# Patient Record
Sex: MALE | Race: WHITE | NOT HISPANIC OR LATINO | Employment: OTHER | URBAN - METROPOLITAN AREA
[De-identification: names, ages, dates, MRNs, and addresses within clinical notes are randomized per-mention and may not be internally consistent; named-entity substitution may affect disease eponyms.]

---

## 2019-03-03 ENCOUNTER — HOSPITAL ENCOUNTER (INPATIENT)
Facility: HOSPITAL | Age: 79
LOS: 3 days | Discharge: HOME | DRG: 389 | End: 2019-03-06
Attending: INTERNAL MEDICINE | Admitting: INTERNAL MEDICINE
Payer: MEDICARE

## 2019-03-03 DIAGNOSIS — K56.7 ILEUS: ICD-10-CM

## 2019-03-03 DIAGNOSIS — R09.02 HYPOXIA: ICD-10-CM

## 2019-03-03 DIAGNOSIS — B34.9 ACUTE VIRAL SYNDROME: ICD-10-CM

## 2019-03-03 DIAGNOSIS — R50.9 FEVER: ICD-10-CM

## 2019-03-03 DIAGNOSIS — R11.2 INTRACTABLE VOMITING WITH NAUSEA, UNSPECIFIED VOMITING TYPE: Primary | ICD-10-CM

## 2019-03-03 DIAGNOSIS — Z97.8 NASOGASTRIC TUBE PRESENT: ICD-10-CM

## 2019-03-03 PROBLEM — N17.9 AKI (ACUTE KIDNEY INJURY): Status: ACTIVE | Noted: 2019-03-03

## 2019-03-03 PROBLEM — K29.70 GASTRITIS: Status: RESOLVED | Noted: 2019-03-03 | Resolved: 2019-03-03

## 2019-03-03 PROBLEM — J44.9 COPD (CHRONIC OBSTRUCTIVE PULMONARY DISEASE): Chronic | Status: ACTIVE | Noted: 2019-03-03

## 2019-03-03 PROBLEM — K29.70 GASTRITIS: Status: ACTIVE | Noted: 2019-03-03

## 2019-03-03 PROBLEM — E78.00 HIGH CHOLESTEROL: Chronic | Status: ACTIVE | Noted: 2019-03-03

## 2019-03-03 PROBLEM — F32.A DEPRESSION: Chronic | Status: ACTIVE | Noted: 2019-03-03

## 2019-03-03 LAB
HCO3 UR-SCNC: 23.4 MMOL/L (ref 24–28)
LDH SERPL L TO P-CCNC: 0.73 MMOL/L (ref 0.5–2.2)
PCO2 BLDA: 42.1 MMHG (ref 35–45)
PH SMN: 7.35 [PH] (ref 7.35–7.45)
PO2 BLDA: 68 MMHG (ref 40–60)
POC B-TYPE NATRIURETIC PEPTIDE: 32.2 PG/ML (ref 0–100)
POC BE: -2 MMOL/L
POC D-DI: 585 NG/ML (ref 0–450)
POC SATURATED O2: 92 % (ref 95–100)
POC TCO2: 25 MMOL/L (ref 24–29)
SAMPLE: ABNORMAL

## 2019-03-03 PROCEDURE — 83880 ASSAY OF NATRIURETIC PEPTIDE: CPT | Mod: ER

## 2019-03-03 PROCEDURE — 25500020 PHARM REV CODE 255: Mod: ER

## 2019-03-03 PROCEDURE — 63600175 PHARM REV CODE 636 W HCPCS: Mod: ER | Performed by: INTERNAL MEDICINE

## 2019-03-03 PROCEDURE — 99291 CRITICAL CARE FIRST HOUR: CPT | Mod: 25,ER

## 2019-03-03 PROCEDURE — 82803 BLOOD GASES ANY COMBINATION: CPT | Mod: ER

## 2019-03-03 PROCEDURE — 96361 HYDRATE IV INFUSION ADD-ON: CPT | Mod: ER

## 2019-03-03 PROCEDURE — C9113 INJ PANTOPRAZOLE SODIUM, VIA: HCPCS | Mod: ER | Performed by: INTERNAL MEDICINE

## 2019-03-03 PROCEDURE — 85379 FIBRIN DEGRADATION QUANT: CPT | Mod: ER

## 2019-03-03 PROCEDURE — 96375 TX/PRO/DX INJ NEW DRUG ADDON: CPT | Mod: ER

## 2019-03-03 PROCEDURE — 21400001 HC TELEMETRY ROOM

## 2019-03-03 PROCEDURE — 63600175 PHARM REV CODE 636 W HCPCS: Performed by: INTERNAL MEDICINE

## 2019-03-03 PROCEDURE — 25000003 PHARM REV CODE 250: Mod: ER | Performed by: INTERNAL MEDICINE

## 2019-03-03 PROCEDURE — 96374 THER/PROPH/DIAG INJ IV PUSH: CPT | Mod: ER

## 2019-03-03 PROCEDURE — 87040 BLOOD CULTURE FOR BACTERIA: CPT

## 2019-03-03 RX ORDER — SODIUM CHLORIDE 0.9 % (FLUSH) 0.9 %
5 SYRINGE (ML) INJECTION
Status: DISCONTINUED | OUTPATIENT
Start: 2019-03-03 | End: 2019-03-06 | Stop reason: HOSPADM

## 2019-03-03 RX ORDER — PROCHLORPERAZINE EDISYLATE 5 MG/ML
10 INJECTION INTRAMUSCULAR; INTRAVENOUS EVERY 6 HOURS PRN
Status: DISCONTINUED | OUTPATIENT
Start: 2019-03-03 | End: 2019-03-06 | Stop reason: HOSPADM

## 2019-03-03 RX ORDER — KETOROLAC TROMETHAMINE 30 MG/ML
15 INJECTION, SOLUTION INTRAMUSCULAR; INTRAVENOUS EVERY 6 HOURS PRN
Status: DISCONTINUED | OUTPATIENT
Start: 2019-03-03 | End: 2019-03-06 | Stop reason: HOSPADM

## 2019-03-03 RX ORDER — PANTOPRAZOLE SODIUM 40 MG/10ML
40 INJECTION, POWDER, LYOPHILIZED, FOR SOLUTION INTRAVENOUS DAILY
Status: DISCONTINUED | OUTPATIENT
Start: 2019-03-03 | End: 2019-03-06 | Stop reason: HOSPADM

## 2019-03-03 RX ORDER — ONDANSETRON 2 MG/ML
4 INJECTION INTRAMUSCULAR; INTRAVENOUS EVERY 8 HOURS PRN
Status: DISCONTINUED | OUTPATIENT
Start: 2019-03-03 | End: 2019-03-06 | Stop reason: HOSPADM

## 2019-03-03 RX ORDER — IPRATROPIUM BROMIDE AND ALBUTEROL SULFATE 2.5; .5 MG/3ML; MG/3ML
3 SOLUTION RESPIRATORY (INHALATION) EVERY 4 HOURS PRN
Status: DISCONTINUED | OUTPATIENT
Start: 2019-03-03 | End: 2019-03-06 | Stop reason: HOSPADM

## 2019-03-03 RX ORDER — ENOXAPARIN SODIUM 100 MG/ML
40 INJECTION SUBCUTANEOUS EVERY 24 HOURS
Status: DISCONTINUED | OUTPATIENT
Start: 2019-03-03 | End: 2019-03-06 | Stop reason: HOSPADM

## 2019-03-03 RX ORDER — ONDANSETRON 2 MG/ML
8 INJECTION INTRAMUSCULAR; INTRAVENOUS
Status: COMPLETED | OUTPATIENT
Start: 2019-03-03 | End: 2019-03-03

## 2019-03-03 RX ORDER — SODIUM CHLORIDE 9 MG/ML
1000 INJECTION, SOLUTION INTRAVENOUS ONCE
Status: DISCONTINUED | OUTPATIENT
Start: 2019-03-03 | End: 2019-03-03

## 2019-03-03 RX ORDER — SODIUM CHLORIDE 9 MG/ML
INJECTION, SOLUTION INTRAVENOUS CONTINUOUS
Status: DISCONTINUED | OUTPATIENT
Start: 2019-03-03 | End: 2019-03-06 | Stop reason: HOSPADM

## 2019-03-03 RX ORDER — ACETAMINOPHEN 325 MG/1
650 TABLET ORAL EVERY 8 HOURS PRN
Status: DISCONTINUED | OUTPATIENT
Start: 2019-03-03 | End: 2019-03-06 | Stop reason: HOSPADM

## 2019-03-03 RX ORDER — SODIUM CHLORIDE 9 MG/ML
1000 INJECTION, SOLUTION INTRAVENOUS ONCE
Status: COMPLETED | OUTPATIENT
Start: 2019-03-03 | End: 2019-03-03

## 2019-03-03 RX ADMIN — SODIUM CHLORIDE: 0.9 INJECTION, SOLUTION INTRAVENOUS at 03:03

## 2019-03-03 RX ADMIN — PANTOPRAZOLE SODIUM 40 MG: 40 INJECTION, POWDER, LYOPHILIZED, FOR SOLUTION INTRAVENOUS at 03:03

## 2019-03-03 RX ADMIN — SODIUM CHLORIDE 1000 ML: 0.9 INJECTION, SOLUTION INTRAVENOUS at 02:03

## 2019-03-03 RX ADMIN — IOHEXOL: 350 INJECTION, SOLUTION INTRAVENOUS at 03:03

## 2019-03-03 RX ADMIN — ENOXAPARIN SODIUM 40 MG: 100 INJECTION SUBCUTANEOUS at 05:03

## 2019-03-03 RX ADMIN — ONDANSETRON 8 MG: 2 INJECTION INTRAMUSCULAR; INTRAVENOUS at 02:03

## 2019-03-03 NOTE — ED PROVIDER NOTES
Encounter Date: 3/3/2019       History     Chief Complaint   Patient presents with    Nausea     Patient reports nausea and vomiting that began last night, evaluated here last night and told to return to ED for worsening symptoms.     Emesis     78-year-old male with history of hypertension and diabetes mellitus type 2 presents to the emergency department after being seen earlier this morning for abdominal pain, viral syndrome with vomiting. Upon admission to the emergency department, patient stated his abdominal pain had already improved greatly.  Patient was treated with IV fluids, Zofran, Protonix and Toradol and began to improve clinically prior to his discharge. He was told that he had a slight nonobstructive ileus and to return to the emergency department if vomiting reoccurred.  After arrival home, he had a recurrence of fever and vomiting. He denies chest pain.      The history is provided by the patient. No  was used.   Emesis    This is a new problem. The current episode started yesterday. The problem occurs intermittently. The problem has been unchanged. The emesis has an appearance of stomach contents. Associated symptoms include abdominal pain, chills and a fever.     Review of patient's allergies indicates:   Allergen Reactions    Doxycycline Rash    Penicillins Rash     Past Medical History:   Diagnosis Date    Asthma     COPD (chronic obstructive pulmonary disease)     Depression     High cholesterol      No past surgical history on file.  No family history on file.  Social History     Tobacco Use    Smoking status: Never Smoker   Substance Use Topics    Alcohol use: No     Frequency: Never    Drug use: No     Review of Systems   Constitutional: Positive for chills and fever.   Respiratory: Negative for shortness of breath.    Cardiovascular: Negative for chest pain.   Gastrointestinal: Positive for abdominal pain, nausea and vomiting.   All other systems reviewed and  are negative.      Physical Exam     Initial Vitals [03/03/19 1348]   BP Pulse Resp Temp SpO2   (!) 116/58 82 17 97.9 °F (36.6 °C) (!) 91 %      MAP       --         Physical Exam    Nursing note and vitals reviewed.  Constitutional: He appears well-developed and well-nourished. No distress.   HENT:   Head: Normocephalic and atraumatic.   Right Ear: External ear normal.   Left Ear: External ear normal.   Eyes: EOM are normal.   Bilateral conjunctival injection   Neck: Normal range of motion. Neck supple.   Cardiovascular: Normal rate, regular rhythm and intact distal pulses.   Pulmonary/Chest: Breath sounds normal.   Abdominal: Soft. Bowel sounds are normal. He exhibits distension. There is no tenderness.   Musculoskeletal: Normal range of motion.   Neurological: He is alert and oriented to person, place, and time. He has normal strength.   Skin: Skin is warm.   Psychiatric: He has a normal mood and affect. His behavior is normal. Thought content normal.         ED Course   Critical Care  Date/Time: 3/3/2019 2:38 PM  Performed by: Antwon Cummings MD  Authorized by: Antwon Cummings MD   Direct patient critical care time: 20 minutes  Additional history critical care time: 10 minutes  Ordering / reviewing critical care time: 10 minutes  Documentation critical care time: 15 minutes  Consulting other physicians critical care time: 5 minutes  Total critical care time (exclusive of procedural time) : 60 minutes  Critical care was necessary to treat or prevent imminent or life-threatening deterioration of the following conditions: dehydration and metabolic crisis.  Critical care was time spent personally by me on the following activities: evaluation of patient's response to treatment, ordering and performing treatments and interventions, re-evaluation of patient's condition, examination of patient, development of treatment plan with patient or surrogate, ordering and review of radiographic studies and obtaining history  from patient or surrogate.        Labs Reviewed   ISTAT PROCEDURE - Abnormal; Notable for the following components:       Result Value    POC PO2 68 (*)     POC HCO3 23.4 (*)     POC SATURATED O2 92 (*)     All other components within normal limits   CULTURE, BLOOD   CULTURE, BLOOD   POCT D DIMER   POCT B-TYPE NATRIURETIC PEPTIDE (BNP)               Imaging Results          CTA Chest Non-Coronary (PE Study) (In process)                X-Ray Chest 1 View (Final result)  Result time 03/03/19 14:51:21   Procedure changed from X-Ray Abdomen AP 1 View (KUB)     Final result by Eric Flores MD (03/03/19 14:51:21)                 Impression:      As above.      Electronically signed by: Eric Flores MD  Date:    03/03/2019  Time:    14:51             Narrative:    EXAMINATION:  XR CHEST 1 VIEW    CLINICAL HISTORY:  Presence of other specified devices    TECHNIQUE:  Single frontal view of the chest was performed.    COMPARISON:  Chest radiograph and CT abdomen and pelvis earlier same day    FINDINGS:  Interval placement of enteric tube with tip and port projected over the left upper quadrant, likely within the gastric fundus.  Otherwise, no change.                               X-Ray Chest AP Portable (Final result)  Result time 03/03/19 14:35:52    Final result by Eric Flores MD (03/03/19 14:35:52)                 Impression:      No radiographic acute intrathoracic process seen.  Specifically, no focal consolidation.      Electronically signed by: Eric Flores MD  Date:    03/03/2019  Time:    14:35             Narrative:    EXAMINATION:  XR CHEST AP PORTABLE    CLINICAL HISTORY:  Fever, unspecified    TECHNIQUE:  Single frontal view of the chest was performed.    COMPARISON:  CT abdomen and pelvis earlier same day    FINDINGS:  Cardiomediastinal silhouette is midline and within normal limits for age allowing for AP portable technique.  Few scattered linear opacities consistent with subsegmental scarring versus  atelectasis.  The lungs are otherwise symmetrically well expanded without focal consolidation, pleural effusion or pneumothorax.  No acute osseous process seen.  PA and lateral views can be obtained.                                 Medical Decision Making:   Initial Assessment:   78-year-old male with history of hypertension and diabetes mellitus type 2 presents to the emergency department after being seen earlier this morning for abdominal pain, viral syndrome with vomiting. Upon admission to the emergency department, patient stated his abdominal pain had already improved greatly.  Patient was treated with IV fluids, Zofran, Protonix and Toradol and began to improve clinically prior to his discharge. He was told that he had a slight nonobstructive ileus and to return to the emergency department if vomiting reoccurred.  After arrival home, he had a recurrence of fever and vomiting. He denies chest pain.    ED Management:  IV fluids and Zofran were given.  Nasogastric tube was placed.  Patient's vital signs are stable and is on 2 L O2 via nasal cannula for pulse ox 91-92% or room air.  BNP was normal.  D-dimer was slightly elevated.  CTA of chest was ordered to rule out PE with low clinical suspicion.  Patient has not exhibited signs of respiratory distress.  Spoke to utilization Management at 1:35 p.m. for admission to inpatient service at [facility] secondary to intractable vomiting, ileus and acute viral syndrome.  Patient was accepted by Dr. Chaparro.                      Clinical Impression:       ICD-10-CM ICD-9-CM   1. Intractable vomiting with nausea, unspecified vomiting type R11.2 536.2   2. Ileus K56.7 560.1   3. Acute viral syndrome B34.9 079.99         Disposition:   Disposition: Transferred  Condition: Stable                        Antwon Cummings MD  03/03/19 1443       Antwon Cummings MD  03/03/19 3633

## 2019-03-03 NOTE — ED NOTES
12 [place] sump inserted to the right nare and secured with silk tape. Placement verified with chest xray. Read at bedside by Dr. Cummings

## 2019-03-03 NOTE — ED NOTES
[facility] dispatch contacted for transport to [facility]. Per dispatch, will be to ED within the hour.

## 2019-03-04 ENCOUNTER — TELEPHONE (OUTPATIENT)
Dept: SURGERY | Facility: CLINIC | Age: 79
End: 2019-03-04

## 2019-03-04 PROBLEM — E66.9 OBESITY: Status: ACTIVE | Noted: 2019-03-04

## 2019-03-04 LAB
ANION GAP SERPL CALC-SCNC: 6 MMOL/L
BUN SERPL-MCNC: 20 MG/DL
CALCIUM SERPL-MCNC: 8.2 MG/DL
CHLORIDE SERPL-SCNC: 104 MMOL/L
CO2 SERPL-SCNC: 25 MMOL/L
CREAT SERPL-MCNC: 1.2 MG/DL
EST. GFR  (AFRICAN AMERICAN): >60 ML/MIN/1.73 M^2
EST. GFR  (NON AFRICAN AMERICAN): 58 ML/MIN/1.73 M^2
GLUCOSE SERPL-MCNC: 75 MG/DL
POTASSIUM SERPL-SCNC: 4.1 MMOL/L
SODIUM SERPL-SCNC: 135 MMOL/L

## 2019-03-04 PROCEDURE — 80048 BASIC METABOLIC PNL TOTAL CA: CPT

## 2019-03-04 PROCEDURE — C9113 INJ PANTOPRAZOLE SODIUM, VIA: HCPCS | Performed by: INTERNAL MEDICINE

## 2019-03-04 PROCEDURE — 25000003 PHARM REV CODE 250: Performed by: INTERNAL MEDICINE

## 2019-03-04 PROCEDURE — 99223 1ST HOSP IP/OBS HIGH 75: CPT | Mod: GC,,, | Performed by: SURGERY

## 2019-03-04 PROCEDURE — 63600175 PHARM REV CODE 636 W HCPCS: Performed by: INTERNAL MEDICINE

## 2019-03-04 PROCEDURE — 36415 COLL VENOUS BLD VENIPUNCTURE: CPT

## 2019-03-04 PROCEDURE — 99223 PR INITIAL HOSPITAL CARE,LEVL III: ICD-10-PCS | Mod: GC,,, | Performed by: SURGERY

## 2019-03-04 PROCEDURE — 21400001 HC TELEMETRY ROOM

## 2019-03-04 RX ADMIN — KETOROLAC TROMETHAMINE 15 MG: 30 INJECTION, SOLUTION INTRAMUSCULAR; INTRAVENOUS at 08:03

## 2019-03-04 RX ADMIN — SODIUM CHLORIDE: 0.9 INJECTION, SOLUTION INTRAVENOUS at 06:03

## 2019-03-04 RX ADMIN — PANTOPRAZOLE SODIUM 40 MG: 40 INJECTION, POWDER, LYOPHILIZED, FOR SOLUTION INTRAVENOUS at 08:03

## 2019-03-04 RX ADMIN — ENOXAPARIN SODIUM 40 MG: 100 INJECTION SUBCUTANEOUS at 05:03

## 2019-03-04 RX ADMIN — SODIUM CHLORIDE: 0.9 INJECTION, SOLUTION INTRAVENOUS at 01:03

## 2019-03-04 RX ADMIN — SODIUM CHLORIDE: 0.9 INJECTION, SOLUTION INTRAVENOUS at 10:03

## 2019-03-04 NOTE — PLAN OF CARE
Problem: Fall Injury Risk  Goal: Absence of Fall and Fall-Related Injury    Intervention: Identify and Manage Contributors to Fall Injury Risk   03/03/19 1935   Manage Acute Allergic Reaction   Medication Review/Management medications reviewed   Identify and Manage Contributors to Fall Injury Risk   Self-Care Promotion independence encouraged;BADL personal objects within reach     Intervention: Promote Injury-Free Environment   03/03/19 1935   Optimize [place] and Functional Mobility   Environmental Safety Modification assistive device/personal items within reach   Optimize Balance and Safe Activity   Safety Promotion/Fall Prevention assistive device/personal item within reach;bed alarm set

## 2019-03-04 NOTE — NURSING
Patient arrived to unit via stretcher with EMS. O2 in place, NG tube in place, NS infusing at 125 ml/hr. Patient walked from stretcher to bed without difficulty. Patient is in no apparent distress. Wife at bedside. Safety maintained. Will continue to monitor.

## 2019-03-04 NOTE — PLAN OF CARE
Problem: Fall Injury Risk  Goal: Absence of Fall and Fall-Related Injury    Intervention: Identify and Manage Contributors to Fall Injury Risk   03/04/19 1510   Manage Acute Allergic Reaction   Medication Review/Management medications reviewed   Identify and Manage Contributors to Fall Injury Risk   Self-Care Promotion BADL personal objects within reach;BADL personal routines maintained

## 2019-03-04 NOTE — ASSESSMENT & PLAN NOTE
Abdominal pain and distension, nausea, and vomiting with evidence of mild ileus on CT.  Continue NG tube and supportive care with IV fluids, antiemetics, and analgesics.  NPO.

## 2019-03-04 NOTE — HOSPITAL COURSE
78 y.o. male with hyperlipidemia, COPD, and depression presents with complaint of nausea, vomiting, and abdominal pain that began last night.  Associated with abdominal distension and fever, T-max 102° F at home. CT abdomen suggestive of ileus.  Patient had an NG tube placed. Surgery was consulted to follow. No previous history of this and no abdominal surgeries. The patient quickly recovered and NG tube was pulled.  The patient tolerated soft diet. Multiple BM's and gas.  He was requesting discharge on 3/6/19.  CT also noted a pulmonary nodule that needs follow up with PCP. The patient will be discharged today. Soft diet for 2 more days. Activity as tolerated.  Follow up PCP in one week      ***Has pulmonary nodule as found on CT. Needs follow up****   Patient informed.

## 2019-03-04 NOTE — PROGRESS NOTES
"[facility] [place]  Progress Note    SUMMARY       Recommendations    1. Advance diet as able to Cardiac   2. RD to f/u with progress and reassess for nutrition support if warranted.    Goals: Initiate nutrition within 3-5 days  Nutrition Goal Status: new  Communication of RD Recs: (plan of care)    Reason for Assessment    Reason For Assessment: identified at risk by screening criteria  Diagnosis: (ileus)  Relevant Medical History: HLD, COPD  Interdisciplinary Rounds: did not attend    General Information Comments: Pt NPO with NGT. Reports last po intake on 3/2 pm. Sx/o n/v since 3/3. Pt also reports no BM since 2/28; reports hx/o impaction/obstruction in past. Reports weight/appetite stable prior to onset of sx. Did not want NFPE at this time due to abd distention/discomfort. NPO Day 2.    Nutrition Discharge Planning: Too soon to determine    Nutrition Risk Screen    Nutrition Risk Screen: other (see comments)(NPO, NG tube in place)    Nutrition/Diet History    Patient Reported Diet/Restrictions/Preferences: general  Food Preferences: n/a  Spiritual, Cultural Beliefs, Yazidism Practices, Values that Affect Care: no  Food Allergies: NKFA  Factors Affecting Nutritional Intake: NPO, altered gastrointestinal function, abdominal distension, nausea/vomiting    Anthropometrics    Temp: 98 °F (36.7 °C)  Height Method: Stated  Height: 5' 6" (167.6 cm)  Height (inches): 66 in  Weight Method: Bed Scale  Weight: 77.5 kg (170 lb 13.7 oz)  Weight (lb): 170.86 lb  Ideal Body Weight (IBW), Male: 142 lb  % Ideal Body Weight, Male (lb): 120.32 lb  BMI (Calculated): 27.6  BMI Grade: 25 - 29.9 - overweight       Lab/Procedures/Meds    Pertinent Labs Reviewed: reviewed  Pertinent Medications Reviewed: reviewed    Estimated/Assessed Needs    Weight Used For Calorie Calculations: 77.5 kg (170 lb 13.7 oz)  Energy Calorie Requirements (kcal): 1800 kcal (x 1.25)  Energy Need Method: [place]ag[place]  Protein " Requirements: 77-93g (1-1.2g/kg)  Weight Used For Protein Calculations: 77.5 kg (170 lb 13.7 oz)     Estimated Fluid Requirement Method: RDA Method  RDA Method (mL): 1800    Nutrition Prescription Ordered    Current Diet Order: NPO    Evaluation of Received Nutrient/Fluid Intake    IV Fluid (mL): 125(ml/hr NS)  Energy Calories Required: not meeting needs  Protein Required: not meeting needs  Fluid Required: (per MD)  Comments: LBM: 2/27 per pt    % Meal Intake: NPO    Nutrition Risk    Level of Risk/Frequency of Follow-up: (2 x week)     Assessment and Plan    Nutrition Problem  Inadequate energy intake    Related to (etiology):   Suspected ileus    Signs and Symptoms (as evidenced by):   NPO    Interventions  Collaboration with providers    Nutrition Diagnosis Status:   New       Monitor and Evaluation    Food and Nutrient Intake: energy intake  Food and Nutrient Adminstration: diet order, enteral and parenteral nutrition administration  Anthropometric Measurements: weight, weight change  Biochemical Data, Medical Tests and Procedures: electrolyte and renal panel  Nutrition-Focused Physical Findings: overall appearance     Malnutrition Assessment       JOSE CRUZ at this time, pt refused NFPE.         Nutrition Follow-Up    RD Follow-up?: Yes

## 2019-03-04 NOTE — TELEPHONE ENCOUNTER
Consult given to         ----- Message from Sarita Bowling sent at 3/4/2019  3:02 PM CST -----  Contact: Cherelle  [phone]           Consult  MRN:  [id number]  #:  [id number]  Doc:  Dr. Chaparro  Reason:   Ileus /SBO  Stat or Routine:  Routine  Name:  Cherelle / Nurse Amos [phone]

## 2019-03-04 NOTE — SUBJECTIVE & OBJECTIVE
Interval History: No new issues. States feels a little better. No N/V. No BM  + flatus.       Review of Systems   Constitutional: Negative for activity change.   HENT: Negative for congestion.    Respiratory: Negative for chest tightness and shortness of breath.    Cardiovascular: Negative for chest pain.   Gastrointestinal: Positive for abdominal distention and abdominal pain. Negative for nausea and vomiting.   Genitourinary: Negative for difficulty urinating.   Musculoskeletal: Negative for arthralgias.   Psychiatric/Behavioral: Negative for agitation.     Objective:     Vital Signs (Most Recent):  Temp: 98 °F (36.7 °C) (03/04/19 0717)  Pulse: 73 (03/04/19 0717)  Resp: 18 (03/04/19 0717)  BP: (!) 112/54 (03/04/19 0717)  SpO2: (!) 93 % (03/04/19 0717) Vital Signs (24h Range):  Temp:  [97.9 °F (36.6 °C)-99.7 °F (37.6 °C)] 98 °F (36.7 °C)  Pulse:  [63-89] 73  Resp:  [17-18] 18  SpO2:  [91 %-98 %] 93 %  BP: (101-132)/(54-60) 112/54     Weight: 77.5 kg (170 lb 13.7 oz)  Body mass index is 27.58 kg/m².    Intake/Output Summary (Last 24 hours) at 3/4/2019 1018  Last data filed at 3/4/2019 0745  Gross per 24 hour   Intake 1000 ml   Output 2050 ml   Net -1050 ml      Physical Exam   Constitutional: He is oriented to person, place, and time. He appears well-developed and well-nourished.   HENT:   Head: Normocephalic and atraumatic.   Pulmonary/Chest: Effort normal and breath sounds normal.   Abdominal: He exhibits distension. There is no tenderness.   Hypoactive Bs   Neurological: He is alert and oriented to person, place, and time.   Nursing note and vitals reviewed.      Significant Labs:   BMP: No results for input(s): GLU, NA, K, CL, CO2, BUN, CREATININE, CALCIUM, MG in the last 48 hours.  CBC:   Recent Labs   Lab 03/03/19  0455   WBC 4.04   HGB 16.2   HCT 49.1   *       Significant Imaging:

## 2019-03-04 NOTE — PLAN OF CARE
03/04/19 1153   Discharge Assessment   Assessment Type Discharge Planning Assessment   Confirmed/corrected address and phone number on facesheet? Yes   Assessment information obtained from? Patient   Communicated expected length of stay with patient/caregiver no   Prior to hospitilization cognitive status: Alert/Oriented   Prior to hospitalization functional status: Independent   Current cognitive status: Alert/Oriented   Current Functional Status: Independent   Facility Arrived From: Home   Lives With spouse   Able to Return to Prior Arrangements yes   Is patient able to care for self after discharge? Yes   Who are your caregiver(s) and their phone number(s)? Spouse; daughters: Rica [phone]; [phone]   Patient's perception of discharge disposition home or selfcare   Readmission Within the Last 30 Days no previous admission in last 30 days   Patient currently being followed by outpatient case management? No   Patient currently receives any other outside agency services? No   Equipment Currently Used at Home none   Do you have any problems affording any of your prescribed medications? No   Is the patient taking medications as prescribed? yes   Does the patient have transportation home? Yes   Transportation Anticipated car, drives self;family or friend will provide   Does the patient receive services at the [facility]? No   Discharge Plan A Home with family   Discharge Plan B Other  (TBD)   DME Needed Upon Discharge  other (see comments)  (TBD)   Patient/Family in Agreement with Plan yes   SW Role explained to patient; two patient identifiers recognized; SW contact information placed on Communication board. Discussed patient managing health care at home; determined who would be helping patient at home with recovery: spouse at home will help with recovery; daughters are also available to assist if needed.    Patient is independent at home with spouse; no assist usually needed; spouse and daughters can  assist if needed; no current services or DME; no needs anticipated at this time    PCP: Je Polanco MD    Extended Emergency Contact Information  Primary Emergency Contact: Rica Cullen  Mobile Phone: [phone]  Relation: Daughter  Secondary Emergency Contact: Eveline Prieto  Mobile Phone: [phone]  Relation: Daughter     Pharmacy[facility]    Payor: MEDICARE / Plan: MEDICARE PART A & B / Product Type: Government /

## 2019-03-04 NOTE — ASSESSMENT & PLAN NOTE
Creatinine 1.7, no prior baseline on file but he denies chronic kidney disease. Suspect prerenal secondary to dehydration due to gastric losses and poor p.o. intake.  Continue IV fluids, strict I/O's, monitor renal function and electrolytes, avoid nephrotoxic agents.  BMP pending.

## 2019-03-04 NOTE — ASSESSMENT & PLAN NOTE
Creatinine 1.7, no prior baseline on file but he denies chronic kidney disease. Suspect prerenal secondary to dehydration due to gastric losses and poor p.o. intake.  Continue IV fluids, strict I/O's, monitor renal function and electrolytes, avoid nephrotoxic agents.

## 2019-03-04 NOTE — SUBJECTIVE & OBJECTIVE
Past Medical History:   Diagnosis Date    Asthma     COPD (chronic obstructive pulmonary disease)     Depression     High cholesterol        No past surgical history on file.    Review of patient's allergies indicates:   Allergen Reactions    Doxycycline Rash    Penicillins Rash       Current Facility-Administered Medications on File Prior to Encounter   Medication    [COMPLETED] (pyxis) gi cocktail (mylanta 30 mL, lidocaine 2 % viscous 10 mL, dicyclomine 10 mL) 50 mL    [COMPLETED] 0.9%  NaCl infusion    [COMPLETED] ketorolac injection 15 mg    [COMPLETED] omnipaque 350 iohexol 75 mL    [COMPLETED] ondansetron injection 8 mg    [COMPLETED] pantoprazole injection 40 mg    [DISCONTINUED] ketorolac injection 15 mg     Current Outpatient Medications on File Prior to Encounter   Medication Sig    aspirin (ECOTRIN) 81 MG EC tablet Take 81 mg by mouth once daily.    FLUoxetine 20 MG tablet Take 40 mg by mouth once daily.    multivitamin capsule Take 1 capsule by mouth once daily.    ondansetron (ZOFRAN) 4 MG tablet Take 1 tablet (4 mg total) by mouth every 6 (six) hours as needed for Nausea (for nausea).    pravastatin (PRAVACHOL) 40 MG tablet Take 40 mg by mouth once daily.    [DISCONTINUED] polyethylene glycol (GLYCOLAX) 17 gram/dose powder Take 17 g by mouth once daily.     Family History     None        Tobacco Use    Smoking status: Never Smoker   Substance and Sexual Activity    Alcohol use: No     Frequency: Never    Drug use: No    Sexual activity: Not on file     Review of Systems   Constitutional: Positive for chills and fever.   Eyes: Negative for photophobia and visual disturbance.   Respiratory: Negative for cough and shortness of breath.    Cardiovascular: Negative for chest pain, palpitations and leg swelling.   Gastrointestinal: Positive for abdominal distention, abdominal pain, nausea and vomiting. Negative for diarrhea.   Genitourinary: Negative for frequency, hematuria and  urgency.   Skin: Negative for pallor, rash and wound.   Neurological: Negative for light-headedness and headaches.   Psychiatric/Behavioral: Negative for confusion and decreased concentration.     Objective:     Vital Signs (Most Recent):  Temp: 98.4 °F (36.9 °C) (03/03/19 1652)  Pulse: 63 (03/03/19 1652)  Resp: 17 (03/03/19 1652)  BP: (!) 106/58 (03/03/19 1652)  SpO2: 96 % (03/03/19 1652) Vital Signs (24h Range):  Temp:  [97.8 °F (36.6 °C)-99.7 °F (37.6 °C)] 98.4 °F (36.9 °C)  Pulse:  [63-82] 63  Resp:  [16-20] 17  SpO2:  [91 %-100 %] 96 %  BP: (101-161)/(55-64) 106/58     Weight: 72.6 kg (160 lb)  Body mass index is 25.82 kg/m².    Physical Exam   Constitutional: He is oriented to person, place, and time. He appears well-developed and well-nourished. No distress.   HENT:   Head: Normocephalic and atraumatic.   Right Ear: External ear normal.   Left Ear: External ear normal.   Nose: Nose normal.   Mouth/Throat: Oropharynx is clear and moist.   Eyes: Conjunctivae and EOM are normal. Pupils are equal, round, and reactive to light.   Neck: Normal range of motion. Neck supple.   Cardiovascular: Normal rate, regular rhythm and intact distal pulses.   Pulmonary/Chest: Effort normal and breath sounds normal. No respiratory distress. He has no wheezes. He has no rales.   Abdominal: Soft. He exhibits distension. Bowel sounds are decreased. There is tenderness (Mild, generalized).   No palpable hepatomegaly or splenomegaly   Musculoskeletal: Normal range of motion. He exhibits no edema or tenderness.   Neurological: He is alert and oriented to person, place, and time.   Skin: Skin is warm and dry.   Psychiatric: He has a normal mood and affect. Thought content normal.   Nursing note and vitals reviewed.        CRANIAL NERVES     CN III, IV, VI   Pupils are equal, round, and reactive to light.  Extraocular motions are normal.        Significant Labs: All pertinent labs within the past 24 hours have been  reviewed.    Significant Imaging: I have reviewed all pertinent imaging results/findings within the past 24 hours.

## 2019-03-04 NOTE — NURSING
End of shift bedside report given to DAYRON Pinon. Patient in no apparent distress.     12 hour chart check complete.

## 2019-03-04 NOTE — ASSESSMENT & PLAN NOTE
Abdominal pain and distension, nausea, and vomiting with evidence of mild ileus on CT.  Continue NG tube and supportive care with IV fluids, antiemetics, and analgesics.  NPO. Surgery consulted. No previous history. No abdominal surgeries. + flatus.  Continue supportive care.

## 2019-03-04 NOTE — PROGRESS NOTES
[facility] Hospital Medicine  Progress Note    Patient Name: Navin Henning Jr.  MRN: [id number]  Patient Class: IP- Inpatient   Admission Date: 3/3/2019  Length of Stay: 1 days  Attending Physician: Mac Chaparro MD  Primary Care Provider: Primary Doctor No        Subjective:     Principal Problem:Ileus    HPI:  78 y.o. male with hyperlipidemia, COPD, and depression presents with complaint of nausea, vomiting, and abdominal pain that began last night.  Associated with abdominal distension and fever, T-max 102°  F at home.  He denies cough, SOB, chest pain, dizziness, syncope, diarrhea, hematemesis, coffee-ground emesis, bloody or black stools, or dysuria.  Evaluated in ED early this morning for these symptoms, received IV fluids, antiemetics, and Tylenol with improvement and was discharged.  Upon return home his symptoms recurred and he presented to the ER again.  CT abdomen/pelvis suggestive of mild ileus.  Also evidence of acute kidney injury, creatinine 1.7 with presume normal baseline.  He was not febrile in the ED and vital signs and lab work reassuring.  IV fluids and antiemetics initiated.  NG tube placed.  Admitted to Hospital Medicine for further evaluation treatment.    Hospital Course:  78 y.o. male with hyperlipidemia, COPD, and depression presents with complaint of nausea, vomiting, and abdominal pain that began last night.  Associated with abdominal distension and fever, T-max 102° F at home. CT abdomen suggestive of ileus.  Patient had an NG tube placed. Surgery was consulted to follow. No previous history of this and no abdominal surgeries.     Interval History: No new issues. States feels a little better. No N/V. No BM  + flatus.       Review of Systems   Constitutional: Negative for activity change.   HENT: Negative for congestion.    Respiratory: Negative for chest tightness and shortness of breath.    Cardiovascular: Negative for chest pain.   Gastrointestinal:  Positive for abdominal distention and abdominal pain. Negative for nausea and vomiting.   Genitourinary: Negative for difficulty urinating.   Musculoskeletal: Negative for arthralgias.   Psychiatric/Behavioral: Negative for agitation.     Objective:     Vital Signs (Most Recent):  Temp: 98 °F (36.7 °C) (03/04/19 0717)  Pulse: 73 (03/04/19 0717)  Resp: 18 (03/04/19 0717)  BP: (!) 112/54 (03/04/19 0717)  SpO2: (!) 93 % (03/04/19 0717) Vital Signs (24h Range):  Temp:  [97.9 °F (36.6 °C)-99.7 °F (37.6 °C)] 98 °F (36.7 °C)  Pulse:  [63-89] 73  Resp:  [17-18] 18  SpO2:  [91 %-98 %] 93 %  BP: (101-132)/(54-60) 112/54     Weight: 77.5 kg (170 lb 13.7 oz)  Body mass index is 27.58 kg/m².    Intake/Output Summary (Last 24 hours) at 3/4/2019 1018  Last data filed at 3/4/2019 0745  Gross per 24 hour   Intake 1000 ml   Output 2050 ml   Net -1050 ml      Physical Exam   Constitutional: He is oriented to person, place, and time. He appears well-developed and well-nourished.   HENT:   Head: Normocephalic and atraumatic.   Pulmonary/Chest: Effort normal and breath sounds normal.   Abdominal: He exhibits distension. There is no tenderness.   Hypoactive Bs   Neurological: He is alert and oriented to person, place, and time.   Nursing note and vitals reviewed.      Significant Labs:   BMP: No results for input(s): GLU, NA, K, CL, CO2, BUN, CREATININE, CALCIUM, MG in the last 48 hours.  CBC:   Recent Labs   Lab 03/03/19  0455   WBC 4.04   HGB 16.2   HCT 49.1   *       Significant Imaging:     Assessment/Plan:      * Ileus    Abdominal pain and distension, nausea, and vomiting with evidence of mild ileus on CT.  Continue NG tube and supportive care with IV fluids, antiemetics, and analgesics.  NPO. Surgery consulted. No previous history. No abdominal surgeries. + flatus.  Continue supportive care.        CAROL (acute kidney injury)    Creatinine 1.7, no prior baseline on file but he denies chronic kidney disease. Suspect prerenal  secondary to dehydration due to gastric losses and poor p.o. intake.  Continue IV fluids, strict I/O's, monitor renal function and electrolytes, avoid nephrotoxic agents.  BMP pending.        COPD (chronic obstructive pulmonary disease)    Well controlled and not on any inhalers or nebs at home, he does not smoke.  P.r.n. nebs.     Depression    Resume fluoxetine when able to tolerate p.o.     High cholesterol    Resume statin when able to tolerate p.o.     Intractable vomiting with nausea    Supportive care as above.     Acute viral syndrome    Supportive care as above.  Antipyretics as needed for fever.       VTE Risk Mitigation (From admission, onward)        Ordered     enoxaparin injection 40 mg  Daily      03/03/19 1354     IP VTE HIGH RISK PATIENT  Once      03/03/19 1354     Place VICKIE hose  Until discontinued      03/03/19 1354              Mac Garcia MD  Department of Hospital Medicine   [facility]

## 2019-03-04 NOTE — CONSULTS
[facility]  General Surgery  Consult Note    Consults  Subjective:     Chief Complaint/Reason for Admission: fever, chills, nausea/vomiting.     History of Present Illness: Mr Henning is a 78 year old man with history of COPD admitted with nausea, intractable vomiting and fever of one day duration. At the time of assessment, he had return of bowel function with +flatus and BM, and was feeling much improved.  He has never had surgery, but had one previous admission for stool impaction, which he reports required NG tube and mechanical bowel prep.   He has had a recent normal colonoscopy, and has no family history of GI pathology.     No current facility-administered medications on file prior to encounter.      Current Outpatient Medications on File Prior to Encounter   Medication Sig    aspirin (ECOTRIN) 81 MG EC tablet Take 81 mg by mouth once daily.    FLUoxetine 20 MG tablet Take 40 mg by mouth once daily.    multivitamin capsule Take 1 capsule by mouth once daily.    ondansetron (ZOFRAN) 4 MG tablet Take 1 tablet (4 mg total) by mouth every 6 (six) hours as needed for Nausea (for nausea).    pravastatin (PRAVACHOL) 40 MG tablet Take 40 mg by mouth once daily.       Review of patient's allergies indicates:   Allergen Reactions    Doxycycline Rash    Penicillins Rash       Past Medical History:   Diagnosis Date    Asthma     COPD (chronic obstructive pulmonary disease)     Depression     High cholesterol      No past surgical history on file.  Family History     None        Tobacco Use    Smoking status: Never Smoker   Substance and Sexual Activity    Alcohol use: No     Frequency: Never    Drug use: No    Sexual activity: Not on file     Review of Systems   +fever, chills  Denies change in vision  Denies dysphagia  Denies headache, seizure  Denies CP  Denies SOB  GI per HPI  Denies dysuria, hematuria  Denies abnormal bleeding or bruising  Denies MSK pain or swelling  Denies rash or  jaundice    Objective:     Vital Signs (Most Recent):  Temp: 98.6 °F (37 °C) (03/04/19 1513)  Pulse: 69 (03/04/19 1513)  Resp: 18 (03/04/19 1513)  BP: (!) 123/58 (03/04/19 1513)  SpO2: 97 % (03/04/19 1513) Vital Signs (24h Range):  Temp:  [98 °F (36.7 °C)-99.2 °F (37.3 °C)] 98.6 °F (37 °C)  Pulse:  [64-89] 69  Resp:  [18] 18  SpO2:  [93 %-98 %] 97 %  BP: (112-132)/(54-65) 123/58     Weight: 77.5 kg (170 lb 13.7 oz)  Body mass index is 27.58 kg/m².      Intake/Output Summary (Last 24 hours) at 3/4/2019 1738  Last data filed at 3/4/2019 1033  Gross per 24 hour   Intake --   Output 2350 ml   Net -2350 ml       Physical Exam  Awake, alert, no distress  EOMI  NG in place with thin bilious output  RRR  No increased work of breathing  Abd soft, moderately distended, non distended  No MSK deformity  No rash or jaundice  CN intact, non focal  Normal affect    Significant Labs:  CBC:   Recent Labs   Lab 03/03/19  0455   WBC 4.04   RBC 5.29   HGB 16.2   HCT 49.1   *   MCV 93   MCH 30.6   MCHC 33.0     CMP:   Recent Labs   Lab 03/04/19  1120   GLU 75   CALCIUM 8.2*   *   K 4.1   CO2 25      BUN 20   CREATININE 1.2       Significant Diagnostics:  I have reviewed all pertinent imaging results/findings within the past 24 hours.  CT: Mildly distended small bowel loops, ileus favored over obstruction.  Assessment/Plan:   78M with suspected viral illness, with ileus, resolving  Clamp NG trail. If no nausea, plan to remove in the AM and PO trial.   No surgical intervention at this time.   Continue medical management.    Active Diagnoses:    Diagnosis Date Noted POA    PRINCIPAL PROBLEM:  Ileus [K56.7] 03/03/2019 Yes    Obesity [E66.9] 03/04/2019 Yes    Acute viral syndrome [B34.9] 03/03/2019 Yes    Intractable vomiting with nausea [R11.2] 03/03/2019 Yes    High cholesterol [E78.00] 03/03/2019 Yes     Chronic    Depression [F32.9] 03/03/2019 Yes     Chronic    COPD (chronic obstructive pulmonary disease)  [J44.9] 03/03/2019 Yes     Chronic    CAROL (acute kidney injury) [N17.9] 03/03/2019 Yes      Problems Resolved During this Admission:       Thank you for your consult. I will follow-up with patient. Please contact us if you have any additional questions.    Ana Conteh MD  General Surgery  [facility]

## 2019-03-04 NOTE — PLAN OF CARE
Recommendations     1. Advance diet as able to Cardiac   2. RD to f/u with progress and reassess for nutrition support if warranted.     Goals: Initiate nutrition within 3-5 days  Nutrition Goal Status: new  Communication of RD Recs: (plan of care)

## 2019-03-04 NOTE — HPI
78 y.o. male with hyperlipidemia, COPD, and depression presents with complaint of nausea, vomiting, and abdominal pain that began last night.  Associated with abdominal distension and fever, T-max 102° F at home.  He denies cough, SOB, chest pain, dizziness, syncope, diarrhea, hematemesis, coffee-ground emesis, bloody or black stools, or dysuria.  Evaluated in ED early this morning for these symptoms, received IV fluids, antiemetics, and Tylenol with improvement and was discharged.  Upon return home his symptoms recurred and he presented to the ER again.  CT abdomen/pelvis suggestive of mild ileus.  Also evidence of acute kidney injury, creatinine 1.7 with presume normal baseline.  He was not febrile in the ED and vital signs and lab work reassuring.  IV fluids and antiemetics initiated.  NG tube placed.  Admitted to Hospital Medicine for further evaluation treatment.

## 2019-03-05 LAB
ANION GAP SERPL CALC-SCNC: 4 MMOL/L
ANION GAP SERPL CALC-SCNC: 8 MMOL/L
BUN SERPL-MCNC: 13 MG/DL
BUN SERPL-MCNC: 15 MG/DL
CALCIUM SERPL-MCNC: 7.8 MG/DL
CALCIUM SERPL-MCNC: 8 MG/DL
CHLORIDE SERPL-SCNC: 108 MMOL/L
CHLORIDE SERPL-SCNC: 108 MMOL/L
CO2 SERPL-SCNC: 19 MMOL/L
CO2 SERPL-SCNC: 25 MMOL/L
CREAT SERPL-MCNC: 0.9 MG/DL
CREAT SERPL-MCNC: 0.9 MG/DL
EST. GFR  (AFRICAN AMERICAN): >60 ML/MIN/1.73 M^2
EST. GFR  (AFRICAN AMERICAN): >60 ML/MIN/1.73 M^2
EST. GFR  (NON AFRICAN AMERICAN): >60 ML/MIN/1.73 M^2
EST. GFR  (NON AFRICAN AMERICAN): >60 ML/MIN/1.73 M^2
GLUCOSE SERPL-MCNC: 56 MG/DL
GLUCOSE SERPL-MCNC: 92 MG/DL
MAGNESIUM SERPL-MCNC: 2.1 MG/DL
PHOSPHATE SERPL-MCNC: 2 MG/DL
POTASSIUM SERPL-SCNC: 4.1 MMOL/L
POTASSIUM SERPL-SCNC: 4.2 MMOL/L
SODIUM SERPL-SCNC: 135 MMOL/L
SODIUM SERPL-SCNC: 137 MMOL/L

## 2019-03-05 PROCEDURE — 80048 BASIC METABOLIC PNL TOTAL CA: CPT

## 2019-03-05 PROCEDURE — 25000003 PHARM REV CODE 250: Performed by: SURGERY

## 2019-03-05 PROCEDURE — 36415 COLL VENOUS BLD VENIPUNCTURE: CPT

## 2019-03-05 PROCEDURE — 94761 N-INVAS EAR/PLS OXIMETRY MLT: CPT

## 2019-03-05 PROCEDURE — 21400001 HC TELEMETRY ROOM

## 2019-03-05 PROCEDURE — 83735 ASSAY OF MAGNESIUM: CPT

## 2019-03-05 PROCEDURE — C9113 INJ PANTOPRAZOLE SODIUM, VIA: HCPCS | Performed by: INTERNAL MEDICINE

## 2019-03-05 PROCEDURE — 63600175 PHARM REV CODE 636 W HCPCS: Performed by: INTERNAL MEDICINE

## 2019-03-05 PROCEDURE — 80048 BASIC METABOLIC PNL TOTAL CA: CPT | Mod: 91

## 2019-03-05 PROCEDURE — 25000003 PHARM REV CODE 250: Performed by: INTERNAL MEDICINE

## 2019-03-05 PROCEDURE — 99900035 HC TECH TIME PER 15 MIN (STAT)

## 2019-03-05 PROCEDURE — 84100 ASSAY OF PHOSPHORUS: CPT

## 2019-03-05 RX ORDER — SODIUM,POTASSIUM PHOSPHATES 280-250MG
2 POWDER IN PACKET (EA) ORAL ONCE
Status: COMPLETED | OUTPATIENT
Start: 2019-03-05 | End: 2019-03-05

## 2019-03-05 RX ADMIN — SODIUM CHLORIDE, SODIUM LACTATE, POTASSIUM CHLORIDE, AND CALCIUM CHLORIDE 1000 ML: .6; .31; .03; .02 INJECTION, SOLUTION INTRAVENOUS at 08:03

## 2019-03-05 RX ADMIN — SODIUM CHLORIDE: 0.9 INJECTION, SOLUTION INTRAVENOUS at 10:03

## 2019-03-05 RX ADMIN — ENOXAPARIN SODIUM 40 MG: 100 INJECTION SUBCUTANEOUS at 05:03

## 2019-03-05 RX ADMIN — PANTOPRAZOLE SODIUM 40 MG: 40 INJECTION, POWDER, LYOPHILIZED, FOR SOLUTION INTRAVENOUS at 08:03

## 2019-03-05 RX ADMIN — POTASSIUM & SODIUM PHOSPHATES POWDER PACK 280-160-250 MG 2 PACKET: 280-160-250 PACK at 02:03

## 2019-03-05 RX ADMIN — SODIUM CHLORIDE: 0.9 INJECTION, SOLUTION INTRAVENOUS at 05:03

## 2019-03-05 RX ADMIN — SODIUM CHLORIDE: 0.9 INJECTION, SOLUTION INTRAVENOUS at 04:03

## 2019-03-05 RX ADMIN — KETOROLAC TROMETHAMINE 15 MG: 30 INJECTION, SOLUTION INTRAMUSCULAR; INTRAVENOUS at 08:03

## 2019-03-05 NOTE — PROGRESS NOTES
[facility]  General Surgery  Progress Note    Subjective:     Interval History:     Having BM  NG removed  Tolerating CLD     Post-Op Info:  * No surgery found *          Medications:  Continuous Infusions:   sodium chloride 0.9% 125 mL/hr at 03/05/19 0404     Scheduled Meds:   enoxaparin  40 mg Subcutaneous Daily    lactated ringers  1,000 mL Intravenous Once    pantoprazole  40 mg Intravenous Daily     PRN Meds:acetaminophen, albuterol-ipratropium, ketorolac, ondansetron, prochlorperazine, sodium chloride 0.9%     Objective:     Vital Signs (Most Recent):  Temp: 98.1 °F (36.7 °C) (03/05/19 0808)  Pulse: 68 (03/05/19 0808)  Resp: 17 (03/05/19 0808)  BP: (!) 148/69 (03/05/19 0808)  SpO2: (!) 94 % (03/05/19 0810) Vital Signs (24h Range):  Temp:  [97.9 °F (36.6 °C)-98.6 °F (37 °C)] 98.1 °F (36.7 °C)  Pulse:  [64-73] 68  Resp:  [17-18] 17  SpO2:  [94 %-97 %] 94 %  BP: (114-148)/(58-69) 148/69       Intake/Output Summary (Last 24 hours) at 3/5/2019 1012  Last data filed at 3/5/2019 0851  Gross per 24 hour   Intake 240 ml   Output 950 ml   Net -710 ml       Physical Exam  Gen: AOx3 in NAD  HEENT: wnl, MMM  CV; RRR  Abd: soft, mild distention, non tender  EXT: moves all     Significant Labs:  CBC: No results for input(s): WBC, RBC, HGB, HCT, PLT, MCV, MCH, MCHC in the last 48 hours.  CMP:   Recent Labs   Lab 03/05/19  0514   GLU 56*   CALCIUM 7.8*   *   K 4.2   CO2 19*      BUN 15   CREATININE 0.9       Significant Diagnostics:  None    Assessment/Plan:     Active Diagnoses:    Diagnosis Date Noted POA    PRINCIPAL PROBLEM:  Ileus [K56.7] 03/03/2019 Yes    Obesity [E66.9] 03/04/2019 Yes    Acute viral syndrome [B34.9] 03/03/2019 Yes    Intractable vomiting with nausea [R11.2] 03/03/2019 Yes    High cholesterol [E78.00] 03/03/2019 Yes     Chronic    Depression [F32.9] 03/03/2019 Yes     Chronic    COPD (chronic obstructive pulmonary disease) [J44.9] 03/03/2019 Yes     Chronic     CAROL (acute kidney injury) [N17.9] 03/03/2019 Yes      Problems Resolved During this Admission:     78M with suspected viral illness, with ileus, resolving. Tolerated clamp NG trial and NG removed this am. Currently tolerating CLD      No surgical intervention at this time.   Slowly advance diet as tolerated   Maintain electrolytes K > 3, Mag > 2 and phos > 3  Continue medical management.          Isidro Sanchez MD  General Surgery  [facility]

## 2019-03-05 NOTE — PROGRESS NOTES
[facility] Hospital Medicine  Progress Note    Patient Name: Navin Henning Jr.  MRN: [id number]  Patient Class: IP- Inpatient   Admission Date: 3/3/2019  Length of Stay: 2 days  Attending Physician: Mac Chaparro MD  Primary Care Provider: Primary Doctor No        Subjective:     Principal Problem:Ileus    HPI:  78 y.o. male with hyperlipidemia, COPD, and depression presents with complaint of nausea, vomiting, and abdominal pain that began last night.  Associated with abdominal distension and fever, T-max 102°  F at home.  He denies cough, SOB, chest pain, dizziness, syncope, diarrhea, hematemesis, coffee-ground emesis, bloody or black stools, or dysuria.  Evaluated in ED early this morning for these symptoms, received IV fluids, antiemetics, and Tylenol with improvement and was discharged.  Upon return home his symptoms recurred and he presented to the ER again.  CT abdomen/pelvis suggestive of mild ileus.  Also evidence of acute kidney injury, creatinine 1.7 with presume normal baseline.  He was not febrile in the ED and vital signs and lab work reassuring.  IV fluids and antiemetics initiated.  NG tube placed.  Admitted to Hospital Medicine for further evaluation treatment.    Hospital Course:  78 y.o. male with hyperlipidemia, COPD, and depression presents with complaint of nausea, vomiting, and abdominal pain that began last night.  Associated with abdominal distension and fever, T-max 102° F at home. CT abdomen suggestive of ileus.  Patient had an NG tube placed. Surgery was consulted to follow. No previous history of this and no abdominal surgeries.     Interval History: doing better.       Review of Systems   Constitutional: Negative for activity change.   HENT: Negative for congestion.    Respiratory: Negative for chest tightness and shortness of breath.    Cardiovascular: Negative for chest pain.   Gastrointestinal: Positive for abdominal distention and abdominal pain. Negative  for nausea and vomiting.   Genitourinary: Negative for difficulty urinating.   Musculoskeletal: Negative for arthralgias.   Psychiatric/Behavioral: Negative for agitation.     Objective:     Vital Signs (Most Recent):  Temp: 98.1 °F (36.7 °C) (03/05/19 0808)  Pulse: 68 (03/05/19 0808)  Resp: 17 (03/05/19 0808)  BP: (!) 148/69 (03/05/19 0808)  SpO2: (!) 94 % (03/05/19 0810) Vital Signs (24h Range):  Temp:  [97.9 °F (36.6 °C)-98.6 °F (37 °C)] 98.1 °F (36.7 °C)  Pulse:  [64-73] 68  Resp:  [17-18] 17  SpO2:  [94 %-97 %] 94 %  BP: (114-148)/(58-69) 148/69     Weight: 77.5 kg (170 lb 13.7 oz)  Body mass index is 27.58 kg/m².    Intake/Output Summary (Last 24 hours) at 3/5/2019 0915  Last data filed at 3/5/2019 0851  Gross per 24 hour   Intake 240 ml   Output 950 ml   Net -710 ml      Physical Exam   Constitutional: He is oriented to person, place, and time. He appears well-developed and well-nourished.   HENT:   Head: Normocephalic and atraumatic.   Pulmonary/Chest: Effort normal and breath sounds normal.   Abdominal: He exhibits distension. There is no tenderness.   Hypoactive Bs   Neurological: He is alert and oriented to person, place, and time.   Nursing note and vitals reviewed.      Significant Labs:   BMP:   Recent Labs   Lab 03/05/19  0514   GLU 56*   *   K 4.2      CO2 19*   BUN 15   CREATININE 0.9   CALCIUM 7.8*     CBC: No results for input(s): WBC, HGB, HCT, PLT in the last 48 hours.    Significant Imaging:     Assessment/Plan:      * Ileus    Abdominal pain and distension, nausea, and vomiting with evidence of mild ileus on CT.  Continue NG tube and supportive care with IV fluids, antiemetics, and analgesics.  NPO. Surgery consulted. No previous history. No abdominal surgeries. + flatus.  Continue supportive care.        CAROL (acute kidney injury)    Creatinine 1.7, no prior baseline on file but he denies chronic kidney disease. Suspect prerenal secondary to dehydration due to gastric losses and  poor p.o. intake.  Continue IV fluids, strict I/O's, monitor renal function and electrolytes, avoid nephrotoxic agents.  BMP pending.        COPD (chronic obstructive pulmonary disease)    Well controlled and not on any inhalers or nebs at home, he does not smoke.  P.r.n. nebs.     Depression    Resume fluoxetine when able to tolerate p.o.     High cholesterol    Resume statin when able to tolerate p.o.     Intractable vomiting with nausea    Supportive care as above.     Acute viral syndrome    Supportive care as above.  Antipyretics as needed for fever.       VTE Risk Mitigation (From admission, onward)        Ordered     enoxaparin injection 40 mg  Daily      03/03/19 1354     IP VTE HIGH RISK PATIENT  Once      03/03/19 1354     Place VICKIE hose  Until discontinued      03/03/19 1354          Follow surgery recs.       Mac Garcia MD  Department of Hospital Medicine   [facility]

## 2019-03-05 NOTE — SUBJECTIVE & OBJECTIVE
Interval History: doing better.       Review of Systems   Constitutional: Negative for activity change.   HENT: Negative for congestion.    Respiratory: Negative for chest tightness and shortness of breath.    Cardiovascular: Negative for chest pain.   Gastrointestinal: Positive for abdominal distention and abdominal pain. Negative for nausea and vomiting.   Genitourinary: Negative for difficulty urinating.   Musculoskeletal: Negative for arthralgias.   Psychiatric/Behavioral: Negative for agitation.     Objective:     Vital Signs (Most Recent):  Temp: 98.1 °F (36.7 °C) (03/05/19 0808)  Pulse: 68 (03/05/19 0808)  Resp: 17 (03/05/19 0808)  BP: (!) 148/69 (03/05/19 0808)  SpO2: (!) 94 % (03/05/19 0810) Vital Signs (24h Range):  Temp:  [97.9 °F (36.6 °C)-98.6 °F (37 °C)] 98.1 °F (36.7 °C)  Pulse:  [64-73] 68  Resp:  [17-18] 17  SpO2:  [94 %-97 %] 94 %  BP: (114-148)/(58-69) 148/69     Weight: 77.5 kg (170 lb 13.7 oz)  Body mass index is 27.58 kg/m².    Intake/Output Summary (Last 24 hours) at 3/5/2019 0915  Last data filed at 3/5/2019 0851  Gross per 24 hour   Intake 240 ml   Output 950 ml   Net -710 ml      Physical Exam   Constitutional: He is oriented to person, place, and time. He appears well-developed and well-nourished.   HENT:   Head: Normocephalic and atraumatic.   Pulmonary/Chest: Effort normal and breath sounds normal.   Abdominal: He exhibits distension. There is no tenderness.   Hypoactive Bs   Neurological: He is alert and oriented to person, place, and time.   Nursing note and vitals reviewed.      Significant Labs:   BMP:   Recent Labs   Lab 03/05/19  0514   GLU 56*   *   K 4.2      CO2 19*   BUN 15   CREATININE 0.9   CALCIUM 7.8*     CBC: No results for input(s): WBC, HGB, HCT, PLT in the last 48 hours.    Significant Imaging:

## 2019-03-05 NOTE — PROGRESS NOTES
Patient's IV in right AC infiltrated and was removed.  Warm towel applied to infiltration site to bring down swelling.  Attempt was made in left forearm with 20g but vein blew out.  Charge nurse MAYA Gee inserted 20g single lumen in left hand.  NS was restarted.

## 2019-03-06 VITALS
TEMPERATURE: 98 F | RESPIRATION RATE: 18 BRPM | SYSTOLIC BLOOD PRESSURE: 108 MMHG | HEART RATE: 52 BPM | DIASTOLIC BLOOD PRESSURE: 55 MMHG | HEIGHT: 66 IN | OXYGEN SATURATION: 93 % | WEIGHT: 170.88 LBS | BODY MASS INDEX: 27.46 KG/M2

## 2019-03-06 PROBLEM — B34.9 ACUTE VIRAL SYNDROME: Status: RESOLVED | Noted: 2019-03-03 | Resolved: 2019-03-06

## 2019-03-06 PROBLEM — F32.A DEPRESSION: Chronic | Status: RESOLVED | Noted: 2019-03-03 | Resolved: 2019-03-06

## 2019-03-06 PROBLEM — N17.9 AKI (ACUTE KIDNEY INJURY): Status: RESOLVED | Noted: 2019-03-03 | Resolved: 2019-03-06

## 2019-03-06 PROBLEM — K56.7 ILEUS: Status: RESOLVED | Noted: 2019-03-03 | Resolved: 2019-03-06

## 2019-03-06 PROBLEM — R11.2 INTRACTABLE VOMITING WITH NAUSEA: Status: RESOLVED | Noted: 2019-03-03 | Resolved: 2019-03-06

## 2019-03-06 LAB
ANION GAP SERPL CALC-SCNC: 5 MMOL/L
BUN SERPL-MCNC: 10 MG/DL
CALCIUM SERPL-MCNC: 7.7 MG/DL
CHLORIDE SERPL-SCNC: 108 MMOL/L
CO2 SERPL-SCNC: 24 MMOL/L
CREAT SERPL-MCNC: 0.9 MG/DL
EST. GFR  (AFRICAN AMERICAN): >60 ML/MIN/1.73 M^2
EST. GFR  (NON AFRICAN AMERICAN): >60 ML/MIN/1.73 M^2
GLUCOSE SERPL-MCNC: 68 MG/DL
POTASSIUM SERPL-SCNC: 3.9 MMOL/L
SODIUM SERPL-SCNC: 137 MMOL/L

## 2019-03-06 PROCEDURE — 25000003 PHARM REV CODE 250: Performed by: INTERNAL MEDICINE

## 2019-03-06 PROCEDURE — 63600175 PHARM REV CODE 636 W HCPCS: Performed by: INTERNAL MEDICINE

## 2019-03-06 PROCEDURE — 63600175 PHARM REV CODE 636 W HCPCS: Performed by: HOSPITALIST

## 2019-03-06 PROCEDURE — C9113 INJ PANTOPRAZOLE SODIUM, VIA: HCPCS | Performed by: INTERNAL MEDICINE

## 2019-03-06 PROCEDURE — 80048 BASIC METABOLIC PNL TOTAL CA: CPT

## 2019-03-06 PROCEDURE — 36415 COLL VENOUS BLD VENIPUNCTURE: CPT

## 2019-03-06 RX ADMIN — KETOROLAC TROMETHAMINE 15 MG: 30 INJECTION, SOLUTION INTRAMUSCULAR; INTRAVENOUS at 12:03

## 2019-03-06 RX ADMIN — PANTOPRAZOLE SODIUM 40 MG: 40 INJECTION, POWDER, LYOPHILIZED, FOR SOLUTION INTRAVENOUS at 09:03

## 2019-03-06 RX ADMIN — SODIUM CHLORIDE: 0.9 INJECTION, SOLUTION INTRAVENOUS at 12:03

## 2019-03-06 RX ADMIN — KETOROLAC TROMETHAMINE 15 MG: 30 INJECTION, SOLUTION INTRAMUSCULAR; INTRAVENOUS at 09:03

## 2019-03-06 RX ADMIN — SODIUM CHLORIDE: 0.9 INJECTION, SOLUTION INTRAVENOUS at 09:03

## 2019-03-06 NOTE — PROGRESS NOTES
[facility] Hospital Medicine  Progress Note    Patient Name: Navin Henning Jr.  MRN: [id number]  Patient Class: IP- Inpatient   Admission Date: 3/3/2019  Length of Stay: 3 days  Attending Physician: Mac Chaparro MD  Primary Care Provider: Primary Doctor No        Subjective:     Principal Problem:Ileus    HPI:  78 y.o. male with hyperlipidemia, COPD, and depression presents with complaint of nausea, vomiting, and abdominal pain that began last night.  Associated with abdominal distension and fever, T-max 102°  F at home.  He denies cough, SOB, chest pain, dizziness, syncope, diarrhea, hematemesis, coffee-ground emesis, bloody or black stools, or dysuria.  Evaluated in ED early this morning for these symptoms, received IV fluids, antiemetics, and Tylenol with improvement and was discharged.  Upon return home his symptoms recurred and he presented to the ER again.  CT abdomen/pelvis suggestive of mild ileus.  Also evidence of acute kidney injury, creatinine 1.7 with presume normal baseline.  He was not febrile in the ED and vital signs and lab work reassuring.  IV fluids and antiemetics initiated.  NG tube placed.  Admitted to Hospital Medicine for further evaluation treatment.    Hospital Course:  78 y.o. male with hyperlipidemia, COPD, and depression presents with complaint of nausea, vomiting, and abdominal pain that began last night.  Associated with abdominal distension and fever, T-max 102° F at home. CT abdomen suggestive of ileus.  Patient had an NG tube placed. Surgery was consulted to follow. No previous history of this and no abdominal surgeries. The patient quickly recovered and NG tube was pulled.  The patient tolerated soft diet. Multiple BM's and gas.  He was requesting discharge on 3/6/19.  CT also noted a pulmonary nodule that needs follow up with PCP. The patient will be discharged today. Soft diet for 2 more days. Activity as tolerated.  Follow up PCP in one week       Interval History: walking around room. Wants to go home.     Review of Systems   Constitutional: Negative for activity change.   HENT: Negative for congestion.    Respiratory: Negative for chest tightness and shortness of breath.    Cardiovascular: Negative for chest pain.   Gastrointestinal: Negative for abdominal distention, abdominal pain, nausea and vomiting.   Genitourinary: Negative for difficulty urinating.   Musculoskeletal: Negative for arthralgias.   Psychiatric/Behavioral: Negative for agitation.     Objective:     Vital Signs (Most Recent):  Temp: 97.7 °F (36.5 °C) (03/06/19 0753)  Pulse: (!) 52 (03/06/19 0753)  Resp: 18 (03/06/19 0753)  BP: (!) 108/55 (03/06/19 0753)  SpO2: (!) 93 % (03/06/19 0753) Vital Signs (24h Range):  Temp:  [97.5 °F (36.4 °C)-98.6 °F (37 °C)] 97.7 °F (36.5 °C)  Pulse:  [51-60] 52  Resp:  [17-18] 18  SpO2:  [91 %-96 %] 93 %  BP: (106-142)/(55-74) 108/55     Weight: 77.5 kg (170 lb 13.7 oz)  Body mass index is 27.58 kg/m².    Intake/Output Summary (Last 24 hours) at 3/6/2019 0944  Last data filed at 3/6/2019 0555  Gross per 24 hour   Intake 1689 ml   Output --   Net 1689 ml      Physical Exam   Constitutional: He is oriented to person, place, and time. He appears well-developed and well-nourished.   HENT:   Head: Normocephalic and atraumatic.   Pulmonary/Chest: Effort normal and breath sounds normal.   Abdominal: Bowel sounds are normal. He exhibits distension. There is no tenderness.   Hypoactive Bs   Neurological: He is alert and oriented to person, place, and time.   Nursing note and vitals reviewed.      Significant Labs:   BMP:   Recent Labs   Lab 03/05/19  0514  03/06/19  0505   GLU 56*   < > 68*   *   < > 137   K 4.2   < > 3.9      < > 108   CO2 19*   < > 24   BUN 15   < > 10   CREATININE 0.9   < > 0.9   CALCIUM 7.8*   < > 7.7*   MG 2.1  --   --     < > = values in this interval not displayed.     CBC: No results for input(s): WBC, HGB, HCT, PLT in the last  48 hours.    Significant Imaging:     Assessment/Plan:      * Ileus    Abdominal pain and distension, nausea, and vomiting with evidence of mild ileus on CT.  Continue NG tube and supportive care with IV fluids, antiemetics, and analgesics.  NPO. Surgery consulted. No previous history. No abdominal surgeries. + flatus.  Continue supportive care.     Resolved.          CAROL (acute kidney injury)    Creatinine 1.7, no prior baseline on file but he denies chronic kidney disease. Suspect prerenal secondary to dehydration due to gastric losses and poor p.o. intake.  Continue IV fluids, strict I/O's, monitor renal function and electrolytes, avoid nephrotoxic agents.  BMP pending.        COPD (chronic obstructive pulmonary disease)    Well controlled and not on any inhalers or nebs at home, he does not smoke.  P.r.n. nebs.     Depression    Resume fluoxetine when able to tolerate p.o.     High cholesterol    Resume statin when able to tolerate p.o.     Intractable vomiting with nausea    Supportive care as above.     Acute viral syndrome    Supportive care as above.  Antipyretics as needed for fever.       VTE Risk Mitigation (From admission, onward)        Ordered     enoxaparin injection 40 mg  Daily      03/03/19 1354     IP VTE HIGH RISK PATIENT  Once      03/03/19 1354     Place VICKIE hose  Until discontinued      03/03/19 1354          Will d/c to home.       Mac Garcia MD  Department of Hospital Medicine   [facility]

## 2019-03-06 NOTE — PLAN OF CARE
"   03/06/19 1102   Final Note   Assessment Type Final Discharge Note   Anticipated Discharge Disposition Home   Hospital Follow Up  Appt(s) scheduled? (NO PCP on file, wife states she will schedule appt in 1 week)   Right Care Referral Info   Post Acute Recommendation No Care     EDUCATION:  Patient provided with educational information on COPD.  Information reviewed and placed in :My Healthcare Packet" to be brought home for him to use as resource after discharge.  Information included:  signs and symptoms to look for and call the doctor if experiencing, and symptoms that may indicate a medical emergency: CALL 911.      All questions answered.  Teach back method used.    Patient stated, "To seek medical attention for SOB when resting and coughing up blood  ".    Wife states patient see's Dr. Je Lemus and will schedule with PCP in 1 week.    Nurse Leo notified patient is discharged from case management standpoint.          "

## 2019-03-06 NOTE — PROGRESS NOTES
[facility]  General Surgery  Progress Note    Subjective:     Interval History: tolerating full liquids, had normal BM, denies nausea. Does note chest pain with deep breath and leg cramping.     Post-Op Info:  * No surgery found *          Medications:  Continuous Infusions:   sodium chloride 0.9% 125 mL/hr at 03/06/19 0043     Scheduled Meds:   enoxaparin  40 mg Subcutaneous Daily    pantoprazole  40 mg Intravenous Daily     PRN Meds:acetaminophen, albuterol-ipratropium, ketorolac, ondansetron, prochlorperazine, sodium chloride 0.9%     Objective:     Vital Signs (Most Recent):  Temp: 98.1 °F (36.7 °C) (03/06/19 0321)  Pulse: (!) 58 (03/06/19 0321)  Resp: 18 (03/06/19 0321)  BP: (!) 108/57 (03/06/19 0321)  SpO2: (!) 91 % (03/06/19 0321) Vital Signs (24h Range):  Temp:  [97.5 °F (36.4 °C)-98.6 °F (37 °C)] 98.1 °F (36.7 °C)  Pulse:  [51-68] 58  Resp:  [17-18] 18  SpO2:  [91 %-96 %] 91 %  BP: (106-148)/(57-74) 108/57       Intake/Output Summary (Last 24 hours) at 3/6/2019 0716  Last data filed at 3/6/2019 0555  Gross per 24 hour   Intake 1929 ml   Output --   Net 1929 ml       Physical Exam  Gen: AOx3 in NAD  HEENT: wnl, MMM  CV; RRR  Abd: soft, mild distention, non tender  EXT: moves all       Significant Labs:  BMP:   Recent Labs   Lab 03/05/19  0514  03/06/19  0505   GLU 56*   < > 68*   *   < > 137   K 4.2   < > 3.9      < > 108   CO2 19*   < > 24   BUN 15   < > 10   CREATININE 0.9   < > 0.9   CALCIUM 7.8*   < > 7.7*   MG 2.1  --   --     < > = values in this interval not displayed.     CBC: No results for input(s): WBC, RBC, HGB, HCT, PLT, MCV, MCH, MCHC in the last 48 hours.    Significant Diagnostics:  None    Assessment/Plan:   78M with suspected viral illness, with ileus, resolving. S/p NG and interval removal, with active bowel function, tolerating diet.      Slowly advance diet as tolerated   Maintain electrolytes K > 3, Mag > 2 and phos > 3  Continue medical  management.    Will sign off, please contact with questions or concerns.     Active Diagnoses:    Diagnosis Date Noted POA    PRINCIPAL PROBLEM:  Ileus [K56.7] 03/03/2019 Yes    Obesity [E66.9] 03/04/2019 Yes    Acute viral syndrome [B34.9] 03/03/2019 Yes    Intractable vomiting with nausea [R11.2] 03/03/2019 Yes    High cholesterol [E78.00] 03/03/2019 Yes     Chronic    Depression [F32.9] 03/03/2019 Yes     Chronic    COPD (chronic obstructive pulmonary disease) [J44.9] 03/03/2019 Yes     Chronic    CAROL (acute kidney injury) [N17.9] 03/03/2019 Yes      Problems Resolved During this Admission:         Ana Conteh MD  General Surgery  [facility]

## 2019-03-06 NOTE — DISCHARGE SUMMARY
[facility] Hospital Medicine  Discharge Summary      Patient Name: Navin Henning Jr.  MRN: [id number]  Admission Date: 3/3/2019  Hospital Length of Stay: 3 days  Discharge Date and Time:  03/06/2019 9:46 AM  Attending Physician: Mac Chaparro MD   Discharging Provider: Mac Chaparro MD  Primary Care Provider: Primary Doctor No      HPI:   78 y.o. male with hyperlipidemia, COPD, and depression presents with complaint of nausea, vomiting, and abdominal pain that began last night.  Associated with abdominal distension and fever, T-max 102°  F at home.  He denies cough, SOB, chest pain, dizziness, syncope, diarrhea, hematemesis, coffee-ground emesis, bloody or black stools, or dysuria.  Evaluated in ED early this morning for these symptoms, received IV fluids, antiemetics, and Tylenol with improvement and was discharged.  Upon return home his symptoms recurred and he presented to the ER again.  CT abdomen/pelvis suggestive of mild ileus.  Also evidence of acute kidney injury, creatinine 1.7 with presume normal baseline.  He was not febrile in the ED and vital signs and lab work reassuring.  IV fluids and antiemetics initiated.  NG tube placed.  Admitted to Hospital Medicine for further evaluation treatment.    * No surgery found *      Hospital Course:   78 y.o. male with hyperlipidemia, COPD, and depression presents with complaint of nausea, vomiting, and abdominal pain that began last night.  Associated with abdominal distension and fever, T-max 102° F at home. CT abdomen suggestive of ileus.  Patient had an NG tube placed. Surgery was consulted to follow. No previous history of this and no abdominal surgeries. The patient quickly recovered and NG tube was pulled.  The patient tolerated soft diet. Multiple BM's and gas.  He was requesting discharge on 3/6/19.  CT also noted a pulmonary nodule that needs follow up with PCP. The patient will be discharged today. Soft diet for 2 more  days. Activity as tolerated.  Follow up PCP in one week      Has pulmonary nodule as found on CT. Needs follow up Patient informed.        Consults:   Consults (From admission, onward)        Status Ordering Provider     Inpatient consult to General Surgery  Once     Provider:  Best Amaro MD    Completed SHILOH REICH          No new Assessment & Plan notes have been filed under this hospital service since the last note was generated.  Service: Hospital Medicine    Final Active Diagnoses:    Diagnosis Date Noted POA    Obesity [E66.9] 03/04/2019 Yes    High cholesterol [E78.00] 03/03/2019 Yes     Chronic    COPD (chronic obstructive pulmonary disease) [J44.9] 03/03/2019 Yes     Chronic      Problems Resolved During this Admission:    Diagnosis Date Noted Date Resolved POA    PRINCIPAL PROBLEM:  Ileus [K56.7] 03/03/2019 03/06/2019 Yes    Acute viral syndrome [B34.9] 03/03/2019 03/06/2019 Yes    Intractable vomiting with nausea [R11.2] 03/03/2019 03/06/2019 Yes    Depression [F32.9] 03/03/2019 03/06/2019 Yes     Chronic    CAROL (acute kidney injury) [N17.9] 03/03/2019 03/06/2019 Yes       Discharged Condition: good    Disposition: Home or Self Care    Follow Up:  Follow-up Information     Primary Doctor No In 1 week.               Patient Instructions:   No discharge procedures on file.    Significant Diagnostic Studies:    Pending Diagnostic Studies:     None         Medications:  Reconciled Home Medications:      Medication List      CONTINUE taking these medications    aspirin 81 MG EC tablet  Commonly known as:  ECOTRIN  Take 81 mg by mouth once daily.     FLUoxetine 20 MG tablet  Take 40 mg by mouth once daily.     multivitamin capsule  Take 1 capsule by mouth once daily.     ondansetron 4 MG tablet  Commonly known as:  ZOFRAN  Take 1 tablet (4 mg total) by mouth every 6 (six) hours as needed for Nausea (for nausea).     pravastatin 40 MG tablet  Commonly known as:  PRAVACHOL  Take 40 mg by  mouth once daily.            Indwelling Lines/Drains at time of discharge:   Lines/Drains/Airways          None          Time spent on the discharge of patient:  < 30 minutes  Patient was seen and examined on the date of discharge and determined to be suitable for discharge.         Mac Garcia MD  Department of Hospital Medicine  [facility]

## 2019-03-06 NOTE — PROGRESS NOTES
Follow-up Information     [facility] In 1 week.    Why:  Outpatient services, spoke with Natalia, states patient has to schedule appt, unable to schedule with sergio.  Contact information:  [address] [facility] [id number] [phone]               WRITTEN DISCHARGE INFORMATION:     Things that YOU are responsible for to Manage Your Care At Home:  1. Getting your prescriptions filled.  2. Taking you medications as directed. DO NOT MISS ANY DOSES!  3. Going to your follow-up doctor appointments. This is important because it allows the doctor to monitor your progress and to determine if any changes need to be made to your treatment plan.                                                                Help at Home  After discharge for assistance [facility] On Call Nurse Care Line 24/7 assistance  [phone]     Thank you for choosing [facility] for your care.  Please answer any calls you may receive from [facility] we want to continue to support you as you manage your healthcare needs.  Sincerely, Your [facility] Healthcare Manager is,  Maribel Warner RN,  [phone]

## 2019-03-06 NOTE — PROGRESS NOTES
Pt chest pain reassessed pt states his chest pain has decreased now 3 out of 10.will continue to moniton

## 2019-03-06 NOTE — PROGRESS NOTES
Secure chat sent to MD pt c/o chest pain 5 out 10. States pain does not radiate and is only felt when he take a deep breathe in. Will continue to monitor

## 2019-03-06 NOTE — ASSESSMENT & PLAN NOTE
Abdominal pain and distension, nausea, and vomiting with evidence of mild ileus on CT.  Continue NG tube and supportive care with IV fluids, antiemetics, and analgesics.  NPO. Surgery consulted. No previous history. No abdominal surgeries. + flatus.  Continue supportive care.     Resolved.

## 2019-03-06 NOTE — PLAN OF CARE
Problem: Adult Inpatient Plan of Care  Goal: Absence of Hospital-Acquired Illness or Injury    Intervention: Identify and Manage Fall Risk   03/06/19 0528   Optimize Balance and Safe Activity   Safety Promotion/Fall Prevention assistive device/personal item within reach;nonskid shoes/socks when out of bed         Comments: Pt encouraged to walk with asisst

## 2019-03-06 NOTE — PROGRESS NOTES
PT ambulating steady gait/balance in hallway with spouse.  Denies any needs and no distress noted.

## 2019-03-06 NOTE — PROGRESS NOTES
AAOX3  Denies any needs and no distress noted.  VSS.  IV and tele dc'd.  DC and Rx instructions reviewed written and oral.  Pt verbalized understanding.  Transport requested.

## 2019-03-06 NOTE — SUBJECTIVE & OBJECTIVE
Interval History: walking around room. Wants to go home.     Review of Systems   Constitutional: Negative for activity change.   HENT: Negative for congestion.    Respiratory: Negative for chest tightness and shortness of breath.    Cardiovascular: Negative for chest pain.   Gastrointestinal: Negative for abdominal distention, abdominal pain, nausea and vomiting.   Genitourinary: Negative for difficulty urinating.   Musculoskeletal: Negative for arthralgias.   Psychiatric/Behavioral: Negative for agitation.     Objective:     Vital Signs (Most Recent):  Temp: 97.7 °F (36.5 °C) (03/06/19 0753)  Pulse: (!) 52 (03/06/19 0753)  Resp: 18 (03/06/19 0753)  BP: (!) 108/55 (03/06/19 0753)  SpO2: (!) 93 % (03/06/19 0753) Vital Signs (24h Range):  Temp:  [97.5 °F (36.4 °C)-98.6 °F (37 °C)] 97.7 °F (36.5 °C)  Pulse:  [51-60] 52  Resp:  [17-18] 18  SpO2:  [91 %-96 %] 93 %  BP: (106-142)/(55-74) 108/55     Weight: 77.5 kg (170 lb 13.7 oz)  Body mass index is 27.58 kg/m².    Intake/Output Summary (Last 24 hours) at 3/6/2019 0944  Last data filed at 3/6/2019 0555  Gross per 24 hour   Intake 1689 ml   Output --   Net 1689 ml      Physical Exam   Constitutional: He is oriented to person, place, and time. He appears well-developed and well-nourished.   HENT:   Head: Normocephalic and atraumatic.   Pulmonary/Chest: Effort normal and breath sounds normal.   Abdominal: Bowel sounds are normal. He exhibits distension. There is no tenderness.   Hypoactive Bs   Neurological: He is alert and oriented to person, place, and time.   Nursing note and vitals reviewed.      Significant Labs:   BMP:   Recent Labs   Lab 03/05/19  0514  03/06/19  0505   GLU 56*   < > 68*   *   < > 137   K 4.2   < > 3.9      < > 108   CO2 19*   < > 24   BUN 15   < > 10   CREATININE 0.9   < > 0.9   CALCIUM 7.8*   < > 7.7*   MG 2.1  --   --     < > = values in this interval not displayed.     CBC: No results for input(s): WBC, HGB, HCT, PLT in the last 48  hours.    Significant Imaging:

## 2019-03-06 NOTE — PLAN OF CARE
Problem: Fall Injury Risk  Goal: Absence of Fall and Fall-Related Injury  Outcome: Ongoing (interventions implemented as appropriate)  Pt has been free of falls/injury, encouraged to call staff for assistance out of bed, spouse at bedside, both verbalized an understanding and intent to comply.

## 2019-03-07 ENCOUNTER — PATIENT OUTREACH (OUTPATIENT)
Dept: ADMINISTRATIVE | Facility: CLINIC | Age: 79
End: 2019-03-07

## 2019-03-07 NOTE — PATIENT INSTRUCTIONS
Ileus  Ileus occurs when there is a problem with motility in the small or large intestine (bowel). Motility is the process of moving ingested food and waste through the digestive tract. With normal motility, muscles in the bowel walls contract to move waste along. Signals from nerves tell the muscles when to contract. With ileus, motility slows down or stops completely. As a result, waste cannot move through the bowels and out of the body. This can cause abdominal discomfort and other symptoms. Treatment is needed to restore motility and relieve symptoms.  Causes of Ileus  Ileus can be caused by the following:  Abdominal surgery  Certain infections, such as that of the peritoneum (the lining of the abdomen)  Injury to blood vessels that supply blood to the abdomen  Electrolyte imbalance, such as low levels of sodium or potassium  Certain medications, such as strong pain medications  Certain kidney or lung diseases  Certain medical conditions, such as cystic fibrosis  Symptoms of Ileus  Common symptoms of ileus include:  Abdominal swelling or bloating  Nausea and vomiting  Abdominal cramps  Loss of appetite  Inability to keep food down  Inability to pass stool or gas  Diagnosing Ileus  Your doctor will ask about your symptoms and health history. Youll also have a physical exam. If ileus is suspected, tests may be done to confirm the problem. These can include:  Imaging tests. These provide pictures of the bowels. Common tests include X-rays and a computed tomography (CT) scan.  Blood tests. These check for infection and other problems such as dehydration.  Upper gastrointestinal (GI) series. This test takes X-rays of the upper digestive tract from the mouth to the small intestine. A contrast fluid is used. The contrast fluid coats the inside of the upper digestive tract so that it will show up clearly on X-rays.  Treating Ileus  In most cases, ileus resolves by itself. The goal is to manage symptoms until motility  returns to normal. Treatment takes place in a hospital. As part of your care, the following may be done:  No food or drink is given by mouth. This allows your bowels to rest.  An intravenous (IV) line is placed in a vein in your arm or hand. The IV line is used to give fluids and nutrition. It may also be used to give medications. These may be needed to improve motility or to relieve pain. They may also be needed to treat any underlying infections or conditions you have.  A soft, thin, flexible tube (nasogastric tube) is inserted through your nose and into your stomach. The tube is used to remove extra gas and fluid in your stomach and bowels. This helps to relieve symptoms such as pain and swelling.  Youll be observed in the hospital until your symptoms improve. Your doctor will tell you when youre well enough to return home. This is usually within a few days.  In rare cases, problems may occur. Other treatments, such as surgery, may then be done. Your doctor will tell you more about other treatments, if needed.  Long-term Concerns   After treatment, most people recover completely. In some cases, you may need to see your doctor for a follow-up appointment.    When to Call the Doctor  Call your doctor right away if you have any of the following:  Fever of 100.4°F (38°C) or higher  Abdominal swelling or pain that wont go away  Inability to pass stool or gas  Nausea and vomiting  Getting full very easily with only small amounts of food or drink   © [id number] [facility], [address] [place] [address], [place] [address]. All rights reserved. This information is not intended as a substitute for professional medical care. Always follow your healthcare professional's instructions.

## 2019-03-08 LAB
BACTERIA BLD CULT: NORMAL
BACTERIA BLD CULT: NORMAL

## 2020-07-28 ENCOUNTER — OFFICE VISIT (OUTPATIENT)
Dept: URGENT CARE | Facility: CLINIC | Age: 80
End: 2020-07-28
Payer: MEDICARE

## 2020-07-28 VITALS
TEMPERATURE: 97 F | SYSTOLIC BLOOD PRESSURE: 138 MMHG | DIASTOLIC BLOOD PRESSURE: 78 MMHG | BODY MASS INDEX: 24.11 KG/M2 | RESPIRATION RATE: 17 BRPM | HEIGHT: 66 IN | OXYGEN SATURATION: 97 % | HEART RATE: 63 BPM | WEIGHT: 150 LBS

## 2020-07-28 DIAGNOSIS — R30.0 DYSURIA: Primary | ICD-10-CM

## 2020-07-28 DIAGNOSIS — R31.9 URINARY TRACT INFECTION WITH HEMATURIA, SITE UNSPECIFIED: ICD-10-CM

## 2020-07-28 DIAGNOSIS — N39.0 URINARY TRACT INFECTION WITH HEMATURIA, SITE UNSPECIFIED: ICD-10-CM

## 2020-07-28 LAB
BILIRUB UR QL STRIP: NEGATIVE
GLUCOSE UR QL STRIP: NEGATIVE
KETONES UR QL STRIP: NEGATIVE
LEUKOCYTE ESTERASE UR QL STRIP: NEGATIVE
PH, POC UA: 5.5
POC BLOOD, URINE: POSITIVE
POC NITRATES, URINE: NEGATIVE
PROT UR QL STRIP: NEGATIVE
SP GR UR STRIP: 1.01 (ref 1–1.03)
UROBILINOGEN UR STRIP-ACNC: NORMAL (ref 0.3–2.2)

## 2020-07-28 PROCEDURE — 81003 URINALYSIS AUTO W/O SCOPE: CPT | Mod: QW,S$GLB,, | Performed by: FAMILY MEDICINE

## 2020-07-28 PROCEDURE — 99213 OFFICE O/P EST LOW 20 MIN: CPT | Mod: 25,S$GLB,, | Performed by: FAMILY MEDICINE

## 2020-07-28 PROCEDURE — 99213 PR OFFICE/OUTPT VISIT, EST, LEVL III, 20-29 MIN: ICD-10-PCS | Mod: 25,S$GLB,, | Performed by: FAMILY MEDICINE

## 2020-07-28 PROCEDURE — 81003 POCT URINALYSIS, DIPSTICK, AUTOMATED, W/O SCOPE: ICD-10-PCS | Mod: QW,S$GLB,, | Performed by: FAMILY MEDICINE

## 2020-07-28 RX ORDER — MEMANTINE HYDROCHLORIDE 10 MG/1
TABLET ORAL
COMMUNITY
Start: 2020-07-06 | End: 2020-07-28 | Stop reason: SDUPTHER

## 2020-07-28 RX ORDER — ALBUTEROL SULFATE 90 UG/1
2 AEROSOL, METERED RESPIRATORY (INHALATION)
COMMUNITY
Start: 2020-01-06 | End: 2021-01-05

## 2020-07-28 RX ORDER — MEMANTINE HYDROCHLORIDE 10 MG/1
TABLET ORAL
COMMUNITY
End: 2023-06-29

## 2020-07-28 RX ORDER — CIPROFLOXACIN 500 MG/1
500 TABLET ORAL 2 TIMES DAILY
Qty: 14 TABLET | Refills: 0 | Status: SHIPPED | OUTPATIENT
Start: 2020-07-28 | End: 2020-08-04

## 2020-07-28 RX ORDER — DONEPEZIL HYDROCHLORIDE 5 MG/1
TABLET, FILM COATED ORAL
COMMUNITY
End: 2020-07-28 | Stop reason: SDUPTHER

## 2020-07-28 RX ORDER — TESTOSTERONE 75 MG/1
PELLET SUBCUTANEOUS
COMMUNITY
Start: 2018-12-17 | End: 2023-07-14

## 2020-07-28 RX ORDER — FLUOXETINE HYDROCHLORIDE 40 MG/1
40 CAPSULE ORAL
COMMUNITY
End: 2021-09-15

## 2020-07-28 RX ORDER — DONEPEZIL HYDROCHLORIDE 5 MG/1
TABLET, FILM COATED ORAL
COMMUNITY
Start: 2020-07-06 | End: 2021-09-15

## 2020-07-28 NOTE — PROGRESS NOTES
"Subjective:       Patient ID: Navin Henning Jr. is a 80 y.o. male.    Vitals:  height is 5' 6" (1.676 m) and weight is 68 kg (150 lb). His tympanic temperature is 96.6 °F (35.9 °C). His blood pressure is 138/78 and his pulse is 63. His respiration is 17 and oxygen saturation is 97%.     Chief Complaint: Dysuria    Patient presents with burning on urination and some blood in his urine . He also complains of lower right quadrant back pain . Onset of symptoms -today.     Dysuria   This is a new problem. The current episode started gradual onset. The problem occurs every urination. The problem has been unchanged. The quality of the pain is described as aching. The pain is at a severity of 4/10. The pain is moderate. There has been no fever. He is not sexually active. Associated symptoms include hematuria. Pertinent negatives include no chills, frequency, nausea, urgency, vomiting or rash. He has tried nothing for the symptoms. The treatment provided no relief. There is no history of kidney stones or recurrent UTIs.       Constitution: Negative for chills and fever.   Neck: Negative for painful lymph nodes.   Gastrointestinal: Negative for abdominal pain, nausea and vomiting.   Genitourinary: Positive for dysuria and hematuria. Negative for frequency, urgency, urine decreased, history of kidney stones, genital trauma, painful intercourse, genital sore, penile discharge, painful ejaculation, penile pain, penile swelling, scrotal swelling and testicular pain.   Musculoskeletal: Positive for back pain.   Skin: Negative for rash and lesion.   Hematologic/Lymphatic: Negative for swollen lymph nodes.       Objective:      Physical Exam   Constitutional: He is oriented to person, place, and time. He appears well-developed. He is cooperative.  Non-toxic appearance. He does not appear ill. No distress.   HENT:   Head: Normocephalic and atraumatic.   Ears:   Right Ear: Hearing, tympanic membrane, external ear and ear canal " normal.   Left Ear: Hearing, tympanic membrane, external ear and ear canal normal.   Nose: Nose normal. No mucosal edema, rhinorrhea or nasal deformity. No epistaxis. Right sinus exhibits no maxillary sinus tenderness and no frontal sinus tenderness. Left sinus exhibits no maxillary sinus tenderness and no frontal sinus tenderness.   Mouth/Throat: Uvula is midline, oropharynx is clear and moist and mucous membranes are normal. No trismus in the jaw. Normal dentition. No uvula swelling. No posterior oropharyngeal erythema.   Eyes: Conjunctivae and lids are normal. Right eye exhibits no discharge. Left eye exhibits no discharge. No scleral icterus.   Neck: Trachea normal, normal range of motion, full passive range of motion without pain and phonation normal. Neck supple.   Cardiovascular: Normal rate, regular rhythm, normal heart sounds and normal pulses.   Pulmonary/Chest: Effort normal and breath sounds normal. No respiratory distress.   Abdominal: Soft. Normal appearance and bowel sounds are normal. He exhibits no distension, no pulsatile midline mass and no mass. There is no abdominal tenderness.   Musculoskeletal: Normal range of motion.         General: No deformity.      Comments: Back : Non tender    Flexion/extension normal     Neurological: He is alert and oriented to person, place, and time. He exhibits normal muscle tone. Coordination normal.   Skin: Skin is warm, dry, intact, not diaphoretic and not pale. Psychiatric: His speech is normal and behavior is normal. Judgment and thought content normal.   Nursing note and vitals reviewed.        Assessment:       1. Dysuria    2. Urinary tract infection with hematuria, site unspecified        Plan:         Dysuria  -     POCT Urinalysis, Dipstick, Automated, W/O Scope    Urinary tract infection with hematuria, site unspecified    Other orders  -     ciprofloxacin HCl (CIPRO) 500 MG tablet; Take 1 tablet (500 mg total) by mouth 2 (two) times daily. for 7 days   Dispense: 14 tablet; Refill: 0           Patient advised to to follow-up with PCP or return to clinic if symptoms persist or worsen

## 2021-02-09 ENCOUNTER — TELEPHONE (OUTPATIENT)
Dept: INTERNAL MEDICINE | Facility: CLINIC | Age: 81
End: 2021-02-09

## 2021-03-15 ENCOUNTER — OFFICE VISIT (OUTPATIENT)
Dept: FAMILY MEDICINE | Facility: CLINIC | Age: 81
End: 2021-03-15
Payer: MEDICARE

## 2021-03-15 VITALS
HEIGHT: 66 IN | DIASTOLIC BLOOD PRESSURE: 78 MMHG | HEART RATE: 64 BPM | OXYGEN SATURATION: 95 % | BODY MASS INDEX: 27.63 KG/M2 | WEIGHT: 171.94 LBS | SYSTOLIC BLOOD PRESSURE: 130 MMHG

## 2021-03-15 DIAGNOSIS — F32.A DEPRESSION, UNSPECIFIED DEPRESSION TYPE: ICD-10-CM

## 2021-03-15 DIAGNOSIS — Z76.89 ESTABLISHING CARE WITH NEW DOCTOR, ENCOUNTER FOR: ICD-10-CM

## 2021-03-15 DIAGNOSIS — E78.00 HIGH CHOLESTEROL: Primary | ICD-10-CM

## 2021-03-15 DIAGNOSIS — J44.9 CHRONIC OBSTRUCTIVE PULMONARY DISEASE, UNSPECIFIED COPD TYPE: ICD-10-CM

## 2021-03-15 DIAGNOSIS — E66.3 OVERWEIGHT (BMI 25.0-29.9): ICD-10-CM

## 2021-03-15 PROCEDURE — 99214 OFFICE O/P EST MOD 30 MIN: CPT | Mod: PBBFAC,PO | Performed by: STUDENT IN AN ORGANIZED HEALTH CARE EDUCATION/TRAINING PROGRAM

## 2021-03-15 PROCEDURE — 99213 OFFICE O/P EST LOW 20 MIN: CPT | Mod: S$PBB,ICN,, | Performed by: STUDENT IN AN ORGANIZED HEALTH CARE EDUCATION/TRAINING PROGRAM

## 2021-03-15 PROCEDURE — 99999 PR PBB SHADOW E&M-EST. PATIENT-LVL IV: ICD-10-PCS | Mod: PBBFAC,,, | Performed by: STUDENT IN AN ORGANIZED HEALTH CARE EDUCATION/TRAINING PROGRAM

## 2021-03-15 PROCEDURE — 99999 PR PBB SHADOW E&M-EST. PATIENT-LVL IV: CPT | Mod: PBBFAC,,, | Performed by: STUDENT IN AN ORGANIZED HEALTH CARE EDUCATION/TRAINING PROGRAM

## 2021-03-15 PROCEDURE — 99213 PR OFFICE/OUTPT VISIT, EST, LEVL III, 20-29 MIN: ICD-10-PCS | Mod: S$PBB,ICN,, | Performed by: STUDENT IN AN ORGANIZED HEALTH CARE EDUCATION/TRAINING PROGRAM

## 2021-05-13 ENCOUNTER — LAB VISIT (OUTPATIENT)
Dept: LAB | Facility: HOSPITAL | Age: 81
End: 2021-05-13
Attending: STUDENT IN AN ORGANIZED HEALTH CARE EDUCATION/TRAINING PROGRAM
Payer: MEDICARE

## 2021-05-13 DIAGNOSIS — E78.00 HIGH CHOLESTEROL: ICD-10-CM

## 2021-05-13 PROCEDURE — 36415 COLL VENOUS BLD VENIPUNCTURE: CPT | Mod: PO | Performed by: STUDENT IN AN ORGANIZED HEALTH CARE EDUCATION/TRAINING PROGRAM

## 2021-05-13 PROCEDURE — 80061 LIPID PANEL: CPT | Performed by: STUDENT IN AN ORGANIZED HEALTH CARE EDUCATION/TRAINING PROGRAM

## 2021-05-14 ENCOUNTER — TELEPHONE (OUTPATIENT)
Dept: FAMILY MEDICINE | Facility: CLINIC | Age: 81
End: 2021-05-14

## 2021-05-14 LAB
CHOLEST SERPL-MCNC: 183 MG/DL (ref 120–199)
CHOLEST/HDLC SERPL: 5.2 {RATIO} (ref 2–5)
HDLC SERPL-MCNC: 35 MG/DL (ref 40–75)
HDLC SERPL: 19.1 % (ref 20–50)
LDLC SERPL CALC-MCNC: 115 MG/DL (ref 63–159)
NONHDLC SERPL-MCNC: 148 MG/DL
TRIGL SERPL-MCNC: 165 MG/DL (ref 30–150)

## 2021-06-08 ENCOUNTER — HOSPITAL ENCOUNTER (OUTPATIENT)
Dept: RADIOLOGY | Facility: HOSPITAL | Age: 81
Discharge: HOME | End: 2021-06-08
Attending: STUDENT IN AN ORGANIZED HEALTH CARE EDUCATION/TRAINING PROGRAM
Payer: MEDICARE

## 2021-06-08 ENCOUNTER — OFFICE VISIT (OUTPATIENT)
Dept: FAMILY MEDICINE | Facility: CLINIC | Age: 81
End: 2021-06-08
Payer: MEDICARE

## 2021-06-08 VITALS
TEMPERATURE: 99 F | BODY MASS INDEX: 25.86 KG/M2 | HEART RATE: 70 BPM | WEIGHT: 160.94 LBS | HEIGHT: 66 IN | SYSTOLIC BLOOD PRESSURE: 130 MMHG | OXYGEN SATURATION: 94 % | DIASTOLIC BLOOD PRESSURE: 68 MMHG

## 2021-06-08 DIAGNOSIS — R05.9 COUGH: Primary | ICD-10-CM

## 2021-06-08 DIAGNOSIS — R05.9 COUGH: ICD-10-CM

## 2021-06-08 PROCEDURE — 99214 PR OFFICE/OUTPT VISIT, EST, LEVL IV, 30-39 MIN: ICD-10-PCS | Mod: S$PBB,,, | Performed by: STUDENT IN AN ORGANIZED HEALTH CARE EDUCATION/TRAINING PROGRAM

## 2021-06-08 PROCEDURE — 71046 XR CHEST PA AND LATERAL: ICD-10-PCS | Mod: 26,,, | Performed by: RADIOLOGY

## 2021-06-08 PROCEDURE — U0003 INFECTIOUS AGENT DETECTION BY NUCLEIC ACID (DNA OR RNA); SEVERE ACUTE RESPIRATORY SYNDROME CORONAVIRUS 2 (SARS-COV-2) (CORONAVIRUS DISEASE [COVID-19]), AMPLIFIED PROBE TECHNIQUE, MAKING USE OF HIGH THROUGHPUT TECHNOLOGIES AS DESCRIBED BY CMS-2020-01-R: HCPCS | Performed by: STUDENT IN AN ORGANIZED HEALTH CARE EDUCATION/TRAINING PROGRAM

## 2021-06-08 PROCEDURE — 99999 PR PBB SHADOW E&M-EST. PATIENT-LVL IV: CPT | Mod: PBBFAC,,, | Performed by: STUDENT IN AN ORGANIZED HEALTH CARE EDUCATION/TRAINING PROGRAM

## 2021-06-08 PROCEDURE — 99999 PR PBB SHADOW E&M-EST. PATIENT-LVL IV: ICD-10-PCS | Mod: PBBFAC,,, | Performed by: STUDENT IN AN ORGANIZED HEALTH CARE EDUCATION/TRAINING PROGRAM

## 2021-06-08 PROCEDURE — 71046 X-RAY EXAM CHEST 2 VIEWS: CPT | Mod: 26,,, | Performed by: RADIOLOGY

## 2021-06-08 PROCEDURE — 99214 OFFICE O/P EST MOD 30 MIN: CPT | Mod: S$PBB,,, | Performed by: STUDENT IN AN ORGANIZED HEALTH CARE EDUCATION/TRAINING PROGRAM

## 2021-06-08 PROCEDURE — U0005 INFEC AGEN DETEC AMPLI PROBE: HCPCS | Performed by: STUDENT IN AN ORGANIZED HEALTH CARE EDUCATION/TRAINING PROGRAM

## 2021-06-08 PROCEDURE — 99214 OFFICE O/P EST MOD 30 MIN: CPT | Mod: PBBFAC,25,PO | Performed by: STUDENT IN AN ORGANIZED HEALTH CARE EDUCATION/TRAINING PROGRAM

## 2021-06-08 PROCEDURE — 71046 X-RAY EXAM CHEST 2 VIEWS: CPT | Mod: TC,FY,PO

## 2021-06-09 ENCOUNTER — TELEPHONE (OUTPATIENT)
Dept: FAMILY MEDICINE | Facility: CLINIC | Age: 81
End: 2021-06-09

## 2021-06-09 LAB — SARS-COV-2 RNA RESP QL NAA+PROBE: NOT DETECTED

## 2021-06-09 RX ORDER — PROMETHAZINE HYDROCHLORIDE AND CODEINE PHOSPHATE 6.25; 1 MG/5ML; MG/5ML
5 SOLUTION ORAL EVERY 8 HOURS PRN
Qty: 240 ML | Refills: 0 | Status: SHIPPED | OUTPATIENT
Start: 2021-06-09 | End: 2021-06-16

## 2021-06-10 ENCOUNTER — OFFICE VISIT (OUTPATIENT)
Dept: URGENT CARE | Facility: CLINIC | Age: 81
End: 2021-06-10
Payer: MEDICARE

## 2021-06-10 VITALS
DIASTOLIC BLOOD PRESSURE: 75 MMHG | RESPIRATION RATE: 18 BRPM | WEIGHT: 160 LBS | HEART RATE: 64 BPM | BODY MASS INDEX: 28.35 KG/M2 | HEIGHT: 63 IN | OXYGEN SATURATION: 96 % | TEMPERATURE: 98 F | SYSTOLIC BLOOD PRESSURE: 122 MMHG

## 2021-06-10 DIAGNOSIS — J20.9 ACUTE PURULENT BRONCHITIS: Primary | ICD-10-CM

## 2021-06-10 DIAGNOSIS — R06.2 WHEEZING: ICD-10-CM

## 2021-06-10 DIAGNOSIS — R05.9 COUGH: ICD-10-CM

## 2021-06-10 PROCEDURE — 99213 PR OFFICE/OUTPT VISIT, EST, LEVL III, 20-29 MIN: ICD-10-PCS | Mod: S$GLB,,, | Performed by: NURSE PRACTITIONER

## 2021-06-10 PROCEDURE — 99213 OFFICE O/P EST LOW 20 MIN: CPT | Mod: S$GLB,,, | Performed by: NURSE PRACTITIONER

## 2021-06-10 RX ORDER — ALBUTEROL SULFATE 90 UG/1
2 AEROSOL, METERED RESPIRATORY (INHALATION) EVERY 6 HOURS PRN
Qty: 18 G | Refills: 0 | Status: SHIPPED | OUTPATIENT
Start: 2021-06-10 | End: 2022-06-21 | Stop reason: SDUPTHER

## 2021-06-10 RX ORDER — AZITHROMYCIN 250 MG/1
TABLET, FILM COATED ORAL
Qty: 6 TABLET | Refills: 0 | Status: SHIPPED | OUTPATIENT
Start: 2021-06-10 | End: 2021-09-15

## 2021-06-14 ENCOUNTER — OFFICE VISIT (OUTPATIENT)
Dept: FAMILY MEDICINE | Facility: CLINIC | Age: 81
End: 2021-06-14
Payer: MEDICARE

## 2021-06-14 VITALS
HEART RATE: 70 BPM | OXYGEN SATURATION: 95 % | WEIGHT: 165.38 LBS | HEIGHT: 63 IN | DIASTOLIC BLOOD PRESSURE: 74 MMHG | SYSTOLIC BLOOD PRESSURE: 120 MMHG | BODY MASS INDEX: 29.3 KG/M2 | TEMPERATURE: 99 F

## 2021-06-14 DIAGNOSIS — R06.2 WHEEZING: ICD-10-CM

## 2021-06-14 DIAGNOSIS — K12.0 ORAL APHTHOUS ULCER: ICD-10-CM

## 2021-06-14 DIAGNOSIS — J44.9 CHRONIC OBSTRUCTIVE PULMONARY DISEASE, UNSPECIFIED COPD TYPE: ICD-10-CM

## 2021-06-14 DIAGNOSIS — H66.91 RIGHT OTITIS MEDIA, UNSPECIFIED OTITIS MEDIA TYPE: Primary | ICD-10-CM

## 2021-06-14 PROCEDURE — 99999 PR PBB SHADOW E&M-EST. PATIENT-LVL III: CPT | Mod: PBBFAC,,, | Performed by: STUDENT IN AN ORGANIZED HEALTH CARE EDUCATION/TRAINING PROGRAM

## 2021-06-14 PROCEDURE — 99214 PR OFFICE/OUTPT VISIT, EST, LEVL IV, 30-39 MIN: ICD-10-PCS | Mod: S$PBB,,, | Performed by: STUDENT IN AN ORGANIZED HEALTH CARE EDUCATION/TRAINING PROGRAM

## 2021-06-14 PROCEDURE — 94640 AIRWAY INHALATION TREATMENT: CPT | Mod: PBBFAC,PO

## 2021-06-14 PROCEDURE — 99214 OFFICE O/P EST MOD 30 MIN: CPT | Mod: S$PBB,,, | Performed by: STUDENT IN AN ORGANIZED HEALTH CARE EDUCATION/TRAINING PROGRAM

## 2021-06-14 PROCEDURE — 99999 PR PBB SHADOW E&M-EST. PATIENT-LVL III: ICD-10-PCS | Mod: PBBFAC,,, | Performed by: STUDENT IN AN ORGANIZED HEALTH CARE EDUCATION/TRAINING PROGRAM

## 2021-06-14 PROCEDURE — 99213 OFFICE O/P EST LOW 20 MIN: CPT | Mod: PBBFAC,PO,25 | Performed by: STUDENT IN AN ORGANIZED HEALTH CARE EDUCATION/TRAINING PROGRAM

## 2021-06-14 RX ORDER — IPRATROPIUM BROMIDE AND ALBUTEROL SULFATE 2.5; .5 MG/3ML; MG/3ML
3 SOLUTION RESPIRATORY (INHALATION)
Status: COMPLETED | OUTPATIENT
Start: 2021-06-14 | End: 2021-06-14

## 2021-06-14 RX ORDER — CEFDINIR 300 MG/1
300 CAPSULE ORAL 2 TIMES DAILY
Qty: 14 CAPSULE | Refills: 0 | Status: SHIPPED | OUTPATIENT
Start: 2021-06-14 | End: 2021-06-21

## 2021-06-14 RX ORDER — ALBUTEROL SULFATE 0.83 MG/ML
2.5 SOLUTION RESPIRATORY (INHALATION) EVERY 6 HOURS PRN
Qty: 3 ML | Refills: 3 | Status: SHIPPED | OUTPATIENT
Start: 2021-06-14 | End: 2022-06-14

## 2021-06-14 RX ADMIN — IPRATROPIUM BROMIDE AND ALBUTEROL SULFATE 3 ML: 2.5; .5 SOLUTION RESPIRATORY (INHALATION) at 04:06

## 2021-08-09 ENCOUNTER — HOSPITAL ENCOUNTER (EMERGENCY)
Facility: HOSPITAL | Age: 81
Discharge: HOME | End: 2021-08-09
Attending: EMERGENCY MEDICINE
Payer: MEDICARE

## 2021-08-09 VITALS
RESPIRATION RATE: 18 BRPM | TEMPERATURE: 100 F | OXYGEN SATURATION: 92 % | DIASTOLIC BLOOD PRESSURE: 65 MMHG | HEART RATE: 80 BPM | SYSTOLIC BLOOD PRESSURE: 129 MMHG

## 2021-08-09 DIAGNOSIS — R10.30 LOWER ABDOMINAL PAIN: ICD-10-CM

## 2021-08-09 DIAGNOSIS — R05.9 COUGH: ICD-10-CM

## 2021-08-09 DIAGNOSIS — U07.1 COVID-19 VIRUS INFECTION: Primary | ICD-10-CM

## 2021-08-09 LAB
ALBUMIN SERPL BCP-MCNC: 4 G/DL (ref 3.5–5.2)
ALLENS TEST: ABNORMAL
ALP SERPL-CCNC: 83 U/L (ref 55–135)
ALT SERPL W/O P-5'-P-CCNC: 12 U/L (ref 10–44)
ANION GAP SERPL CALC-SCNC: 9 MMOL/L (ref 8–16)
AST SERPL-CCNC: 27 U/L (ref 10–40)
BASOPHILS # BLD AUTO: 0.01 K/UL (ref 0–0.2)
BASOPHILS NFR BLD: 0.1 % (ref 0–1.9)
BILIRUB SERPL-MCNC: 0.4 MG/DL (ref 0.1–1)
BILIRUB UR QL STRIP: NEGATIVE
BUN SERPL-MCNC: 13 MG/DL (ref 8–23)
CALCIUM SERPL-MCNC: 9.3 MG/DL (ref 8.7–10.5)
CHLORIDE SERPL-SCNC: 103 MMOL/L (ref 95–110)
CLARITY UR: CLEAR
CO2 SERPL-SCNC: 28 MMOL/L (ref 23–29)
COLOR UR: YELLOW
CREAT SERPL-MCNC: 1.3 MG/DL (ref 0.5–1.4)
CTP QC/QA: YES
DELSYS: ABNORMAL
DIFFERENTIAL METHOD: ABNORMAL
EOSINOPHIL # BLD AUTO: 0 K/UL (ref 0–0.5)
EOSINOPHIL NFR BLD: 0 % (ref 0–8)
ERYTHROCYTE [DISTWIDTH] IN BLOOD BY AUTOMATED COUNT: 14.3 % (ref 11.5–14.5)
EST. GFR  (AFRICAN AMERICAN): 60 ML/MIN/1.73 M^2
EST. GFR  (NON AFRICAN AMERICAN): 52 ML/MIN/1.73 M^2
GLUCOSE SERPL-MCNC: 88 MG/DL (ref 70–110)
GLUCOSE UR QL STRIP: NEGATIVE
HCO3 UR-SCNC: 33.3 MMOL/L (ref 24–28)
HCT VFR BLD AUTO: 50.3 % (ref 40–54)
HCT VFR BLD CALC: 54 %PCV (ref 36–54)
HGB BLD-MCNC: 16.8 G/DL (ref 14–18)
HGB BLD-MCNC: 18 G/DL
HGB UR QL STRIP: ABNORMAL
IMM GRANULOCYTES # BLD AUTO: 0.05 K/UL (ref 0–0.04)
IMM GRANULOCYTES NFR BLD AUTO: 0.7 % (ref 0–0.5)
KETONES UR QL STRIP: NEGATIVE
LEUKOCYTE ESTERASE UR QL STRIP: NEGATIVE
LIPASE SERPL-CCNC: 33 U/L (ref 4–60)
LYMPHOCYTES # BLD AUTO: 0.5 K/UL (ref 1–4.8)
LYMPHOCYTES NFR BLD: 7.5 % (ref 18–48)
MCH RBC QN AUTO: 30.2 PG (ref 27–31)
MCHC RBC AUTO-ENTMCNC: 33.4 G/DL (ref 32–36)
MCV RBC AUTO: 91 FL (ref 82–98)
MODE: ABNORMAL
MONOCYTES # BLD AUTO: 3.1 K/UL (ref 0.3–1)
MONOCYTES NFR BLD: 45.2 % (ref 4–15)
NEUTROPHILS # BLD AUTO: 3.2 K/UL (ref 1.8–7.7)
NEUTROPHILS NFR BLD: 46.5 % (ref 38–73)
NITRITE UR QL STRIP: NEGATIVE
NRBC BLD-RTO: 0 /100 WBC
PCO2 BLDA: 63.4 MMHG (ref 35–45)
PH SMN: 7.33 [PH] (ref 7.35–7.45)
PH UR STRIP: 6 [PH] (ref 5–8)
PLATELET # BLD AUTO: 120 K/UL (ref 150–450)
PLATELET BLD QL SMEAR: ABNORMAL
PMV BLD AUTO: 11.3 FL (ref 9.2–12.9)
PO2 BLDA: 16 MMHG (ref 40–60)
POC BE: 7 MMOL/L
POC SATURATED O2: 19 % (ref 95–100)
POC TCO2: 35 MMOL/L (ref 24–29)
POTASSIUM BLD-SCNC: 4.3 MMOL/L (ref 3.5–5.1)
POTASSIUM SERPL-SCNC: 4.5 MMOL/L (ref 3.5–5.1)
PROT SERPL-MCNC: 7.3 G/DL (ref 6–8.4)
PROT UR QL STRIP: ABNORMAL
RBC # BLD AUTO: 5.56 M/UL (ref 4.6–6.2)
SAMPLE: ABNORMAL
SARS-COV-2 RDRP RESP QL NAA+PROBE: POSITIVE
SITE: ABNORMAL
SODIUM BLD-SCNC: 142 MMOL/L (ref 136–145)
SODIUM SERPL-SCNC: 140 MMOL/L (ref 136–145)
SP GR UR STRIP: 1.03 (ref 1–1.03)
URN SPEC COLLECT METH UR: ABNORMAL
UROBILINOGEN UR STRIP-ACNC: NEGATIVE EU/DL
WBC # BLD AUTO: 6.81 K/UL (ref 3.9–12.7)

## 2021-08-09 PROCEDURE — 25500020 PHARM REV CODE 255: Performed by: EMERGENCY MEDICINE

## 2021-08-09 PROCEDURE — 85025 COMPLETE CBC W/AUTO DIFF WBC: CPT | Performed by: STUDENT IN AN ORGANIZED HEALTH CARE EDUCATION/TRAINING PROGRAM

## 2021-08-09 PROCEDURE — 99285 EMERGENCY DEPT VISIT HI MDM: CPT | Mod: 25

## 2021-08-09 PROCEDURE — 63600175 PHARM REV CODE 636 W HCPCS: Performed by: STUDENT IN AN ORGANIZED HEALTH CARE EDUCATION/TRAINING PROGRAM

## 2021-08-09 PROCEDURE — 83690 ASSAY OF LIPASE: CPT | Performed by: STUDENT IN AN ORGANIZED HEALTH CARE EDUCATION/TRAINING PROGRAM

## 2021-08-09 PROCEDURE — 82803 BLOOD GASES ANY COMBINATION: CPT

## 2021-08-09 PROCEDURE — 96374 THER/PROPH/DIAG INJ IV PUSH: CPT | Mod: 59

## 2021-08-09 PROCEDURE — 80053 COMPREHEN METABOLIC PANEL: CPT | Performed by: STUDENT IN AN ORGANIZED HEALTH CARE EDUCATION/TRAINING PROGRAM

## 2021-08-09 PROCEDURE — 25000003 PHARM REV CODE 250: Performed by: EMERGENCY MEDICINE

## 2021-08-09 PROCEDURE — U0002 COVID-19 LAB TEST NON-CDC: HCPCS | Performed by: PHYSICIAN ASSISTANT

## 2021-08-09 PROCEDURE — 99900035 HC TECH TIME PER 15 MIN (STAT)

## 2021-08-09 PROCEDURE — 81003 URINALYSIS AUTO W/O SCOPE: CPT | Performed by: STUDENT IN AN ORGANIZED HEALTH CARE EDUCATION/TRAINING PROGRAM

## 2021-08-09 PROCEDURE — 25000242 PHARM REV CODE 250 ALT 637 W/ HCPCS: Performed by: STUDENT IN AN ORGANIZED HEALTH CARE EDUCATION/TRAINING PROGRAM

## 2021-08-09 RX ORDER — ACETAMINOPHEN 325 MG/1
650 TABLET ORAL
Status: COMPLETED | OUTPATIENT
Start: 2021-08-09 | End: 2021-08-09

## 2021-08-09 RX ORDER — ALBUTEROL SULFATE 90 UG/1
2 AEROSOL, METERED RESPIRATORY (INHALATION)
Status: COMPLETED | OUTPATIENT
Start: 2021-08-09 | End: 2021-08-09

## 2021-08-09 RX ORDER — DEXAMETHASONE SODIUM PHOSPHATE 4 MG/ML
6 INJECTION, SOLUTION INTRA-ARTICULAR; INTRALESIONAL; INTRAMUSCULAR; INTRAVENOUS; SOFT TISSUE
Status: COMPLETED | OUTPATIENT
Start: 2021-08-09 | End: 2021-08-09

## 2021-08-09 RX ADMIN — DEXAMETHASONE SODIUM PHOSPHATE 6 MG: 4 INJECTION, SOLUTION INTRAMUSCULAR; INTRAVENOUS at 03:08

## 2021-08-09 RX ADMIN — ALBUTEROL SULFATE 2 PUFF: 90 AEROSOL, METERED RESPIRATORY (INHALATION) at 03:08

## 2021-08-09 RX ADMIN — ACETAMINOPHEN 650 MG: 325 TABLET ORAL at 03:08

## 2021-08-09 RX ADMIN — IOHEXOL 75 ML: 350 INJECTION, SOLUTION INTRAVENOUS at 04:08

## 2021-08-10 ENCOUNTER — INFUSION (OUTPATIENT)
Dept: INFECTIOUS DISEASES | Facility: HOSPITAL | Age: 81
End: 2021-08-10
Attending: INTERNAL MEDICINE
Payer: MEDICARE

## 2021-08-10 VITALS
OXYGEN SATURATION: 95 % | DIASTOLIC BLOOD PRESSURE: 70 MMHG | BODY MASS INDEX: 27.31 KG/M2 | TEMPERATURE: 98 F | HEIGHT: 64 IN | HEART RATE: 60 BPM | SYSTOLIC BLOOD PRESSURE: 145 MMHG | RESPIRATION RATE: 20 BRPM | WEIGHT: 160 LBS

## 2021-08-10 DIAGNOSIS — U07.1 COVID-19 VIRUS INFECTION: Primary | ICD-10-CM

## 2021-08-10 PROCEDURE — M0243 CASIRIVI AND IMDEVI INFUSION: HCPCS | Performed by: INTERNAL MEDICINE

## 2021-08-10 PROCEDURE — 63600175 PHARM REV CODE 636 W HCPCS: Performed by: INTERNAL MEDICINE

## 2021-08-10 PROCEDURE — 25000003 PHARM REV CODE 250: Performed by: INTERNAL MEDICINE

## 2021-08-10 RX ORDER — EPINEPHRINE 0.3 MG/.3ML
0.3 INJECTION SUBCUTANEOUS
Status: DISCONTINUED | OUTPATIENT
Start: 2021-08-10 | End: 2022-01-10

## 2021-08-10 RX ORDER — SODIUM CHLORIDE 0.9 % (FLUSH) 0.9 %
10 SYRINGE (ML) INJECTION
Status: DISCONTINUED | OUTPATIENT
Start: 2021-08-10 | End: 2022-01-10

## 2021-08-10 RX ORDER — ALBUTEROL SULFATE 90 UG/1
2 AEROSOL, METERED RESPIRATORY (INHALATION)
Status: DISCONTINUED | OUTPATIENT
Start: 2021-08-10 | End: 2022-01-10

## 2021-08-10 RX ORDER — DIPHENHYDRAMINE HYDROCHLORIDE 50 MG/ML
25 INJECTION INTRAMUSCULAR; INTRAVENOUS ONCE AS NEEDED
Status: DISCONTINUED | OUTPATIENT
Start: 2021-08-10 | End: 2022-01-10

## 2021-08-10 RX ORDER — ACETAMINOPHEN 325 MG/1
650 TABLET ORAL ONCE AS NEEDED
Status: DISCONTINUED | OUTPATIENT
Start: 2021-08-10 | End: 2022-01-10

## 2021-08-10 RX ORDER — ONDANSETRON 4 MG/1
4 TABLET, ORALLY DISINTEGRATING ORAL ONCE AS NEEDED
Status: DISCONTINUED | OUTPATIENT
Start: 2021-08-10 | End: 2022-01-10

## 2021-08-10 RX ADMIN — CASIRIVIMAB AND IMDEVIMAB 600 MG: 600; 600 INJECTION, SOLUTION, CONCENTRATE INTRAVENOUS at 01:08

## 2021-08-11 ENCOUNTER — TELEPHONE (OUTPATIENT)
Dept: ADMINISTRATIVE | Facility: OTHER | Age: 81
End: 2021-08-11

## 2021-08-11 ENCOUNTER — NURSE TRIAGE (OUTPATIENT)
Dept: ADMINISTRATIVE | Facility: CLINIC | Age: 81
End: 2021-08-11

## 2021-08-12 ENCOUNTER — NURSE TRIAGE (OUTPATIENT)
Dept: ADMINISTRATIVE | Facility: CLINIC | Age: 81
End: 2021-08-12

## 2021-09-15 ENCOUNTER — LAB VISIT (OUTPATIENT)
Dept: LAB | Facility: HOSPITAL | Age: 81
End: 2021-09-15
Attending: STUDENT IN AN ORGANIZED HEALTH CARE EDUCATION/TRAINING PROGRAM
Payer: MEDICARE

## 2021-09-15 ENCOUNTER — OFFICE VISIT (OUTPATIENT)
Dept: FAMILY MEDICINE | Facility: CLINIC | Age: 81
End: 2021-09-15
Payer: MEDICARE

## 2021-09-15 VITALS
OXYGEN SATURATION: 95 % | DIASTOLIC BLOOD PRESSURE: 60 MMHG | SYSTOLIC BLOOD PRESSURE: 130 MMHG | HEIGHT: 64 IN | WEIGHT: 158.75 LBS | HEART RATE: 65 BPM | BODY MASS INDEX: 27.1 KG/M2

## 2021-09-15 DIAGNOSIS — E78.00 HIGH CHOLESTEROL: ICD-10-CM

## 2021-09-15 DIAGNOSIS — J44.9 CHRONIC OBSTRUCTIVE PULMONARY DISEASE, UNSPECIFIED COPD TYPE: ICD-10-CM

## 2021-09-15 DIAGNOSIS — R94.4 DECREASED GFR: Primary | ICD-10-CM

## 2021-09-15 DIAGNOSIS — R94.4 DECREASED GFR: ICD-10-CM

## 2021-09-15 LAB
ANION GAP SERPL CALC-SCNC: 9 MMOL/L (ref 8–16)
BUN SERPL-MCNC: 16 MG/DL (ref 8–23)
CALCIUM SERPL-MCNC: 9.4 MG/DL (ref 8.7–10.5)
CHLORIDE SERPL-SCNC: 101 MMOL/L (ref 95–110)
CO2 SERPL-SCNC: 26 MMOL/L (ref 23–29)
CREAT SERPL-MCNC: 1.1 MG/DL (ref 0.5–1.4)
EST. GFR  (AFRICAN AMERICAN): >60 ML/MIN/1.73 M^2
EST. GFR  (NON AFRICAN AMERICAN): >60 ML/MIN/1.73 M^2
GLUCOSE SERPL-MCNC: 83 MG/DL (ref 70–110)
POTASSIUM SERPL-SCNC: 4.1 MMOL/L (ref 3.5–5.1)
SODIUM SERPL-SCNC: 136 MMOL/L (ref 136–145)

## 2021-09-15 PROCEDURE — 99999 PR PBB SHADOW E&M-EST. PATIENT-LVL IV: CPT | Mod: PBBFAC,,, | Performed by: STUDENT IN AN ORGANIZED HEALTH CARE EDUCATION/TRAINING PROGRAM

## 2021-09-15 PROCEDURE — 36415 COLL VENOUS BLD VENIPUNCTURE: CPT | Mod: PO | Performed by: STUDENT IN AN ORGANIZED HEALTH CARE EDUCATION/TRAINING PROGRAM

## 2021-09-15 PROCEDURE — 80048 BASIC METABOLIC PNL TOTAL CA: CPT | Performed by: STUDENT IN AN ORGANIZED HEALTH CARE EDUCATION/TRAINING PROGRAM

## 2021-09-15 PROCEDURE — 99213 OFFICE O/P EST LOW 20 MIN: CPT | Mod: S$PBB,,, | Performed by: STUDENT IN AN ORGANIZED HEALTH CARE EDUCATION/TRAINING PROGRAM

## 2021-09-15 PROCEDURE — 99999 PR PBB SHADOW E&M-EST. PATIENT-LVL IV: ICD-10-PCS | Mod: PBBFAC,,, | Performed by: STUDENT IN AN ORGANIZED HEALTH CARE EDUCATION/TRAINING PROGRAM

## 2021-09-15 PROCEDURE — 99213 PR OFFICE/OUTPT VISIT, EST, LEVL III, 20-29 MIN: ICD-10-PCS | Mod: S$PBB,,, | Performed by: STUDENT IN AN ORGANIZED HEALTH CARE EDUCATION/TRAINING PROGRAM

## 2021-09-15 PROCEDURE — 99214 OFFICE O/P EST MOD 30 MIN: CPT | Mod: PBBFAC,PO | Performed by: STUDENT IN AN ORGANIZED HEALTH CARE EDUCATION/TRAINING PROGRAM

## 2022-01-10 ENCOUNTER — OFFICE VISIT (OUTPATIENT)
Dept: FAMILY MEDICINE | Facility: CLINIC | Age: 82
End: 2022-01-10
Payer: COMMERCIAL

## 2022-01-10 VITALS
DIASTOLIC BLOOD PRESSURE: 82 MMHG | BODY MASS INDEX: 28.04 KG/M2 | OXYGEN SATURATION: 95 % | HEIGHT: 64 IN | RESPIRATION RATE: 16 BRPM | SYSTOLIC BLOOD PRESSURE: 126 MMHG | HEART RATE: 72 BPM | WEIGHT: 164.25 LBS

## 2022-01-10 DIAGNOSIS — L57.0 ACTINIC KERATOSIS: ICD-10-CM

## 2022-01-10 DIAGNOSIS — F10.11 NONDEPENDENT ALCOHOL ABUSE, IN REMISSION: ICD-10-CM

## 2022-01-10 DIAGNOSIS — H60.391 OTHER INFECTIVE OTITIS EXTERNA OF RIGHT EAR, UNSPECIFIED CHRONICITY: ICD-10-CM

## 2022-01-10 DIAGNOSIS — Z23 IMMUNIZATION DUE: ICD-10-CM

## 2022-01-10 DIAGNOSIS — I10 ESSENTIAL HYPERTENSION: ICD-10-CM

## 2022-01-10 DIAGNOSIS — R41.3 MEMORY LOSS: ICD-10-CM

## 2022-01-10 DIAGNOSIS — H90.3 SENSORINEURAL HEARING LOSS, BILATERAL: ICD-10-CM

## 2022-01-10 DIAGNOSIS — L82.1 OTHER SEBORRHEIC KERATOSIS: ICD-10-CM

## 2022-01-10 DIAGNOSIS — Z00.01 ENCOUNTER FOR GENERAL ADULT MEDICAL EXAMINATION WITH ABNORMAL FINDINGS: Primary | ICD-10-CM

## 2022-01-10 DIAGNOSIS — F32.A DEPRESSIVE DISORDER: ICD-10-CM

## 2022-01-10 DIAGNOSIS — E66.3 OVERWEIGHT WITH BODY MASS INDEX (BMI) OF 28 TO 28.9 IN ADULT: ICD-10-CM

## 2022-01-10 DIAGNOSIS — J44.9 CHRONIC OBSTRUCTIVE PULMONARY DISEASE, UNSPECIFIED COPD TYPE: Chronic | ICD-10-CM

## 2022-01-10 DIAGNOSIS — Z87.891 PERSONAL HISTORY OF TOBACCO USE, PRESENTING HAZARDS TO HEALTH: ICD-10-CM

## 2022-01-10 DIAGNOSIS — F41.9 ANXIETY DISORDER, UNSPECIFIED TYPE: ICD-10-CM

## 2022-01-10 PROBLEM — J30.9 ALLERGIC RHINOSINUSITIS: Status: ACTIVE | Noted: 2018-11-02

## 2022-01-10 PROBLEM — G47.00 INSOMNIA: Status: ACTIVE | Noted: 2022-01-10

## 2022-01-10 PROBLEM — R73.01 IMPAIRED FASTING GLUCOSE: Status: ACTIVE | Noted: 2022-01-10

## 2022-01-10 PROBLEM — N18.30 CKD (CHRONIC KIDNEY DISEASE) STAGE 3, GFR 30-59 ML/MIN: Status: ACTIVE | Noted: 2018-11-02

## 2022-01-10 PROBLEM — E66.9 OBESITY: Status: RESOLVED | Noted: 2019-03-04 | Resolved: 2022-01-10

## 2022-01-10 PROBLEM — F31.62 BIPOLAR DISORDER, CURRENT EPISODE MIXED, MODERATE: Status: RESOLVED | Noted: 2022-01-10 | Resolved: 2022-01-10

## 2022-01-10 PROBLEM — F31.62 BIPOLAR DISORDER, CURRENT EPISODE MIXED, MODERATE: Status: ACTIVE | Noted: 2022-01-10

## 2022-01-10 PROBLEM — E29.1 TESTICULAR HYPOFUNCTION: Status: ACTIVE | Noted: 2022-01-10

## 2022-01-10 PROBLEM — Z86.39 HISTORY OF HYPERLIPIDEMIA: Status: ACTIVE | Noted: 2018-11-02

## 2022-01-10 PROBLEM — Z83.3 FAMILY HISTORY OF DIABETES MELLITUS (DM): Status: ACTIVE | Noted: 2018-11-02

## 2022-01-10 PROCEDURE — 3079F DIAST BP 80-89 MM HG: CPT | Mod: CPTII,S$GLB,, | Performed by: FAMILY MEDICINE

## 2022-01-10 PROCEDURE — 99397 PR PREVENTIVE VISIT,EST,65 & OVER: ICD-10-PCS | Mod: S$GLB,,, | Performed by: FAMILY MEDICINE

## 2022-01-10 PROCEDURE — 1159F PR MEDICATION LIST DOCUMENTED IN MEDICAL RECORD: ICD-10-PCS | Mod: CPTII,S$GLB,, | Performed by: FAMILY MEDICINE

## 2022-01-10 PROCEDURE — 99999 PR PBB SHADOW E&M-EST. PATIENT-LVL III: ICD-10-PCS | Mod: PBBFAC,,, | Performed by: FAMILY MEDICINE

## 2022-01-10 PROCEDURE — 1160F RVW MEDS BY RX/DR IN RCRD: CPT | Mod: CPTII,S$GLB,, | Performed by: FAMILY MEDICINE

## 2022-01-10 PROCEDURE — 3074F SYST BP LT 130 MM HG: CPT | Mod: CPTII,S$GLB,, | Performed by: FAMILY MEDICINE

## 2022-01-10 PROCEDURE — 1126F PR PAIN SEVERITY QUANTIFIED, NO PAIN PRESENT: ICD-10-PCS | Mod: CPTII,S$GLB,, | Performed by: FAMILY MEDICINE

## 2022-01-10 PROCEDURE — 3079F PR MOST RECENT DIASTOLIC BLOOD PRESSURE 80-89 MM HG: ICD-10-PCS | Mod: CPTII,S$GLB,, | Performed by: FAMILY MEDICINE

## 2022-01-10 PROCEDURE — 1126F AMNT PAIN NOTED NONE PRSNT: CPT | Mod: CPTII,S$GLB,, | Performed by: FAMILY MEDICINE

## 2022-01-10 PROCEDURE — 99397 PER PM REEVAL EST PAT 65+ YR: CPT | Mod: S$GLB,,, | Performed by: FAMILY MEDICINE

## 2022-01-10 PROCEDURE — 1160F PR REVIEW ALL MEDS BY PRESCRIBER/CLIN PHARMACIST DOCUMENTED: ICD-10-PCS | Mod: CPTII,S$GLB,, | Performed by: FAMILY MEDICINE

## 2022-01-10 PROCEDURE — 99999 PR PBB SHADOW E&M-EST. PATIENT-LVL III: CPT | Mod: PBBFAC,,, | Performed by: FAMILY MEDICINE

## 2022-01-10 PROCEDURE — 1101F PR PT FALLS ASSESS DOC 0-1 FALLS W/OUT INJ PAST YR: ICD-10-PCS | Mod: CPTII,S$GLB,, | Performed by: FAMILY MEDICINE

## 2022-01-10 PROCEDURE — 3288F PR FALLS RISK ASSESSMENT DOCUMENTED: ICD-10-PCS | Mod: CPTII,S$GLB,, | Performed by: FAMILY MEDICINE

## 2022-01-10 PROCEDURE — 3074F PR MOST RECENT SYSTOLIC BLOOD PRESSURE < 130 MM HG: ICD-10-PCS | Mod: CPTII,S$GLB,, | Performed by: FAMILY MEDICINE

## 2022-01-10 PROCEDURE — 1159F MED LIST DOCD IN RCRD: CPT | Mod: CPTII,S$GLB,, | Performed by: FAMILY MEDICINE

## 2022-01-10 PROCEDURE — 3288F FALL RISK ASSESSMENT DOCD: CPT | Mod: CPTII,S$GLB,, | Performed by: FAMILY MEDICINE

## 2022-01-10 PROCEDURE — 1101F PT FALLS ASSESS-DOCD LE1/YR: CPT | Mod: CPTII,S$GLB,, | Performed by: FAMILY MEDICINE

## 2022-01-10 RX ORDER — TAMSULOSIN HYDROCHLORIDE 0.4 MG/1
1 CAPSULE ORAL DAILY
COMMUNITY
Start: 2021-04-08

## 2022-01-10 RX ORDER — NEOMYCIN SULFATE, POLYMYXIN B SULFATE AND HYDROCORTISONE 10; 3.5; 1 MG/ML; MG/ML; [USP'U]/ML
3 SUSPENSION/ DROPS AURICULAR (OTIC) 4 TIMES DAILY
Qty: 10 ML | Refills: 0 | Status: SHIPPED | OUTPATIENT
Start: 2022-01-10 | End: 2022-01-20

## 2022-01-10 NOTE — PROGRESS NOTES
Subjective:       Patient ID: Navin Henning Jr. is a 81 y.o. male.    Chief Complaint: Annual Exam    Patient Active Problem List   Diagnosis    High cholesterol    Depressive disorder    COPD (chronic obstructive pulmonary disease)    Personal history of tobacco use, presenting hazards to health    Actinic keratosis    Other seborrheic keratosis    Allergic rhinosinusitis    Anxiety disorder    Sensorineural hearing loss, bilateral    CKD (chronic kidney disease) stage 3, GFR 30-59 ml/min    Testicular hypofunction    Essential hypertension    Family history of diabetes mellitus (DM)    History of hyperlipidemia    Impaired fasting glucose    Insomnia    Memory loss    Nondependent alcohol abuse, in remission    Overweight with body mass index (BMI) of 28 to 28.9 in adult      HPI  82 yo male presents today to follow up chronic medical problems. Overall stable. Follows with Dr. Alba, Neurology. Urology and Dermatology. Also gets testosterone replacement therapy in pellet form.     Reports that he feels pain in right ear canal at times. Bilateral hearing aids. No drainage, no local swelling noted.     Review of Systems   All other systems reviewed and are negative.         Results for orders placed or performed in visit on 09/15/21   Basic Metabolic Panel   Result Value Ref Range    Sodium 136 136 - 145 mmol/L    Potassium 4.1 3.5 - 5.1 mmol/L    Chloride 101 95 - 110 mmol/L    CO2 26 23 - 29 mmol/L    Glucose 83 70 - 110 mg/dL    BUN 16 8 - 23 mg/dL    Creatinine 1.1 0.5 - 1.4 mg/dL    Calcium 9.4 8.7 - 10.5 mg/dL    Anion Gap 9 8 - 16 mmol/L    eGFR if African American >60.0 >60 mL/min/1.73 m^2    eGFR if non African American >60.0 >60 mL/min/1.73 m^2       Objective:     Vitals:    01/10/22 1322   BP: 126/82   Pulse: 72   Resp: 16        Physical Exam  Vitals and nursing note reviewed.   Constitutional:       General: He is not in acute distress.     Appearance: Normal appearance. He is  not ill-appearing, toxic-appearing or diaphoretic.   HENT:      Head: Normocephalic and atraumatic.   Eyes:      General: No scleral icterus.     Conjunctiva/sclera: Conjunctivae normal.   Cardiovascular:      Rate and Rhythm: Normal rate.      Heart sounds: Normal heart sounds.   Pulmonary:      Effort: Pulmonary effort is normal. No respiratory distress.      Breath sounds: Normal breath sounds.   Abdominal:      General: Bowel sounds are normal.   Skin:     Coloration: Skin is not pale.   Neurological:      Mental Status: He is alert. Mental status is at baseline.   Psychiatric:         Attention and Perception: Attention and perception normal.         Mood and Affect: Mood and affect normal.         Speech: Speech normal.         Behavior: Behavior normal.         Cognition and Memory: Cognition and memory normal.         Judgment: Judgment normal.         Assessment:       1. Encounter for general adult medical examination with abnormal findings    2. Immunization due    3. Other infective otitis externa of right ear, unspecified chronicity    4. Overweight with body mass index (BMI) of 28 to 28.9 in adult    5. Essential hypertension    6. Memory loss    7. Chronic obstructive pulmonary disease, unspecified COPD type    8. Actinic keratosis    9. Other seborrheic keratosis    10. Sensorineural hearing loss, bilateral    11. Depressive disorder    12. Anxiety disorder, unspecified type    13. Nondependent alcohol abuse, in remission    14. Personal history of tobacco use, presenting hazards to health        Plan:         - Risk and age appropriate anticipatory guidance. HM reviewed and updated. Recommendations discussed with patient as appropriate.   - Chronic health condition is stable and controlled. Continue current medication regimen and relevant lifestyle modifications. Necessary medication refills addressed. Routine ongoing surveillance monitoring.     -Overall conditions stable. Continue follow up with  specialists. Reviewed most recent labs, no further labs needed at this time. History of COPD but no ongoing or active symptoms.    -OE - treatment, likely initially irritation and skin broken from hearing aide. Care for skin encouraged. Med prescribed as below.    - Wife and patients they'll check [facility] and return to complete if still needed.     Encounter for general adult medical examination with abnormal findings    Immunization due    Other infective otitis externa of right ear, unspecified chronicity  -     neomycin-polymyxin-hydrocortisone (CORTISPORIN) 3.5-10,000-1 mg/mL-unit/mL-% otic suspension; Place 3 drops into the right ear 4 (four) times daily. for 10 days  Dispense: 10 mL; Refill: 0    Overweight with body mass index (BMI) of 28 to 28.9 in adult    Essential hypertension    Memory loss    Chronic obstructive pulmonary disease, unspecified COPD type    Actinic keratosis    Other seborrheic keratosis    Sensorineural hearing loss, bilateral    Depressive disorder    Anxiety disorder, unspecified type    Nondependent alcohol abuse, in remission    Personal history of tobacco use, presenting hazards to health      Patient's questions answered. Plan reviewed with patient at the end of visit. Relevant precautions to chief complaint and reasons to seek medical care or contact the office sooner reviewed with patient.     Follow up in about 6 months (around 7/10/2022) for f/u chronic medical problems.

## 2022-01-22 ENCOUNTER — OFFICE VISIT (OUTPATIENT)
Dept: FAMILY MEDICINE | Facility: CLINIC | Age: 82
End: 2022-01-22
Payer: MEDICARE

## 2022-01-22 VITALS
HEART RATE: 61 BPM | HEIGHT: 64 IN | OXYGEN SATURATION: 96 % | BODY MASS INDEX: 27.63 KG/M2 | DIASTOLIC BLOOD PRESSURE: 62 MMHG | WEIGHT: 161.81 LBS | SYSTOLIC BLOOD PRESSURE: 108 MMHG

## 2022-01-22 DIAGNOSIS — N18.30 STAGE 3 CHRONIC KIDNEY DISEASE, UNSPECIFIED WHETHER STAGE 3A OR 3B CKD: ICD-10-CM

## 2022-01-22 DIAGNOSIS — F41.9 ANXIETY DISORDER, UNSPECIFIED TYPE: ICD-10-CM

## 2022-01-22 DIAGNOSIS — I10 ESSENTIAL HYPERTENSION: ICD-10-CM

## 2022-01-22 DIAGNOSIS — M54.50 ACUTE RIGHT-SIDED LOW BACK PAIN WITHOUT SCIATICA: Primary | ICD-10-CM

## 2022-01-22 DIAGNOSIS — J42 CHRONIC BRONCHITIS, UNSPECIFIED CHRONIC BRONCHITIS TYPE: ICD-10-CM

## 2022-01-22 PROCEDURE — 99499 RISK ADDL DX/OHS AUDIT: ICD-10-PCS | Mod: S$GLB,,, | Performed by: INTERNAL MEDICINE

## 2022-01-22 PROCEDURE — 1125F PR PAIN SEVERITY QUANTIFIED, PAIN PRESENT: ICD-10-PCS | Mod: HCNC,CPTII,S$GLB, | Performed by: INTERNAL MEDICINE

## 2022-01-22 PROCEDURE — 3074F SYST BP LT 130 MM HG: CPT | Mod: HCNC,CPTII,S$GLB, | Performed by: INTERNAL MEDICINE

## 2022-01-22 PROCEDURE — 1101F PR PT FALLS ASSESS DOC 0-1 FALLS W/OUT INJ PAST YR: ICD-10-PCS | Mod: HCNC,CPTII,S$GLB, | Performed by: INTERNAL MEDICINE

## 2022-01-22 PROCEDURE — 1125F AMNT PAIN NOTED PAIN PRSNT: CPT | Mod: HCNC,CPTII,S$GLB, | Performed by: INTERNAL MEDICINE

## 2022-01-22 PROCEDURE — 3288F PR FALLS RISK ASSESSMENT DOCUMENTED: ICD-10-PCS | Mod: HCNC,CPTII,S$GLB, | Performed by: INTERNAL MEDICINE

## 2022-01-22 PROCEDURE — 99499 UNLISTED E&M SERVICE: CPT | Mod: S$GLB,,, | Performed by: INTERNAL MEDICINE

## 2022-01-22 PROCEDURE — 99999 PR PBB SHADOW E&M-EST. PATIENT-LVL IV: ICD-10-PCS | Mod: PBBFAC,HCNC,, | Performed by: INTERNAL MEDICINE

## 2022-01-22 PROCEDURE — 99214 PR OFFICE/OUTPT VISIT, EST, LEVL IV, 30-39 MIN: ICD-10-PCS | Mod: HCNC,S$GLB,, | Performed by: INTERNAL MEDICINE

## 2022-01-22 PROCEDURE — 1159F PR MEDICATION LIST DOCUMENTED IN MEDICAL RECORD: ICD-10-PCS | Mod: HCNC,CPTII,S$GLB, | Performed by: INTERNAL MEDICINE

## 2022-01-22 PROCEDURE — 1101F PT FALLS ASSESS-DOCD LE1/YR: CPT | Mod: HCNC,CPTII,S$GLB, | Performed by: INTERNAL MEDICINE

## 2022-01-22 PROCEDURE — 3288F FALL RISK ASSESSMENT DOCD: CPT | Mod: HCNC,CPTII,S$GLB, | Performed by: INTERNAL MEDICINE

## 2022-01-22 PROCEDURE — 1159F MED LIST DOCD IN RCRD: CPT | Mod: HCNC,CPTII,S$GLB, | Performed by: INTERNAL MEDICINE

## 2022-01-22 PROCEDURE — 3078F PR MOST RECENT DIASTOLIC BLOOD PRESSURE < 80 MM HG: ICD-10-PCS | Mod: HCNC,CPTII,S$GLB, | Performed by: INTERNAL MEDICINE

## 2022-01-22 PROCEDURE — 1160F RVW MEDS BY RX/DR IN RCRD: CPT | Mod: HCNC,CPTII,S$GLB, | Performed by: INTERNAL MEDICINE

## 2022-01-22 PROCEDURE — 1160F PR REVIEW ALL MEDS BY PRESCRIBER/CLIN PHARMACIST DOCUMENTED: ICD-10-PCS | Mod: HCNC,CPTII,S$GLB, | Performed by: INTERNAL MEDICINE

## 2022-01-22 PROCEDURE — 3074F PR MOST RECENT SYSTOLIC BLOOD PRESSURE < 130 MM HG: ICD-10-PCS | Mod: HCNC,CPTII,S$GLB, | Performed by: INTERNAL MEDICINE

## 2022-01-22 PROCEDURE — 99214 OFFICE O/P EST MOD 30 MIN: CPT | Mod: HCNC,S$GLB,, | Performed by: INTERNAL MEDICINE

## 2022-01-22 PROCEDURE — 3078F DIAST BP <80 MM HG: CPT | Mod: HCNC,CPTII,S$GLB, | Performed by: INTERNAL MEDICINE

## 2022-01-22 PROCEDURE — 99999 PR PBB SHADOW E&M-EST. PATIENT-LVL IV: CPT | Mod: PBBFAC,HCNC,, | Performed by: INTERNAL MEDICINE

## 2022-01-22 RX ORDER — METHOCARBAMOL 500 MG/1
500 TABLET, FILM COATED ORAL 2 TIMES DAILY
Qty: 20 TABLET | Refills: 0 | Status: SHIPPED | OUTPATIENT
Start: 2022-01-22 | End: 2022-02-01

## 2022-01-22 RX ORDER — MELOXICAM 7.5 MG/1
7.5 TABLET ORAL DAILY
Qty: 30 TABLET | Refills: 0 | Status: SHIPPED | OUTPATIENT
Start: 2022-01-22 | End: 2022-07-11

## 2022-01-22 NOTE — PROGRESS NOTES
Subjective:       Patient ID: Navin Henning Jr. is a 81 y.o. male.    Chief Complaint: Back Pain (1 month )      HPI  Navin Henning Jr. is a 81 y.o. male with chronic conditions of COPD, hyperlipidemia, hypertension, impaired glucose tolerance, mood disorder who presents today for evaluation of back pain.    Reports has been having a right-sided needs back pain for a month.  Pain is reproduced with deep inspiration and sometimes hurts to breathe.  Denies cough, fever, chills, leg swelling, or shortness of breath.  Reports pain appeared after he had a fall and fell on his side and the right side of his back he had a wall.  Has been using Tylenol and patches and the medications dull the pain but it does not go away his he the pain is more muscle skeletal than respiratory.  Decided to further evaluate as he has been persistent but does not limit him.    Health Maintenance:  Health Maintenance   Topic Date Due    Hepatitis C Screening  Never done    TETANUS VACCINE  07/26/2021    Lipid Panel  05/13/2026       Review of Systems   Constitutional: Negative for activity change, chills, fever and unexpected weight change.   HENT: Negative for nasal congestion and sore throat.    Respiratory: Negative for cough and shortness of breath.    Cardiovascular: Negative for chest pain and palpitations.   Gastrointestinal: Negative.    Genitourinary: Negative for difficulty urinating.   Musculoskeletal: Positive for back pain. Negative for joint swelling.   Integumentary:  Negative for rash.   Neurological: Negative.       Past Medical History:   Diagnosis Date    Asthma     COPD (chronic obstructive pulmonary disease)     Depression     High cholesterol        No past surgical history on file.    No family history on file.    Social History     Socioeconomic History    Marital status:    Tobacco Use    Smoking status: Never Smoker    Smokeless tobacco: Never Used   Substance and Sexual Activity     Alcohol use: No    Drug use: No       Current Outpatient Medications   Medication Sig Dispense Refill    albuterol (PROVENTIL) 2.5 mg /3 mL (0.083 %) nebulizer solution Take 3 mLs (2.5 mg total) by nebulization every 6 (six) hours as needed for Wheezing. Rescue 3 mL 3    albuterol (VENTOLIN HFA) 90 mcg/actuation inhaler Inhale 2 puffs into the lungs every 6 (six) hours as needed for Wheezing. Rescue 18 g 0    memantine (NAMENDA) 10 MG Tab memantine 10 mg tablet   Take 1 tablet twice a day by oral route as directed for 30 days.      multivitamin capsule Take 1 capsule by mouth once daily.      tamsulosin (FLOMAX) 0.4 mg Cap Take 1 capsule by mouth once daily.      testosterone (TESTOPEL) 75 mg Pllt       meloxicam (MOBIC) 7.5 MG tablet Take 1 tablet (7.5 mg total) by mouth once daily. 30 tablet 0    methocarbamoL (ROBAXIN) 500 MG Tab Take 1 tablet (500 mg total) by mouth 2 (two) times daily. for 10 days 20 tablet 0     No current facility-administered medications for this visit.       Review of patient's allergies indicates:   Allergen Reactions    Doxycycline Rash and Hives    Penicillins Rash    Sulfamethoxazole-trimethoprim Rash         Objective:       Last 3 sets of Vitals    Vitals - 1 value per visit 1/10/2022 1/22/2022 1/22/2022   SYSTOLIC 126 - 108   DIASTOLIC 82 - 62   Pulse 72 - 61   Temp - - -   Resp 16 - -   SPO2 95 - 96   Weight (lb) 164.24 - 161.82   Weight (kg) 74.5 - 73.4   Height 64 - 64   BMI (Calculated) 28.2 - 27.8   VISIT REPORT - - -   Pain Score  - 8 -   Physical Exam  Constitutional:       General: He is not in acute distress.     Appearance: Normal appearance.   HENT:      Head: Normocephalic.   Eyes:      General: No scleral icterus.     Extraocular Movements: Extraocular movements intact.      Conjunctiva/sclera: Conjunctivae normal.   Neck:      Vascular: No carotid bruit.   Cardiovascular:      Rate and Rhythm: Normal rate and regular rhythm.      Pulses: Normal pulses.       Heart sounds: Normal heart sounds.   Pulmonary:      Effort: Pulmonary effort is normal.      Breath sounds: Normal breath sounds.   Abdominal:      General: Bowel sounds are normal. There is no distension.      Palpations: Abdomen is soft.   Musculoskeletal:         General: Tenderness (Has reproducible pain in paraspinal area of his midback. ) present. No swelling. Normal range of motion.   Lymphadenopathy:      Cervical: No cervical adenopathy.   Skin:     General: Skin is warm and dry.   Neurological:      General: No focal deficit present.      Mental Status: He is alert and oriented to person, place, and time.      Motor: No weakness.   Psychiatric:         Mood and Affect: Mood normal.         Behavior: Behavior normal.           CBC:  Recent Labs   Lab 03/03/19  0455 03/03/19  0455 08/09/21  1431 08/09/21  1455   WBC 4.04   < > 6.81  --    RBC 5.29   < > 5.56  --    Hemoglobin 16.2   < > 16.8  --    POC Hematocrit  --   --   --  54   Hematocrit 49.1   < > 50.3  --    Platelets 146 L   < > 120 L  --    MCV 93   < > 91  --    MCH 30.6   < > 30.2  --    MCHC 33.0  --  33.4  --     < > = values in this interval not displayed.     CMP:  Recent Labs   Lab 08/09/21  1431 08/09/21  1431 09/15/21  1135   Glucose 88   < > 83   Calcium 9.3   < > 9.4   Albumin 4.0  --   --    Total Protein 7.3  --   --    Sodium 140   < > 136   Potassium 4.5   < > 4.1   CO2 28   < > 26   Chloride 103   < > 101   BUN 13   < > 16   Creatinine 1.3   < > 1.1   Alkaline Phosphatase 83  --   --    ALT 12  --   --    AST 27  --   --    Total Bilirubin 0.4  --   --     < > = values in this interval not displayed.     URINALYSIS:  Recent Labs   Lab 03/03/19  0516 07/28/20  1316 08/09/21  1449   Color, UA Estrella  --  Yellow   Clarity, UA Clear  --   --    Specific Gravity, UA  --   --  1.030   Spec Grav UA 1.025  --   --    pH, UA  --    < > 6.0   Protein, UA  --   --  Trace A   Nitrite, UA Negative NG  --  Negative   Leukocytes, UA Negative NG   --  Negative   Urobilinogen, UA 1.0  --  Negative    < > = values in this interval not displayed.      LIPIDS:  Recent Labs   Lab 05/13/21  1059   HDL 35 L   Cholesterol 183   Triglycerides 165 H   LDL Cholesterol 115.0   HDL/Cholesterol Ratio 19.1 L   Non-HDL Cholesterol 148   Total Cholesterol/HDL Ratio 5.2 H     TSH:        A1C:        Imaging:  CT Abdomen Pelvis With Contrast  Narrative: EXAMINATION:  CT ABDOMEN PELVIS WITH CONTRAST    CLINICAL HISTORY:  Abdominal pain, acute, nonlocalized;Severe lower abdominal tendernes, constipation, history of SBO;    TECHNIQUE:  Low dose axial images, sagittal and coronal reformations were obtained from the lung bases to the pubic symphysis following the IV administration of 100 mL of Omnipaque 350 .  Oral contrast was not given.    COMPARISON:  CT abdomen and pelvis 03/03/2019    FINDINGS:  Imaged lung bases show mild dependent atelectasis and scattered areas of platelike scarring versus atelectasis most prominent within the left lower lobe.  There is approximate 5 mm soft tissue density pleural based nodule along the right hemidiaphragm.    Base of the heart is normal in size without significant pericardial fluid.    Portal vein is patent.  Liver, gallbladder, pancreas, spleen, stomach, duodenum and bilateral adrenal glands are within normal limits.  No biliary ductal dilatation.    Bilateral kidneys are normal in size, shape and location with symmetric normal enhancement.  No hydronephrosis or significant perinephric stranding.  Right renal subcentimeter hypoattenuating cortical focus which is too small to characterize.  Ureters are nondilated.  Urinary bladder is well distended noting circumferential wall thickening and subtle perivesicular stranding.  Prostate is normal in size.  Punctate right pelvic phlebolith noted.    No ascites, free air or lymphadenopathy.  Mild scattered calcific atherosclerosis of the aorta and its branch vessels.  No aortic aneurysm or  dissection.    Tiny fat containing umbilical hernia.  Appendix and terminal ileum are within normal limits.  Scattered liquid stool throughout the colon suggesting a nonspecific diarrheal illness.  Terminal ileum is within normal limits.  Multiple scattered colonic diverticula without evidence of acute diverticulitis.  No evidence of bowel obstruction.  No focal bowel wall thickening or adjacent inflammation.  No pneumatosis or portal venous gas.    Osseous structures appear stable without acute or destructive process seen.  Impression: 1. Scattered colonic liquid stool suggesting nonspecific diarrheal illness.  Otherwise, no evidence of bowel obstruction or focal bowel wall thickening.  2. Diverticulosis coli without acute diverticulitis.  3. Right lung base 5 mm soft tissue density pleural based nodule.  For a solid nodule <6 mm, Fleischner Society 2017 guidelines recommend no routine follow up for a low risk patient, or follow-up with non-contrast chest CT at 12 months in a high risk patient.  4. Grossly stable few additional findings as above.    Electronically signed by: Eric Flores MD  Date:    08/09/2021  Time:    16:54  X-Ray Chest AP Portable  Narrative: EXAMINATION:  XR CHEST AP PORTABLE    CLINICAL HISTORY:  Cough    TECHNIQUE:  Single frontal view of the chest was performed.    COMPARISON:  06/08/2021    FINDINGS:  The cardiomediastinal silhouette is not enlarged noting calcification of the aorta..  There is no pleural effusion.  The trachea is midline.  The lungs are symmetrically expanded bilaterally with mild bilateral basilar subsegmental atelectasis..  No large focal consolidation seen.  There is no pneumothorax.  The osseous structures are remarkable for degenerative change..  Impression: 1. Mild bilateral basilar subsegmental atelectasis, no large focal consolidation.    Electronically signed by: Winston Martínez MD  Date:    08/09/2021  Time:    14:12      Assessment:       1. Acute right-sided  low back pain without sciatica    2. Chronic bronchitis, unspecified chronic bronchitis type    3. Anxiety disorder, unspecified type    4. Essential hypertension    5. Stage 3 chronic kidney disease, unspecified whether stage 3a or 3b CKD          Plan:       Navin was seen today for back pain.    Diagnoses and all orders for this visit:    Acute right-sided low back pain without sciatica  -     possible muscle strain or spasm.  With history off fall will order x-ray.  -  X-Ray Ribs 2 View Right; Future  -     meloxicam (MOBIC) 7.5 MG tablet; Take 1 tablet (7.5 mg total) by mouth once daily.  -     methocarbamoL (ROBAXIN) 500 MG Tab; Take 1 tablet (500 mg total) by mouth 2 (two) times daily. for 10 days  - continue patches and Tylenol as needed.  - okay to use heating pad.    Chronic bronchitis, unspecified chronic bronchitis type   - respiratory stable    Anxiety disorder, unspecified type   - stable mood and affect.    Essential hypertension   - controlled.  Same treatment.    Stage 3 chronic kidney disease, unspecified whether stage 3a or 3b CKD   - labs reviewed and last metabolic panel was stable.      Health Maintenance Due   Topic Date Due    Hepatitis C Screening  Never done    Pneumococcal Vaccines (Age 65+) (2 of 2 - PPSV23) 04/14/2020    Shingles Vaccine (3 of 3) 04/27/2020    TETANUS VACCINE  07/26/2021        Merle Vásquez MD  [facility]  Disclaimer:  This note has been generated using voice-recognition software. There may be grammatical or spelling errors that have been missed during proof-reading

## 2022-01-24 ENCOUNTER — HOSPITAL ENCOUNTER (OUTPATIENT)
Dept: RADIOLOGY | Facility: HOSPITAL | Age: 82
Discharge: HOME | End: 2022-01-24
Attending: INTERNAL MEDICINE
Payer: MEDICARE

## 2022-01-24 ENCOUNTER — PATIENT MESSAGE (OUTPATIENT)
Dept: FAMILY MEDICINE | Facility: CLINIC | Age: 82
End: 2022-01-24
Payer: MEDICARE

## 2022-01-24 DIAGNOSIS — M54.50 ACUTE RIGHT-SIDED LOW BACK PAIN WITHOUT SCIATICA: ICD-10-CM

## 2022-01-24 PROCEDURE — 71100 X-RAY EXAM RIBS UNI 2 VIEWS: CPT | Mod: TC,HCNC,FY,PO,RT

## 2022-01-24 PROCEDURE — 71100 XR RIBS 2 VIEW RIGHT: ICD-10-PCS | Mod: 26,HCNC,RT, | Performed by: RADIOLOGY

## 2022-01-24 PROCEDURE — 71100 X-RAY EXAM RIBS UNI 2 VIEWS: CPT | Mod: 26,HCNC,RT, | Performed by: RADIOLOGY

## 2022-01-25 NOTE — PROGRESS NOTES
Patient, Navin Henning Jr. (MRN #[id number]), presented with a recent Platelet count less than 150 K/uL consistent with the definition of thrombocytopenia (ICD10 - D69.6).    Platelets   Date Value Ref Range Status   08/09/2021 120 (L) 150 - 450 K/uL Final     The patient's thrombocytopenia was monitored, evaluated, addressed and/or treated. This addendum to the medical record is made on 01/24/2022.

## 2022-03-23 ENCOUNTER — TELEPHONE (OUTPATIENT)
Dept: INTERNAL MEDICINE | Facility: CLINIC | Age: 82
End: 2022-03-23
Payer: MEDICARE

## 2022-03-23 ENCOUNTER — OFFICE VISIT (OUTPATIENT)
Dept: INTERNAL MEDICINE | Facility: CLINIC | Age: 82
End: 2022-03-23
Payer: MEDICARE

## 2022-03-23 VITALS
BODY MASS INDEX: 25.66 KG/M2 | OXYGEN SATURATION: 95 % | HEIGHT: 66 IN | SYSTOLIC BLOOD PRESSURE: 130 MMHG | HEART RATE: 73 BPM | WEIGHT: 159.63 LBS | DIASTOLIC BLOOD PRESSURE: 72 MMHG

## 2022-03-23 DIAGNOSIS — J44.1 ACUTE EXACERBATION OF CHRONIC OBSTRUCTIVE PULMONARY DISEASE (COPD): ICD-10-CM

## 2022-03-23 DIAGNOSIS — J06.9 VIRAL URI: Primary | ICD-10-CM

## 2022-03-23 LAB
CTP QC/QA: YES
CTP QC/QA: YES
FLUAV AG NPH QL: NEGATIVE
FLUBV AG NPH QL: NEGATIVE
SARS-COV-2 RDRP RESP QL NAA+PROBE: NEGATIVE

## 2022-03-23 PROCEDURE — 99214 OFFICE O/P EST MOD 30 MIN: CPT | Mod: S$GLB,,, | Performed by: INTERNAL MEDICINE

## 2022-03-23 PROCEDURE — 1125F AMNT PAIN NOTED PAIN PRSNT: CPT | Mod: CPTII,S$GLB,, | Performed by: INTERNAL MEDICINE

## 2022-03-23 PROCEDURE — 1101F PT FALLS ASSESS-DOCD LE1/YR: CPT | Mod: CPTII,S$GLB,, | Performed by: INTERNAL MEDICINE

## 2022-03-23 PROCEDURE — 87804 POCT INFLUENZA A/B: ICD-10-PCS | Mod: QW,S$GLB,, | Performed by: INTERNAL MEDICINE

## 2022-03-23 PROCEDURE — 3078F DIAST BP <80 MM HG: CPT | Mod: CPTII,S$GLB,, | Performed by: INTERNAL MEDICINE

## 2022-03-23 PROCEDURE — 1101F PR PT FALLS ASSESS DOC 0-1 FALLS W/OUT INJ PAST YR: ICD-10-PCS | Mod: CPTII,S$GLB,, | Performed by: INTERNAL MEDICINE

## 2022-03-23 PROCEDURE — U0002 COVID-19 LAB TEST NON-CDC: HCPCS | Mod: QW,S$GLB,, | Performed by: INTERNAL MEDICINE

## 2022-03-23 PROCEDURE — 1159F MED LIST DOCD IN RCRD: CPT | Mod: CPTII,S$GLB,, | Performed by: INTERNAL MEDICINE

## 2022-03-23 PROCEDURE — 3078F PR MOST RECENT DIASTOLIC BLOOD PRESSURE < 80 MM HG: ICD-10-PCS | Mod: CPTII,S$GLB,, | Performed by: INTERNAL MEDICINE

## 2022-03-23 PROCEDURE — 99214 PR OFFICE/OUTPT VISIT, EST, LEVL IV, 30-39 MIN: ICD-10-PCS | Mod: S$GLB,,, | Performed by: INTERNAL MEDICINE

## 2022-03-23 PROCEDURE — 3288F FALL RISK ASSESSMENT DOCD: CPT | Mod: CPTII,S$GLB,, | Performed by: INTERNAL MEDICINE

## 2022-03-23 PROCEDURE — 99999 PR PBB SHADOW E&M-EST. PATIENT-LVL III: ICD-10-PCS | Mod: PBBFAC,,, | Performed by: INTERNAL MEDICINE

## 2022-03-23 PROCEDURE — 87804 INFLUENZA ASSAY W/OPTIC: CPT | Mod: QW,S$GLB,, | Performed by: INTERNAL MEDICINE

## 2022-03-23 PROCEDURE — 1159F PR MEDICATION LIST DOCUMENTED IN MEDICAL RECORD: ICD-10-PCS | Mod: CPTII,S$GLB,, | Performed by: INTERNAL MEDICINE

## 2022-03-23 PROCEDURE — 3075F PR MOST RECENT SYSTOLIC BLOOD PRESS GE 130-139MM HG: ICD-10-PCS | Mod: CPTII,S$GLB,, | Performed by: INTERNAL MEDICINE

## 2022-03-23 PROCEDURE — 99999 PR PBB SHADOW E&M-EST. PATIENT-LVL III: CPT | Mod: PBBFAC,,, | Performed by: INTERNAL MEDICINE

## 2022-03-23 PROCEDURE — 1125F PR PAIN SEVERITY QUANTIFIED, PAIN PRESENT: ICD-10-PCS | Mod: CPTII,S$GLB,, | Performed by: INTERNAL MEDICINE

## 2022-03-23 PROCEDURE — 3288F PR FALLS RISK ASSESSMENT DOCUMENTED: ICD-10-PCS | Mod: CPTII,S$GLB,, | Performed by: INTERNAL MEDICINE

## 2022-03-23 PROCEDURE — U0002: ICD-10-PCS | Mod: QW,S$GLB,, | Performed by: INTERNAL MEDICINE

## 2022-03-23 PROCEDURE — 3075F SYST BP GE 130 - 139MM HG: CPT | Mod: CPTII,S$GLB,, | Performed by: INTERNAL MEDICINE

## 2022-03-23 RX ORDER — PREDNISONE 20 MG/1
60 TABLET ORAL DAILY
Qty: 15 TABLET | Refills: 0 | Status: SHIPPED | OUTPATIENT
Start: 2022-03-23 | End: 2022-03-29 | Stop reason: SDUPTHER

## 2022-03-23 RX ORDER — LEVOFLOXACIN 750 MG/1
750 TABLET ORAL DAILY
Qty: 5 TABLET | Refills: 0 | Status: SHIPPED | OUTPATIENT
Start: 2022-03-23 | End: 2022-03-28

## 2022-03-23 NOTE — TELEPHONE ENCOUNTER
----- Message from Sp Cates III, MD sent at 3/23/2022  4:20 PM CDT -----  Hi  Can you please call the patient. All testing was negative and the patient can f/u with the PCP for persistent symptoms.

## 2022-03-23 NOTE — PROGRESS NOTES
Subjective:       Patient ID: Navin Henning Jr. is a 81 y.o. male.    Chief Complaint: Cough, Nasal Congestion, and Sore Throat    URI         Patient reports that symptoms started   Yesterday evening   prior to presentation .     Symptoms are are worsening       Symptoms include nasal congestion, sore throat, cough    Patient denies fever, chills, night sweats, chest pain, hemoptysis, dyspnea, wheezing or change taste/smell    They have tried albuterol inhaler   for the symptoms     Associated symptoms include green ,       none known sick contact. But son flew in from out of town 3 days ago     Recent travel;none    Recent visits    Immunization History   Administered Date(s) Administered    COVID-19, MRNA, LN-S, PF (Pfizer) (Purple Cap) 01/06/2021, 01/27/2021, 10/19/2021    Dtap, Unspecified Formulation 07/26/2011    Influenza 01/31/2012, 01/01/2013, 09/01/2013, 10/03/2013, 10/07/2014, 04/11/2016    Influenza (FLUAD) - Quadrivalent - Adjuvanted - PF *Preferred* (65+) 11/30/2021    Influenza - Quadrivalent 11/10/2016, 11/08/2017    Influenza - Quadrivalent - High Dose - PF (65 years and older) 10/05/2020    Influenza - Trivalent (ADULT) 11/07/2018    Pneumococcal 02/01/2009    Pneumococcal Conjugate - 13 Valent 04/14/2015    Pneumococcal Polysaccharide - 23 Valent 03/03/2003    Tdap 07/26/2011    Zoster 05/03/2003, 01/01/2013, 03/01/2013    Zoster Recombinant 03/02/2020       Tobacco Use: Medium Risk    Smoking Tobacco Use: Former Smoker    Smokeless Tobacco Use: Never Used           Patient Active Problem List   Diagnosis    High cholesterol    Depressive disorder    COPD (chronic obstructive pulmonary disease)    Personal history of tobacco use, presenting hazards to health    Actinic keratosis    Other seborrheic keratosis    Allergic rhinosinusitis    Anxiety disorder    Sensorineural hearing loss, bilateral    CKD (chronic kidney disease) stage 3, GFR 30-59 ml/min     "Testicular hypofunction    Essential hypertension    Family history of diabetes mellitus (DM)    History of hyperlipidemia    Impaired fasting glucose    Insomnia    Memory loss    Nondependent alcohol abuse, in remission    Overweight with body mass index (BMI) of 28 to 28.9 in adult           HPI  Review of Systems      Objective:     /72 (BP Location: Right arm, Patient Position: Sitting, BP Method: Medium (Manual))   Pulse 73   Ht 5' 6" (1.676 m)   Wt 72.4 kg (159 lb 9.8 oz)   SpO2 (!) 93%   BMI 25.76 kg/m²       Wt Readings from Last 1 Encounters:   03/23/22 1122 72.4 kg (159 lb 9.8 oz)       BMI Readings from Last 1 Encounters:   03/23/22 25.76 kg/m²            Physical Exam    Assessment:       1. Viral URI    2. Acute exacerbation of chronic obstructive pulmonary disease (COPD)        Plan:         Navin was seen today for cough, nasal congestion and sore throat.    Diagnoses and all orders for this visit:    Viral URI  -     POCT COVID-19 Rapid Screening  -     POCT Influenza A/B    Acute exacerbation of chronic obstructive pulmonary disease (COPD)  Chronic  Acute Exacerbation  Patient is at goal today   I have reviewed lifestyle modification to achieve/maintain goals  We will adjust the current medication regimen to   Patient will follow up in 2 weeks if no improvement    reviewed signs and symptoms that should prompt return to provider or evaluation in the ED  -     predniSONE (DELTASONE) 20 MG tablet; Take 3 tablets (60 mg total) by mouth once daily. for 5 days  -     levoFLOXacin (LEVAQUIN) 750 MG tablet; Take 1 tablet (750 mg total) by mouth once daily. for 5 days            Future Appointments   Date Time Provider Department Center   7/11/2022  8:40 AM Chandrika Barrett MD [facility]         Medication List with Changes/Refills   New Medications    LEVOFLOXACIN (LEVAQUIN) 750 MG TABLET    Take 1 tablet (750 mg total) by mouth once daily. for 5 days    PREDNISONE (DELTASONE) 20 " MG TABLET    Take 3 tablets (60 mg total) by mouth once daily. for 5 days   Current Medications    ALBUTEROL (PROVENTIL) 2.5 MG /3 ML (0.083 %) NEBULIZER SOLUTION    Take 3 mLs (2.5 mg total) by nebulization every 6 (six) hours as needed for Wheezing. Rescue    ALBUTEROL (VENTOLIN HFA) 90 MCG/ACTUATION INHALER    Inhale 2 puffs into the lungs every 6 (six) hours as needed for Wheezing. Rescue    MELOXICAM (MOBIC) 7.5 MG TABLET    Take 1 tablet (7.5 mg total) by mouth once daily.    MEMANTINE (NAMENDA) 10 MG TAB    memantine 10 mg tablet   Take 1 tablet twice a day by oral route as directed for 30 days.    MULTIVITAMIN CAPSULE    Take 1 capsule by mouth once daily.    TAMSULOSIN (FLOMAX) 0.4 MG CAP    Take 1 capsule by mouth once daily.    TESTOSTERONE (TESTOPEL) 75 MG PLLT             Disclaimer:  This note has been generated using voice-recognition software. There may be grammatical or spelling errors that have been missed during proof-reading

## 2022-03-24 ENCOUNTER — TELEPHONE (OUTPATIENT)
Dept: INTERNAL MEDICINE | Facility: CLINIC | Age: 82
End: 2022-03-24
Payer: MEDICARE

## 2022-03-24 NOTE — TELEPHONE ENCOUNTER
----- Message from Seema Benz sent at 3/23/2022  5:14 PM CDT -----  Type:  Patient Returning Call    Who Called:pt wife  Who Left Message for Patient:office  Does the patient know what this is regarding?:lab results  Would the patient rather a call back or a response via [facility]? call  Best Call Back Number:[phone]  Additional Information:

## 2022-03-29 ENCOUNTER — PATIENT MESSAGE (OUTPATIENT)
Dept: INTERNAL MEDICINE | Facility: CLINIC | Age: 82
End: 2022-03-29
Payer: MEDICARE

## 2022-03-29 DIAGNOSIS — J44.1 ACUTE EXACERBATION OF CHRONIC OBSTRUCTIVE PULMONARY DISEASE (COPD): ICD-10-CM

## 2022-03-29 RX ORDER — PREDNISONE 20 MG/1
60 TABLET ORAL DAILY
Qty: 15 TABLET | Refills: 0 | Status: SHIPPED | OUTPATIENT
Start: 2022-03-29 | End: 2022-04-03

## 2022-06-21 ENCOUNTER — HOSPITAL ENCOUNTER (EMERGENCY)
Facility: HOSPITAL | Age: 82
Discharge: HOME | End: 2022-06-21
Attending: EMERGENCY MEDICINE
Payer: MEDICARE

## 2022-06-21 VITALS
TEMPERATURE: 99 F | OXYGEN SATURATION: 95 % | HEART RATE: 70 BPM | RESPIRATION RATE: 20 BRPM | SYSTOLIC BLOOD PRESSURE: 154 MMHG | DIASTOLIC BLOOD PRESSURE: 71 MMHG

## 2022-06-21 DIAGNOSIS — U07.1 COVID-19: Primary | ICD-10-CM

## 2022-06-21 DIAGNOSIS — R06.2 WHEEZING: ICD-10-CM

## 2022-06-21 PROCEDURE — 99284 EMERGENCY DEPT VISIT MOD MDM: CPT

## 2022-06-21 RX ORDER — PROMETHAZINE HYDROCHLORIDE AND DEXTROMETHORPHAN HYDROBROMIDE 6.25; 15 MG/5ML; MG/5ML
5 SYRUP ORAL EVERY 8 HOURS PRN
Qty: 118 ML | Refills: 0 | Status: SHIPPED | OUTPATIENT
Start: 2022-06-21 | End: 2022-07-01

## 2022-06-21 RX ORDER — ALBUTEROL SULFATE 90 UG/1
2 AEROSOL, METERED RESPIRATORY (INHALATION) EVERY 4 HOURS PRN
Qty: 18 G | Refills: 0 | Status: SHIPPED | OUTPATIENT
Start: 2022-06-21 | End: 2023-01-25 | Stop reason: SDUPTHER

## 2022-06-21 NOTE — DISCHARGE INSTRUCTIONS
Thank you for coming in to see us at [facility]! It was nice to meet you, and I hope you feel better soon. Please feel free to return to the ER at any time should your symptoms get worse, or if you have different emergent concerns.    Our goal in the emergency department is to always give you outstanding care and exceptional service. You may receive a survey by mail or e-mail in the next week regarding your experience in our ED. We would greatly appreciate your completing and returning the survey. Your feedback provides us with a way to recognize our staff who give very good care and it helps us learn how to improve when your experience was below our aspiration of excellence.       Sincerely,    Al Bennett MD  Medical Director  Emergency Department  [facility]

## 2022-06-23 ENCOUNTER — PATIENT MESSAGE (OUTPATIENT)
Dept: FAMILY MEDICINE | Facility: CLINIC | Age: 82
End: 2022-06-23
Payer: MEDICARE

## 2022-06-23 DIAGNOSIS — U07.1 COVID-19: Primary | ICD-10-CM

## 2022-06-29 NOTE — ED PROVIDER NOTES
NAME:  Navin Henning Jr.  MRN:    [id number]  ADMIT DATE: 2022        EMERGENCY DEPARTMENT ENCOUNTER    CHIEF COMPLAINT    Chief Complaint   Patient presents with    COVID-19 Concerns     Nasal congestion, body aches, sore throat x 3 days, pt tested + for covid yesterday          HPI    Navin Henning Jr. is a 82 y.o. male who  has a past medical history of Asthma, COPD (chronic obstructive pulmonary disease), Depression, and High cholesterol.    Patient presents with symptoms concerning for COVID-19, tested positive yesterday.  Onset: 3 days ago  Symptoms include: SOB, cough, body aches, sore throat, nasal congestion  Alleviating factors: nothing  Pt denies fever and chills.  Exposure: wife with similar sx  Vaccinated: yes      ALLERGIES    Review of patient's allergies indicates:   Allergen Reactions    Doxycycline Rash and Hives    Penicillins Rash    Sulfamethoxazole-trimethoprim Rash         PAST MEDICAL HISTORY  Past Medical History:   Diagnosis Date    Asthma     COPD (chronic obstructive pulmonary disease)     Depression     High cholesterol          SURGICAL HISTORY    No past surgical history on file.      SOCIAL HISTORY    Social History     Socioeconomic History    Marital status:    Tobacco Use    Smoking status: Former Smoker     Packs/day: 2.00     Years: 19.00     Pack years: 38.00     Quit date:      Years since quittin.5    Smokeless tobacco: Never Used   Substance and Sexual Activity    Alcohol use: No    Drug use: No         FAMILY HISTORY    No family history on file.      REVIEW OF SYSTEMS   Review of Systems   Constitutional: Positive for activity change, appetite change and fatigue. Negative for fever.   HENT: Positive for congestion and sore throat.    Respiratory: Positive for cough and shortness of breath.    Gastrointestinal: Negative for abdominal pain, nausea and vomiting.   Musculoskeletal: Positive for myalgias.   Neurological: Positive for  headaches.   Psychiatric/Behavioral: Positive for sleep disturbance.   All other systems reviewed and are negative.          PHYSICAL EXAM      VITAL SIGNS:   Initial Vitals [06/21/22 1422]   BP Pulse Resp Temp SpO2   (!) 154/71 70 20 99.4 °F (37.4 °C) 95 %      MAP       --              Physical Exam    Nursing Notes and Triage Vitals reviewed by me.    Gen: AxOx4, NAD, appears stated age  Eye: no scleral icterus, no periorbital edema or ecchymosis  Head: NCAT, no lesions  Neck: no obvious masses  CVS: warm and well perfused  PULM:  Unable to auscultate lungs secondary to PPE and poor quality of contact precautions stethoscope, normal work of breathing and effort, speaking in full sentences without distress.   Ext: no rash, no deformities  Neuro: SANDERS, gait intact          LABS  Pertinent labs reviewed. (See chart for details)   Labs Reviewed - No data to display      RADIOLOGY    Imaging Results    None           PROCEDURES    Procedures      EKG               ED COURSE & MEDICAL DECISION MAKING:  Pt was seen and examined.    Pt presents with COVID-19 positive test, feeling ill.    Pt was not hypoxic, in no respiratory distress.  Ambulatory pulse ox was 95%  Pt does not meet criteria for admission at this time.  Will provide Rx for paxlovid.    Pt discharged in stable condition.   Provided with strict return precautions and follow up instructions.  Advised on hand hygeine, mask wearing, and social distancing.    Discussed CDC recommendations for quarantine.       Medications - No data to display              IMPRESSION:    ICD-10-CM ICD-9-CM   1. COVID-19  U07.1 079.89   2. Wheezing  R06.2 786.07         DISPOSITION:   ED Disposition Condition    Discharge Stable              PRESCRIPTIONS:   ED Prescriptions     Medication Sig Dispense Start Date End Date Auth. Provider    albuterol (VENTOLIN HFA) 90 mcg/actuation inhaler Inhale 2 puffs into the lungs every 4 (four) hours as needed for Wheezing or Shortness of  Breath. Rescue 18 g 2022  Al Bennett MD    nirmatrelvir-ritonavir 150 mg x 2- 100 mg copackaged tablets (EUA) () Take 3 tablets by mouth 2 (two) times daily for 5 days. Each dose contains 2 nirmatrelvir (pink tablets) and 1 ritonavir (white tablet). Take all 3 tablets together 30 tablet 2022 Al Bennett MD    promethazine-dextromethorphan (PROMETHAZINE-DM) 6.25-15 mg/5 mL Syrp Take 5 mLs by mouth every 8 (eight) hours as needed (cough). 118 mL 2022 MD Al Beck MD  22 [id number]

## 2022-07-11 ENCOUNTER — OFFICE VISIT (OUTPATIENT)
Dept: FAMILY MEDICINE | Facility: CLINIC | Age: 82
End: 2022-07-11
Payer: MEDICARE

## 2022-07-11 VITALS
HEART RATE: 72 BPM | DIASTOLIC BLOOD PRESSURE: 64 MMHG | HEIGHT: 66 IN | OXYGEN SATURATION: 95 % | SYSTOLIC BLOOD PRESSURE: 130 MMHG | WEIGHT: 158.75 LBS | BODY MASS INDEX: 25.51 KG/M2

## 2022-07-11 DIAGNOSIS — F51.01 PRIMARY INSOMNIA: ICD-10-CM

## 2022-07-11 DIAGNOSIS — N40.0 BENIGN PROSTATIC HYPERPLASIA WITHOUT LOWER URINARY TRACT SYMPTOMS: ICD-10-CM

## 2022-07-11 DIAGNOSIS — Z86.39 HISTORY OF HYPERLIPIDEMIA: ICD-10-CM

## 2022-07-11 DIAGNOSIS — I10 ESSENTIAL HYPERTENSION: Primary | ICD-10-CM

## 2022-07-11 DIAGNOSIS — H90.3 SENSORINEURAL HEARING LOSS, BILATERAL: ICD-10-CM

## 2022-07-11 DIAGNOSIS — F33.1 MAJOR DEPRESSIVE DISORDER, RECURRENT, MODERATE: ICD-10-CM

## 2022-07-11 DIAGNOSIS — R41.3 MEMORY LOSS: ICD-10-CM

## 2022-07-11 DIAGNOSIS — J42 CHRONIC BRONCHITIS, UNSPECIFIED CHRONIC BRONCHITIS TYPE: Chronic | ICD-10-CM

## 2022-07-11 DIAGNOSIS — N18.31 STAGE 3A CHRONIC KIDNEY DISEASE: ICD-10-CM

## 2022-07-11 DIAGNOSIS — R73.01 IMPAIRED FASTING GLUCOSE: ICD-10-CM

## 2022-07-11 DIAGNOSIS — E66.3 OVERWEIGHT WITH BODY MASS INDEX (BMI) OF 25 TO 25.9 IN ADULT: ICD-10-CM

## 2022-07-11 DIAGNOSIS — R05.8 POST-VIRAL COUGH SYNDROME: ICD-10-CM

## 2022-07-11 DIAGNOSIS — Z87.891 PERSONAL HISTORY OF TOBACCO USE, PRESENTING HAZARDS TO HEALTH: ICD-10-CM

## 2022-07-11 DIAGNOSIS — Z23 IMMUNIZATION DUE: ICD-10-CM

## 2022-07-11 DIAGNOSIS — H60.541 ECZEMATOID OTITIS EXTERNA OF RIGHT EAR, UNSPECIFIED CHRONICITY: ICD-10-CM

## 2022-07-11 PROBLEM — L82.1 OTHER SEBORRHEIC KERATOSIS: Status: RESOLVED | Noted: 2022-01-10 | Resolved: 2022-07-11

## 2022-07-11 PROBLEM — E78.00 HIGH CHOLESTEROL: Chronic | Status: RESOLVED | Noted: 2019-03-03 | Resolved: 2022-07-11

## 2022-07-11 PROBLEM — F32.A DEPRESSIVE DISORDER: Status: RESOLVED | Noted: 2019-03-03 | Resolved: 2022-07-11

## 2022-07-11 PROCEDURE — 1159F PR MEDICATION LIST DOCUMENTED IN MEDICAL RECORD: ICD-10-PCS | Mod: CPTII,S$GLB,, | Performed by: FAMILY MEDICINE

## 2022-07-11 PROCEDURE — 99214 OFFICE O/P EST MOD 30 MIN: CPT | Mod: S$GLB,,, | Performed by: FAMILY MEDICINE

## 2022-07-11 PROCEDURE — 1126F AMNT PAIN NOTED NONE PRSNT: CPT | Mod: CPTII,S$GLB,, | Performed by: FAMILY MEDICINE

## 2022-07-11 PROCEDURE — 99999 PR PBB SHADOW E&M-EST. PATIENT-LVL III: ICD-10-PCS | Mod: PBBFAC,,, | Performed by: FAMILY MEDICINE

## 2022-07-11 PROCEDURE — 1159F MED LIST DOCD IN RCRD: CPT | Mod: CPTII,S$GLB,, | Performed by: FAMILY MEDICINE

## 2022-07-11 PROCEDURE — 90732 PPSV23 VACC 2 YRS+ SUBQ/IM: CPT | Mod: S$GLB,,, | Performed by: FAMILY MEDICINE

## 2022-07-11 PROCEDURE — 1126F PR PAIN SEVERITY QUANTIFIED, NO PAIN PRESENT: ICD-10-PCS | Mod: CPTII,S$GLB,, | Performed by: FAMILY MEDICINE

## 2022-07-11 PROCEDURE — 99214 PR OFFICE/OUTPT VISIT, EST, LEVL IV, 30-39 MIN: ICD-10-PCS | Mod: S$GLB,,, | Performed by: FAMILY MEDICINE

## 2022-07-11 PROCEDURE — 1101F PT FALLS ASSESS-DOCD LE1/YR: CPT | Mod: CPTII,S$GLB,, | Performed by: FAMILY MEDICINE

## 2022-07-11 PROCEDURE — G0009 PNEUMOCOCCAL POLYSACCHARIDE VACCINE 23-VALENT =>2YO SQ IM: ICD-10-PCS | Mod: S$GLB,,, | Performed by: FAMILY MEDICINE

## 2022-07-11 PROCEDURE — 3078F DIAST BP <80 MM HG: CPT | Mod: CPTII,S$GLB,, | Performed by: FAMILY MEDICINE

## 2022-07-11 PROCEDURE — 3075F SYST BP GE 130 - 139MM HG: CPT | Mod: CPTII,S$GLB,, | Performed by: FAMILY MEDICINE

## 2022-07-11 PROCEDURE — 99999 PR PBB SHADOW E&M-EST. PATIENT-LVL III: CPT | Mod: PBBFAC,,, | Performed by: FAMILY MEDICINE

## 2022-07-11 PROCEDURE — 3075F PR MOST RECENT SYSTOLIC BLOOD PRESS GE 130-139MM HG: ICD-10-PCS | Mod: CPTII,S$GLB,, | Performed by: FAMILY MEDICINE

## 2022-07-11 PROCEDURE — 1160F PR REVIEW ALL MEDS BY PRESCRIBER/CLIN PHARMACIST DOCUMENTED: ICD-10-PCS | Mod: CPTII,S$GLB,, | Performed by: FAMILY MEDICINE

## 2022-07-11 PROCEDURE — 3078F PR MOST RECENT DIASTOLIC BLOOD PRESSURE < 80 MM HG: ICD-10-PCS | Mod: CPTII,S$GLB,, | Performed by: FAMILY MEDICINE

## 2022-07-11 PROCEDURE — 3288F FALL RISK ASSESSMENT DOCD: CPT | Mod: CPTII,S$GLB,, | Performed by: FAMILY MEDICINE

## 2022-07-11 PROCEDURE — G0009 ADMIN PNEUMOCOCCAL VACCINE: HCPCS | Mod: S$GLB,,, | Performed by: FAMILY MEDICINE

## 2022-07-11 PROCEDURE — 1101F PR PT FALLS ASSESS DOC 0-1 FALLS W/OUT INJ PAST YR: ICD-10-PCS | Mod: CPTII,S$GLB,, | Performed by: FAMILY MEDICINE

## 2022-07-11 PROCEDURE — 1160F RVW MEDS BY RX/DR IN RCRD: CPT | Mod: CPTII,S$GLB,, | Performed by: FAMILY MEDICINE

## 2022-07-11 PROCEDURE — 90732 PNEUMOCOCCAL POLYSACCHARIDE VACCINE 23-VALENT =>2YO SQ IM: ICD-10-PCS | Mod: S$GLB,,, | Performed by: FAMILY MEDICINE

## 2022-07-11 PROCEDURE — 3288F PR FALLS RISK ASSESSMENT DOCUMENTED: ICD-10-PCS | Mod: CPTII,S$GLB,, | Performed by: FAMILY MEDICINE

## 2022-07-11 RX ORDER — FLUTICASONE PROPIONATE 50 MCG
1 SPRAY, SUSPENSION (ML) NASAL DAILY
Qty: 15.8 ML | Refills: 0 | Status: SHIPPED | OUTPATIENT
Start: 2022-07-11 | End: 2023-01-25 | Stop reason: SDUPTHER

## 2022-07-11 RX ORDER — DONEPEZIL HYDROCHLORIDE 5 MG/1
5 TABLET, FILM COATED ORAL DAILY
COMMUNITY
Start: 2022-04-21 | End: 2023-08-17 | Stop reason: ALTCHOICE

## 2022-07-11 RX ORDER — PROMETHAZINE HYDROCHLORIDE AND DEXTROMETHORPHAN HYDROBROMIDE 6.25; 15 MG/5ML; MG/5ML
5 SYRUP ORAL EVERY 4 HOURS PRN
Qty: 240 ML | Refills: 0 | Status: SHIPPED | OUTPATIENT
Start: 2022-07-11 | End: 2022-07-21

## 2022-07-11 RX ORDER — CALCIUM CARBONATE 300MG(750)
1 TABLET,CHEWABLE ORAL NIGHTLY
COMMUNITY

## 2022-07-11 RX ORDER — BENZONATATE 200 MG/1
200 CAPSULE ORAL 3 TIMES DAILY PRN
Qty: 30 CAPSULE | Refills: 0 | Status: SHIPPED | OUTPATIENT
Start: 2022-07-11 | End: 2022-07-21

## 2022-07-11 RX ORDER — FLUOXETINE HYDROCHLORIDE 40 MG/1
40 CAPSULE ORAL
COMMUNITY
Start: 2022-04-08

## 2022-07-11 RX ORDER — NEOMYCIN SULFATE, POLYMYXIN B SULFATE AND HYDROCORTISONE 10; 3.5; 1 MG/ML; MG/ML; [USP'U]/ML
3 SUSPENSION/ DROPS AURICULAR (OTIC) 4 TIMES DAILY
Qty: 10 ML | Refills: 0 | Status: SHIPPED | OUTPATIENT
Start: 2022-07-11 | End: 2022-07-18

## 2022-07-11 NOTE — PROGRESS NOTES
Subjective:       Patient ID: Navin Henning Jr. is a 82 y.o. male.    Chief Complaint: Follow-up    Patient Active Problem List   Diagnosis    COPD (chronic obstructive pulmonary disease)    Personal history of tobacco use, presenting hazards to health    Actinic keratosis    Seborrheic keratoses    Allergic rhinosinusitis    Anxiety disorder    Sensorineural hearing loss, bilateral    Stage 3a chronic kidney disease    Testicular hypofunction    Essential hypertension    Family history of diabetes mellitus (DM)    History of hyperlipidemia    Impaired fasting glucose    Insomnia    Memory loss    Nondependent alcohol abuse, in remission    Overweight with body mass index (BMI) of 25 to 25.9 in adult    Major depressive disorder, recurrent, moderate    Benign prostatic hyperplasia without lower urinary tract symptoms      HPI  83 yo male presents for follow up of chronic medical problem.     Recent COVID recovery at end of June. All improved with exception of persistent cough.   Right ear pain with no drainage or fever/chills for past week.     Overall stable with meds and doing well. Some post COVID fatigue so has not been walking as much as prior.     Review of Systems   All other systems reviewed and are negative.       Objective:     Vitals:    07/11/22 0842   BP: 130/64   Pulse: 72        Physical Exam  Vitals and nursing note reviewed.   Constitutional:       General: He is not in acute distress.     Appearance: Normal appearance. He is well-developed. He is not ill-appearing, toxic-appearing or diaphoretic.   HENT:      Head: Normocephalic and atraumatic.      Nose:      Comments: TM: appear normal BL - no erythema or bulging. Right ear canal with some mild erythema and scaly area.  Nasal congestion noted   Cobblestoning and pharyngeal erythema without exudate noted  No adenopathy  Full ROM of neck    Eyes:      General: No scleral icterus.     Conjunctiva/sclera: Conjunctivae normal.       Pupils: Pupils are equal, round, and reactive to light.   Cardiovascular:      Rate and Rhythm: Normal rate and regular rhythm.      Heart sounds: Normal heart sounds.   Pulmonary:      Effort: Pulmonary effort is normal. No respiratory distress.      Breath sounds: Normal breath sounds.   Abdominal:      General: Bowel sounds are normal.      Palpations: Abdomen is soft.      Tenderness: There is no abdominal tenderness.   Musculoskeletal:      Cervical back: Normal range of motion and neck supple.   Skin:     General: Skin is warm.      Coloration: Skin is not pale.      Findings: No rash.   Neurological:      Mental Status: He is alert and oriented to person, place, and time. Mental status is at baseline.   Psychiatric:         Attention and Perception: Attention and perception normal.         Mood and Affect: Mood and affect normal.         Speech: Speech normal.         Behavior: Behavior normal.         Thought Content: Thought content normal.         Cognition and Memory: Cognition and memory normal.         Judgment: Judgment normal.         Assessment:       1. Essential hypertension    2. Stage 3a chronic kidney disease    3. Major depressive disorder, recurrent, moderate    4. Memory loss    5. Chronic bronchitis, unspecified chronic bronchitis type    6. History of hyperlipidemia    7. Overweight with body mass index (BMI) of 25 to 25.9 in adult    8. Impaired fasting glucose    9. Primary insomnia    10. Personal history of tobacco use, presenting hazards to health    11. Sensorineural hearing loss, bilateral    12. Benign prostatic hyperplasia without lower urinary tract symptoms    13. Immunization due    14. Eczematoid otitis externa of right ear, unspecified chronicity    15. Post-viral cough syndrome        Plan:       Overall chronic medical conditions are stable. Continue current meds.   No refills needed.   Update labs and vaccines as below. [facility] and T[facility] with [facility].   Pneumovax  (23) today. Prior was before 68 yo.   Exam with CTAB - post viral cough expectations discussed.   Ear with some local signs of irritation. Also could be pressure in middle ear with fluid/residual congestion. Flonase.    Essential hypertension  -     CBC Auto Differential; Future; Expected date: 07/11/2022  -     Comprehensive Metabolic Panel; Future; Expected date: 07/11/2022  -     Lipid Panel; Future; Expected date: 07/11/2022  -     BASIC METABOLIC PANEL; Future; Expected date: 07/11/2022    Stage 3a chronic kidney disease  -     Comprehensive Metabolic Panel; Future; Expected date: 07/11/2022  -     BASIC METABOLIC PANEL; Future; Expected date: 07/11/2022    Major depressive disorder, recurrent, moderate    Memory loss    Chronic bronchitis, unspecified chronic bronchitis type    History of hyperlipidemia  -     Lipid Panel; Future; Expected date: 07/11/2022    Overweight with body mass index (BMI) of 25 to 25.9 in adult    Impaired fasting glucose  -     Hemoglobin A1C; Future; Expected date: 07/11/2022  -     Lipid Panel; Future; Expected date: 07/11/2022    Primary insomnia    Personal history of tobacco use, presenting hazards to health    Sensorineural hearing loss, bilateral    Benign prostatic hyperplasia without lower urinary tract symptoms    Immunization due  -     (In Office Administered) Pneumococcal Polysaccharide Vaccine (23 Valent) (SQ/IM)    Eczematoid otitis externa of right ear, unspecified chronicity  -     neomycin-polymyxin-hydrocortisone (CORTISPORIN) 3.5-10,000-1 mg/mL-unit/mL-% otic suspension; Place 3 drops into the right ear 4 (four) times daily. for 7 days  Dispense: 10 mL; Refill: 0    Post-viral cough syndrome  -     promethazine-dextromethorphan (PROMETHAZINE-DM) 6.25-15 mg/5 mL Syrp; Take 5 mLs by mouth every 4 (four) hours as needed (cough).  Dispense: 240 mL; Refill: 0  -     benzonatate (TESSALON) 200 MG capsule; Take 1 capsule (200 mg total) by mouth 3 (three) times daily as  needed for Cough.  Dispense: 30 capsule; Refill: 0  -     fluticasone propionate (FLONASE) 50 mcg/actuation nasal spray; 1 spray (50 mcg total) by Each Nostril route once daily.  Dispense: 15.8 mL; Refill: 0      Patient's questions answered. Plan reviewed with patient at the end of visit. Relevant precautions to chief complaint and reasons to seek medical care or contact the office sooner reviewed with patient.     Follow up in about 6 months (around 1/11/2023) for Annual Exam (BMP).

## 2022-07-12 ENCOUNTER — PATIENT MESSAGE (OUTPATIENT)
Dept: FAMILY MEDICINE | Facility: CLINIC | Age: 82
End: 2022-07-12
Payer: MEDICARE

## 2022-07-12 ENCOUNTER — LAB VISIT (OUTPATIENT)
Dept: LAB | Facility: HOSPITAL | Age: 82
End: 2022-07-12
Attending: FAMILY MEDICINE
Payer: MEDICARE

## 2022-07-12 DIAGNOSIS — N18.31 STAGE 3A CHRONIC KIDNEY DISEASE: ICD-10-CM

## 2022-07-12 DIAGNOSIS — R73.01 IMPAIRED FASTING GLUCOSE: ICD-10-CM

## 2022-07-12 DIAGNOSIS — I10 ESSENTIAL HYPERTENSION: ICD-10-CM

## 2022-07-12 DIAGNOSIS — Z86.39 HISTORY OF HYPERLIPIDEMIA: ICD-10-CM

## 2022-07-12 LAB
ALBUMIN SERPL BCP-MCNC: 3.8 G/DL (ref 3.5–5.2)
ALP SERPL-CCNC: 81 U/L (ref 55–135)
ALT SERPL W/O P-5'-P-CCNC: 13 U/L (ref 10–44)
ANION GAP SERPL CALC-SCNC: 8 MMOL/L (ref 8–16)
AST SERPL-CCNC: 18 U/L (ref 10–40)
BASOPHILS # BLD AUTO: 0.01 K/UL (ref 0–0.2)
BASOPHILS NFR BLD: 0.1 % (ref 0–1.9)
BILIRUB SERPL-MCNC: 0.7 MG/DL (ref 0.1–1)
BUN SERPL-MCNC: 14 MG/DL (ref 8–23)
CALCIUM SERPL-MCNC: 9.3 MG/DL (ref 8.7–10.5)
CHLORIDE SERPL-SCNC: 104 MMOL/L (ref 95–110)
CHOLEST SERPL-MCNC: 199 MG/DL (ref 120–199)
CHOLEST/HDLC SERPL: 5 {RATIO} (ref 2–5)
CO2 SERPL-SCNC: 26 MMOL/L (ref 23–29)
CREAT SERPL-MCNC: 1 MG/DL (ref 0.5–1.4)
DIFFERENTIAL METHOD: ABNORMAL
EOSINOPHIL # BLD AUTO: 0 K/UL (ref 0–0.5)
EOSINOPHIL NFR BLD: 0.1 % (ref 0–8)
ERYTHROCYTE [DISTWIDTH] IN BLOOD BY AUTOMATED COUNT: 15.2 % (ref 11.5–14.5)
EST. GFR  (AFRICAN AMERICAN): >60 ML/MIN/1.73 M^2
EST. GFR  (NON AFRICAN AMERICAN): >60 ML/MIN/1.73 M^2
ESTIMATED AVG GLUCOSE: 108 MG/DL (ref 68–131)
GLUCOSE SERPL-MCNC: 76 MG/DL (ref 70–110)
HBA1C MFR BLD: 5.4 % (ref 4–5.6)
HCT VFR BLD AUTO: 47.2 % (ref 40–54)
HDLC SERPL-MCNC: 40 MG/DL (ref 40–75)
HDLC SERPL: 20.1 % (ref 20–50)
HGB BLD-MCNC: 15 G/DL (ref 14–18)
IMM GRANULOCYTES # BLD AUTO: 0.06 K/UL (ref 0–0.04)
IMM GRANULOCYTES NFR BLD AUTO: 0.9 % (ref 0–0.5)
LDLC SERPL CALC-MCNC: 128.2 MG/DL (ref 63–159)
LYMPHOCYTES # BLD AUTO: 1.4 K/UL (ref 1–4.8)
LYMPHOCYTES NFR BLD: 20.7 % (ref 18–48)
MCH RBC QN AUTO: 29.9 PG (ref 27–31)
MCHC RBC AUTO-ENTMCNC: 31.8 G/DL (ref 32–36)
MCV RBC AUTO: 94 FL (ref 82–98)
MONOCYTES # BLD AUTO: 1.7 K/UL (ref 0.3–1)
MONOCYTES NFR BLD: 24.2 % (ref 4–15)
NEUTROPHILS # BLD AUTO: 3.7 K/UL (ref 1.8–7.7)
NEUTROPHILS NFR BLD: 54 % (ref 38–73)
NONHDLC SERPL-MCNC: 159 MG/DL
NRBC BLD-RTO: 0 /100 WBC
PLATELET # BLD AUTO: 167 K/UL (ref 150–450)
PMV BLD AUTO: 11.2 FL (ref 9.2–12.9)
POTASSIUM SERPL-SCNC: 4.2 MMOL/L (ref 3.5–5.1)
PROT SERPL-MCNC: 6.3 G/DL (ref 6–8.4)
RBC # BLD AUTO: 5.01 M/UL (ref 4.6–6.2)
SODIUM SERPL-SCNC: 138 MMOL/L (ref 136–145)
TRIGL SERPL-MCNC: 154 MG/DL (ref 30–150)
WBC # BLD AUTO: 6.86 K/UL (ref 3.9–12.7)

## 2022-07-12 PROCEDURE — 80053 COMPREHEN METABOLIC PANEL: CPT | Performed by: FAMILY MEDICINE

## 2022-07-12 PROCEDURE — 85025 COMPLETE CBC W/AUTO DIFF WBC: CPT | Performed by: FAMILY MEDICINE

## 2022-07-12 PROCEDURE — 36415 COLL VENOUS BLD VENIPUNCTURE: CPT | Mod: PO | Performed by: FAMILY MEDICINE

## 2022-07-12 PROCEDURE — 83036 HEMOGLOBIN GLYCOSYLATED A1C: CPT | Performed by: FAMILY MEDICINE

## 2022-07-12 PROCEDURE — 80061 LIPID PANEL: CPT | Performed by: FAMILY MEDICINE

## 2022-07-13 PROBLEM — E78.1 HYPERTRIGLYCERIDEMIA: Status: ACTIVE | Noted: 2018-11-02

## 2022-07-19 ENCOUNTER — PATIENT MESSAGE (OUTPATIENT)
Dept: RESEARCH | Facility: CLINIC | Age: 82
End: 2022-07-19
Payer: MEDICARE

## 2022-10-12 ENCOUNTER — OFFICE VISIT (OUTPATIENT)
Dept: URGENT CARE | Facility: CLINIC | Age: 82
End: 2022-10-12
Payer: MEDICARE

## 2022-10-12 VITALS
BODY MASS INDEX: 25.39 KG/M2 | DIASTOLIC BLOOD PRESSURE: 83 MMHG | RESPIRATION RATE: 19 BRPM | HEIGHT: 66 IN | SYSTOLIC BLOOD PRESSURE: 158 MMHG | OXYGEN SATURATION: 95 % | WEIGHT: 158 LBS | HEART RATE: 70 BPM | TEMPERATURE: 98 F

## 2022-10-12 DIAGNOSIS — R09.81 COUGH WITH CONGESTION OF PARANASAL SINUS: ICD-10-CM

## 2022-10-12 DIAGNOSIS — R05.8 COUGH WITH CONGESTION OF PARANASAL SINUS: ICD-10-CM

## 2022-10-12 DIAGNOSIS — H93.8X3 EAR CONGESTION, BILATERAL: ICD-10-CM

## 2022-10-12 DIAGNOSIS — R06.2 WHEEZING ON AUSCULTATION: ICD-10-CM

## 2022-10-12 DIAGNOSIS — B96.89 ACUTE BACTERIAL BRONCHITIS: Primary | ICD-10-CM

## 2022-10-12 DIAGNOSIS — J20.8 ACUTE BACTERIAL BRONCHITIS: Primary | ICD-10-CM

## 2022-10-12 DIAGNOSIS — Z11.52 ENCOUNTER FOR SCREENING FOR COVID-19: ICD-10-CM

## 2022-10-12 DIAGNOSIS — Z11.59 SCREENING FOR VIRAL DISEASE: ICD-10-CM

## 2022-10-12 LAB
CTP QC/QA: YES
CTP QC/QA: YES
POC MOLECULAR INFLUENZA A AGN: NEGATIVE
POC MOLECULAR INFLUENZA B AGN: NEGATIVE
SARS-COV-2 RDRP RESP QL NAA+PROBE: NEGATIVE

## 2022-10-12 PROCEDURE — 99214 PR OFFICE/OUTPT VISIT, EST, LEVL IV, 30-39 MIN: ICD-10-PCS | Mod: S$GLB,CS,, | Performed by: PHYSICIAN ASSISTANT

## 2022-10-12 PROCEDURE — 87502 POCT INFLUENZA A/B MOLECULAR: ICD-10-PCS | Mod: QW,S$GLB,, | Performed by: PHYSICIAN ASSISTANT

## 2022-10-12 PROCEDURE — 1125F PR PAIN SEVERITY QUANTIFIED, PAIN PRESENT: ICD-10-PCS | Mod: CPTII,S$GLB,, | Performed by: PHYSICIAN ASSISTANT

## 2022-10-12 PROCEDURE — 1125F AMNT PAIN NOTED PAIN PRSNT: CPT | Mod: CPTII,S$GLB,, | Performed by: PHYSICIAN ASSISTANT

## 2022-10-12 PROCEDURE — 3077F PR MOST RECENT SYSTOLIC BLOOD PRESSURE >= 140 MM HG: ICD-10-PCS | Mod: CPTII,S$GLB,, | Performed by: PHYSICIAN ASSISTANT

## 2022-10-12 PROCEDURE — 3079F PR MOST RECENT DIASTOLIC BLOOD PRESSURE 80-89 MM HG: ICD-10-PCS | Mod: CPTII,S$GLB,, | Performed by: PHYSICIAN ASSISTANT

## 2022-10-12 PROCEDURE — 87635: ICD-10-PCS | Mod: QW,S$GLB,, | Performed by: PHYSICIAN ASSISTANT

## 2022-10-12 PROCEDURE — 1159F PR MEDICATION LIST DOCUMENTED IN MEDICAL RECORD: ICD-10-PCS | Mod: CPTII,S$GLB,, | Performed by: PHYSICIAN ASSISTANT

## 2022-10-12 PROCEDURE — 87635 SARS-COV-2 COVID-19 AMP PRB: CPT | Mod: QW,S$GLB,, | Performed by: PHYSICIAN ASSISTANT

## 2022-10-12 PROCEDURE — 3079F DIAST BP 80-89 MM HG: CPT | Mod: CPTII,S$GLB,, | Performed by: PHYSICIAN ASSISTANT

## 2022-10-12 PROCEDURE — 99214 OFFICE O/P EST MOD 30 MIN: CPT | Mod: S$GLB,CS,, | Performed by: PHYSICIAN ASSISTANT

## 2022-10-12 PROCEDURE — 1159F MED LIST DOCD IN RCRD: CPT | Mod: CPTII,S$GLB,, | Performed by: PHYSICIAN ASSISTANT

## 2022-10-12 PROCEDURE — 87502 INFLUENZA DNA AMP PROBE: CPT | Mod: QW,S$GLB,, | Performed by: PHYSICIAN ASSISTANT

## 2022-10-12 PROCEDURE — 3077F SYST BP >= 140 MM HG: CPT | Mod: CPTII,S$GLB,, | Performed by: PHYSICIAN ASSISTANT

## 2022-10-12 RX ORDER — CEFDINIR 300 MG/1
300 CAPSULE ORAL 2 TIMES DAILY
Qty: 20 CAPSULE | Refills: 0 | Status: SHIPPED | OUTPATIENT
Start: 2022-10-12 | End: 2022-10-22

## 2022-10-12 RX ORDER — GABAPENTIN 300 MG
300 CAPSULE ORAL NIGHTLY PRN
COMMUNITY
Start: 2022-07-13 | End: 2023-01-25

## 2022-10-12 RX ORDER — BENZONATATE 200 MG/1
200 CAPSULE ORAL 3 TIMES DAILY PRN
Qty: 30 CAPSULE | Refills: 0 | Status: SHIPPED | OUTPATIENT
Start: 2022-10-12 | End: 2022-10-22

## 2022-10-12 NOTE — PROGRESS NOTES
"Subjective:       Patient ID: Navin Henning Jr. is a 82 y.o. male.    Vitals:  height is 5' 6" (1.676 m) and weight is 71.7 kg (158 lb). His oral temperature is 97.9 °F (36.6 °C). His blood pressure is 158/83 (abnormal) and his pulse is 70. His respiration is 19 and oxygen saturation is 95%.     Chief Complaint: Cough    82 yr old male  With history of mild COPD, sciatica, depression, and hyperlipidemia who presents to urgent care clinic with wife for evaluation.  Complaining of significant productive cough, runny nose, earache, wheezing Due to excessive coughing , Nasal/sinus congestion, and body aches. His symptoms started Saturday. Taking tylenol and zyrtec. No other associated symptoms. Taking ciproo 500mg twice a day from urology since patient had cystoscope done yesterday.     Cough  This is a new problem. The current episode started in the past 7 days. The problem has been gradually worsening. The problem occurs constantly. The cough is Productive of sputum. Associated symptoms include ear congestion, ear pain, nasal congestion, postnasal drip, rhinorrhea, a sore throat and wheezing. Pertinent negatives include no chest pain, chills, eye redness, fever, headaches, heartburn, hemoptysis, myalgias, rash, shortness of breath, sweats or weight loss. Nothing aggravates the symptoms. Treatments tried: tylnol 1000mg. The treatment provided mild relief.      Constitution: Negative for activity change, chills, sweating, fatigue, fever and generalized weakness.   HENT:  Positive for ear pain, congestion, postnasal drip, sinus pressure and sore throat. Negative for hearing loss, facial swelling, sinus pain, trouble swallowing and voice change.    Neck: Negative for neck pain, neck stiffness and painful lymph nodes.   Cardiovascular:  Negative for chest pain, leg swelling, palpitations, sob on exertion and passing out.   Eyes:  Negative for eye discharge, eye pain, eye redness, photophobia, vision loss, double " vision, blurred vision and eyelid swelling.   Respiratory:  Positive for cough, sputum production, COPD and wheezing. Negative for chest tightness, bloody sputum, shortness of breath and asthma.    Gastrointestinal:  Negative for abdominal pain, nausea, vomiting, diarrhea, bright red blood in stool, dark colored stools, rectal bleeding, heartburn and bowel incontinence.   Genitourinary:  Negative for dysuria, frequency, urgency, urine decreased, flank pain, bladder incontinence, hematuria and history of kidney stones.   Musculoskeletal:  Negative for trauma, joint pain, joint swelling, abnormal ROM of joint, muscle cramps and muscle ache.   Skin:  Negative for color change, pale, rash and wound.   Allergic/Immunologic: Negative for seasonal allergies, asthma and immunocompromised state.   Neurological:  Negative for dizziness, history of vertigo, light-headedness, passing out, facial drooping, speech difficulty, coordination disturbances, loss of balance, headaches, disorientation, altered mental status, loss of consciousness, numbness, tingling and seizures.   Hematologic/Lymphatic: Negative for swollen lymph nodes, easy bruising/bleeding and trouble clotting. Does not bruise/bleed easily.   Psychiatric/Behavioral:  Negative for altered mental status and disorientation.        Past Medical History:   Diagnosis Date    Asthma     COPD (chronic obstructive pulmonary disease)     Depression     High cholesterol        Objective:      Physical Exam   Constitutional: He is oriented to person, place, and time. He appears well-developed. He is cooperative.  Non-toxic appearance. He does not appear ill. No distress.   HENT:   Head: Normocephalic and atraumatic.   Ears:   Right Ear: Hearing, external ear and ear canal normal. No drainage, swelling or tenderness.   Left Ear: Hearing, external ear and ear canal normal. No drainage, swelling or tenderness.      Comments: Mild  middle ear effusion bilaterally with no bulging or  erythema.  Nose: Nose normal. No rhinorrhea or purulent discharge. Right sinus exhibits no maxillary sinus tenderness and no frontal sinus tenderness. Left sinus exhibits no maxillary sinus tenderness and no frontal sinus tenderness.   Mouth/Throat: Uvula is midline, oropharynx is clear and moist and mucous membranes are normal. No oral lesions. No trismus in the jaw. No uvula swelling. No oropharyngeal exudate, posterior oropharyngeal edema or posterior oropharyngeal erythema. No tonsillar exudate.   Eyes: Conjunctivae, EOM and lids are normal. Pupils are equal, round, and reactive to light. No visual field deficit is present. Right eye exhibits no discharge. Left eye exhibits no discharge. Right conjunctiva is not injected. Right conjunctiva has no hemorrhage. Left conjunctiva is not injected. Left conjunctiva has no hemorrhage. Extraocular movement intact vision grossly intact gaze aligned appropriately   Neck: Neck supple. No neck rigidity present.   Cardiovascular: Normal rate, regular rhythm, normal heart sounds and normal pulses.   No murmur heard.  Pulmonary/Chest: Effort normal. No accessory muscle usage or stridor. No respiratory distress. He has wheezes. He exhibits no tenderness.         Comments: Wheezing on ausculation in upper lung lobes    Abdominal: Normal appearance. He exhibits no distension and no mass. Soft. There is no abdominal tenderness. There is no rebound and no guarding.   Musculoskeletal: Normal range of motion.         General: Normal range of motion.      Right lower leg: No edema.      Left lower leg: No edema.      Comments: Moves all extremities with normal tone, strength, and ROM.  Gait normal.   Lymphadenopathy:     He has no cervical adenopathy.   Neurological: no focal deficit. He is alert, oriented to person, place, and time and at baseline. He has normal motor skills and normal sensation. He displays no weakness, facial symmetry, normal reflexes and no dysarthria. No cranial  nerve deficit or sensory deficit. He exhibits normal muscle tone. He has a normal Finger-Nose-Finger Test. Coordination: Heel to shin test normal. He shows no pronator drift. He displays no seizure activity. Gait and coordination normal. Coordination normal. GCS eye subscore is 4. GCS verbal subscore is 5. GCS motor subscore is 6.   Skin: Skin is warm, dry, not diaphoretic and no rash. Capillary refill takes less than 2 seconds.   Psychiatric: His speech is normal and behavior is normal. Mood and thought content normal.   Nursing note and vitals reviewed.        Results for orders placed or performed in visit on 10/12/22   POCT Influenza A/B MOLECULAR   Result Value Ref Range    POC Molecular Influenza A Ag Negative Negative, Not Reported    POC Molecular Influenza B Ag Negative Negative, Not Reported     Acceptable Yes    POCT COVID-19 Rapid Screening   Result Value Ref Range    POC Rapid COVID Negative Negative     Acceptable Yes        Assessment:       1. Acute bacterial bronchitis    2. Cough with congestion of paranasal sinus    3. Ear congestion, bilateral    4. Wheezing on auscultation    5. Encounter for screening for COVID-19    6. Screening for viral disease          Nontoxic appearing. Vitals are stable. Rapid covid and flu negative.   All diagnostic testing personally reviewed and interpreted.   Patient has symptoms at this time which is consistent with above diagnosis.        Patient was recommended OTC treatments for their symptoms.   Patient was also prescribed medications for their symptoms.   Offered albuterol inhaler but patient already has 1 from previous visit few months ago.  Discussed that he needs to add Mucinex DM, Flonase twice a day, cetirizine in addition to his prescription antibiotics.  We had an in-depth conversation regarding antibiotic choice as he is allergic to penicillin, doxycycline, and Bactrim.  He is currently on 3 days of Cipro 500 mg twice a day  per his urology due to cystoscope yesterday.  Unable to take any other antibiotics due to allergy.  Unable to take macrolide flashes Z-Richard in addition to his current cipro abx due to increased risk of QTC syndrome.   Will prescribe 10 day of Omnicef/cefdinir for symptoms as he has tolerated this in the past.     Patient was counseled, explained with the test results meaning, expected course, and answered all of questions. They can also receive results via my chart.  Printed and verbal treatment guidelines/recommendations were given.   Recommend follow-up PCP in the next 2-3 days if new or worsening symptoms.    Patient understands that they received an Urgent Care treatment only and that they may be released before all your medical problems are known or treated. Strict ED versus clinic precautions given.  Patient verbalized understanding and agreed with plan of care.    Note dictated with voice recognition software, please excuse any grammatical errors.    Plan:         Acute bacterial bronchitis  -     cefdinir (OMNICEF) 300 MG capsule; Take 1 capsule (300 mg total) by mouth 2 (two) times daily. for 10 days  Dispense: 20 capsule; Refill: 0    Cough with congestion of paranasal sinus  -     POCT Influenza A/B MOLECULAR  -     POCT COVID-19 Rapid Screening  -     benzonatate (TESSALON) 200 MG capsule; Take 1 capsule (200 mg total) by mouth 3 (three) times daily as needed for Cough.  Dispense: 30 capsule; Refill: 0    Ear congestion, bilateral    Wheezing on auscultation    Encounter for screening for COVID-19    Screening for viral disease            Additional MDM:     Heart Failure Score:   COPD = Yes    Patient Instructions     PLEASE READ YOUR DISCHARGE INSTRUCTIONS ENTIRELY AS IT CONTAINS IMPORTANT INFORMATION.    Patient had covid testing done today.  Discussed corona virus precautions and reviewed CDC FAC; printed a copy for patient.  I discussed to continue to monitor their symptoms. Discussed that if their  symptoms persist or worsen to seek re-evaluation. Clinic vs. ER precautions were given.  Patient verbalized understanding and agreed with the entire plan of care.    If Negative and no direct exposure: symptom free without fever reducing meds in 24 hours - can go back to work in 24 hours with surgical mask for 10-14 days.      - Reviewed radiographs and all diagnostic testing with patient/family.    - Rest.  Drink plenty of fluids.    - Tylenol OR anti-inflammatory (NSAIDs, ibuprofen, aleve, motrin) as directed as needed for fever/pain.  For Tylenol, do not exceed 3000 mg/ day. If no contraindication or allergies.  -OK to supplement with OTC DayQuil, NyQuil or TheraFlu every 6 hours as needed for cough and congestion.  Use caution of total amount of Tylenol/acetaminophen per day.  - continue albuterol inhaler as needed for shortness of breath/wheezing  - take Tessalon as needed for cough suppression.     - If you were prescribed antibiotics, please take them to completion. Please supplement with OTC probiotics and yogurt.  Contact clinic if develop profuse diarrhea and weakness.      -Below are suggestions for symptomatic relief:              -Salt water gargles to soothe throat pain.              -Chloroseptic spray also helps to numb throat pain.              -Nasal saline spray reduces inflammation and dryness.              -Warm face compresses to help with facial sinus pain/pressure.              -Vicks vapor rub at night.            **may also supplement with OTC nasal spray to help with inflammation and congestion. Wean to off when you nose becomes to dry or bleed. Also use nasal saline twice a day to help with dryness.               -Flonase OTC or Nasacort OTC  once or twice a day for nasal/sinus congestion. DON'T USE IF YOU HAVE GLAUCOMA. CHECK WITH YOUR PHARMACIST/PHYSICIAN.              -Simple foods like chicken noodle soup.              -Mucinex DM (ANY COUGH EXPECTORANT-- guaifenesin) for cough or  chest congestion with mucus during the day time. Delsym or robitussin (ANY COUGH SUPPRESSANT- dextromethorphan) helps with coughing at night. Mucinex-DM if you have chest congestion or sputum (caution if history of high blood pressure or palpitations).              -Zyrtec/Claritin/xyzal during the day time  & Benadryl at night (only if severe runny nose) may help with allergies and runny nose. Add decongestant if you have nasal/sinus congestion/sinus pressure/ear fullness sensation. (see below)              -may take OTC meclizine as needed for dizziness or nausea.     Caution with use of Decongestant meds:  -If you DO NOT have Hypertension or any history of palpitations, it is ok to take over the counter Sudafed or Mucinex D or Allegra-D or Claritin-D or Zyrtec-D.  -If you do take one of the above, it is ok to combine that with plain over the counter Mucinex or Allegra or Claritin or Zyrtec. If, for example, you are taking Zyrtec -D, you can combine that with Mucinex, but not Mucinex-D.  If you are taking Mucinex-D, you can combine that with plain Allegra or Claritin or Zyrtec.     -Do not combine pseudophed or phenylephrine with any other brand allergy-D for DECONGESTANT.   -Or vice versa, you can you take plain allergy medications (allegra/claritin/zyrtec with NO Decongestant) and ADD OTC pseudophed or phenelyphrine 2-3 times a day (or every 4-6 hours needed). Avoid taking decongestant late at night or with caffeine as it can keep you up or cause jittery feeling.     -If you DO have Hypertension , anxiety, or palpitations, it is safe to take Coricidin HBP for relief of sinus symptoms.      For your GI symptoms:  -Use gatorade/pedialyte or rehydration packets to help stay hydrated. Vitamin water and plain water do not contain rehydrating electrolytes.  -Increase clear liquids (water, gatorade, pedialyte, broths, jello, etc) Hold off on solids for 12-18 hours. Then advance to BRAT diet (banana, rice, applesauce,  tea, toast/crackers), then advance further as tolerated. Avoid spicy or fatty foods.   -May take Emitrol OTC as needed for nausea.   -Use Peptobismol or Immodium to help alleviate your diarrhea symptoms.   -Take mylanta or simethicone for bloating or gas pain.   -Take pepcid or omeprazole if you have heartburn or reflux sensation.  -Avoid imodium unless you have more than 6 loose stools in 24 hours. Take 1 dose and monitor to see if you can repeat AS IT WILL CAUSE CONSTIPATION.  -Wash hands frequently while sick. Avoid ibuprofen or other NSAIDS until you are well.   -Please go to the ER if you experience worsening abdominal pain, blood in your vomit or stool, high fever, dizziness, fainting, swelling of your abdomen, inability to pass gas or stool, or inability to urinate.         -You must understand that you've received an Urgent Care treatment only and that you may be released before all your medical problems are known or treated. You, the patient, will arrange for follow up care as instructed. Please arrange follow up with your primary medical clinic within 2-5 days if your signs and symptoms have not resolved or worsen.     - Follow up with your PCP or specialty clinic as directed.  You can call [phone] or [phone] to schedule an appointment with the appropriate provider.  Schedule [facility]ENTER is open Mon-Friday 8-5pm (excluded holidays).    - If your condition worsens or fails to improve we recommend that you receive another evaluation at the emergency room immediately or contact your primary medical clinic to discuss your concerns.            Prevention steps for patients with confirmed or suspected COVID-19  Stay home and stay away from family members and friends. The CDC says, you can leave home after these three things have happened: 1) You have had no fever for at least 24 hours (that is one full day of no fever without the use of medicine that reduces fevers) 2) AND other symptoms have improved  (for example, when your cough or shortness of breath have improved) 3) AND at least 10 days have passed since your symptoms first appeared OR after 7-10 days passed from first positive test.  Separate yourself from other people and animals in your home.  Call ahead before visiting your doctor.  Wear a facemask.  Cover your coughs and sneezes.  Wash your hands often with soap and water; hand  can be used, too.  Avoid sharing personal household items.  Wipe down surfaces used daily.  Monitor your symptoms. Seek prompt medical attention if your illness is worsening (e.g., difficulty breathing).   Before seeking care, call your healthcare provider.  If you have a medical emergency and need to call 911, notify the dispatch personnel that you have, or are being evaluated for COVID-19. If possible, put on a facemask before emergency medical services arrive.        Recommended precautions for household members, intimate partners, and caregivers in a home setting of a patient with symptomatic laboratory-confirmed COVID-19 or a patient under investigation.  Household members, intimate partners, and caregivers in the home setting awaiting tests results have close contact with a person with symptomatic, laboratory-confirmed COVID-19 or a person under investigation. Close contacts should monitor their health; they should call their provider right away if they develop symptoms suggestive of COVID-19 (e.g., fever, cough, shortness of breath).    Close contacts should also follow these recommendations:  Make sure that you understand and can help the patient follow their provider's instructions for medication(s) and care. You should help the patient with basic needs in the home and provide support for getting groceries, prescriptions, and other personal needs.  Monitor the patient's symptoms. If the patient is getting sicker, call his or her healthcare provider and tell them that the patient has laboratory-confirmed  COVID-19. If the patient has a medical emergency and you need to call 911, notify the dispatch personnel that the patient has, or is being evaluated for COVID-19.  Household members should stay in another room or be  from the patient. Household members should use a separate bedroom and bathroom, if available.  Prohibit visitors.  Household members should care for any pets in the home.  Make sure that shared spaces in the home have good air flow, such as by an air conditioner or an opened window, weather permitting.  Perform hand hygiene frequently. Wash your hands often with soap and water for at least 20 seconds or use an alcohol-based hand  (that contains > 60% alcohol) covering all surfaces of your hands and rubbing them together until they feel dry. Soap and water should be used preferentially.  Avoid touching your eyes, nose, and mouth.  The patient should wear a facemask. If the patient is not able to wear a facemask (for example, because it causes trouble breathing), caregivers should wear a mask when they are in the same room as the patient.  Wear a disposable facemask and gloves when you touch or have contact with the patient's blood, stool, or body fluids, such as saliva, sputum, nasal mucus, vomit, urine.  Throw out disposable facemasks and gloves after using them. Do not reuse.  When removing personal protective equipment, first remove and dispose of gloves. Then, immediately clean your hands with soap and water or alcohol-based hand . Next, remove and dispose of facemask, and immediately clean your hands again with soap and water or alcohol-based hand .  You should not share dishes, drinking glasses, cups, eating utensils, towels, bedding, or other items with the patient. After the patient uses these items, you should wash them thoroughly (see below Wash laundry thoroughly).  Clean all high-touch surfaces, such as counters, tabletops, doorknobs, bathroom fixtures,  toilets, phones, keyboards, tablets, and bedside tables, every day. Also, clean any surfaces that may have blood, stool, or body fluids on them.  Use a household cleaning spray or wipe, according to the label instructions. Labels contain instructions for safe and effective use of the cleaning product including precautions you should take when applying the product, such as wearing gloves and making sure you have good ventilation during use of the product.  Wash laundry thoroughly.  Immediately remove and wash clothes or bedding that have blood, stool, or body fluids on them.  Wear disposable gloves while handling soiled items and keep soiled items away from your body. Clean your hands (with soap and water or an alcohol-based hand ) immediately after removing your gloves.  Read and follow directions on labels of laundry or clothing items and detergent. In general, using a normal laundry detergent according to washing machine instructions and dry thoroughly using the warmest temperatures recommended on the clothing label.  Place all used disposable gloves, facemasks, and other contaminated items in a lined container before disposing of them with other household waste. Clean your hands (with soap and water or an alcohol-based hand ) immediately after handling these items. Soap and water should be used preferentially if hands are visibly dirty.  Discuss any additional questions with your state or local health department or healthcare provider. Check available hours when contacting your local health department.    For more information see CDC link below.      https://www.cdc.gov/coronavirus/2019-ncov/hcp/guidance-prevent-spread.html#precautions        Sources:  CDC, [place] [facility]          Instructions for Home Care of Patients and Caretakers with Coronavirus Disease 2019  Limit visitors to the home.  Older persons and those that have chronic medical conditions such as  diabetes, lung and heart disease are at increased risk for illness.   If possible, patients should use a separate bedroom while recovering. Caregivers and household members should avoid prolonged contact with the patient which means to stay 6 feet away and avoid contact with cough droplets.  When close contact is necessary, wash your hands before and immediately after contact.   Perform hand hygiene frequently. Wash your hands often with soap and water for at least 20 seconds or use an alcohol-based hand , covering all surfaces of your hands and rubbing them together until they feel dry.   Avoid touching your eyes, nose, and mouth with unwashed hands.  Avoid sharing household items with the patient. You should not share dishes, drinking glasses, cups, eating utensils, towels, bedding, or other items. After the patient uses these items, you should wash them thoroughly.  Wash laundry thoroughly.   Immediately remove and wash clothes or bedding that have blood, stool, or body fluids on them.  Clean all high-touch surfaces, such as counters, tabletops, doorknobs, bathroom fixtures, toilets, phones, keyboards, tablets, and bedside tables, every day.   Use a household cleaning spray or wipe, according to the label instructions. Labels contain instructions for safe and effective use of the cleaning product including precautions you should take when applying the product, such as wearing gloves and making sure you have good ventilation during use of the product.    For more information see CDC link below.      https://www.cdc.gov/coronavirus/2019-ncov/hcp/guidance-prevent-spread.html#precautions               If your symptoms worsen or if you have any other concerns, please contact [facility] at [phone].

## 2022-10-12 NOTE — PATIENT INSTRUCTIONS
PLEASE READ YOUR DISCHARGE INSTRUCTIONS ENTIRELY AS IT CONTAINS IMPORTANT INFORMATION.    Patient had covid testing done today.  Discussed corona virus precautions and reviewed [facility] FAC; printed a copy for patient.  I discussed to continue to monitor their symptoms. Discussed that if their symptoms persist or worsen to seek re-evaluation. Clinic vs. ER precautions were given.  Patient verbalized understanding and agreed with the entire plan of care.    If Negative and no direct exposure: symptom free without fever reducing meds in 24 hours - can go back to work in 24 hours with surgical mask for 10-14 days.      - Reviewed radiographs and all diagnostic testing with patient/family.    - Rest.  Drink plenty of fluids.    - Tylenol OR anti-inflammatory (NSAIDs, ibuprofen, aleve, motrin) as directed as needed for fever/pain.  For Tylenol, do not exceed 3000 mg/ day. If no contraindication or allergies.  -OK to supplement with OTC DayQuil, NyQuil or TheraFlu every 6 hours as needed for cough and congestion.  Use caution of total amount of Tylenol/acetaminophen per day.  - continue albuterol inhaler as needed for shortness of breath/wheezing  - take Tessalon as needed for cough suppression.     - If you were prescribed antibiotics, please take them to completion. Please supplement with OTC probiotics and yogurt.  Contact clinic if develop profuse diarrhea and weakness.      -Below are suggestions for symptomatic relief:              -Salt water gargles to soothe throat pain.              -Chloroseptic spray also helps to numb throat pain.              -Nasal saline spray reduces inflammation and dryness.              -Warm face compresses to help with facial sinus pain/pressure.              -Vicks vapor rub at night.            **may also supplement with OTC nasal spray to help with inflammation and congestion. Wean to off when you nose becomes to dry or bleed. Also use nasal saline twice a day to help with dryness.                -Flonase OTC or Nasacort OTC  once or twice a day for nasal/sinus congestion. DON'T USE IF YOU HAVE GLAUCOMA. CHECK WITH YOUR PHARMACIST/PHYSICIAN.              -Simple foods like chicken noodle soup.              -Mucinex DM (ANY COUGH EXPECTORANT-- guaifenesin) for cough or chest congestion with mucus during the day time. Delsym or robitussin (ANY COUGH SUPPRESSANT- dextromethorphan) helps with coughing at night. Mucinex-DM if you have chest congestion or sputum (caution if history of high blood pressure or palpitations).              -Zyrtec/Claritin/xyzal during the day time  & Benadryl at night (only if severe runny nose) may help with allergies and runny nose. Add decongestant if you have nasal/sinus congestion/sinus pressure/ear fullness sensation. (see below)              -may take OTC meclizine as needed for dizziness or nausea.     Caution with use of Decongestant meds:  -If you DO NOT have Hypertension or any history of palpitations, it is ok to take over the counter Sudafed or Mucinex D or Allegra-D or Claritin-D or Zyrtec-D.  -If you do take one of the above, it is ok to combine that with plain over the counter Mucinex or Allegra or Claritin or Zyrtec. If, for example, you are taking Zyrtec -D, you can combine that with Mucinex, but not Mucinex-D.  If you are taking Mucinex-D, you can combine that with plain Allegra or Claritin or Zyrtec.     -Do not combine pseudophed or phenylephrine with any other brand allergy-D for DECONGESTANT.   -Or vice versa, you can you take plain allergy medications (allegra/claritin/zyrtec with NO Decongestant) and ADD OTC pseudophed or phenelyphrine 2-3 times a day (or every 4-6 hours needed). Avoid taking decongestant late at night or with caffeine as it can keep you up or cause jittery feeling.     -If you DO have Hypertension , anxiety, or palpitations, it is safe to take Coricidin HBP for relief of sinus symptoms.      For your GI symptoms:  -Use  gatorade/pedialyte or rehydration packets to help stay hydrated. Vitamin water and plain water do not contain rehydrating electrolytes.  -Increase clear liquids (water, gatorade, pedialyte, broths, jello, etc) Hold off on solids for 12-18 hours. Then advance to BRAT diet (banana, rice, applesauce, tea, toast/crackers), then advance further as tolerated. Avoid spicy or fatty foods.   -May take Emitrol OTC as needed for nausea.   -Use Peptobismol or Immodium to help alleviate your diarrhea symptoms.   -Take mylanta or simethicone for bloating or gas pain.   -Take pepcid or omeprazole if you have heartburn or reflux sensation.  -Avoid imodium unless you have more than 6 loose stools in 24 hours. Take 1 dose and monitor to see if you can repeat AS IT WILL CAUSE CONSTIPATION.  -Wash hands frequently while sick. Avoid ibuprofen or other NSAIDS until you are well.   -Please go to the ER if you experience worsening abdominal pain, blood in your vomit or stool, high fever, dizziness, fainting, swelling of your abdomen, inability to pass gas or stool, or inability to urinate.         -You must understand that you've received an Urgent Care treatment only and that you may be released before all your medical problems are known or treated. You, the patient, will arrange for follow up care as instructed. Please arrange follow up with your primary medical clinic within 2-5 days if your signs and symptoms have not resolved or worsen.     - Follow up with your PCP or specialty clinic as directed.  You can call [phone] or [phone] to schedule an appointment with the appropriate provider.  Schedule [facility] is open Mon-Friday 8-5pm (excluded holidays).    - If your condition worsens or fails to improve we recommend that you receive another evaluation at the emergency room immediately or contact your primary medical clinic to discuss your concerns.            Prevention steps for patients with confirmed or suspected  COVID-19  Stay home and stay away from family members and friends. The CDC says, you can leave home after these three things have happened: 1) You have had no fever for at least 24 hours (that is one full day of no fever without the use of medicine that reduces fevers) 2) AND other symptoms have improved (for example, when your cough or shortness of breath have improved) 3) AND at least 10 days have passed since your symptoms first appeared OR after 7-10 days passed from first positive test.  Separate yourself from other people and animals in your home.  Call ahead before visiting your doctor.  Wear a facemask.  Cover your coughs and sneezes.  Wash your hands often with soap and water; hand  can be used, too.  Avoid sharing personal household items.  Wipe down surfaces used daily.  Monitor your symptoms. Seek prompt medical attention if your illness is worsening (e.g., difficulty breathing).   Before seeking care, call your healthcare provider.  If you have a medical emergency and need to call 911, notify the dispatch personnel that you have, or are being evaluated for COVID-19. If possible, put on a facemask before emergency medical services arrive.        Recommended precautions for household members, intimate partners, and caregivers in a home setting of a patient with symptomatic laboratory-confirmed COVID-19 or a patient under investigation.  Household members, intimate partners, and caregivers in the home setting awaiting tests results have close contact with a person with symptomatic, laboratory-confirmed COVID-19 or a person under investigation. Close contacts should monitor their health; they should call their provider right away if they develop symptoms suggestive of COVID-19 (e.g., fever, cough, shortness of breath).    Close contacts should also follow these recommendations:  Make sure that you understand and can help the patient follow their provider's instructions for medication(s) and care.  You should help the patient with basic needs in the home and provide support for getting groceries, prescriptions, and other personal needs.  Monitor the patient's symptoms. If the patient is getting sicker, call his or her healthcare provider and tell them that the patient has laboratory-confirmed COVID-19. If the patient has a medical emergency and you need to call 911, notify the dispatch personnel that the patient has, or is being evaluated for COVID-19.  Household members should stay in another room or be  from the patient. Household members should use a separate bedroom and bathroom, if available.  Prohibit visitors.  Household members should care for any pets in the home.  Make sure that shared spaces in the home have good air flow, such as by an air conditioner or an opened window, weather permitting.  Perform hand hygiene frequently. Wash your hands often with soap and water for at least 20 seconds or use an alcohol-based hand  (that contains > 60% alcohol) covering all surfaces of your hands and rubbing them together until they feel dry. Soap and water should be used preferentially.  Avoid touching your eyes, nose, and mouth.  The patient should wear a facemask. If the patient is not able to wear a facemask (for example, because it causes trouble breathing), caregivers should wear a mask when they are in the same room as the patient.  Wear a disposable facemask and gloves when you touch or have contact with the patient's blood, stool, or body fluids, such as saliva, sputum, nasal mucus, vomit, urine.  Throw out disposable facemasks and gloves after using them. Do not reuse.  When removing personal protective equipment, first remove and dispose of gloves. Then, immediately clean your hands with soap and water or alcohol-based hand . Next, remove and dispose of facemask, and immediately clean your hands again with soap and water or alcohol-based hand .  You should not  share dishes, drinking glasses, cups, eating utensils, towels, bedding, or other items with the patient. After the patient uses these items, you should wash them thoroughly (see below Wash laundry thoroughly).  Clean all high-touch surfaces, such as counters, tabletops, doorknobs, bathroom fixtures, toilets, phones, keyboards, tablets, and bedside tables, every day. Also, clean any surfaces that may have blood, stool, or body fluids on them.  Use a household cleaning spray or wipe, according to the label instructions. Labels contain instructions for safe and effective use of the cleaning product including precautions you should take when applying the product, such as wearing gloves and making sure you have good ventilation during use of the product.  Wash laundry thoroughly.  Immediately remove and wash clothes or bedding that have blood, stool, or body fluids on them.  Wear disposable gloves while handling soiled items and keep soiled items away from your body. Clean your hands (with soap and water or an alcohol-based hand ) immediately after removing your gloves.  Read and follow directions on labels of laundry or clothing items and detergent. In general, using a normal laundry detergent according to washing machine instructions and dry thoroughly using the warmest temperatures recommended on the clothing label.  Place all used disposable gloves, facemasks, and other contaminated items in a lined container before disposing of them with other household waste. Clean your hands (with soap and water or an alcohol-based hand ) immediately after handling these items. Soap and water should be used preferentially if hands are visibly dirty.  Discuss any additional questions with your state or local health department or healthcare provider. Check available hours when contacting your local health department.    For more information see CDC link below.       https://www.cdc.gov/coronavirus/2019-ncov/hcp/guidance-prevent-spread.html#precautions        Sources:  [facility], [place] Department of Health and \[facility] [place] [facility]\""          Instructions for Home Care of Patients and Caretakers with Coronavirus Disease 2019  Limit visitors to the home.  Older persons and those that have chronic medical conditions such as diabetes, lung and heart disease are at increased risk for illness.   If possible, patients should use a separate bedroom while recovering. Caregivers and household members should avoid prolonged contact with the patient which means to stay 6 feet away and avoid contact with cough droplets.  When close contact is necessary, wash your hands before and immediately after contact.   Perform hand hygiene frequently. Wash your hands often with soap and water for at least 20 seconds or use an alcohol-based hand , covering all surfaces of your hands and rubbing them together until they feel dry.   Avoid touching your eyes, nose, and mouth with unwashed hands.  Avoid sharing household items with the patient. You should not share dishes, drinking glasses, cups, eating utensils, towels, bedding, or other items. After the patient uses these items, you should wash them thoroughly.  Wash laundry thoroughly.   Immediately remove and wash clothes or bedding that have blood, stool, or body fluids on them.  Clean all high-touch surfaces, such as counters, tabletops, doorknobs, bathroom fixtures, toilets, phones, keyboards, tablets, and bedside tables, every day.   Use a household cleaning spray or wipe, according to the label instructions. Labels contain instructions for safe and effective use of the cleaning product including precautions you should take when applying the product, such as wearing gloves and making sure you have good ventilation during use of the product.    For more information see CDC link below.       https://www.cdc.gov/coronavirus/2019-ncov/hcp/guidance-prevent-spread.html#precautions               If your symptoms worsen or if you have any other concerns, please contact [facility] at [phone].

## 2022-11-11 ENCOUNTER — OFFICE VISIT (OUTPATIENT)
Dept: INTERNAL MEDICINE | Facility: CLINIC | Age: 82
End: 2022-11-11
Payer: MEDICARE

## 2022-11-11 VITALS
HEART RATE: 68 BPM | RESPIRATION RATE: 18 BRPM | WEIGHT: 166.25 LBS | SYSTOLIC BLOOD PRESSURE: 130 MMHG | BODY MASS INDEX: 26.72 KG/M2 | HEIGHT: 66 IN | OXYGEN SATURATION: 95 % | TEMPERATURE: 98 F | DIASTOLIC BLOOD PRESSURE: 74 MMHG

## 2022-11-11 DIAGNOSIS — J44.1 COPD EXACERBATION: Primary | ICD-10-CM

## 2022-11-11 DIAGNOSIS — R19.7 DIARRHEA, UNSPECIFIED TYPE: ICD-10-CM

## 2022-11-11 PROCEDURE — 3075F PR MOST RECENT SYSTOLIC BLOOD PRESS GE 130-139MM HG: ICD-10-PCS | Mod: CPTII,S$GLB,, | Performed by: INTERNAL MEDICINE

## 2022-11-11 PROCEDURE — 1159F PR MEDICATION LIST DOCUMENTED IN MEDICAL RECORD: ICD-10-PCS | Mod: CPTII,S$GLB,, | Performed by: INTERNAL MEDICINE

## 2022-11-11 PROCEDURE — 99214 OFFICE O/P EST MOD 30 MIN: CPT | Mod: S$GLB,,, | Performed by: INTERNAL MEDICINE

## 2022-11-11 PROCEDURE — 1101F PR PT FALLS ASSESS DOC 0-1 FALLS W/OUT INJ PAST YR: ICD-10-PCS | Mod: CPTII,S$GLB,, | Performed by: INTERNAL MEDICINE

## 2022-11-11 PROCEDURE — 99999 PR PBB SHADOW E&M-EST. PATIENT-LVL IV: ICD-10-PCS | Mod: PBBFAC,,, | Performed by: INTERNAL MEDICINE

## 2022-11-11 PROCEDURE — 1160F PR REVIEW ALL MEDS BY PRESCRIBER/CLIN PHARMACIST DOCUMENTED: ICD-10-PCS | Mod: CPTII,S$GLB,, | Performed by: INTERNAL MEDICINE

## 2022-11-11 PROCEDURE — 1159F MED LIST DOCD IN RCRD: CPT | Mod: CPTII,S$GLB,, | Performed by: INTERNAL MEDICINE

## 2022-11-11 PROCEDURE — 1101F PT FALLS ASSESS-DOCD LE1/YR: CPT | Mod: CPTII,S$GLB,, | Performed by: INTERNAL MEDICINE

## 2022-11-11 PROCEDURE — 3288F FALL RISK ASSESSMENT DOCD: CPT | Mod: CPTII,S$GLB,, | Performed by: INTERNAL MEDICINE

## 2022-11-11 PROCEDURE — 1160F RVW MEDS BY RX/DR IN RCRD: CPT | Mod: CPTII,S$GLB,, | Performed by: INTERNAL MEDICINE

## 2022-11-11 PROCEDURE — 1125F AMNT PAIN NOTED PAIN PRSNT: CPT | Mod: CPTII,S$GLB,, | Performed by: INTERNAL MEDICINE

## 2022-11-11 PROCEDURE — 3075F SYST BP GE 130 - 139MM HG: CPT | Mod: CPTII,S$GLB,, | Performed by: INTERNAL MEDICINE

## 2022-11-11 PROCEDURE — 3078F DIAST BP <80 MM HG: CPT | Mod: CPTII,S$GLB,, | Performed by: INTERNAL MEDICINE

## 2022-11-11 PROCEDURE — 99999 PR PBB SHADOW E&M-EST. PATIENT-LVL IV: CPT | Mod: PBBFAC,,, | Performed by: INTERNAL MEDICINE

## 2022-11-11 PROCEDURE — 99214 PR OFFICE/OUTPT VISIT, EST, LEVL IV, 30-39 MIN: ICD-10-PCS | Mod: S$GLB,,, | Performed by: INTERNAL MEDICINE

## 2022-11-11 PROCEDURE — 3288F PR FALLS RISK ASSESSMENT DOCUMENTED: ICD-10-PCS | Mod: CPTII,S$GLB,, | Performed by: INTERNAL MEDICINE

## 2022-11-11 PROCEDURE — 3078F PR MOST RECENT DIASTOLIC BLOOD PRESSURE < 80 MM HG: ICD-10-PCS | Mod: CPTII,S$GLB,, | Performed by: INTERNAL MEDICINE

## 2022-11-11 PROCEDURE — 1125F PR PAIN SEVERITY QUANTIFIED, PAIN PRESENT: ICD-10-PCS | Mod: CPTII,S$GLB,, | Performed by: INTERNAL MEDICINE

## 2022-11-11 RX ORDER — PREDNISONE 50 MG/1
50 TABLET ORAL DAILY
Qty: 5 TABLET | Refills: 0 | Status: SHIPPED | OUTPATIENT
Start: 2022-11-11 | End: 2022-11-16

## 2022-11-11 RX ORDER — AZITHROMYCIN 250 MG/1
TABLET, FILM COATED ORAL
Qty: 6 TABLET | Refills: 0 | Status: SHIPPED | OUTPATIENT
Start: 2022-11-11 | End: 2023-01-25

## 2022-11-11 NOTE — PROGRESS NOTES
Subjective:       Patient ID: Navin Henning Jr. is a 82 y.o. male.    Chief Complaint: Cough (2 weeks ) and Fatigue (2 weeks )    HPI    82-year-old male here for evaluation of a cough and fatigue.  He has been coughing the last two weeks.  He has had trouble breathing and has been taking breathing treatments.  He has COPD.  He went to  and was diagnosed with bronchitis. He has had concerning symptoms - persistent cough with thick, green mucus, fatigue/sleeping all the time.  His stomach has been hard and having diarrhea.  He has had muscle cramps.  He was treated with cefdinir.      His diarrhea started about a week ago.  He is having 3 BMs a day.  It is loose and watery.  It is brown in color.  It smells worse than usual.    Review of Systems      Objective:      Physical Exam  Vitals reviewed.   Constitutional:       Appearance: He is well-developed.   HENT:      Head: Normocephalic and atraumatic.      Mouth/Throat:      Pharynx: No oropharyngeal exudate.   Eyes:      General: No scleral icterus.        Right eye: No discharge.         Left eye: No discharge.      Pupils: Pupils are equal, round, and reactive to light.   Neck:      Thyroid: No thyromegaly.      Trachea: No tracheal deviation.   Cardiovascular:      Rate and Rhythm: Normal rate and regular rhythm.      Heart sounds: Normal heart sounds. No murmur heard.    No friction rub. No gallop.   Pulmonary:      Effort: Pulmonary effort is normal. No respiratory distress.      Breath sounds: Normal breath sounds. No wheezing or rales.   Chest:      Chest wall: No tenderness.   Abdominal:      General: Bowel sounds are normal. There is no distension.      Palpations: Abdomen is soft. There is no mass.      Tenderness: There is no abdominal tenderness. There is no guarding or rebound.   Musculoskeletal:         General: No tenderness. Normal range of motion.      Cervical back: Normal range of motion and neck supple.   Skin:     General: Skin is warm  and dry.      Coloration: Skin is not pale.      Findings: No erythema or rash.   Neurological:      Mental Status: He is alert and oriented to person, place, and time.   Psychiatric:         Behavior: Behavior normal.       Assessment:       1. COPD exacerbation  - predniSONE (DELTASONE) 50 MG Tab; Take 1 tablet (50 mg total) by mouth once daily. for 5 days  Dispense: 5 tablet; Refill: 0    2. Diarrhea, unspecified type  - Clostridium difficile EIA; Future  - WBC, Stool; Future  - CULTURE, STOOL; Future    Plan:       1. Z-Richard prescribed.  Prednisone 50 mg daily x5 days prescribed.  2. Check stool for C diff, white blood cell, culture.  Recommend probiotics.  If positive for C diff, would treat with Flagyl or vancomycin.

## 2022-11-12 ENCOUNTER — LAB VISIT (OUTPATIENT)
Dept: LAB | Facility: HOSPITAL | Age: 82
End: 2022-11-12
Attending: INTERNAL MEDICINE
Payer: MEDICARE

## 2022-11-12 DIAGNOSIS — R19.7 DIARRHEA, UNSPECIFIED TYPE: ICD-10-CM

## 2022-11-12 LAB
C DIFF GDH STL QL: NEGATIVE
C DIFF TOX A+B STL QL IA: NEGATIVE
WBC #/AREA STL HPF: NORMAL /[HPF]

## 2022-11-12 PROCEDURE — 87427 SHIGA-LIKE TOXIN AG IA: CPT | Mod: 59 | Performed by: INTERNAL MEDICINE

## 2022-11-12 PROCEDURE — 87449 NOS EACH ORGANISM AG IA: CPT | Performed by: INTERNAL MEDICINE

## 2022-11-12 PROCEDURE — 89055 LEUKOCYTE ASSESSMENT FECAL: CPT | Performed by: INTERNAL MEDICINE

## 2022-11-12 PROCEDURE — 87045 FECES CULTURE AEROBIC BACT: CPT | Performed by: INTERNAL MEDICINE

## 2022-11-12 PROCEDURE — 87046 STOOL CULTR AEROBIC BACT EA: CPT | Performed by: INTERNAL MEDICINE

## 2022-11-14 ENCOUNTER — PES CALL (OUTPATIENT)
Dept: ADMINISTRATIVE | Facility: OTHER | Age: 82
End: 2022-11-14
Payer: MEDICARE

## 2022-11-14 LAB
E COLI SXT1 STL QL IA: NEGATIVE
E COLI SXT2 STL QL IA: NEGATIVE

## 2022-11-16 LAB — BACTERIA STL CULT: NORMAL

## 2023-01-17 ENCOUNTER — OFFICE VISIT (OUTPATIENT)
Dept: URGENT CARE | Facility: CLINIC | Age: 83
End: 2023-01-17
Payer: MEDICARE

## 2023-01-17 VITALS
HEART RATE: 91 BPM | HEIGHT: 66 IN | BODY MASS INDEX: 26.52 KG/M2 | SYSTOLIC BLOOD PRESSURE: 146 MMHG | OXYGEN SATURATION: 96 % | RESPIRATION RATE: 18 BRPM | WEIGHT: 165 LBS | DIASTOLIC BLOOD PRESSURE: 78 MMHG | TEMPERATURE: 98 F

## 2023-01-17 DIAGNOSIS — J06.9 URI, ACUTE: ICD-10-CM

## 2023-01-17 DIAGNOSIS — R05.9 COUGH, UNSPECIFIED TYPE: Primary | ICD-10-CM

## 2023-01-17 DIAGNOSIS — J40 BRONCHITIS: ICD-10-CM

## 2023-01-17 LAB
CTP QC/QA: YES
SARS-COV-2 AG RESP QL IA.RAPID: NEGATIVE

## 2023-01-17 PROCEDURE — 1159F PR MEDICATION LIST DOCUMENTED IN MEDICAL RECORD: ICD-10-PCS | Mod: CPTII,S$GLB,, | Performed by: FAMILY MEDICINE

## 2023-01-17 PROCEDURE — 3077F SYST BP >= 140 MM HG: CPT | Mod: CPTII,S$GLB,, | Performed by: FAMILY MEDICINE

## 2023-01-17 PROCEDURE — 99213 PR OFFICE/OUTPT VISIT, EST, LEVL III, 20-29 MIN: ICD-10-PCS | Mod: S$GLB,,, | Performed by: FAMILY MEDICINE

## 2023-01-17 PROCEDURE — 87811 SARS CORONAVIRUS 2 ANTIGEN POCT, MANUAL READ: ICD-10-PCS | Mod: QW,S$GLB,, | Performed by: FAMILY MEDICINE

## 2023-01-17 PROCEDURE — 1159F MED LIST DOCD IN RCRD: CPT | Mod: CPTII,S$GLB,, | Performed by: FAMILY MEDICINE

## 2023-01-17 PROCEDURE — 99213 OFFICE O/P EST LOW 20 MIN: CPT | Mod: S$GLB,,, | Performed by: FAMILY MEDICINE

## 2023-01-17 PROCEDURE — 3077F PR MOST RECENT SYSTOLIC BLOOD PRESSURE >= 140 MM HG: ICD-10-PCS | Mod: CPTII,S$GLB,, | Performed by: FAMILY MEDICINE

## 2023-01-17 PROCEDURE — 87811 SARS-COV-2 COVID19 W/OPTIC: CPT | Mod: QW,S$GLB,, | Performed by: FAMILY MEDICINE

## 2023-01-17 PROCEDURE — 3078F PR MOST RECENT DIASTOLIC BLOOD PRESSURE < 80 MM HG: ICD-10-PCS | Mod: CPTII,S$GLB,, | Performed by: FAMILY MEDICINE

## 2023-01-17 PROCEDURE — 3078F DIAST BP <80 MM HG: CPT | Mod: CPTII,S$GLB,, | Performed by: FAMILY MEDICINE

## 2023-01-17 RX ORDER — LORATADINE 10 MG/1
10 TABLET ORAL DAILY
Qty: 30 TABLET | Refills: 2 | Status: SHIPPED | OUTPATIENT
Start: 2023-01-17 | End: 2023-05-11 | Stop reason: ALTCHOICE

## 2023-01-17 RX ORDER — GUAIFENESIN 600 MG/1
600 TABLET, EXTENDED RELEASE ORAL 2 TIMES DAILY
Qty: 14 TABLET | Refills: 2 | Status: SHIPPED | OUTPATIENT
Start: 2023-01-17 | End: 2023-01-25

## 2023-01-17 RX ORDER — AZITHROMYCIN 250 MG/1
TABLET, FILM COATED ORAL
Qty: 6 TABLET | Refills: 0 | Status: SHIPPED | OUTPATIENT
Start: 2023-01-17 | End: 2023-01-25

## 2023-01-17 RX ORDER — PROMETHAZINE HYDROCHLORIDE AND DEXTROMETHORPHAN HYDROBROMIDE 6.25; 15 MG/5ML; MG/5ML
SYRUP ORAL
Qty: 180 ML | Refills: 0 | Status: SHIPPED | OUTPATIENT
Start: 2023-01-17 | End: 2023-01-25 | Stop reason: SDUPTHER

## 2023-01-17 NOTE — PROGRESS NOTES
"Subjective:       Patient ID: Navin Henning Jr. is a 82 y.o. male.    Vitals:  height is 5' 6" (1.676 m) and weight is 74.8 kg (165 lb). His oral temperature is 98.3 °F (36.8 °C). His blood pressure is 146/78 (abnormal) and his pulse is 91. His respiration is 18 and oxygen saturation is 96%.     Chief Complaint: Sinus Problem    This started yesterday. He is having the symptoms below and is coughing up green mucus. He has taken an inhaler, some cough medication and zyrtec. The inhaler is the only one that helped. He went to [employer] recent and just got back and that is when this started. Now coughing colored phlegm, no fever or chills      Sinus Problem  This is a new problem. The current episode started yesterday. The problem has been gradually worsening since onset. There has been no fever. The fever has been present for Less than 1 day. His pain is at a severity of 0/10. He is experiencing no pain. Associated symptoms include congestion, coughing, headaches, sinus pressure and a sore throat. The treatment provided moderate relief.     HENT:  Positive for congestion, sinus pressure and sore throat.    Respiratory:  Positive for cough.    Neurological:  Positive for headaches.     Objective:      Physical Exam   Constitutional: He is oriented to person, place, and time. He appears well-developed. He is cooperative.  Non-toxic appearance. He does not appear ill. No distress.   HENT:   Head: Normocephalic and atraumatic.   Ears:   Right Ear: Hearing, tympanic membrane, external ear and ear canal normal.   Left Ear: Hearing, tympanic membrane, external ear and ear canal normal.   Nose: Nose normal. No mucosal edema, rhinorrhea or nasal deformity. No epistaxis. Right sinus exhibits no maxillary sinus tenderness and no frontal sinus tenderness. Left sinus exhibits no maxillary sinus tenderness and no frontal sinus tenderness.   Mouth/Throat: Uvula is midline, oropharynx is clear and moist and mucous membranes are " normal. Mucous membranes are moist. No trismus in the jaw. Normal dentition. No uvula swelling. No oropharyngeal exudate. Oropharynx is clear.   Eyes: Conjunctivae and lids are normal. Pupils are equal, round, and reactive to light. Right eye exhibits no discharge. Left eye exhibits no discharge. No scleral icterus. Extraocular movement intact   Neck: Trachea normal and phonation normal. Neck supple.   Cardiovascular: Normal rate, regular rhythm, normal heart sounds and normal pulses.   Pulmonary/Chest: Effort normal and breath sounds normal. No respiratory distress.   Abdominal: Normal appearance and bowel sounds are normal. He exhibits no distension and no mass. Soft. There is no abdominal tenderness.   Musculoskeletal: Normal range of motion.         General: No deformity. Normal range of motion.   Neurological: He is alert and oriented to person, place, and time. He exhibits normal muscle tone. Coordination normal.   Skin: Skin is warm, dry, intact, not diaphoretic and not pale.   Psychiatric: His speech is normal and behavior is normal. Judgment and thought content normal.   Nursing note and vitals reviewed.      Assessment:       1. Cough, unspecified type    2. URI, acute    3. Bronchitis          Plan:         Cough, unspecified type  -     SARS Coronavirus 2 Antigen, POCT Manual Read    URI, acute    Bronchitis    Other orders  -     azithromycin (ZITHROMAX Z-JESSICA) 250 MG tablet; Take 2 tablets (500 mg) on  Day 1,  followed by 1 tablet (250 mg) once daily on Days 2 through 5.  Dispense: 6 tablet; Refill: 0  -     loratadine (CLARITIN) 10 mg tablet; Take 1 tablet (10 mg total) by mouth once daily.  Dispense: 30 tablet; Refill: 2  -     promethazine-dextromethorphan (PROMETHAZINE-DM) 6.25-15 mg/5 mL Syrp; Take one tsp po q 6 hrs prn cough  Dispense: 180 mL; Refill: 0  -     guaiFENesin (MUCINEX) 600 mg 12 hr tablet; Take 1 tablet (600 mg total) by mouth 2 (two) times daily. for 7 days  Dispense: 14 tablet;  Refill: 2          Results for orders placed or performed in visit on 01/17/23   SARS Coronavirus 2 Antigen, POCT Manual Read   Result Value Ref Range    SARS Coronavirus 2 Antigen Negative Negative     Acceptable Yes

## 2023-01-18 ENCOUNTER — PATIENT MESSAGE (OUTPATIENT)
Dept: FAMILY MEDICINE | Facility: CLINIC | Age: 83
End: 2023-01-18
Payer: MEDICARE

## 2023-01-18 DIAGNOSIS — J11.1 INFLUENZAL ACUTE UPPER RESPIRATORY INFECTION: Primary | ICD-10-CM

## 2023-01-19 ENCOUNTER — PATIENT MESSAGE (OUTPATIENT)
Dept: FAMILY MEDICINE | Facility: CLINIC | Age: 83
End: 2023-01-19
Payer: MEDICARE

## 2023-01-19 RX ORDER — OSELTAMIVIR PHOSPHATE 75 MG/1
75 CAPSULE ORAL 2 TIMES DAILY
Qty: 10 CAPSULE | Refills: 0 | Status: SHIPPED | OUTPATIENT
Start: 2023-01-19 | End: 2023-01-25

## 2023-01-23 ENCOUNTER — LAB VISIT (OUTPATIENT)
Dept: LAB | Facility: HOSPITAL | Age: 83
End: 2023-01-23
Attending: FAMILY MEDICINE
Payer: MEDICARE

## 2023-01-23 DIAGNOSIS — N18.31 STAGE 3A CHRONIC KIDNEY DISEASE: ICD-10-CM

## 2023-01-23 DIAGNOSIS — I10 ESSENTIAL HYPERTENSION: ICD-10-CM

## 2023-01-23 LAB
ANION GAP SERPL CALC-SCNC: 9 MMOL/L (ref 8–16)
BUN SERPL-MCNC: 13 MG/DL (ref 8–23)
CALCIUM SERPL-MCNC: 9.8 MG/DL (ref 8.7–10.5)
CHLORIDE SERPL-SCNC: 104 MMOL/L (ref 95–110)
CO2 SERPL-SCNC: 26 MMOL/L (ref 23–29)
CREAT SERPL-MCNC: 1.2 MG/DL (ref 0.5–1.4)
EST. GFR  (NO RACE VARIABLE): >60 ML/MIN/1.73 M^2
GLUCOSE SERPL-MCNC: 57 MG/DL (ref 70–110)
POTASSIUM SERPL-SCNC: 4.5 MMOL/L (ref 3.5–5.1)
SODIUM SERPL-SCNC: 139 MMOL/L (ref 136–145)

## 2023-01-23 PROCEDURE — 80048 BASIC METABOLIC PNL TOTAL CA: CPT | Mod: HCNC | Performed by: FAMILY MEDICINE

## 2023-01-23 PROCEDURE — 36415 COLL VENOUS BLD VENIPUNCTURE: CPT | Mod: HCNC,PO | Performed by: FAMILY MEDICINE

## 2023-01-25 ENCOUNTER — LAB VISIT (OUTPATIENT)
Dept: LAB | Facility: HOSPITAL | Age: 83
End: 2023-01-25
Attending: FAMILY MEDICINE
Payer: MEDICARE

## 2023-01-25 ENCOUNTER — OFFICE VISIT (OUTPATIENT)
Dept: FAMILY MEDICINE | Facility: CLINIC | Age: 83
End: 2023-01-25
Payer: MEDICARE

## 2023-01-25 VITALS
OXYGEN SATURATION: 95 % | HEIGHT: 66 IN | BODY MASS INDEX: 25.97 KG/M2 | HEART RATE: 80 BPM | DIASTOLIC BLOOD PRESSURE: 70 MMHG | WEIGHT: 161.63 LBS | TEMPERATURE: 98 F | SYSTOLIC BLOOD PRESSURE: 122 MMHG

## 2023-01-25 DIAGNOSIS — G30.9 ALZHEIMER'S DISEASE, UNSPECIFIED (CODE): ICD-10-CM

## 2023-01-25 DIAGNOSIS — F33.1 MAJOR DEPRESSIVE DISORDER, RECURRENT, MODERATE: ICD-10-CM

## 2023-01-25 DIAGNOSIS — F10.21 ALCOHOL DEPENDENCE, IN REMISSION: ICD-10-CM

## 2023-01-25 DIAGNOSIS — R05.8 POST-VIRAL COUGH SYNDROME: ICD-10-CM

## 2023-01-25 DIAGNOSIS — R79.89 ABNORMAL CBC: ICD-10-CM

## 2023-01-25 DIAGNOSIS — N18.31 STAGE 3A CHRONIC KIDNEY DISEASE: ICD-10-CM

## 2023-01-25 DIAGNOSIS — J44.1 COPD EXACERBATION: ICD-10-CM

## 2023-01-25 DIAGNOSIS — E16.2 HYPOGLYCEMIA: ICD-10-CM

## 2023-01-25 DIAGNOSIS — R53.83 FATIGUE, UNSPECIFIED TYPE: ICD-10-CM

## 2023-01-25 DIAGNOSIS — D64.9 ANEMIA, UNSPECIFIED TYPE: ICD-10-CM

## 2023-01-25 DIAGNOSIS — D69.6 THROMBOCYTOPENIA, UNSPECIFIED: Primary | ICD-10-CM

## 2023-01-25 DIAGNOSIS — F10.20 UNCOMPLICATED ALCOHOL DEPENDENCE: ICD-10-CM

## 2023-01-25 DIAGNOSIS — D69.6 THROMBOCYTOPENIA, UNSPECIFIED: ICD-10-CM

## 2023-01-25 DIAGNOSIS — F10.21 HISTORY OF ALCOHOL DEPENDENCE: ICD-10-CM

## 2023-01-25 DIAGNOSIS — I70.0 AORTIC ATHEROSCLEROSIS: ICD-10-CM

## 2023-01-25 LAB
ALBUMIN SERPL BCP-MCNC: 4.1 G/DL (ref 3.5–5.2)
ALP SERPL-CCNC: 81 U/L (ref 55–135)
ALT SERPL W/O P-5'-P-CCNC: 6 U/L (ref 10–44)
ANION GAP SERPL CALC-SCNC: 12 MMOL/L (ref 8–16)
AST SERPL-CCNC: 17 U/L (ref 10–40)
BASOPHILS # BLD AUTO: 0.02 K/UL (ref 0–0.2)
BASOPHILS NFR BLD: 0.4 % (ref 0–1.9)
BILIRUB SERPL-MCNC: 0.7 MG/DL (ref 0.1–1)
BUN SERPL-MCNC: 18 MG/DL (ref 8–23)
CALCIUM SERPL-MCNC: 10.1 MG/DL (ref 8.7–10.5)
CHLORIDE SERPL-SCNC: 104 MMOL/L (ref 95–110)
CO2 SERPL-SCNC: 25 MMOL/L (ref 23–29)
CREAT SERPL-MCNC: 1.4 MG/DL (ref 0.5–1.4)
DIFFERENTIAL METHOD: ABNORMAL
EOSINOPHIL # BLD AUTO: 0 K/UL (ref 0–0.5)
EOSINOPHIL NFR BLD: 0.2 % (ref 0–8)
ERYTHROCYTE [DISTWIDTH] IN BLOOD BY AUTOMATED COUNT: 15.7 % (ref 11.5–14.5)
EST. GFR  (NO RACE VARIABLE): 50.8 ML/MIN/1.73 M^2
ESTIMATED AVG GLUCOSE: 111 MG/DL (ref 68–131)
GLUCOSE SERPL-MCNC: 70 MG/DL (ref 70–110)
HBA1C MFR BLD: 5.5 % (ref 4–5.6)
HCT VFR BLD AUTO: 53 % (ref 40–54)
HGB BLD-MCNC: 17.2 G/DL (ref 14–18)
IMM GRANULOCYTES # BLD AUTO: 0.02 K/UL (ref 0–0.04)
IMM GRANULOCYTES NFR BLD AUTO: 0.4 % (ref 0–0.5)
IRON SERPL-MCNC: 149 UG/DL (ref 45–160)
LYMPHOCYTES # BLD AUTO: 1.1 K/UL (ref 1–4.8)
LYMPHOCYTES NFR BLD: 20.7 % (ref 18–48)
MCH RBC QN AUTO: 28.6 PG (ref 27–31)
MCHC RBC AUTO-ENTMCNC: 32.5 G/DL (ref 32–36)
MCV RBC AUTO: 88 FL (ref 82–98)
MONOCYTES # BLD AUTO: 1.2 K/UL (ref 0.3–1)
MONOCYTES NFR BLD: 21.5 % (ref 4–15)
NEUTROPHILS # BLD AUTO: 3.1 K/UL (ref 1.8–7.7)
NEUTROPHILS NFR BLD: 56.8 % (ref 38–73)
NRBC BLD-RTO: 0 /100 WBC
PLATELET # BLD AUTO: 187 K/UL (ref 150–450)
PMV BLD AUTO: 11.3 FL (ref 9.2–12.9)
POTASSIUM SERPL-SCNC: 4.6 MMOL/L (ref 3.5–5.1)
PROT SERPL-MCNC: 7.4 G/DL (ref 6–8.4)
RBC # BLD AUTO: 6.01 M/UL (ref 4.6–6.2)
SATURATED IRON: 34 % (ref 20–50)
SODIUM SERPL-SCNC: 141 MMOL/L (ref 136–145)
TOTAL IRON BINDING CAPACITY: 438 UG/DL (ref 250–450)
TRANSFERRIN SERPL-MCNC: 296 MG/DL (ref 200–375)
WBC # BLD AUTO: 5.4 K/UL (ref 3.9–12.7)

## 2023-01-25 PROCEDURE — 82607 VITAMIN B-12: CPT | Mod: HCNC | Performed by: FAMILY MEDICINE

## 2023-01-25 PROCEDURE — 3078F DIAST BP <80 MM HG: CPT | Mod: HCNC,CPTII,S$GLB, | Performed by: FAMILY MEDICINE

## 2023-01-25 PROCEDURE — 84443 ASSAY THYROID STIM HORMONE: CPT | Mod: HCNC | Performed by: FAMILY MEDICINE

## 2023-01-25 PROCEDURE — 1159F MED LIST DOCD IN RCRD: CPT | Mod: HCNC,CPTII,S$GLB, | Performed by: FAMILY MEDICINE

## 2023-01-25 PROCEDURE — 82746 ASSAY OF FOLIC ACID SERUM: CPT | Mod: HCNC | Performed by: FAMILY MEDICINE

## 2023-01-25 PROCEDURE — 1160F RVW MEDS BY RX/DR IN RCRD: CPT | Mod: HCNC,CPTII,S$GLB, | Performed by: FAMILY MEDICINE

## 2023-01-25 PROCEDURE — 3074F PR MOST RECENT SYSTOLIC BLOOD PRESSURE < 130 MM HG: ICD-10-PCS | Mod: HCNC,CPTII,S$GLB, | Performed by: FAMILY MEDICINE

## 2023-01-25 PROCEDURE — 3078F PR MOST RECENT DIASTOLIC BLOOD PRESSURE < 80 MM HG: ICD-10-PCS | Mod: HCNC,CPTII,S$GLB, | Performed by: FAMILY MEDICINE

## 2023-01-25 PROCEDURE — 84466 ASSAY OF TRANSFERRIN: CPT | Mod: HCNC | Performed by: FAMILY MEDICINE

## 2023-01-25 PROCEDURE — 99999 PR PBB SHADOW E&M-EST. PATIENT-LVL IV: CPT | Mod: PBBFAC,HCNC,, | Performed by: FAMILY MEDICINE

## 2023-01-25 PROCEDURE — 3288F PR FALLS RISK ASSESSMENT DOCUMENTED: ICD-10-PCS | Mod: HCNC,CPTII,S$GLB, | Performed by: FAMILY MEDICINE

## 2023-01-25 PROCEDURE — 84681 ASSAY OF C-PEPTIDE: CPT | Mod: HCNC | Performed by: FAMILY MEDICINE

## 2023-01-25 PROCEDURE — 3074F SYST BP LT 130 MM HG: CPT | Mod: HCNC,CPTII,S$GLB, | Performed by: FAMILY MEDICINE

## 2023-01-25 PROCEDURE — 1101F PT FALLS ASSESS-DOCD LE1/YR: CPT | Mod: HCNC,CPTII,S$GLB, | Performed by: FAMILY MEDICINE

## 2023-01-25 PROCEDURE — 99214 PR OFFICE/OUTPT VISIT, EST, LEVL IV, 30-39 MIN: ICD-10-PCS | Mod: HCNC,S$GLB,, | Performed by: FAMILY MEDICINE

## 2023-01-25 PROCEDURE — 99214 OFFICE O/P EST MOD 30 MIN: CPT | Mod: HCNC,S$GLB,, | Performed by: FAMILY MEDICINE

## 2023-01-25 PROCEDURE — 1159F PR MEDICATION LIST DOCUMENTED IN MEDICAL RECORD: ICD-10-PCS | Mod: HCNC,CPTII,S$GLB, | Performed by: FAMILY MEDICINE

## 2023-01-25 PROCEDURE — 84206 ASSAY OF PROINSULIN: CPT | Mod: HCNC | Performed by: FAMILY MEDICINE

## 2023-01-25 PROCEDURE — 1126F PR PAIN SEVERITY QUANTIFIED, NO PAIN PRESENT: ICD-10-PCS | Mod: HCNC,CPTII,S$GLB, | Performed by: FAMILY MEDICINE

## 2023-01-25 PROCEDURE — 1160F PR REVIEW ALL MEDS BY PRESCRIBER/CLIN PHARMACIST DOCUMENTED: ICD-10-PCS | Mod: HCNC,CPTII,S$GLB, | Performed by: FAMILY MEDICINE

## 2023-01-25 PROCEDURE — 83036 HEMOGLOBIN GLYCOSYLATED A1C: CPT | Mod: HCNC | Performed by: FAMILY MEDICINE

## 2023-01-25 PROCEDURE — 82728 ASSAY OF FERRITIN: CPT | Mod: HCNC | Performed by: FAMILY MEDICINE

## 2023-01-25 PROCEDURE — 85025 COMPLETE CBC W/AUTO DIFF WBC: CPT | Mod: HCNC | Performed by: FAMILY MEDICINE

## 2023-01-25 PROCEDURE — 99499 RISK ADDL DX/OHS AUDIT: ICD-10-PCS | Mod: S$GLB,,, | Performed by: FAMILY MEDICINE

## 2023-01-25 PROCEDURE — 1126F AMNT PAIN NOTED NONE PRSNT: CPT | Mod: HCNC,CPTII,S$GLB, | Performed by: FAMILY MEDICINE

## 2023-01-25 PROCEDURE — 36415 COLL VENOUS BLD VENIPUNCTURE: CPT | Mod: HCNC,PO | Performed by: FAMILY MEDICINE

## 2023-01-25 PROCEDURE — 99499 UNLISTED E&M SERVICE: CPT | Mod: S$GLB,,, | Performed by: FAMILY MEDICINE

## 2023-01-25 PROCEDURE — 80053 COMPREHEN METABOLIC PANEL: CPT | Mod: HCNC | Performed by: FAMILY MEDICINE

## 2023-01-25 PROCEDURE — 83525 ASSAY OF INSULIN: CPT | Mod: HCNC | Performed by: FAMILY MEDICINE

## 2023-01-25 PROCEDURE — 3288F FALL RISK ASSESSMENT DOCD: CPT | Mod: HCNC,CPTII,S$GLB, | Performed by: FAMILY MEDICINE

## 2023-01-25 PROCEDURE — 99999 PR PBB SHADOW E&M-EST. PATIENT-LVL IV: ICD-10-PCS | Mod: PBBFAC,HCNC,, | Performed by: FAMILY MEDICINE

## 2023-01-25 PROCEDURE — 1101F PR PT FALLS ASSESS DOC 0-1 FALLS W/OUT INJ PAST YR: ICD-10-PCS | Mod: HCNC,CPTII,S$GLB, | Performed by: FAMILY MEDICINE

## 2023-01-25 RX ORDER — PROMETHAZINE HYDROCHLORIDE AND DEXTROMETHORPHAN HYDROBROMIDE 6.25; 15 MG/5ML; MG/5ML
SYRUP ORAL
Qty: 180 ML | Refills: 0 | Status: SHIPPED | OUTPATIENT
Start: 2023-01-25 | End: 2023-03-02

## 2023-01-25 RX ORDER — BENZONATATE 100 MG/1
100 CAPSULE ORAL 3 TIMES DAILY PRN
Qty: 30 CAPSULE | Refills: 0 | Status: SHIPPED | OUTPATIENT
Start: 2023-01-25 | End: 2023-02-04

## 2023-01-25 RX ORDER — ALBUTEROL SULFATE 90 UG/1
2 AEROSOL, METERED RESPIRATORY (INHALATION) EVERY 4 HOURS PRN
Qty: 18 G | Refills: 0 | Status: SHIPPED | OUTPATIENT
Start: 2023-01-25

## 2023-01-25 RX ORDER — FLUTICASONE PROPIONATE 50 MCG
1 SPRAY, SUSPENSION (ML) NASAL DAILY
Qty: 15.8 ML | Refills: 0 | Status: SHIPPED | OUTPATIENT
Start: 2023-01-25 | End: 2023-05-11 | Stop reason: ALTCHOICE

## 2023-01-25 NOTE — PROGRESS NOTES
"Subjective:       Patient ID: Navin Henning Jr. is a 82 y.o. male.    Chief Complaint: Cough, Fatigue, and Diarrhea        Navin Henning Jr. is a 82 y.o. male who presents today for an annual exam. Normally followed by Dr. Barrett.     He has the flu during his normally scheduled f/u.   Has a history of COPD.   He had the flu 9 days ago, he thinks. Wife was positive. He has similar symptoms. He is currently taking mucinex, albuterol 3x/day and cough syrup and claritin. He doesn't think any of this is helping. He feels fatigued from this. No fevers. He completed XODIS kwan. He does report cough is improving, but slowly. Doesn't feel like his previous COPD exacerbation.     Memory problem: seeing outside neurology. Starting a trial.   Insomnia: on fluoxetine, from neurology.     Reports episodes of hypoglycemia. This occurs when he gets sick. Wife, who is a nurse, reports checking sugars and they are in the 50's and 60's. He eats something, and then this improves.  Most recent episode was 1/23, had labs that day, had eaten, yet glucose was still low.     Has been feeling fatigued. Run down. "In the bed." Not sleeping well at night.     PMHx: reviewed in EMR and updated  Meds: reviewed in EMR and updated  Shx: reviewed in EMR and updated  FMHx: no family history of colon cancer, breast cancer, ovarian cancer    Review of Systems   Constitutional:  Positive for fatigue. Negative for chills and fever.   Respiratory:  Positive for cough. Negative for shortness of breath.    Cardiovascular:  Negative for chest pain.   Gastrointestinal:  Negative for diarrhea, nausea and vomiting.   Neurological:  Negative for dizziness, light-headedness and headaches.       Health Maintenance Due   Topic Date Due    TETANUS VACCINE  07/26/2021    COVID-19 Vaccine (4 - Booster for Pfizer series) 12/14/2021     Immunization History   Administered Date(s) Administered    COVID-19, MRNA, LN-S PF (Pfizer) (Purple Cap) 01/06/2021, " "01/27/2021, 10/19/2021    Dtap, Unspecified Formulation 07/26/2011    Influenza 01/31/2012, 01/01/2013, 09/01/2013, 10/03/2013, 10/07/2014, 04/11/2016    Influenza (FLUAD) - Quadrivalent - Adjuvanted - PF *Preferred* (65+) 11/30/2021    Influenza - High Dose - PF (65 years and older) 10/05/2020    Influenza - Quadrivalent 11/10/2016, 11/08/2017    Influenza - Quadrivalent - High Dose - PF (65 years and older) 10/05/2020    Influenza - Trivalent (ADULT) 11/07/2018    Pneumococcal 02/01/2009    Pneumococcal Conjugate - 13 Valent 04/14/2015    Pneumococcal Polysaccharide - 23 Valent 03/03/2003, 07/11/2022    Tdap 07/26/2011    Zoster 05/03/2003, 01/01/2013, 03/01/2013    Zoster Recombinant 03/02/2020, 04/25/2022         Objective:     Vitals:    01/25/23 1536   BP: 122/70   BP Location: Right arm   Patient Position: Sitting   BP Method: Large (Manual)   Pulse: 80   Temp: 98.1 °F (36.7 °C)   TempSrc: Axillary   SpO2: 95%   Weight: 73.3 kg (161 lb 9.6 oz)   Height: 5' 6" (1.676 m)        Physical Exam  Constitutional:       General: He is not in acute distress.     Appearance: He is not ill-appearing, toxic-appearing or diaphoretic.   HENT:      Nose: Congestion present. No rhinorrhea.      Mouth/Throat:      Pharynx: No oropharyngeal exudate or posterior oropharyngeal erythema.   Cardiovascular:      Rate and Rhythm: Normal rate and regular rhythm.   Pulmonary:      Effort: Pulmonary effort is normal. No respiratory distress.      Breath sounds: Normal breath sounds. No wheezing or rales.   Abdominal:      Palpations: Abdomen is soft.      Tenderness: There is no abdominal tenderness.   Neurological:      General: No focal deficit present.      Mental Status: He is alert.   Psychiatric:         Mood and Affect: Mood normal.         Behavior: Behavior normal.         Thought Content: Thought content normal.         Judgment: Judgment normal.       Assessment:       1. Thrombocytopenia, unspecified    2. Alcohol " dependence, in remission    3. Aortic atherosclerosis    4. Alzheimer's disease, unspecified (CODE)    5. Major depressive disorder, recurrent, moderate    6. COPD exacerbation    7. Stage 3a chronic kidney disease    8. Fatigue, unspecified type    9. Hypoglycemia    10. Abnormal CBC    11. Anemia, unspecified type    12. History of alcohol dependence    13. Post-viral cough syndrome        Plan:       Whipple's triad??? hypoglycemia, symptoms, and improves with food. Refer to endocrine. Will order labs.     Cough: likely post viral. Supportive care. Slowly improving. Consider CXR if still persisting.     F/u with PCP    Labs today.     Thrombocytopenia, unspecified  -     CBC Auto Differential; Future; Expected date: 01/25/2023  -     Iron and TIBC; Future; Expected date: 01/25/2023  -     Ferritin; Future; Expected date: 01/25/2023  -     Vitamin B12; Future; Expected date: 01/25/2023  -     FOLATE; Future; Expected date: 01/25/2023    Alcohol dependence, in remission    Aortic atherosclerosis    Alzheimer's disease, unspecified (CODE)    Major depressive disorder, recurrent, moderate  -     TSH; Future; Expected date: 01/25/2023    COPD exacerbation    Stage 3a chronic kidney disease  -     Comprehensive Metabolic Panel; Future; Expected date: 01/25/2023    Fatigue, unspecified type  -     CBC Auto Differential; Future; Expected date: 01/25/2023  -     Iron and TIBC; Future; Expected date: 01/25/2023  -     Ferritin; Future; Expected date: 01/25/2023  -     Comprehensive Metabolic Panel; Future; Expected date: 01/25/2023  -     TSH; Future; Expected date: 01/25/2023    Hypoglycemia  -     Insulin, random; Future; Expected date: 01/25/2023  -     C-PEPTIDE; Future; Expected date: 01/25/2023  -     BETA - HYDROXYBUTYRATE, SERUM; Future; Expected date: 01/25/2023  -     PROINSULIN; Future; Expected date: 01/25/2023  -     Ambulatory referral/consult to Endocrinology; Future; Expected date: 02/01/2023  -     Vitamin  B12; Future; Expected date: 01/25/2023  -     FOLATE; Future; Expected date: 01/25/2023    Abnormal CBC  -     Hemoglobin A1C; Future; Expected date: 01/25/2023  -     Vitamin B12; Future; Expected date: 01/25/2023  -     FOLATE; Future; Expected date: 01/25/2023    Anemia, unspecified type  -     Iron and TIBC; Future; Expected date: 01/25/2023  -     Ferritin; Future; Expected date: 01/25/2023  -     Vitamin B12; Future; Expected date: 01/25/2023  -     FOLATE; Future; Expected date: 01/25/2023    History of alcohol dependence  -     Vitamin B12; Future; Expected date: 01/25/2023  -     FOLATE; Future; Expected date: 01/25/2023    Post-viral cough syndrome  -     promethazine-dextromethorphan (PROMETHAZINE-DM) 6.25-15 mg/5 mL Syrp; Take one tsp po q 6 hrs prn cough  Dispense: 180 mL; Refill: 0  -     fluticasone propionate (FLONASE) 50 mcg/actuation nasal spray; 1 spray (50 mcg total) by Each Nostril route once daily.  Dispense: 15.8 mL; Refill: 0  -     benzonatate (TESSALON) 100 MG capsule; Take 1 capsule (100 mg total) by mouth 3 (three) times daily as needed.  Dispense: 30 capsule; Refill: 0  -     albuterol (VENTOLIN HFA) 90 mcg/actuation inhaler; Inhale 2 puffs into the lungs every 4 (four) hours as needed for Wheezing or Shortness of Breath. Rescue  Dispense: 18 g; Refill: 0

## 2023-01-26 LAB
C PEPTIDE SERPL-MCNC: 4.5 NG/ML (ref 0.78–5.19)
FERRITIN SERPL-MCNC: 86 NG/ML (ref 20–300)
FOLATE SERPL-MCNC: 39.8 NG/ML (ref 4–24)
INSULIN COLLECTION INTERVAL: 7
INSULIN SERPL-ACNC: 8.3 UU/ML
TSH SERPL DL<=0.005 MIU/L-ACNC: 0.66 UIU/ML (ref 0.4–4)
VIT B12 SERPL-MCNC: 1913 PG/ML (ref 210–950)

## 2023-01-30 NOTE — PROGRESS NOTES
"Subjective:      Patient ID: Navin Henning Jr. is a 82 y.o. male.    Chief Complaint:  No chief complaint on file.    History of Present Illness  Navin Henning Jr. With history of hypertension, high triglycerides, COPD, impaired fasting glucose, overweight, testicular hypofunction, insomnia, memory loss, alcohol dependence in remission, and aortic atherosclerosis who presents today for initial evaluation and management of hypoglycemia. This is his first visit with me.     The patient location is: at home  The chief complaint leading to consultation is: hypoglycemia  Visit type: audiovisual    Face to Face time with patient: 20  45 minutes of total time spent on the encounter, which includes face to face time and non-face to face time preparing to see the patient (eg, review of tests), Obtaining and/or reviewing separately obtained history, Documenting clinical information in the electronic or other health record, Independently interpreting results (not separately reported) and communicating results to the patient/family/caregiver, or Care coordination (not separately reported).    Each patient to whom he or she provides medical services by telemedicine is:  (1) informed of the relationship between the physician and patient and the respective role of any other health care provider with respect to management of the patient; and (2) notified that he or she may decline to receive medical services by telemedicine and may withdraw from such care at any time.    He is accompanied by his wife.    With regards to hypoglycemia:    Symptoms first started "a few years" Wife states he gets shaky and sweaty and weak. Wife checks blood sugar and states low around 60's. States he eats cheese stick and some juice and he feels better in about 15-20 minutes  Usually occurs in the morning. Sometimes after a meal.     Lab was after breakfast that was mostly sweets.    Did not need assistance     Context of events:  Fasting " or postprandial   Meals that precipitate symptoms - sweets tend to bring on these symptoms.     Has not seen nutrition    No history of bariatric surgery  Max weight: 161 and has been this weight for several years     Wife states around 10 years ago his A1c was elevated. He was put on Metformin one tab daily and A1c reduced and he was taken off of medication. Has history of impaired glucose tolerance.    Denies symptoms of hyperglycemia such as polyuria, polydipsia, or blurred vision    Gets up to go to the bathroom     No new medications     Lab Results   Component Value Date    HGBA1C 5.5 01/25/2023      Latest Reference Range & Units 01/25/23 16:52   Hemoglobin A1C External 4.0 - 5.6 % 5.5   Estimated Avg Glucose 68 - 131 mg/dL 111   C-Peptide 0.78 - 5.19 ng/mL 4.50   Insulin <25.0 uU/mL 8.3   Insulin Collection Interval  7.0   Proinsulin 3.6 - 22 pmol/L 24 (H)   (H): Data is abnormally high  Review of Systems  As above  Lab Review:   Lab Results   Component Value Date    HGBA1C 5.5 01/25/2023    HGBA1C 5.4 07/12/2022      Lab Results   Component Value Date    CHOL 199 07/12/2022    HDL 40 07/12/2022    LDLCALC 128.2 07/12/2022    TRIG 154 (H) 07/12/2022    CHOLHDL 20.1 07/12/2022     Lab Results   Component Value Date     01/25/2023    K 4.6 01/25/2023     01/25/2023    CO2 25 01/25/2023    GLU 70 01/25/2023    BUN 18 01/25/2023    CREATININE 1.4 01/25/2023    CALCIUM 10.1 01/25/2023    PROT 7.4 01/25/2023    ALBUMIN 4.1 01/25/2023    BILITOT 0.7 01/25/2023    ALKPHOS 81 01/25/2023    AST 17 01/25/2023    ALT 6 (L) 01/25/2023    ANIONGAP 12 01/25/2023    ESTGFRAFRICA >60.0 07/12/2022    EGFRNONAA >60.0 07/12/2022    TSH 0.662 01/25/2023     No results found for: [id number]  Assessment and Plan     1. Hypoglycemia  Ambulatory referral/consult to Endocrinology    GLUCOSE MONITORING CONTINUOUS MIN 72 HOURS    Basic Metabolic Panel    Beta-Hydroxybutyrate, Serum    PROINSULIN    Insulin, random      2.  Impaired fasting glucose          Hypoglycemia  Glucose of 50's sporadically on labs since 2019.   Symptoms of hypoglycemia, fasting and postprandial, that resolve with eating  Will check CGMS   Will order BMP, betahydroxybutyate, proinuslin and insulin with instruction to go to lab if he experiences symptoms  Consider observed fast or mixed meal test in future  Encouraged to have protein with carbohydrates     Impaired fasting glucose  Has history of elevated A1c and was on metformin in the past.  A1c at goal      Follow up in about 6 months (around 8/3/2023).

## 2023-01-31 LAB — PROINSULIN SERPL-SCNC: 24 PMOL/L (ref 3.6–22)

## 2023-02-03 ENCOUNTER — OFFICE VISIT (OUTPATIENT)
Dept: ENDOCRINOLOGY | Facility: CLINIC | Age: 83
End: 2023-02-03
Payer: MEDICARE

## 2023-02-03 ENCOUNTER — PATIENT MESSAGE (OUTPATIENT)
Dept: ENDOCRINOLOGY | Facility: CLINIC | Age: 83
End: 2023-02-03

## 2023-02-03 DIAGNOSIS — E16.2 HYPOGLYCEMIA: ICD-10-CM

## 2023-02-03 DIAGNOSIS — R73.01 IMPAIRED FASTING GLUCOSE: ICD-10-CM

## 2023-02-03 PROCEDURE — 1159F PR MEDICATION LIST DOCUMENTED IN MEDICAL RECORD: ICD-10-PCS | Mod: HCNC,CPTII,95, | Performed by: NURSE PRACTITIONER

## 2023-02-03 PROCEDURE — 99204 PR OFFICE/OUTPT VISIT, NEW, LEVL IV, 45-59 MIN: ICD-10-PCS | Mod: HCNC,95,, | Performed by: NURSE PRACTITIONER

## 2023-02-03 PROCEDURE — 1160F PR REVIEW ALL MEDS BY PRESCRIBER/CLIN PHARMACIST DOCUMENTED: ICD-10-PCS | Mod: HCNC,CPTII,95, | Performed by: NURSE PRACTITIONER

## 2023-02-03 PROCEDURE — 1159F MED LIST DOCD IN RCRD: CPT | Mod: HCNC,CPTII,95, | Performed by: NURSE PRACTITIONER

## 2023-02-03 PROCEDURE — 99204 OFFICE O/P NEW MOD 45 MIN: CPT | Mod: HCNC,95,, | Performed by: NURSE PRACTITIONER

## 2023-02-03 PROCEDURE — 1160F RVW MEDS BY RX/DR IN RCRD: CPT | Mod: HCNC,CPTII,95, | Performed by: NURSE PRACTITIONER

## 2023-02-03 NOTE — ASSESSMENT & PLAN NOTE
Glucose of 50's sporadically on labs since 2019.   Symptoms of hypoglycemia, fasting and postprandial, that resolve with eating  Will check CGMS   Will order BMP, betahydroxybutyate, proinuslin and insulin with instruction to go to lab if he experiences symptoms  Consider observed fast or mixed meal test in future  Encouraged to have protein with carbohydrates

## 2023-02-03 NOTE — PATIENT INSTRUCTIONS
Hypoglycemia  Glucose of 50's sporadically on labs since 2019.   Symptoms of hypoglycemia, fasting and postprandial, that resolve with eating  Will check CGMS -continuous glucose monitor  Will order BMP, betahydroxybutyate, proinuslin and insulin with instruction to go to lab if he experiences symptoms  Consider observed fast or mixed meal test in future  Encouraged to have protein with carbohydrates     UNDERSTANDING CONTINUOUS GLUCOSE MONITORING     What is continuous glucose monitoring?   Continuous glucose monitoring system (CGMS) is a method used to record your blood sugar levels over a period of time (usually 5 days). Your home blood glucose monitoring is very helpful, but only measures blood glucose levels at various points in time and can miss dangerous high and low patterns, especially during the night while you sleep. Continuous glucose monitoring provides a continuous 24-hour measurement of your blood glucose levels.     Who benefits from continuous glucose monitoring?   ? People who experience dangerous high and low blood glucose readings.   ? People who suffer from hypoglycemia unawareness and cannot feel when their blood glucose is dropping.   ? People who desire better blood glucose control.   ? People with elevated A1C levels.     How does continuous glucose monitoring work?   A glucose sensor is inserted on the top of your skin on the back of your arm. We do not leave a needle under your skin. The blood glucose sensor measures your blood sugar level every 15 minutes, 24 hours a day. At the end of the 5 days, the CGM Sensor is then downloaded and reviewed so your healthcare provider can see your blood sugar trends and patterns.     What are your responsibilities to ensure successful testing?   It is recommended that you monitor your blood readings as directed by your healthcare provider.  It is vital that you document the correct times of your medications, meals, snacks, blood sugar readings, and any  exercise.     How do you prepare for the test?   There is nothing you need to do to prepare for the test. You do not need to fast (restrict food intake) the night before the test.     Revised: 01/2017    © 2015 [facility] ([email or web address]) is a non-profit, academic, multi-specialty, healthcare delivery system dedicated to patient care, research and education.

## 2023-02-06 ENCOUNTER — TELEPHONE (OUTPATIENT)
Dept: ENDOCRINOLOGY | Facility: CLINIC | Age: 83
End: 2023-02-06
Payer: MEDICARE

## 2023-02-07 DIAGNOSIS — Z00.00 ENCOUNTER FOR MEDICARE ANNUAL WELLNESS EXAM: ICD-10-CM

## 2023-02-09 DIAGNOSIS — Z00.00 ENCOUNTER FOR MEDICARE ANNUAL WELLNESS EXAM: ICD-10-CM

## 2023-02-10 ENCOUNTER — PATIENT MESSAGE (OUTPATIENT)
Dept: FAMILY MEDICINE | Facility: CLINIC | Age: 83
End: 2023-02-10
Payer: MEDICARE

## 2023-02-10 DIAGNOSIS — R05.9 COUGH, UNSPECIFIED TYPE: Primary | ICD-10-CM

## 2023-02-13 ENCOUNTER — HOSPITAL ENCOUNTER (OUTPATIENT)
Dept: RADIOLOGY | Facility: HOSPITAL | Age: 83
Discharge: HOME | End: 2023-02-13
Attending: FAMILY MEDICINE
Payer: MEDICARE

## 2023-02-13 ENCOUNTER — PATIENT MESSAGE (OUTPATIENT)
Dept: FAMILY MEDICINE | Facility: CLINIC | Age: 83
End: 2023-02-13
Payer: MEDICARE

## 2023-02-13 DIAGNOSIS — R05.9 COUGH, UNSPECIFIED TYPE: ICD-10-CM

## 2023-02-13 PROCEDURE — 71046 XR CHEST PA AND LATERAL: ICD-10-PCS | Mod: 26,HCNC,, | Performed by: RADIOLOGY

## 2023-02-13 PROCEDURE — 71046 X-RAY EXAM CHEST 2 VIEWS: CPT | Mod: TC,HCNC,FY,PO

## 2023-02-13 PROCEDURE — 71046 X-RAY EXAM CHEST 2 VIEWS: CPT | Mod: 26,HCNC,, | Performed by: RADIOLOGY

## 2023-02-14 ENCOUNTER — CLINICAL SUPPORT (OUTPATIENT)
Dept: ENDOCRINOLOGY | Facility: CLINIC | Age: 83
End: 2023-02-14
Payer: MEDICARE

## 2023-02-14 DIAGNOSIS — E16.2 HYPOGLYCEMIA: ICD-10-CM

## 2023-02-14 NOTE — PROGRESS NOTES
"PLACEMENT OF DEXCOM G6 PRO SENSOR  CONTINOUS GLUCOSE MONITORING SYSTEM (CGMS)     Patient is here in clinic today for placement of continuous glucose monitoring sensor.                Each patient verified that they were here for CGMS procedure ordered by their provider and that they have a working glucose meter and supplies at home.   Patient will be provided with a Dexcom G6 Pro sensor, transmitter, and a copy of the Continuous Glucose Monitoring Patient Log to fill out during the study.              A detailed  explanation of Continuous Glucose Monitoring was  provided. Patient informed that this is a blind procedure and that they will not actually see the blood sugar tracing in real time.    Instructed patient to check blood sugar using home glucometer and to record the following on provided patient log sheets:Blood sugar taken at home, Meals and snacks, Activity, and Diabetes medications taken and dosage               Patient was brought to a private location.  Site selected and prepared and allowed to dry. Glucose Transmitter Serial Number [id number]  was inserted to patient's abdomen.               The following forms  were given and reviewed in detail with patient and all questions answered.   Continuous Glucose Monitoring Patient Log   Dexcom G6 PRO Patient Handout "Blinded CGM Patient Handout"                 Instructions: Time: 15 min   Insertion of sensor:  Time: 5 minutes      "

## 2023-02-20 ENCOUNTER — CLINICAL SUPPORT (OUTPATIENT)
Dept: ENDOCRINOLOGY | Facility: CLINIC | Age: 83
End: 2023-02-20
Payer: MEDICARE

## 2023-02-20 DIAGNOSIS — E16.2 HYPOGLYCEMIA: Primary | ICD-10-CM

## 2023-02-20 PROCEDURE — 95250 CONT GLUC MNTR PHYS/QHP EQP: CPT | Mod: HCNC,S$GLB,, | Performed by: NURSE PRACTITIONER

## 2023-02-20 PROCEDURE — 95250 PR GLUCOSE MONITORING,72 HRS,SUB-Q SENSOR: ICD-10-PCS | Mod: HCNC,S$GLB,, | Performed by: NURSE PRACTITIONER

## 2023-02-20 PROCEDURE — 95251 CONT GLUC MNTR ANALYSIS I&R: CPT | Mod: HCNC,S$GLB,, | Performed by: NURSE PRACTITIONER

## 2023-02-20 PROCEDURE — 95251 PR GLUCOSE MONITOR, 72 HOUR, PHYS INTERP: ICD-10-PCS | Mod: HCNC,S$GLB,, | Performed by: NURSE PRACTITIONER

## 2023-02-20 NOTE — PROGRESS NOTES
Return of the Dexcom G6 Pro Sensor and Patient Log.     Patient returned to clinic today to return Glucose Sensor and signed patient log form used in CGMS procedure.     The CGMS Sensor will be scanned and downloaded. All reports will be imported into the patient's electronic medical record.     Endocrine Provider will complete data interpretation and make recommendations; will forward recommendations to the ordering provider for follow up with patient.       normal... Well appearing, well nourished, awake, alert, oriented to person, place, time/situation and in no apparent distress.

## 2023-02-28 ENCOUNTER — PATIENT MESSAGE (OUTPATIENT)
Dept: FAMILY MEDICINE | Facility: CLINIC | Age: 83
End: 2023-02-28
Payer: MEDICARE

## 2023-02-28 ENCOUNTER — PATIENT MESSAGE (OUTPATIENT)
Dept: ENDOCRINOLOGY | Facility: CLINIC | Age: 83
End: 2023-02-28
Payer: MEDICARE

## 2023-03-02 ENCOUNTER — OFFICE VISIT (OUTPATIENT)
Dept: FAMILY MEDICINE | Facility: CLINIC | Age: 83
End: 2023-03-02
Payer: MEDICARE

## 2023-03-02 ENCOUNTER — LAB VISIT (OUTPATIENT)
Dept: LAB | Facility: HOSPITAL | Age: 83
End: 2023-03-02
Attending: FAMILY MEDICINE
Payer: MEDICARE

## 2023-03-02 VITALS
HEART RATE: 81 BPM | DIASTOLIC BLOOD PRESSURE: 61 MMHG | BODY MASS INDEX: 26.26 KG/M2 | SYSTOLIC BLOOD PRESSURE: 119 MMHG | WEIGHT: 163.38 LBS | HEIGHT: 66 IN | OXYGEN SATURATION: 95 %

## 2023-03-02 DIAGNOSIS — R04.2 COUGH WITH HEMOPTYSIS: ICD-10-CM

## 2023-03-02 DIAGNOSIS — K21.9 GASTROESOPHAGEAL REFLUX DISEASE WITHOUT ESOPHAGITIS: ICD-10-CM

## 2023-03-02 DIAGNOSIS — R61 UNEXPLAINED NIGHT SWEATS: ICD-10-CM

## 2023-03-02 DIAGNOSIS — R04.2 COUGH WITH HEMOPTYSIS: Primary | ICD-10-CM

## 2023-03-02 DIAGNOSIS — R05.3 CHRONIC COUGH: ICD-10-CM

## 2023-03-02 DIAGNOSIS — J45.30 MILD PERSISTENT ASTHMA WITHOUT COMPLICATION: ICD-10-CM

## 2023-03-02 DIAGNOSIS — J30.9 ALLERGIC RHINITIS, UNSPECIFIED SEASONALITY, UNSPECIFIED TRIGGER: ICD-10-CM

## 2023-03-02 DIAGNOSIS — R06.01 ORTHOPNEA: ICD-10-CM

## 2023-03-02 LAB — BNP SERPL-MCNC: 34 PG/ML (ref 0–99)

## 2023-03-02 PROCEDURE — 99999 PR PBB SHADOW E&M-EST. PATIENT-LVL IV: ICD-10-PCS | Mod: PBBFAC,HCNC,, | Performed by: FAMILY MEDICINE

## 2023-03-02 PROCEDURE — 1159F PR MEDICATION LIST DOCUMENTED IN MEDICAL RECORD: ICD-10-PCS | Mod: HCNC,CPTII,S$GLB, | Performed by: FAMILY MEDICINE

## 2023-03-02 PROCEDURE — 1126F AMNT PAIN NOTED NONE PRSNT: CPT | Mod: HCNC,CPTII,S$GLB, | Performed by: FAMILY MEDICINE

## 2023-03-02 PROCEDURE — 99999 PR PBB SHADOW E&M-EST. PATIENT-LVL IV: CPT | Mod: PBBFAC,HCNC,, | Performed by: FAMILY MEDICINE

## 2023-03-02 PROCEDURE — 1160F RVW MEDS BY RX/DR IN RCRD: CPT | Mod: HCNC,CPTII,S$GLB, | Performed by: FAMILY MEDICINE

## 2023-03-02 PROCEDURE — 86480 TB TEST CELL IMMUN MEASURE: CPT | Mod: HCNC | Performed by: FAMILY MEDICINE

## 2023-03-02 PROCEDURE — 3288F FALL RISK ASSESSMENT DOCD: CPT | Mod: HCNC,CPTII,S$GLB, | Performed by: FAMILY MEDICINE

## 2023-03-02 PROCEDURE — 1101F PR PT FALLS ASSESS DOC 0-1 FALLS W/OUT INJ PAST YR: ICD-10-PCS | Mod: HCNC,CPTII,S$GLB, | Performed by: FAMILY MEDICINE

## 2023-03-02 PROCEDURE — 36415 COLL VENOUS BLD VENIPUNCTURE: CPT | Mod: HCNC,PO | Performed by: FAMILY MEDICINE

## 2023-03-02 PROCEDURE — 1126F PR PAIN SEVERITY QUANTIFIED, NO PAIN PRESENT: ICD-10-PCS | Mod: HCNC,CPTII,S$GLB, | Performed by: FAMILY MEDICINE

## 2023-03-02 PROCEDURE — 3074F SYST BP LT 130 MM HG: CPT | Mod: HCNC,CPTII,S$GLB, | Performed by: FAMILY MEDICINE

## 2023-03-02 PROCEDURE — 99213 PR OFFICE/OUTPT VISIT, EST, LEVL III, 20-29 MIN: ICD-10-PCS | Mod: HCNC,S$GLB,, | Performed by: FAMILY MEDICINE

## 2023-03-02 PROCEDURE — 83880 ASSAY OF NATRIURETIC PEPTIDE: CPT | Mod: HCNC | Performed by: FAMILY MEDICINE

## 2023-03-02 PROCEDURE — 1159F MED LIST DOCD IN RCRD: CPT | Mod: HCNC,CPTII,S$GLB, | Performed by: FAMILY MEDICINE

## 2023-03-02 PROCEDURE — 3078F PR MOST RECENT DIASTOLIC BLOOD PRESSURE < 80 MM HG: ICD-10-PCS | Mod: HCNC,CPTII,S$GLB, | Performed by: FAMILY MEDICINE

## 2023-03-02 PROCEDURE — 1160F PR REVIEW ALL MEDS BY PRESCRIBER/CLIN PHARMACIST DOCUMENTED: ICD-10-PCS | Mod: HCNC,CPTII,S$GLB, | Performed by: FAMILY MEDICINE

## 2023-03-02 PROCEDURE — 3074F PR MOST RECENT SYSTOLIC BLOOD PRESSURE < 130 MM HG: ICD-10-PCS | Mod: HCNC,CPTII,S$GLB, | Performed by: FAMILY MEDICINE

## 2023-03-02 PROCEDURE — 99213 OFFICE O/P EST LOW 20 MIN: CPT | Mod: HCNC,S$GLB,, | Performed by: FAMILY MEDICINE

## 2023-03-02 PROCEDURE — 3288F PR FALLS RISK ASSESSMENT DOCUMENTED: ICD-10-PCS | Mod: HCNC,CPTII,S$GLB, | Performed by: FAMILY MEDICINE

## 2023-03-02 PROCEDURE — 3078F DIAST BP <80 MM HG: CPT | Mod: HCNC,CPTII,S$GLB, | Performed by: FAMILY MEDICINE

## 2023-03-02 PROCEDURE — 1101F PT FALLS ASSESS-DOCD LE1/YR: CPT | Mod: HCNC,CPTII,S$GLB, | Performed by: FAMILY MEDICINE

## 2023-03-02 RX ORDER — FAMOTIDINE 20 MG/1
20 TABLET, FILM COATED ORAL NIGHTLY PRN
Qty: 90 TABLET | Refills: 3 | Status: SHIPPED | OUTPATIENT
Start: 2023-03-02 | End: 2024-03-28

## 2023-03-02 RX ORDER — BUDESONIDE AND FORMOTEROL FUMARATE DIHYDRATE 160; 4.5 UG/1; UG/1
2 AEROSOL RESPIRATORY (INHALATION) EVERY 12 HOURS
Qty: 10.2 G | Refills: 5 | Status: SHIPPED | OUTPATIENT
Start: 2023-03-02 | End: 2024-02-22 | Stop reason: SDUPTHER

## 2023-03-02 NOTE — PROGRESS NOTES
Subjective:       Patient ID: Navin Henning Jr. is a 82 y.o. male.    Chief Complaint: Cough    Patient Active Problem List   Diagnosis    COPD (chronic obstructive pulmonary disease)    Personal history of tobacco use, presenting hazards to health    Actinic keratosis    Seborrheic keratoses    Allergic rhinosinusitis    Anxiety disorder    Sensorineural hearing loss, bilateral    Stage 3a chronic kidney disease    Testicular hypofunction    Essential hypertension    Family history of diabetes mellitus (DM)    Hypertriglyceridemia    Impaired fasting glucose    Insomnia    Memory loss    Nondependent alcohol abuse, in remission    Overweight with body mass index (BMI) of 25 to 25.9 in adult    Major depressive disorder, recurrent, moderate    Benign prostatic hyperplasia without lower urinary tract symptoms    Thrombocytopenia, unspecified    Alcohol dependence, in remission    Aortic atherosclerosis    Alzheimer's disease, unspecified (CODE)    Hypoglycemia      Cough  81 yo male with cough at night ongoing, recently in past 2-3 days coughing up fresh blood. Laying down and coughing. Some clear sputum. +wheezing intermittently. 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 Reports +sweats. Weight stable. Working up with Endo for hypoglycemic episodes at this time.     Review of Systems   Respiratory:  Positive for cough.    All other systems reviewed and are negative.       Results for orders placed or performed in visit on 01/25/23   CBC Auto Differential   Result Value Ref Range    WBC 5.40 3.90 - 12.70 K/uL    RBC 6.01 4.60 - 6.20 M/uL    Hemoglobin 17.2 14.0 - 18.0 g/dL    Hematocrit 53.0 40.0 - 54.0 %    MCV 88 82 - 98 fL    MCH 28.6 27.0 - 31.0 pg    MCHC 32.5 32.0 - 36.0 g/dL    RDW 15.7 (H) 11.5 - 14.5 %    Platelets 187 150 - 450 K/uL    MPV 11.3 9.2 - 12.9 fL    Immature Granulocytes 0.4 0.0 - 0.5 %    Gran # (ANC) 3.1 1.8 - 7.7 K/uL    Immature Grans (Abs) 0.02 0.00  - 0.04 K/uL    Lymph # 1.1 1.0 - 4.8 K/uL    Mono # 1.2 (H) 0.3 - 1.0 K/uL    Eos # 0.0 0.0 - 0.5 K/uL    Baso # 0.02 0.00 - 0.20 K/uL    nRBC 0 0 /100 WBC    Gran % 56.8 38.0 - 73.0 %    Lymph % 20.7 18.0 - 48.0 %    Mono % 21.5 (H) 4.0 - 15.0 %    Eosinophil % 0.2 0.0 - 8.0 %    Basophil % 0.4 0.0 - 1.9 %    Differential Method Automated    Iron and TIBC   Result Value Ref Range    Iron 149 45 - 160 ug/dL    Transferrin 296 200 - 375 mg/dL    TIBC 438 250 - 450 ug/dL    Saturated Iron 34 20 - 50 %   Ferritin   Result Value Ref Range    Ferritin 86 20.0 - 300.0 ng/mL   Comprehensive Metabolic Panel   Result Value Ref Range    Sodium 141 136 - 145 mmol/L    Potassium 4.6 3.5 - 5.1 mmol/L    Chloride 104 95 - 110 mmol/L    CO2 25 23 - 29 mmol/L    Glucose 70 70 - 110 mg/dL    BUN 18 8 - 23 mg/dL    Creatinine 1.4 0.5 - 1.4 mg/dL    Calcium 10.1 8.7 - 10.5 mg/dL    Total Protein 7.4 6.0 - 8.4 g/dL    Albumin 4.1 3.5 - 5.2 g/dL    Total Bilirubin 0.7 0.1 - 1.0 mg/dL    Alkaline Phosphatase 81 55 - 135 U/L    AST 17 10 - 40 U/L    ALT 6 (L) 10 - 44 U/L    Anion Gap 12 8 - 16 mmol/L    eGFR 50.8 (A) >60 mL/min/1.73 m^2   Insulin, random   Result Value Ref Range    Insulin 8.3 <25.0 uU/mL    Insulin Collection Interval 7.0    C-PEPTIDE   Result Value Ref Range    C-Peptide 4.50 0.78 - 5.19 ng/mL   PROINSULIN   Result Value Ref Range    Proinsulin 24 (H) 3.6 - 22 pmol/L   TSH   Result Value Ref Range    TSH 0.662 0.400 - 4.000 uIU/mL   Hemoglobin A1C   Result Value Ref Range    Hemoglobin A1C 5.5 4.0 - 5.6 %    Estimated Avg Glucose 111 68 - 131 mg/dL   Vitamin B12   Result Value Ref Range    Vitamin B-12 1913 (H) 210 - 950 pg/mL   FOLATE   Result Value Ref Range    Folate 39.8 (H) 4.0 - 24.0 ng/mL     Objective:     Vitals:    03/02/23 0922   BP: 119/61   Pulse: 81        Physical Exam  Vitals and nursing note reviewed.   Constitutional:       General: He is not in acute distress.     Appearance: Normal appearance. He is  well-developed. He is not ill-appearing, toxic-appearing or diaphoretic.   HENT:      Head: Normocephalic and atraumatic.      Nose:      Comments: TM: appear normal BL  Nasal congestion absent  Posterior pharynx irritation  No adenopathy  Full ROM of neck    Eyes:      General: No scleral icterus.     Conjunctiva/sclera: Conjunctivae normal.      Pupils: Pupils are equal, round, and reactive to light.   Cardiovascular:      Rate and Rhythm: Normal rate and regular rhythm.   Pulmonary:      Effort: Pulmonary effort is normal. No respiratory distress.      Breath sounds: Wheezing present.   Abdominal:      Palpations: Abdomen is soft.      Tenderness: There is no abdominal tenderness.   Musculoskeletal:      Cervical back: Normal range of motion and neck supple.   Skin:     General: Skin is warm.      Coloration: Skin is not pale.      Findings: No rash.   Neurological:      Mental Status: He is alert and oriented to person, place, and time. Mental status is at baseline.   Psychiatric:         Attention and Perception: Attention and perception normal.         Mood and Affect: Mood and affect normal.         Speech: Speech normal.         Behavior: Behavior normal.         Thought Content: Thought content normal.         Cognition and Memory: Cognition and memory normal.         Judgment: Judgment normal.         Assessment:       1. Cough with hemoptysis    2. Unexplained night sweats    3. Chronic cough    4. Mild persistent asthma without complication    5. Orthopnea    6. Gastroesophageal reflux disease without esophagitis    7. Allergic rhinitis, unspecified seasonality, unspecified trigger          Plan:         Cough with hemoptysis  -     CT Chest Without Contrast; Future; Expected date: 03/02/2023  -     BNP; Future; Expected date: 03/02/2023  -     QUANTIFERON GOLD TB; Future; Expected date: 03/02/2023    Unexplained night sweats  -     CT Chest Without Contrast; Future; Expected date: 03/02/2023  -      QUANTIFERON GOLD TB; Future; Expected date: 03/02/2023    Chronic cough  -     CT Chest Without Contrast; Future; Expected date: 03/02/2023  -     Echo; Future  -     BNP; Future; Expected date: 03/02/2023    Mild persistent asthma without complication  -     budesonide-formoterol 160-4.5 mcg (SYMBICORT) 160-4.5 mcg/actuation HFAA; Inhale 2 puffs into the lungs every 12 (twelve) hours. Controller  Dispense: 10.2 g; Refill: 5    Orthopnea  -     Echo; Future    Gastroesophageal reflux disease without esophagitis  -     famotidine (PEPCID) 20 MG tablet; Take 1 tablet (20 mg total) by mouth nightly as needed for Heartburn.  Dispense: 90 tablet; Refill: 3    Allergic rhinitis, unspecified seasonality, unspecified trigger  Continue Claritin.     Patient's questions answered. Plan reviewed with patient at the end of visit. Relevant precautions to chief complaint and reasons to seek medical care or contact the office sooner reviewed with patient.     Follow up if symptoms worsen or fail to improve.

## 2023-03-03 LAB
GAMMA INTERFERON BACKGROUND BLD IA-ACNC: 0 IU/ML
M TB IFN-G CD4+ BCKGRND COR BLD-ACNC: -0 IU/ML
MITOGEN IGNF BCKGRD COR BLD-ACNC: 10 IU/ML
TB GOLD PLUS: NEGATIVE
TB2 - NIL: 0.02 IU/ML

## 2023-03-06 ENCOUNTER — HOSPITAL ENCOUNTER (OUTPATIENT)
Dept: RADIOLOGY | Facility: HOSPITAL | Age: 83
Discharge: HOME | End: 2023-03-06
Attending: FAMILY MEDICINE
Payer: MEDICARE

## 2023-03-06 DIAGNOSIS — R61 UNEXPLAINED NIGHT SWEATS: ICD-10-CM

## 2023-03-06 DIAGNOSIS — R05.3 CHRONIC COUGH: ICD-10-CM

## 2023-03-06 DIAGNOSIS — R04.2 COUGH WITH HEMOPTYSIS: ICD-10-CM

## 2023-03-06 PROCEDURE — 71250 CT CHEST WITHOUT CONTRAST: ICD-10-PCS | Mod: 26,HCNC,, | Performed by: STUDENT IN AN ORGANIZED HEALTH CARE EDUCATION/TRAINING PROGRAM

## 2023-03-06 PROCEDURE — 71250 CT THORAX DX C-: CPT | Mod: TC,HCNC

## 2023-03-06 PROCEDURE — 71250 CT THORAX DX C-: CPT | Mod: 26,HCNC,, | Performed by: STUDENT IN AN ORGANIZED HEALTH CARE EDUCATION/TRAINING PROGRAM

## 2023-03-14 ENCOUNTER — HOSPITAL ENCOUNTER (OUTPATIENT)
Dept: CARDIOLOGY | Facility: HOSPITAL | Age: 83
Discharge: HOME | End: 2023-03-14
Attending: FAMILY MEDICINE
Payer: MEDICARE

## 2023-03-14 VITALS — HEIGHT: 66 IN | WEIGHT: 163 LBS | BODY MASS INDEX: 26.2 KG/M2

## 2023-03-14 DIAGNOSIS — R05.3 CHRONIC COUGH: ICD-10-CM

## 2023-03-14 DIAGNOSIS — R06.01 ORTHOPNEA: ICD-10-CM

## 2023-03-14 LAB
AORTIC ROOT ANNULUS: 2.8 CM
AORTIC VALVE CUSP SEPERATION: 1.76 CM
AV INDEX (PROSTH): 0.91
AV MEAN GRADIENT: 3 MMHG
AV PEAK GRADIENT: 5 MMHG
AV VALVE AREA: 2.6 CM2
AV VELOCITY RATIO: 0.91
BSA FOR ECHO PROCEDURE: 1.86 M2
CV ECHO LV RWT: 0.63 CM
DOP CALC AO PEAK VEL: 1.17 M/S
DOP CALC AO VTI: 23.2 CM
DOP CALC LVOT AREA: 2.9 CM2
DOP CALC LVOT DIAMETER: 1.91 CM
DOP CALC LVOT PEAK VEL: 1.07 M/S
DOP CALC LVOT STROKE VOLUME: 60.43 CM3
DOP CALC MV VTI: 33.7 CM
DOP CALCLVOT PEAK VEL VTI: 21.1 CM
E WAVE DECELERATION TIME: 292.33 MSEC
E/A RATIO: 0.65
E/E' RATIO: 9.5 M/S
ECHO LV POSTERIOR WALL: 1.22 CM (ref 0.6–1.1)
EJECTION FRACTION: 55 %
FRACTIONAL SHORTENING: 26 % (ref 28–44)
INTERVENTRICULAR SEPTUM: 1.44 CM (ref 0.6–1.1)
IVRT: 79.92 MSEC
LA MAJOR: 5.33 CM
LA MINOR: 4.8 CM
LA WIDTH: 3.5 CM
LEFT ATRIUM SIZE: 3.66 CM
LEFT ATRIUM VOLUME INDEX MOD: 19.3 ML/M2
LEFT ATRIUM VOLUME INDEX: 30.1 ML/M2
LEFT ATRIUM VOLUME MOD: 35.32 CM3
LEFT ATRIUM VOLUME: 55 CM3
LEFT INTERNAL DIMENSION IN SYSTOLE: 2.87 CM (ref 2.1–4)
LEFT VENTRICLE DIASTOLIC VOLUME INDEX: 35.2 ML/M2
LEFT VENTRICLE DIASTOLIC VOLUME: 64.42 ML
LEFT VENTRICLE MASS INDEX: 100 G/M2
LEFT VENTRICLE SYSTOLIC VOLUME INDEX: 17.2 ML/M2
LEFT VENTRICLE SYSTOLIC VOLUME: 31.44 ML
LEFT VENTRICULAR INTERNAL DIMENSION IN DIASTOLE: 3.86 CM (ref 3.5–6)
LEFT VENTRICULAR MASS: 183.35 G
LV LATERAL E/E' RATIO: 9.5 M/S
LV SEPTAL E/E' RATIO: 9.5 M/S
LVOT MG: 2.34 MMHG
LVOT MV: 0.7 CM/S
MV MEAN GRADIENT: 1 MMHG
MV PEAK A VEL: 0.88 M/S
MV PEAK E VEL: 0.57 M/S
MV PEAK GRADIENT: 5 MMHG
MV STENOSIS PRESSURE HALF TIME: 84.78 MS
MV VALVE AREA BY CONTINUITY EQUATION: 1.79 CM2
MV VALVE AREA P 1/2 METHOD: 2.59 CM2
PISA TR MAX VEL: 1.19 M/S
PULM VEIN S/D RATIO: 1.72
PV MV: 0.75 M/S
PV PEAK D VEL: 0.36 M/S
PV PEAK S VEL: 0.62 M/S
PV PEAK VELOCITY: 1.2 CM/S
RA MAJOR: 4.7 CM
RA PRESSURE: 3 MMHG
RIGHT VENTRICULAR END-DIASTOLIC DIMENSION: 2.53 CM
TDI LATERAL: 0.06 M/S
TDI SEPTAL: 0.06 M/S
TDI: 0.06 M/S
TR MAX PG: 6 MMHG
TV REST PULMONARY ARTERY PRESSURE: 9 MMHG

## 2023-03-14 PROCEDURE — 93306 ECHO (CUPID ONLY): ICD-10-PCS | Mod: 26,HCNC,, | Performed by: INTERNAL MEDICINE

## 2023-03-14 PROCEDURE — 93306 TTE W/DOPPLER COMPLETE: CPT | Mod: 26,HCNC,, | Performed by: INTERNAL MEDICINE

## 2023-03-14 PROCEDURE — 93306 TTE W/DOPPLER COMPLETE: CPT | Mod: HCNC

## 2023-03-19 ENCOUNTER — PATIENT MESSAGE (OUTPATIENT)
Dept: FAMILY MEDICINE | Facility: CLINIC | Age: 83
End: 2023-03-19

## 2023-03-21 ENCOUNTER — TELEPHONE (OUTPATIENT)
Dept: FAMILY MEDICINE | Facility: CLINIC | Age: 83
End: 2023-03-21
Payer: MEDICARE

## 2023-03-28 ENCOUNTER — LAB VISIT (OUTPATIENT)
Dept: LAB | Facility: HOSPITAL | Age: 83
End: 2023-03-28
Attending: FAMILY MEDICINE
Payer: MEDICARE

## 2023-03-28 ENCOUNTER — OFFICE VISIT (OUTPATIENT)
Dept: FAMILY MEDICINE | Facility: CLINIC | Age: 83
End: 2023-03-28
Payer: MEDICARE

## 2023-03-28 VITALS
SYSTOLIC BLOOD PRESSURE: 128 MMHG | HEIGHT: 66 IN | HEART RATE: 91 BPM | BODY MASS INDEX: 26.4 KG/M2 | WEIGHT: 164.25 LBS | OXYGEN SATURATION: 95 % | DIASTOLIC BLOOD PRESSURE: 64 MMHG

## 2023-03-28 DIAGNOSIS — R61 CHRONIC NIGHT SWEATS: Primary | ICD-10-CM

## 2023-03-28 DIAGNOSIS — E66.3 OVERWEIGHT WITH BODY MASS INDEX (BMI) OF 26 TO 26.9 IN ADULT: ICD-10-CM

## 2023-03-28 DIAGNOSIS — R05.3 CHRONIC COUGH: ICD-10-CM

## 2023-03-28 DIAGNOSIS — R61 CHRONIC NIGHT SWEATS: ICD-10-CM

## 2023-03-28 PROBLEM — D69.6 THROMBOCYTOPENIA, UNSPECIFIED: Status: RESOLVED | Noted: 2023-01-25 | Resolved: 2023-03-28

## 2023-03-28 LAB
ALBUMIN SERPL BCP-MCNC: 3.9 G/DL (ref 3.5–5.2)
ALP SERPL-CCNC: 90 U/L (ref 55–135)
ALT SERPL W/O P-5'-P-CCNC: 15 U/L (ref 10–44)
ANION GAP SERPL CALC-SCNC: 8 MMOL/L (ref 8–16)
AST SERPL-CCNC: 19 U/L (ref 10–40)
BASOPHILS # BLD AUTO: 0.02 K/UL (ref 0–0.2)
BASOPHILS NFR BLD: 0.3 % (ref 0–1.9)
BILIRUB SERPL-MCNC: 0.5 MG/DL (ref 0.1–1)
BUN SERPL-MCNC: 17 MG/DL (ref 8–23)
CALCIUM SERPL-MCNC: 9.6 MG/DL (ref 8.7–10.5)
CHLORIDE SERPL-SCNC: 101 MMOL/L (ref 95–110)
CO2 SERPL-SCNC: 34 MMOL/L (ref 23–29)
CREAT SERPL-MCNC: 1.1 MG/DL (ref 0.5–1.4)
CRP SERPL-MCNC: 3.5 MG/L (ref 0–8.2)
DIFFERENTIAL METHOD: ABNORMAL
EOSINOPHIL # BLD AUTO: 0 K/UL (ref 0–0.5)
EOSINOPHIL NFR BLD: 0 % (ref 0–8)
ERYTHROCYTE [DISTWIDTH] IN BLOOD BY AUTOMATED COUNT: 18.6 % (ref 11.5–14.5)
ERYTHROCYTE [SEDIMENTATION RATE] IN BLOOD BY PHOTOMETRIC METHOD: 16 MM/HR (ref 0–23)
EST. GFR  (NO RACE VARIABLE): >60 ML/MIN/1.73 M^2
GLUCOSE SERPL-MCNC: 67 MG/DL (ref 70–110)
HCT VFR BLD AUTO: 53 % (ref 40–54)
HGB BLD-MCNC: 16.8 G/DL (ref 14–18)
HIV 1+2 AB+HIV1 P24 AG SERPL QL IA: NORMAL
IMM GRANULOCYTES # BLD AUTO: 0.07 K/UL (ref 0–0.04)
IMM GRANULOCYTES NFR BLD AUTO: 0.9 % (ref 0–0.5)
LYMPHOCYTES # BLD AUTO: 1.2 K/UL (ref 1–4.8)
LYMPHOCYTES NFR BLD: 15 % (ref 18–48)
MCH RBC QN AUTO: 28.8 PG (ref 27–31)
MCHC RBC AUTO-ENTMCNC: 31.7 G/DL (ref 32–36)
MCV RBC AUTO: 91 FL (ref 82–98)
MONOCYTES # BLD AUTO: 1.7 K/UL (ref 0.3–1)
MONOCYTES NFR BLD: 22.3 % (ref 4–15)
NEUTROPHILS # BLD AUTO: 4.7 K/UL (ref 1.8–7.7)
NEUTROPHILS NFR BLD: 61.5 % (ref 38–73)
NRBC BLD-RTO: 0 /100 WBC
PLATELET # BLD AUTO: 150 K/UL (ref 150–450)
PMV BLD AUTO: 11.2 FL (ref 9.2–12.9)
POTASSIUM SERPL-SCNC: 3.8 MMOL/L (ref 3.5–5.1)
PROT SERPL-MCNC: 7 G/DL (ref 6–8.4)
RBC # BLD AUTO: 5.83 M/UL (ref 4.6–6.2)
SODIUM SERPL-SCNC: 143 MMOL/L (ref 136–145)
WBC # BLD AUTO: 7.67 K/UL (ref 3.9–12.7)

## 2023-03-28 PROCEDURE — 87040 BLOOD CULTURE FOR BACTERIA: CPT | Mod: HCNC | Performed by: FAMILY MEDICINE

## 2023-03-28 PROCEDURE — 3074F SYST BP LT 130 MM HG: CPT | Mod: HCNC,CPTII,S$GLB, | Performed by: FAMILY MEDICINE

## 2023-03-28 PROCEDURE — 85652 RBC SED RATE AUTOMATED: CPT | Mod: HCNC | Performed by: FAMILY MEDICINE

## 2023-03-28 PROCEDURE — 1101F PR PT FALLS ASSESS DOC 0-1 FALLS W/OUT INJ PAST YR: ICD-10-PCS | Mod: HCNC,CPTII,S$GLB, | Performed by: FAMILY MEDICINE

## 2023-03-28 PROCEDURE — 3078F PR MOST RECENT DIASTOLIC BLOOD PRESSURE < 80 MM HG: ICD-10-PCS | Mod: HCNC,CPTII,S$GLB, | Performed by: FAMILY MEDICINE

## 2023-03-28 PROCEDURE — 1159F MED LIST DOCD IN RCRD: CPT | Mod: HCNC,CPTII,S$GLB, | Performed by: FAMILY MEDICINE

## 2023-03-28 PROCEDURE — 99213 OFFICE O/P EST LOW 20 MIN: CPT | Mod: HCNC,S$GLB,, | Performed by: FAMILY MEDICINE

## 2023-03-28 PROCEDURE — 99999 PR PBB SHADOW E&M-EST. PATIENT-LVL IV: ICD-10-PCS | Mod: PBBFAC,HCNC,, | Performed by: FAMILY MEDICINE

## 2023-03-28 PROCEDURE — 3078F DIAST BP <80 MM HG: CPT | Mod: HCNC,CPTII,S$GLB, | Performed by: FAMILY MEDICINE

## 2023-03-28 PROCEDURE — 1160F PR REVIEW ALL MEDS BY PRESCRIBER/CLIN PHARMACIST DOCUMENTED: ICD-10-PCS | Mod: HCNC,CPTII,S$GLB, | Performed by: FAMILY MEDICINE

## 2023-03-28 PROCEDURE — 1160F RVW MEDS BY RX/DR IN RCRD: CPT | Mod: HCNC,CPTII,S$GLB, | Performed by: FAMILY MEDICINE

## 2023-03-28 PROCEDURE — 1101F PT FALLS ASSESS-DOCD LE1/YR: CPT | Mod: HCNC,CPTII,S$GLB, | Performed by: FAMILY MEDICINE

## 2023-03-28 PROCEDURE — 3288F PR FALLS RISK ASSESSMENT DOCUMENTED: ICD-10-PCS | Mod: HCNC,CPTII,S$GLB, | Performed by: FAMILY MEDICINE

## 2023-03-28 PROCEDURE — 1126F AMNT PAIN NOTED NONE PRSNT: CPT | Mod: HCNC,CPTII,S$GLB, | Performed by: FAMILY MEDICINE

## 2023-03-28 PROCEDURE — 86140 C-REACTIVE PROTEIN: CPT | Mod: HCNC | Performed by: FAMILY MEDICINE

## 2023-03-28 PROCEDURE — 80053 COMPREHEN METABOLIC PANEL: CPT | Mod: HCNC | Performed by: FAMILY MEDICINE

## 2023-03-28 PROCEDURE — 36415 COLL VENOUS BLD VENIPUNCTURE: CPT | Mod: HCNC,PO | Performed by: FAMILY MEDICINE

## 2023-03-28 PROCEDURE — 1159F PR MEDICATION LIST DOCUMENTED IN MEDICAL RECORD: ICD-10-PCS | Mod: HCNC,CPTII,S$GLB, | Performed by: FAMILY MEDICINE

## 2023-03-28 PROCEDURE — 99999 PR PBB SHADOW E&M-EST. PATIENT-LVL IV: CPT | Mod: PBBFAC,HCNC,, | Performed by: FAMILY MEDICINE

## 2023-03-28 PROCEDURE — 3288F FALL RISK ASSESSMENT DOCD: CPT | Mod: HCNC,CPTII,S$GLB, | Performed by: FAMILY MEDICINE

## 2023-03-28 PROCEDURE — 87389 HIV-1 AG W/HIV-1&-2 AB AG IA: CPT | Mod: HCNC | Performed by: FAMILY MEDICINE

## 2023-03-28 PROCEDURE — 1126F PR PAIN SEVERITY QUANTIFIED, NO PAIN PRESENT: ICD-10-PCS | Mod: HCNC,CPTII,S$GLB, | Performed by: FAMILY MEDICINE

## 2023-03-28 PROCEDURE — 85025 COMPLETE CBC W/AUTO DIFF WBC: CPT | Mod: HCNC | Performed by: FAMILY MEDICINE

## 2023-03-28 PROCEDURE — 3074F PR MOST RECENT SYSTOLIC BLOOD PRESSURE < 130 MM HG: ICD-10-PCS | Mod: HCNC,CPTII,S$GLB, | Performed by: FAMILY MEDICINE

## 2023-03-28 PROCEDURE — 99213 PR OFFICE/OUTPT VISIT, EST, LEVL III, 20-29 MIN: ICD-10-PCS | Mod: HCNC,S$GLB,, | Performed by: FAMILY MEDICINE

## 2023-03-28 RX ORDER — MIRTAZAPINE 15 MG/1
TABLET, FILM COATED ORAL
COMMUNITY

## 2023-03-28 NOTE — PROGRESS NOTES
Subjective:       Patient ID: Navin eHnning Jr. is a 82 y.o. male.    Chief Complaint: Diarrhea    Patient Active Problem List   Diagnosis    COPD (chronic obstructive pulmonary disease)    Personal history of tobacco use, presenting hazards to health    Actinic keratosis    Seborrheic keratoses    Allergic rhinosinusitis    Anxiety disorder    Sensorineural hearing loss, bilateral    Stage 3a chronic kidney disease    Testicular hypofunction    Essential hypertension    Family history of diabetes mellitus (DM)    Hypertriglyceridemia    Impaired fasting glucose    Insomnia    Memory loss    Nondependent alcohol abuse, in remission    Overweight with body mass index (BMI) of 26 to 26.9 in adult    Major depressive disorder, recurrent, moderate    Benign prostatic hyperplasia without lower urinary tract symptoms    Alcohol dependence, in remission    Aortic atherosclerosis    Alzheimer's disease, unspecified (CODE)    Hypoglycemia      81 yo male with:    - 6 months of drenching night sweats. Ongoing concern. Unremarkable work up thusfar. No weight loss. 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     Symbicort - Using once per day - reports improvement with cough.  Albuterol - No needing albuterol.   Improvement on wheezy and winded feeling.     PSA check in past year normal. Treated for BPH    Review of Systems   Gastrointestinal:  Positive for diarrhea.   All other systems reviewed and are negative.       Objective:     Vitals:    03/28/23 1021   BP: 128/64   Pulse: 91        Physical Exam  Vitals and nursing note reviewed.   Constitutional:       General: He is not in acute distress.     Appearance: Normal appearance. He is not ill-appearing, toxic-appearing or diaphoretic.   HENT:      Head: Normocephalic and atraumatic.   Eyes:      General: No scleral icterus.     Conjunctiva/sclera: Conjunctivae normal.   Cardiovascular:      Rate and Rhythm: Normal rate.      Heart  sounds: Normal heart sounds.   Pulmonary:      Effort: Pulmonary effort is normal. No respiratory distress.      Breath sounds: Normal breath sounds.   Abdominal:      General: Bowel sounds are normal.   Skin:     Coloration: Skin is not pale.   Neurological:      Mental Status: He is alert. Mental status is at baseline.   Psychiatric:         Attention and Perception: Attention and perception normal.         Mood and Affect: Mood and affect normal.         Speech: Speech normal.         Behavior: Behavior normal.         Cognition and Memory: Cognition and memory normal.         Judgment: Judgment normal.         Assessment:       1. Chronic night sweats    2. Chronic cough    3. Overweight with body mass index (BMI) of 26 to 26.9 in adult        Plan:         Chronic night sweats  -     C-Reactive Protein; Future; Expected date: 03/28/2023  -     Sedimentation rate; Future; Expected date: 03/28/2023  -     Urinalysis, Reflex to Urine Culture Urine, Clean Catch; Future; Expected date: 03/28/2023  -     HIV 1/2 Ag/Ab (4th Gen); Future; Expected date: 03/28/2023  -     CBC Auto Differential; Future; Expected date: 03/28/2023  -     Comprehensive Metabolic Panel; Future; Expected date: 03/28/2023  -     CULTURE, BLOOD; Future; Expected date: 03/28/2023  - Continue work up of night sweats.     Chronic cough  - Improving, continue with combo inhaler.     Overweight with body mass index (BMI) of 26 to 26.9 in adult  - Continue to monitor and trend weight.     Patient's questions answered. Plan reviewed with patient at the end of visit. Relevant precautions to chief complaint and reasons to seek medical care or contact the office sooner reviewed with patient.     Follow up in about 4 weeks (around 4/25/2023) for night sweats.

## 2023-04-02 LAB — BACTERIA BLD CULT: NORMAL

## 2023-04-03 DIAGNOSIS — R61 CHRONIC NIGHT SWEATS: ICD-10-CM

## 2023-04-03 DIAGNOSIS — R61 UNEXPLAINED NIGHT SWEATS: Primary | ICD-10-CM

## 2023-04-04 ENCOUNTER — PATIENT MESSAGE (OUTPATIENT)
Dept: FAMILY MEDICINE | Facility: CLINIC | Age: 83
End: 2023-04-04
Payer: MEDICARE

## 2023-04-05 ENCOUNTER — PATIENT MESSAGE (OUTPATIENT)
Dept: FAMILY MEDICINE | Facility: CLINIC | Age: 83
End: 2023-04-05
Payer: MEDICARE

## 2023-04-05 DIAGNOSIS — I44.7 LEFT BUNDLE BRANCH BLOCK: Primary | ICD-10-CM

## 2023-04-20 ENCOUNTER — PATIENT MESSAGE (OUTPATIENT)
Dept: FAMILY MEDICINE | Facility: CLINIC | Age: 83
End: 2023-04-20
Payer: MEDICARE

## 2023-04-21 ENCOUNTER — PATIENT MESSAGE (OUTPATIENT)
Dept: FAMILY MEDICINE | Facility: CLINIC | Age: 83
End: 2023-04-21
Payer: MEDICARE

## 2023-04-27 ENCOUNTER — OFFICE VISIT (OUTPATIENT)
Dept: CARDIOLOGY | Facility: CLINIC | Age: 83
End: 2023-04-27
Payer: MEDICARE

## 2023-04-27 VITALS
BODY MASS INDEX: 26.12 KG/M2 | DIASTOLIC BLOOD PRESSURE: 63 MMHG | OXYGEN SATURATION: 94 % | HEART RATE: 64 BPM | WEIGHT: 162.5 LBS | SYSTOLIC BLOOD PRESSURE: 120 MMHG | HEIGHT: 66 IN

## 2023-04-27 DIAGNOSIS — I44.7 LEFT BUNDLE BRANCH BLOCK: ICD-10-CM

## 2023-04-27 DIAGNOSIS — I70.0 AORTIC ATHEROSCLEROSIS: ICD-10-CM

## 2023-04-27 DIAGNOSIS — I10 ESSENTIAL HYPERTENSION: Primary | ICD-10-CM

## 2023-04-27 DIAGNOSIS — N18.31 STAGE 3A CHRONIC KIDNEY DISEASE: ICD-10-CM

## 2023-04-27 DIAGNOSIS — E78.1 HYPERTRIGLYCERIDEMIA: ICD-10-CM

## 2023-04-27 DIAGNOSIS — I25.118 ATHEROSCLEROSIS OF NATIVE CORONARY ARTERY OF NATIVE HEART WITH OTHER FORM OF ANGINA PECTORIS: ICD-10-CM

## 2023-04-27 DIAGNOSIS — R94.31 ABNORMAL ELECTROCARDIOGRAM (ECG) (EKG): ICD-10-CM

## 2023-04-27 PROCEDURE — 1159F PR MEDICATION LIST DOCUMENTED IN MEDICAL RECORD: ICD-10-PCS | Mod: HCNC,CPTII,S$GLB, | Performed by: INTERNAL MEDICINE

## 2023-04-27 PROCEDURE — 3078F DIAST BP <80 MM HG: CPT | Mod: HCNC,CPTII,S$GLB, | Performed by: INTERNAL MEDICINE

## 2023-04-27 PROCEDURE — 99999 PR PBB SHADOW E&M-EST. PATIENT-LVL IV: CPT | Mod: PBBFAC,HCNC,, | Performed by: INTERNAL MEDICINE

## 2023-04-27 PROCEDURE — 99204 PR OFFICE/OUTPT VISIT, NEW, LEVL IV, 45-59 MIN: ICD-10-PCS | Mod: HCNC,S$GLB,, | Performed by: INTERNAL MEDICINE

## 2023-04-27 PROCEDURE — 3074F SYST BP LT 130 MM HG: CPT | Mod: HCNC,CPTII,S$GLB, | Performed by: INTERNAL MEDICINE

## 2023-04-27 PROCEDURE — 99999 PR PBB SHADOW E&M-EST. PATIENT-LVL IV: ICD-10-PCS | Mod: PBBFAC,HCNC,, | Performed by: INTERNAL MEDICINE

## 2023-04-27 PROCEDURE — 1126F AMNT PAIN NOTED NONE PRSNT: CPT | Mod: HCNC,CPTII,S$GLB, | Performed by: INTERNAL MEDICINE

## 2023-04-27 PROCEDURE — 1159F MED LIST DOCD IN RCRD: CPT | Mod: HCNC,CPTII,S$GLB, | Performed by: INTERNAL MEDICINE

## 2023-04-27 PROCEDURE — 1160F PR REVIEW ALL MEDS BY PRESCRIBER/CLIN PHARMACIST DOCUMENTED: ICD-10-PCS | Mod: HCNC,CPTII,S$GLB, | Performed by: INTERNAL MEDICINE

## 2023-04-27 PROCEDURE — 1160F RVW MEDS BY RX/DR IN RCRD: CPT | Mod: HCNC,CPTII,S$GLB, | Performed by: INTERNAL MEDICINE

## 2023-04-27 PROCEDURE — 3078F PR MOST RECENT DIASTOLIC BLOOD PRESSURE < 80 MM HG: ICD-10-PCS | Mod: HCNC,CPTII,S$GLB, | Performed by: INTERNAL MEDICINE

## 2023-04-27 PROCEDURE — 3288F FALL RISK ASSESSMENT DOCD: CPT | Mod: HCNC,CPTII,S$GLB, | Performed by: INTERNAL MEDICINE

## 2023-04-27 PROCEDURE — 3288F PR FALLS RISK ASSESSMENT DOCUMENTED: ICD-10-PCS | Mod: HCNC,CPTII,S$GLB, | Performed by: INTERNAL MEDICINE

## 2023-04-27 PROCEDURE — 3074F PR MOST RECENT SYSTOLIC BLOOD PRESSURE < 130 MM HG: ICD-10-PCS | Mod: HCNC,CPTII,S$GLB, | Performed by: INTERNAL MEDICINE

## 2023-04-27 PROCEDURE — 1126F PR PAIN SEVERITY QUANTIFIED, NO PAIN PRESENT: ICD-10-PCS | Mod: HCNC,CPTII,S$GLB, | Performed by: INTERNAL MEDICINE

## 2023-04-27 PROCEDURE — 1101F PT FALLS ASSESS-DOCD LE1/YR: CPT | Mod: HCNC,CPTII,S$GLB, | Performed by: INTERNAL MEDICINE

## 2023-04-27 PROCEDURE — 1101F PR PT FALLS ASSESS DOC 0-1 FALLS W/OUT INJ PAST YR: ICD-10-PCS | Mod: HCNC,CPTII,S$GLB, | Performed by: INTERNAL MEDICINE

## 2023-04-27 PROCEDURE — 99204 OFFICE O/P NEW MOD 45 MIN: CPT | Mod: HCNC,S$GLB,, | Performed by: INTERNAL MEDICINE

## 2023-04-27 RX ORDER — PRAVASTATIN SODIUM 20 MG/1
20 TABLET ORAL NIGHTLY
Qty: 90 TABLET | Refills: 3 | Status: SHIPPED | OUTPATIENT
Start: 2023-04-27 | End: 2024-02-19

## 2023-04-27 NOTE — PROGRESS NOTES
Subjective:   @Patient ID:  Navin Henning Jr. is a 82 y.o. male who presents for evaluation of LBBB      HPI:   Here for initial evaluation   Referred by Dr. Barrett for LBBB  He is accompanied by his wife  He is a pleasant gentleman.  Does have chronic left bundle-branch block for many years.  For the last 2 -3 years he has been having significant night sweats  No chest pain.  Stable dyspnea on exertion  Recent CT of the chest showed severe coronary atherosclerosis in the left main as well as LAD circumflex and RCA  He had a stress test 15 years ago that was okay  He does have vertigo the last few days.  He had history of vertigo pain many years ago and he was seen by ENT    Stopped smoking 1976. He smoked for 20 yrs    Prior cardiovascular  Hx  --------------------------------         - ECHO   3/14/2023   The left ventricle is normal in size with concentric remodeling and normal systolic function.  The estimated ejection fraction is 55%.  There is abnormal septal wall motion consistent with left bundle branch block.  Normal left ventricular diastolic function.  The estimated PA systolic pressure is 9 mmHg.  Normal right ventricular size with normal right ventricular systolic function.  Normal central venous pressure (3 mmHg).        - EKG 3/2019   SR with LBBB        Patient Active Problem List    Diagnosis Date Noted    Hypoglycemia 02/03/2023    Alcohol dependence, in remission 01/25/2023    Aortic atherosclerosis 01/25/2023    Alzheimer's disease, unspecified (CODE) 01/25/2023    Major depressive disorder, recurrent, moderate 07/11/2022    Benign prostatic hyperplasia without lower urinary tract symptoms 07/11/2022    Personal history of tobacco use, presenting hazards to health 01/10/2022    Actinic keratosis 01/10/2022    Seborrheic keratoses 01/10/2022    Anxiety disorder 01/10/2022    Sensorineural hearing loss, bilateral 01/10/2022    Testicular hypofunction 01/10/2022    Essential hypertension  01/10/2022    Impaired fasting glucose 01/10/2022    Insomnia 01/10/2022    Nondependent alcohol abuse, in remission 01/10/2022    Overweight with body mass index (BMI) of 26 to 26.9 in adult 01/10/2022    Memory loss 2019    COPD (chronic obstructive pulmonary disease) 2019    Allergic rhinosinusitis 2018    Stage 3a chronic kidney disease 2018    Family history of diabetes mellitus (DM) 2018    Hypertriglyceridemia 2018           Right Arm BP - Sittin/63  Left Arm BP - Sittin/61        LAST HbA1c  Lab Results   Component Value Date    HGBA1C 5.5 2023       Lipid panel  Lab Results   Component Value Date    CHOL 199 2022    CHOL 183 2021     Lab Results   Component Value Date    HDL 40 2022    HDL 35 (L) 2021     Lab Results   Component Value Date    LDLCALC 128.2 2022    LDLCALC 115.0 2021     Lab Results   Component Value Date    TRIG 154 (H) 2022    TRIG 165 (H) 2021     Lab Results   Component Value Date    CHOLHDL 20.1 2022    CHOLHDL 19.1 (L) 2021            Review of Systems   Constitutional: Negative for chills and fever.   HENT:  Positive for hearing loss.         As in HPI   Eyes:  Negative for blurred vision.   Cardiovascular:         As in HPI   Respiratory:          As in HPI   Hematologic/Lymphatic: Negative for bleeding problem.   Skin:  Negative for itching.   Musculoskeletal:  Negative for falls.   Gastrointestinal:  Negative for abdominal pain and hematochezia.   Genitourinary:  Negative for hematuria.   Neurological:  Negative for dizziness and loss of balance.   Psychiatric/Behavioral:  Negative for altered mental status and depression.      Objective:   Physical Exam  Constitutional:       Appearance: He is well-developed.   HENT:      Head: Normocephalic and atraumatic.   Eyes:      Conjunctiva/sclera: Conjunctivae normal.   Neck:      Vascular: No carotid bruit or JVD.    Cardiovascular:      Rate and Rhythm: Normal rate and regular rhythm.      Pulses:           Carotid pulses are 2+ on the right side and 2+ on the left side.       Radial pulses are 2+ on the right side and 2+ on the left side.      Heart sounds: Normal heart sounds. No murmur heard.    No friction rub. No gallop.   Pulmonary:      Effort: Pulmonary effort is normal. No respiratory distress.      Breath sounds: No stridor. No wheezing.      Comments: Prolonged expiratory phase  Abdominal:      General: Abdomen is flat.      Palpations: Abdomen is soft.   Musculoskeletal:      Cervical back: Neck supple.      Right lower leg: No edema.      Left lower leg: No edema.   Skin:     General: Skin is warm and dry.   Neurological:      Mental Status: He is alert and oriented to person, place, and time.   Psychiatric:         Behavior: Behavior normal.       Assessment:     1. Essential hypertension    2. Hypertriglyceridemia    3. Aortic atherosclerosis    4. Stage 3a chronic kidney disease    5. Left bundle branch block    6. Abnormal electrocardiogram (ECG) (EKG)    7. Atherosclerosis of native coronary artery of native heart with other form of angina pectoris        Plan:   There is evidence of significant coronary atherosclerosis by CT chest.  Left bundle-branch block EKG of undetermined significance    Given risk factors, evidence of severe coronary atherosclerosis, and abnormal EKG we will go ahead and check Lexiscan stress test for risk stratification further evaluation    We will start low-dose pravastatin along with the aspirin  Blood pressure is well controlled    We will discuss the stress test results over the phone  Six-month follow-up.  Sooner if major abnormalities in the stress test      Pertinent cardiac images and EKG reviewed independently.    Continue with current medical plan and lifestyle changes.  Return sooner for concerns or questions. If symptoms persist go to the ED  I have reviewed all  pertinent data including patient's medical history in detail and updated the computerized patient record.     Orders Placed This Encounter   Procedures    NM Myocardial Perfusion Spect Multi Pharmacologic     Standing Status:   Future     Standing Expiration Date:   4/27/2024     Order Specific Question:   May the Radiologist modify the order per protocol to meet the clinical needs of the patient?     Answer:   Yes     Order Specific Question:   Stress Medication to use:     Answer:   Regadenoson     Order Specific Question:   Diabetes?     Answer:   No    Nuclear Stress Test     Standing Status:   Future     Standing Expiration Date:   4/27/2024     Order Specific Question:   Which stress agent will be used?     Answer:   Pharm     Order Specific Question:   Which medicaton for the stress procedure?     Answer:   Regadenoson     Order Specific Question:   Release to patient     Answer:   Immediate       Follow up as scheduled.     He expressed verbal understanding and agreed with the plan    Patient's Medications   New Prescriptions    PRAVASTATIN (PRAVACHOL) 20 MG TABLET    Take 1 tablet (20 mg total) by mouth every evening.   Previous Medications    ALBUTEROL (VENTOLIN HFA) 90 MCG/ACTUATION INHALER    Inhale 2 puffs into the lungs every 4 (four) hours as needed for Wheezing or Shortness of Breath. Rescue    BUDESONIDE-FORMOTEROL 160-4.5 MCG (SYMBICORT) 160-4.5 MCG/ACTUATION HFAA    Inhale 2 puffs into the lungs every 12 (twelve) hours. Controller    DONEPEZIL (ARICEPT) 5 MG TABLET    Take 5 mg by mouth once daily.    FAMOTIDINE (PEPCID) 20 MG TABLET    Take 1 tablet (20 mg total) by mouth nightly as needed for Heartburn.    FLUOXETINE 40 MG CAPSULE    Take 40 mg by mouth.    FLUTICASONE PROPIONATE (FLONASE) 50 MCG/ACTUATION NASAL SPRAY    1 spray (50 mcg total) by Each Nostril route once daily.    LORATADINE (CLARITIN) 10 MG TABLET    Take 1 tablet (10 mg total) by mouth once daily.    MELATONIN 10 MG TBDL     Take 1 tablet by mouth every evening.    MEMANTINE (NAMENDA) 10 MG TAB    memantine 10 mg tablet   Take 1 tablet twice a day by oral route as directed for 30 days.    MIRTAZAPINE (REMERON) 15 MG TABLET    mirtazapine 15 mg tablet   Take 1 tablet every day by oral route in the evening for 30 days.    MULTIVITAMIN CAPSULE    Take 1 capsule by mouth once daily.    TAMSULOSIN (FLOMAX) 0.4 MG CAP    Take 1 capsule by mouth once daily.    TESTOSTERONE (TESTOPEL) 75 MG PLLT       Modified Medications    No medications on file   Discontinued Medications    No medications on file

## 2023-05-01 ENCOUNTER — HOSPITAL ENCOUNTER (OUTPATIENT)
Dept: RADIOLOGY | Facility: HOSPITAL | Age: 83
Discharge: HOME | End: 2023-05-01
Attending: FAMILY MEDICINE
Payer: MEDICARE

## 2023-05-01 DIAGNOSIS — R61 CHRONIC NIGHT SWEATS: ICD-10-CM

## 2023-05-01 DIAGNOSIS — R61 UNEXPLAINED NIGHT SWEATS: ICD-10-CM

## 2023-05-01 PROCEDURE — 74177 CT ABDOMEN PELVIS WITH CONTRAST: ICD-10-PCS | Mod: 26,HCNC,, | Performed by: RADIOLOGY

## 2023-05-01 PROCEDURE — 25500020 PHARM REV CODE 255: Mod: HCNC | Performed by: FAMILY MEDICINE

## 2023-05-01 PROCEDURE — 74177 CT ABD & PELVIS W/CONTRAST: CPT | Mod: TC,HCNC

## 2023-05-01 PROCEDURE — 74177 CT ABD & PELVIS W/CONTRAST: CPT | Mod: 26,HCNC,, | Performed by: RADIOLOGY

## 2023-05-01 RX ADMIN — IOHEXOL 75 ML: 350 INJECTION, SOLUTION INTRAVENOUS at 12:05

## 2023-05-01 RX ADMIN — IOHEXOL 30 ML: 350 INJECTION, SOLUTION INTRAVENOUS at 11:05

## 2023-05-11 ENCOUNTER — OFFICE VISIT (OUTPATIENT)
Dept: PULMONOLOGY | Facility: CLINIC | Age: 83
End: 2023-05-11
Payer: MEDICARE

## 2023-05-11 ENCOUNTER — PATIENT MESSAGE (OUTPATIENT)
Dept: PULMONOLOGY | Facility: CLINIC | Age: 83
End: 2023-05-11

## 2023-05-11 ENCOUNTER — LAB VISIT (OUTPATIENT)
Dept: LAB | Facility: HOSPITAL | Age: 83
End: 2023-05-11
Payer: MEDICARE

## 2023-05-11 VITALS
WEIGHT: 163.38 LBS | HEIGHT: 66 IN | SYSTOLIC BLOOD PRESSURE: 120 MMHG | HEART RATE: 75 BPM | DIASTOLIC BLOOD PRESSURE: 82 MMHG | BODY MASS INDEX: 26.26 KG/M2 | OXYGEN SATURATION: 95 %

## 2023-05-11 DIAGNOSIS — R19.7 DIARRHEA, UNSPECIFIED TYPE: ICD-10-CM

## 2023-05-11 DIAGNOSIS — I70.0 AORTIC ATHEROSCLEROSIS: ICD-10-CM

## 2023-05-11 DIAGNOSIS — E16.2 HYPOGLYCEMIA: ICD-10-CM

## 2023-05-11 DIAGNOSIS — R61 CHRONIC NIGHT SWEATS: ICD-10-CM

## 2023-05-11 DIAGNOSIS — J42 CHRONIC BRONCHITIS, UNSPECIFIED CHRONIC BRONCHITIS TYPE: Chronic | ICD-10-CM

## 2023-05-11 DIAGNOSIS — D3A.8 NEUROENDOCRINE TUMOR: Primary | ICD-10-CM

## 2023-05-11 DIAGNOSIS — E66.3 OVERWEIGHT WITH BODY MASS INDEX (BMI) OF 26 TO 26.9 IN ADULT: ICD-10-CM

## 2023-05-11 DIAGNOSIS — R23.2 FLUSHING: ICD-10-CM

## 2023-05-11 DIAGNOSIS — C80.1 OCCULT MALIGNANCY: ICD-10-CM

## 2023-05-11 DIAGNOSIS — Z87.891 PERSONAL HISTORY OF TOBACCO USE, PRESENTING HAZARDS TO HEALTH: ICD-10-CM

## 2023-05-11 DIAGNOSIS — J30.9 ALLERGIC RHINITIS, UNSPECIFIED SEASONALITY, UNSPECIFIED TRIGGER: ICD-10-CM

## 2023-05-11 DIAGNOSIS — N18.31 STAGE 3A CHRONIC KIDNEY DISEASE: ICD-10-CM

## 2023-05-11 DIAGNOSIS — R05.3 CHRONIC COUGH: Primary | ICD-10-CM

## 2023-05-11 LAB
ANION GAP SERPL CALC-SCNC: 9 MMOL/L (ref 8–16)
B-OH-BUTYR BLD STRIP-SCNC: 0.2 MMOL/L (ref 0–0.5)
BUN SERPL-MCNC: 12 MG/DL (ref 8–23)
CALCIUM SERPL-MCNC: 10.3 MG/DL (ref 8.7–10.5)
CHLORIDE SERPL-SCNC: 104 MMOL/L (ref 95–110)
CO2 SERPL-SCNC: 31 MMOL/L (ref 23–29)
CREAT SERPL-MCNC: 1.1 MG/DL (ref 0.5–1.4)
EST. GFR  (NO RACE VARIABLE): >60 ML/MIN/1.73 M^2
GLUCOSE SERPL-MCNC: 65 MG/DL (ref 70–110)
INSULIN COLLECTION INTERVAL: NORMAL
INSULIN SERPL-ACNC: 8 UU/ML
POTASSIUM SERPL-SCNC: 5 MMOL/L (ref 3.5–5.1)
SODIUM SERPL-SCNC: 144 MMOL/L (ref 136–145)

## 2023-05-11 PROCEDURE — 1101F PR PT FALLS ASSESS DOC 0-1 FALLS W/OUT INJ PAST YR: ICD-10-PCS | Mod: HCNC,CPTII,S$GLB, | Performed by: STUDENT IN AN ORGANIZED HEALTH CARE EDUCATION/TRAINING PROGRAM

## 2023-05-11 PROCEDURE — 3074F SYST BP LT 130 MM HG: CPT | Mod: HCNC,CPTII,S$GLB, | Performed by: STUDENT IN AN ORGANIZED HEALTH CARE EDUCATION/TRAINING PROGRAM

## 2023-05-11 PROCEDURE — 82941 ASSAY OF GASTRIN: CPT | Mod: HCNC | Performed by: STUDENT IN AN ORGANIZED HEALTH CARE EDUCATION/TRAINING PROGRAM

## 2023-05-11 PROCEDURE — 1159F MED LIST DOCD IN RCRD: CPT | Mod: HCNC,CPTII,S$GLB, | Performed by: STUDENT IN AN ORGANIZED HEALTH CARE EDUCATION/TRAINING PROGRAM

## 2023-05-11 PROCEDURE — 82010 KETONE BODYS QUAN: CPT | Mod: HCNC | Performed by: NURSE PRACTITIONER

## 2023-05-11 PROCEDURE — 99999 PR PBB SHADOW E&M-EST. PATIENT-LVL IV: ICD-10-PCS | Mod: PBBFAC,HCNC,, | Performed by: STUDENT IN AN ORGANIZED HEALTH CARE EDUCATION/TRAINING PROGRAM

## 2023-05-11 PROCEDURE — 1101F PT FALLS ASSESS-DOCD LE1/YR: CPT | Mod: HCNC,CPTII,S$GLB, | Performed by: STUDENT IN AN ORGANIZED HEALTH CARE EDUCATION/TRAINING PROGRAM

## 2023-05-11 PROCEDURE — 99205 PR OFFICE/OUTPT VISIT, NEW, LEVL V, 60-74 MIN: ICD-10-PCS | Mod: HCNC,S$GLB,, | Performed by: STUDENT IN AN ORGANIZED HEALTH CARE EDUCATION/TRAINING PROGRAM

## 2023-05-11 PROCEDURE — 83525 ASSAY OF INSULIN: CPT | Mod: HCNC | Performed by: NURSE PRACTITIONER

## 2023-05-11 PROCEDURE — 99205 OFFICE O/P NEW HI 60 MIN: CPT | Mod: HCNC,S$GLB,, | Performed by: STUDENT IN AN ORGANIZED HEALTH CARE EDUCATION/TRAINING PROGRAM

## 2023-05-11 PROCEDURE — 3074F PR MOST RECENT SYSTOLIC BLOOD PRESSURE < 130 MM HG: ICD-10-PCS | Mod: HCNC,CPTII,S$GLB, | Performed by: STUDENT IN AN ORGANIZED HEALTH CARE EDUCATION/TRAINING PROGRAM

## 2023-05-11 PROCEDURE — 3288F FALL RISK ASSESSMENT DOCD: CPT | Mod: HCNC,CPTII,S$GLB, | Performed by: STUDENT IN AN ORGANIZED HEALTH CARE EDUCATION/TRAINING PROGRAM

## 2023-05-11 PROCEDURE — 3079F PR MOST RECENT DIASTOLIC BLOOD PRESSURE 80-89 MM HG: ICD-10-PCS | Mod: HCNC,CPTII,S$GLB, | Performed by: STUDENT IN AN ORGANIZED HEALTH CARE EDUCATION/TRAINING PROGRAM

## 2023-05-11 PROCEDURE — 36415 COLL VENOUS BLD VENIPUNCTURE: CPT | Mod: HCNC | Performed by: NURSE PRACTITIONER

## 2023-05-11 PROCEDURE — 84586 ASSAY OF VIP: CPT | Mod: HCNC | Performed by: STUDENT IN AN ORGANIZED HEALTH CARE EDUCATION/TRAINING PROGRAM

## 2023-05-11 PROCEDURE — 3288F PR FALLS RISK ASSESSMENT DOCUMENTED: ICD-10-PCS | Mod: HCNC,CPTII,S$GLB, | Performed by: STUDENT IN AN ORGANIZED HEALTH CARE EDUCATION/TRAINING PROGRAM

## 2023-05-11 PROCEDURE — 1126F AMNT PAIN NOTED NONE PRSNT: CPT | Mod: HCNC,CPTII,S$GLB, | Performed by: STUDENT IN AN ORGANIZED HEALTH CARE EDUCATION/TRAINING PROGRAM

## 2023-05-11 PROCEDURE — 1159F PR MEDICATION LIST DOCUMENTED IN MEDICAL RECORD: ICD-10-PCS | Mod: HCNC,CPTII,S$GLB, | Performed by: STUDENT IN AN ORGANIZED HEALTH CARE EDUCATION/TRAINING PROGRAM

## 2023-05-11 PROCEDURE — 80048 BASIC METABOLIC PNL TOTAL CA: CPT | Mod: HCNC | Performed by: NURSE PRACTITIONER

## 2023-05-11 PROCEDURE — 3079F DIAST BP 80-89 MM HG: CPT | Mod: HCNC,CPTII,S$GLB, | Performed by: STUDENT IN AN ORGANIZED HEALTH CARE EDUCATION/TRAINING PROGRAM

## 2023-05-11 PROCEDURE — 1126F PR PAIN SEVERITY QUANTIFIED, NO PAIN PRESENT: ICD-10-PCS | Mod: HCNC,CPTII,S$GLB, | Performed by: STUDENT IN AN ORGANIZED HEALTH CARE EDUCATION/TRAINING PROGRAM

## 2023-05-11 PROCEDURE — 83497 ASSAY OF 5-HIAA: CPT | Mod: HCNC | Performed by: STUDENT IN AN ORGANIZED HEALTH CARE EDUCATION/TRAINING PROGRAM

## 2023-05-11 PROCEDURE — 84206 ASSAY OF PROINSULIN: CPT | Mod: HCNC | Performed by: NURSE PRACTITIONER

## 2023-05-11 PROCEDURE — 99999 PR PBB SHADOW E&M-EST. PATIENT-LVL IV: CPT | Mod: PBBFAC,HCNC,, | Performed by: STUDENT IN AN ORGANIZED HEALTH CARE EDUCATION/TRAINING PROGRAM

## 2023-05-11 RX ORDER — AMMONIUM LACTATE 12 G/100G
LOTION TOPICAL
COMMUNITY

## 2023-05-11 RX ORDER — AZELASTINE 1 MG/ML
2 SPRAY, METERED NASAL 2 TIMES DAILY
Qty: 60 ML | Refills: 3 | Status: SHIPPED | OUTPATIENT
Start: 2023-05-11 | End: 2023-10-10 | Stop reason: SDUPTHER

## 2023-05-11 RX ORDER — HYDROCODONE BITARTRATE AND ACETAMINOPHEN 5; 325 MG/1; MG/1
TABLET ORAL
COMMUNITY
End: 2023-08-17

## 2023-05-11 RX ORDER — FLUTICASONE PROPIONATE 50 MCG
1 SPRAY, SUSPENSION (ML) NASAL 2 TIMES DAILY
Qty: 16 G | Refills: 5 | Status: SHIPPED | OUTPATIENT
Start: 2023-05-11

## 2023-05-11 RX ORDER — KETOCONAZOLE 20 MG/ML
SHAMPOO, SUSPENSION TOPICAL
COMMUNITY
Start: 2023-02-27

## 2023-05-11 RX ORDER — TRAMADOL HYDROCHLORIDE 50 MG/1
50 TABLET ORAL DAILY PRN
COMMUNITY
Start: 2023-05-10 | End: 2024-02-22

## 2023-05-11 RX ORDER — HYDROCORTISONE 25 MG/ML
LOTION TOPICAL
COMMUNITY
Start: 2023-02-27 | End: 2024-03-28

## 2023-05-11 RX ORDER — LEVOCETIRIZINE DIHYDROCHLORIDE 5 MG/1
5 TABLET, FILM COATED ORAL NIGHTLY
Qty: 90 TABLET | Refills: 3 | Status: SHIPPED | OUTPATIENT
Start: 2023-05-11 | End: 2024-03-06 | Stop reason: SDUPTHER

## 2023-05-11 NOTE — PROGRESS NOTES
"Subjective:     Reason for visit: chronic cough    Patient ID:  Navin Henning Jr. is a 83 y.o. male with Alzheimer's, anxiety/depression, HTN, CKD 3, hyperlipidemia, allergic rhinosinusitis    Patient is accompanied by his wife who provides additional history.    Interval History:  2-3 years of night sweats.  Six months of drenching night sweats.  Also with facial flushing and chronic diarrhea.  Currently having issues with hypoglycemia as well.  Metallic taste in mouth with cough intermittently throughout the day.  Mostly nonproductive.  Cough worse at night and better in the morning.  Weight has been stable.      Additional Pulmonary History:  Childhood Illnesses:  No  Occupational/Environmental:  Worked in a chemical plant  Tobacco/Smokin year history.  Quit     Objective:     Vitals:    23 1420   BP: 120/82   BP Location: Right arm   Patient Position: Sitting   BP Method: Medium (Manual)   Pulse: 75   SpO2: 95%   Weight: 74.1 kg (163 lb 5.8 oz)   Height: 5' 6" (1.676 m)         Physical Exam  Vitals and nursing note reviewed.   Constitutional:       General: He is not in acute distress.     Appearance: He is overweight. He is not ill-appearing, toxic-appearing or diaphoretic.   HENT:      Head: Normocephalic and atraumatic.      Nose: No rhinorrhea.      Mouth/Throat:      Mouth: Mucous membranes are moist.   Eyes:      General: No scleral icterus.     Extraocular Movements: Extraocular movements intact.   Cardiovascular:      Rate and Rhythm: Normal rate and regular rhythm.   Pulmonary:      Effort: No tachypnea, accessory muscle usage, respiratory distress or retractions.   Abdominal:      General: There is no distension.   Skin:     General: Skin is warm and dry.      Coloration: Skin is not jaundiced.      Findings: No rash.   Neurological:      General: No focal deficit present.      Mental Status: Mental status is at baseline.        Personal Diagnostic Review and " Interpretation  03/06/2023 CT chest without contrast:  Aortic and coronary artery atherosclerosis; paraseptal emphysema in the bilateral upper lobes with small scarring; mildly ectatic airways in the RML; right-sided calcified granuloma; bibasilar reticulations     08/09/2021 CT abdomen and pelvis:  Bibasilar atelectasis      Pertinent Studies Reviewed & Interpreted:     Pulmonary Function Tests:   None    6 Minute Walk Tests:   None    Echocardiograms:   03/14/2023:  EF 55% with elevated MITCH 30 and concentric LVH; abnormal septal motion; PASP 9      Assessment & Plan:       Problem List Items Addressed This Visit          ENT    Allergic rhinosinusitis    Overview     Significant sinus symptoms with almost constant postnasal gtt.   Suspect this is the primary  behind his chronic cough.  Starting aggressive sinus regimen for 8 week trial           Relevant Medications    levocetirizine (XYZAL) 5 MG tablet    fluticasone propionate (FLONASE) 50 mcg/actuation nasal spray    azelastine (ASTELIN) 137 mcg (0.1 %) nasal spray       Pulmonary    COPD (chronic obstructive pulmonary disease) (Chronic)    Overview     Substantial smoking history.  Emphysematous changes seen on recent CT chest.  PFTs prior to next visit.  Currently on LABA/ICS           Chronic cough - Primary    Overview     Occurs throughout the day but mostly at night.  Some occupational exposure with remote smoking history.  Significant sinus disease.  Suspect sinus disease is driving this in are going to start an aggressive trial.  See the section for further recs.           Relevant Medications    levocetirizine (XYZAL) 5 MG tablet    fluticasone propionate (FLONASE) 50 mcg/actuation nasal spray    azelastine (ASTELIN) 137 mcg (0.1 %) nasal spray    Other Relevant Orders    Spirometry with/without bronchodilator    Lung Volumes    DLCO-Carbon Monoxide Diffusing Capacity       Cardiac/Vascular    Aortic atherosclerosis    Overview     Noted on  recent 2023 CT chest.  Not currently on ASA or statin              Renal/    Stage 3a chronic kidney disease    Overview     Complicates every aspect of patient care.              Endocrine    Overweight with body mass index (BMI) of 26 to 26.9 in adult    Overview     Patient would benefit from weight           Hypoglycemia    Overview     Intermittent hypoglycemia of unknown cause.  Elevated C-peptide.  Following with endocrinology.  See the section on night sweats.           Relevant Orders    VASOACTIVE INTES. POLYPEP 8150    GASTRIN    5-HIAA Plasma (Neuroendocrine)       GI    Diarrhea    Overview     Daily diarrhea.  See the section on night sweats.           Relevant Orders    VASOACTIVE INTES. POLYPEP 8150    GASTRIN    5-HIAA Plasma (Neuroendocrine)       Other    Personal history of tobacco use, presenting hazards to health    Overview     Quit 1976.  Twenty pack year history before that.  Worked in a chemical plant.  Congratulated on quitting encouraged ongoing cessation.           Flushing    Overview     Noticeable facial flushing.  See the section on night sweats           Relevant Orders    VASOACTIVE INTES. POLYPEP 8150    GASTRIN    5-HIAA Plasma (Neuroendocrine)    Chronic night sweats    Overview     2-3 year history.  Drenching night sweats for past 6 months.  Also associated with diarrhea, flushing and hypoglycemia.  Concerned that this may represent a neuroendocrine tumor with carcinoid syndrome.  Had ordered preliminary blood work.  Message to PCP regarding obtaining an octreotide scan and following up on result.               Portions of the record may have been created with voice-recognition software. Occasional wrong-word or sound-a-like substitutions may have occurred due to the inherent limitations of voice-recognition software. Read the chart carefully and recognize, using context, where substitutions have occurred.

## 2023-05-11 NOTE — PROGRESS NOTES
Night sweats persistent over 1 year with unremarkable work up.   Hypoglycemia episodes with initial suspicion for insulinoma, unrevealing work up with Endo.   Continued flushing and now  with chronic diarrhea. Intermittent cough.   Stable weight.     Suspected Neuroendocrine tumor  Occult malignancy  -     Ambulatory referral/consult to Hematology / Oncology; Future; Expected date: 05/18/2023

## 2023-05-12 ENCOUNTER — TELEPHONE (OUTPATIENT)
Dept: HEMATOLOGY/ONCOLOGY | Facility: CLINIC | Age: 83
End: 2023-05-12
Payer: MEDICARE

## 2023-05-15 LAB — GASTRIN SERPL-MCNC: 43 PG/ML

## 2023-05-16 LAB — PROINSULIN SERPL-SCNC: 19 PMOL/L (ref 3.6–22)

## 2023-05-16 NOTE — PROGRESS NOTES
"DIAGNOSIS CLINIC NOTE    Name: Navin Henning Jr.  MRN:  [id number]  :  1940 Age 83 y.o.  Date of Service: 2023    Reason for visit:  Navin Henning Jr. is a 83 y.o. male here regarding carcinoid syndrome     ONCOLOGICAL HISTORY/HISTORY OF PRESENT ILLNESS:  Navin Henning Jr. has medical problems including COPD and asthma    Navin Henning Jr. presents to diagnosis for evaluation carcinoid syndrome.    Regarding concerning symptoms he reports the following have occurred for the last 1.5-2 yrs:  -Drenching Night sweats that require him to change his clothes and shower  -"erratic" blood surgars  -Facial flushing for 1.5-2 years.   -Diarrhea (intermittent but persistent)     Weight is the same   No palpitations    Wheezing increased, on Symbicort (BID) and using albuterol on a daily basis, followed by pulmonary.     Dizziness x 5-6 weeks with imbalance.  Goes away without intervention.     Smoked  cigarrets x 20 years, quit , former ETOH (quit drinking ).  Chemical plant operater x 25 years, retired . Lives in [place] with wife 2 cats. Has 3 adult daughters, all heathy.     Past Medical History:   Diagnosis Date    Asthma     COPD (chronic obstructive pulmonary disease)     Depression     High cholesterol        No past surgical history on file.    Allergies as of 2023 - Reviewed 2023   Allergen Reaction Noted    Mirtazapine Other (See Comments) 2023    Doxycycline Rash and Hives 2018    Penicillins Rash 2019    Sulfamethoxazole-trimethoprim Rash 2018       No family history on file.    Social History     Tobacco Use    Smoking status: Former     Packs/day: 2.00     Years: 19.00     Pack years: 38.00     Types: Cigarettes     Quit date:      Years since quittin.3    Smokeless tobacco: Never   Substance Use Topics    Alcohol use: No    Drug use: No       PHYSICAL EXAMINATION:  There were no vitals taken for this visit.  Wt Readings from " Last 3 Encounters:   05/11/23 74.1 kg (163 lb 5.8 oz)   04/27/23 73.7 kg (162 lb 7.7 oz)   03/28/23 74.5 kg (164 lb 3.9 oz)     ECOG PERFORMANCE STATUS: 1  General:  albert-appearing, nontoxic  Eyes:  Equal and round pupils, EOMI, no scleral icterus  Mouth:  No lesions, moist  Cardiovascular:  Warm, well-perfused, no peripheral edema  Lungs:  Unlabored on room air, no wheezing  Neurologic:  Awake, alert and oriented, participating in the exam  Psych:  Appropriate mood and affect  Skin:  albert pallor, No rashes  Heme:  No petechiae, no purpura    LABORATORY:  CBC  Lab Results   Component Value Date    WBC 7.67 03/28/2023    HGB 16.8 03/28/2023    HCT 53.0 03/28/2023    MCV 91 03/28/2023     03/28/2023         BMP  Lab Results   Component Value Date     05/11/2023    K 5.0 05/11/2023     05/11/2023    CO2 31 (H) 05/11/2023    BUN 12 05/11/2023    CREATININE 1.1 05/11/2023    CALCIUM 10.3 05/11/2023    ANIONGAP 9 05/11/2023    ESTGFRAFRICA >60.0 07/12/2022    EGFRNONAA >60.0 07/12/2022         PERTINENT PATHOLOGY:      PERTINENT RADIOLOGY:  CT A/P 5/1/23  Impression:  1.  Proximal segmental small bowel wall thickening no new when compared to 08/09/2021.  This is nonspecific and can be secondary to a variety of causes, including inflammatory bowel disease, portal hypertension, ischemic enteritis, infectious enteritis among other things   2.  Dilation of the right renal pelvis with an abrupt caliber change at the ureteropelvic junction.  No clear cause for this is seen on imaging.  Direct visualization and/or a CT urogram could be considered for further evaluation.  3.  Mild bilateral ureterectasis, also new when compared to 08/09/2021.  This may be secondary to obstruction, possibly from the enlarged prostate.    ASSESSMENT AND PLAN:    Navin Henning Jr. is a 83 y.o. male with...    #Night Sweats, Facial Flushing, Diarrhea   -concern for neuroendocrine cancer/carcinoid, referral from  pulmonologist   -referring physician ordered some biochemical labs remain pending, will add copper PET and additional biochemical testing to work up   -follow up with [facility] with Dr. Brenna Murrieta (GI) and Dr. Carlos Erickson (thoracic) if tissue diagnosis confirmed    #erythrocytosis  - likely related to testosterone supplementations but erythrocytosis could also be a contributing factor to his above symptoms   -checking MPN panel given other symptoms    #Dizziness  -reported onset 5-6 weeks ago, could be related to erythrocytosis, checking MRI brain given age and concern for malignancy   -if malignancy ruled out, needs to consider discontinuation of testosterone therapy vs. Routine phlebotomy     # aortic atherosclerosis-noted on recent imaging, not currently on aspirin or statin, followed by cardiology, monitoring ongoing    # Alzheimer's-mild cognitive decline, on Aricept, monitored by primary care, accompanied bywife today    #Anxiety disorder-on mirtazapine, mood stable, monitored by primary care      Please note, presumed malignancy symptom management prior to tissue diagnosis remains the responsibility of the referring physician, primary care, or emergency department.  Symptom management will transfer to the treating oncologist after tissue diagnosis and initial visit with the treating oncologist.         BMT Chart Routing      Follow up with physician No follow up needed.   Follow up with [facility]    Provider visit type    Infusion scheduling note    Injection scheduling note    Labs   Scheduling:  Preferred lab:  Lab interval:  Collect all labs today   Imaging MRI, ECHO and PET scan   PET scan complete ASAP   Pharmacy appointment    Other referrals              Margarita Pandya M.D.  Hematology/Oncology

## 2023-05-17 ENCOUNTER — TELEPHONE (OUTPATIENT)
Dept: INFUSION THERAPY | Facility: HOSPITAL | Age: 83
End: 2023-05-17
Payer: MEDICARE

## 2023-05-17 ENCOUNTER — LAB VISIT (OUTPATIENT)
Dept: LAB | Facility: HOSPITAL | Age: 83
End: 2023-05-17
Attending: INTERNAL MEDICINE
Payer: MEDICARE

## 2023-05-17 ENCOUNTER — OFFICE VISIT (OUTPATIENT)
Dept: HEMATOLOGY/ONCOLOGY | Facility: CLINIC | Age: 83
End: 2023-05-17
Payer: MEDICARE

## 2023-05-17 VITALS
TEMPERATURE: 98 F | SYSTOLIC BLOOD PRESSURE: 154 MMHG | RESPIRATION RATE: 16 BRPM | HEIGHT: 66 IN | BODY MASS INDEX: 26.19 KG/M2 | OXYGEN SATURATION: 95 % | DIASTOLIC BLOOD PRESSURE: 69 MMHG | WEIGHT: 162.94 LBS | HEART RATE: 63 BPM

## 2023-05-17 DIAGNOSIS — D3A.8 NEUROENDOCRINE TUMOR: ICD-10-CM

## 2023-05-17 DIAGNOSIS — R42 DIZZINESS: ICD-10-CM

## 2023-05-17 DIAGNOSIS — R23.2 FLUSHING: ICD-10-CM

## 2023-05-17 DIAGNOSIS — R19.7 DIARRHEA, UNSPECIFIED TYPE: ICD-10-CM

## 2023-05-17 DIAGNOSIS — D45 ERYTHROCYTOSIS DUE TO POLYCYTHEMIA VERA: ICD-10-CM

## 2023-05-17 DIAGNOSIS — C80.1 OCCULT MALIGNANCY: ICD-10-CM

## 2023-05-17 DIAGNOSIS — I70.0 AORTIC ATHEROSCLEROSIS: ICD-10-CM

## 2023-05-17 DIAGNOSIS — Z08 ENCOUNTER FOR FOLLOW-UP SURVEILLANCE OF NEUROENDOCRINE CARCINOMA: ICD-10-CM

## 2023-05-17 DIAGNOSIS — Z85.89 ENCOUNTER FOR FOLLOW-UP SURVEILLANCE OF NEUROENDOCRINE CARCINOMA: ICD-10-CM

## 2023-05-17 DIAGNOSIS — D75.1 SECONDARY ERYTHROCYTOSIS: ICD-10-CM

## 2023-05-17 DIAGNOSIS — R61 CHRONIC NIGHT SWEATS: ICD-10-CM

## 2023-05-17 DIAGNOSIS — F41.9 ANXIETY DISORDER, UNSPECIFIED TYPE: Primary | ICD-10-CM

## 2023-05-17 LAB
ALBUMIN SERPL BCP-MCNC: 4 G/DL (ref 3.5–5.2)
ALP SERPL-CCNC: 84 U/L (ref 55–135)
ALT SERPL W/O P-5'-P-CCNC: 8 U/L (ref 10–44)
ANION GAP SERPL CALC-SCNC: 7 MMOL/L (ref 8–16)
AST SERPL-CCNC: 19 U/L (ref 10–40)
BASOPHILS # BLD AUTO: 0.03 K/UL (ref 0–0.2)
BASOPHILS NFR BLD: 0.5 % (ref 0–1.9)
BILIRUB SERPL-MCNC: 0.5 MG/DL (ref 0.1–1)
BUN SERPL-MCNC: 17 MG/DL (ref 8–23)
CALCIUM SERPL-MCNC: 9.8 MG/DL (ref 8.7–10.5)
CHLORIDE SERPL-SCNC: 104 MMOL/L (ref 95–110)
CO2 SERPL-SCNC: 30 MMOL/L (ref 23–29)
CREAT SERPL-MCNC: 1.2 MG/DL (ref 0.5–1.4)
DIFFERENTIAL METHOD: ABNORMAL
EOSINOPHIL # BLD AUTO: 0 K/UL (ref 0–0.5)
EOSINOPHIL NFR BLD: 0.2 % (ref 0–8)
ERYTHROCYTE [DISTWIDTH] IN BLOOD BY AUTOMATED COUNT: 15.7 % (ref 11.5–14.5)
EST. GFR  (NO RACE VARIABLE): >60 ML/MIN/1.73 M^2
GLUCOSE SERPL-MCNC: 89 MG/DL (ref 70–110)
HCT VFR BLD AUTO: 54.7 % (ref 40–54)
HGB BLD-MCNC: 17.1 G/DL (ref 14–18)
IMM GRANULOCYTES # BLD AUTO: 0.05 K/UL (ref 0–0.04)
IMM GRANULOCYTES NFR BLD AUTO: 0.8 % (ref 0–0.5)
LDH SERPL L TO P-CCNC: 183 U/L (ref 110–260)
LYMPHOCYTES # BLD AUTO: 0.9 K/UL (ref 1–4.8)
LYMPHOCYTES NFR BLD: 14.4 % (ref 18–48)
MCH RBC QN AUTO: 28.7 PG (ref 27–31)
MCHC RBC AUTO-ENTMCNC: 31.3 G/DL (ref 32–36)
MCV RBC AUTO: 92 FL (ref 82–98)
MONOCYTES # BLD AUTO: 1.5 K/UL (ref 0.3–1)
MONOCYTES NFR BLD: 24.9 % (ref 4–15)
NEUTROPHILS # BLD AUTO: 3.6 K/UL (ref 1.8–7.7)
NEUTROPHILS NFR BLD: 59.2 % (ref 38–73)
NRBC BLD-RTO: 0 /100 WBC
PLATELET # BLD AUTO: 145 K/UL (ref 150–450)
PMV BLD AUTO: 10.4 FL (ref 9.2–12.9)
POTASSIUM SERPL-SCNC: 4.6 MMOL/L (ref 3.5–5.1)
PROT SERPL-MCNC: 6.9 G/DL (ref 6–8.4)
RBC # BLD AUTO: 5.95 M/UL (ref 4.6–6.2)
SODIUM SERPL-SCNC: 141 MMOL/L (ref 136–145)
TSH SERPL DL<=0.005 MIU/L-ACNC: 2.45 UIU/ML (ref 0.4–4)
VASOACTIVE INTESTINAL POLYPEPTIDE PLASMA: 51 PG/ML
WBC # BLD AUTO: 5.99 K/UL (ref 3.9–12.7)

## 2023-05-17 PROCEDURE — 80053 COMPREHEN METABOLIC PANEL: CPT | Performed by: INTERNAL MEDICINE

## 2023-05-17 PROCEDURE — 99999 PR PBB SHADOW E&M-EST. PATIENT-LVL III: ICD-10-PCS | Mod: PBBFAC,HCNC,, | Performed by: INTERNAL MEDICINE

## 2023-05-17 PROCEDURE — 83615 LACTATE (LD) (LDH) ENZYME: CPT | Performed by: INTERNAL MEDICINE

## 2023-05-17 PROCEDURE — 1101F PT FALLS ASSESS-DOCD LE1/YR: CPT | Mod: CPTII,,, | Performed by: INTERNAL MEDICINE

## 2023-05-17 PROCEDURE — 36415 COLL VENOUS BLD VENIPUNCTURE: CPT | Performed by: INTERNAL MEDICINE

## 2023-05-17 PROCEDURE — 1126F AMNT PAIN NOTED NONE PRSNT: CPT | Mod: CPTII,,, | Performed by: INTERNAL MEDICINE

## 2023-05-17 PROCEDURE — 3078F DIAST BP <80 MM HG: CPT | Mod: CPTII,,, | Performed by: INTERNAL MEDICINE

## 2023-05-17 PROCEDURE — 1126F PR PAIN SEVERITY QUANTIFIED, NO PAIN PRESENT: ICD-10-PCS | Mod: CPTII,,, | Performed by: INTERNAL MEDICINE

## 2023-05-17 PROCEDURE — 83519 RIA NONANTIBODY: CPT | Performed by: INTERNAL MEDICINE

## 2023-05-17 PROCEDURE — 84260 ASSAY OF SEROTONIN: CPT | Performed by: INTERNAL MEDICINE

## 2023-05-17 PROCEDURE — 3077F PR MOST RECENT SYSTOLIC BLOOD PRESSURE >= 140 MM HG: ICD-10-PCS | Mod: CPTII,,, | Performed by: INTERNAL MEDICINE

## 2023-05-17 PROCEDURE — 99499 RISK ADDL DX/OHS AUDIT: ICD-10-PCS | Mod: S$GLB,,, | Performed by: INTERNAL MEDICINE

## 2023-05-17 PROCEDURE — 99205 OFFICE O/P NEW HI 60 MIN: CPT | Mod: ,,, | Performed by: INTERNAL MEDICINE

## 2023-05-17 PROCEDURE — 1101F PR PT FALLS ASSESS DOC 0-1 FALLS W/OUT INJ PAST YR: ICD-10-PCS | Mod: CPTII,,, | Performed by: INTERNAL MEDICINE

## 2023-05-17 PROCEDURE — 99205 PR OFFICE/OUTPT VISIT, NEW, LEVL V, 60-74 MIN: ICD-10-PCS | Mod: ,,, | Performed by: INTERNAL MEDICINE

## 2023-05-17 PROCEDURE — 3288F FALL RISK ASSESSMENT DOCD: CPT | Mod: CPTII,,, | Performed by: INTERNAL MEDICINE

## 2023-05-17 PROCEDURE — 3077F SYST BP >= 140 MM HG: CPT | Mod: CPTII,,, | Performed by: INTERNAL MEDICINE

## 2023-05-17 PROCEDURE — 99999 PR PBB SHADOW E&M-EST. PATIENT-LVL III: CPT | Mod: PBBFAC,HCNC,, | Performed by: INTERNAL MEDICINE

## 2023-05-17 PROCEDURE — 86316 IMMUNOASSAY TUMOR OTHER: CPT | Performed by: INTERNAL MEDICINE

## 2023-05-17 PROCEDURE — 3078F PR MOST RECENT DIASTOLIC BLOOD PRESSURE < 80 MM HG: ICD-10-PCS | Mod: CPTII,,, | Performed by: INTERNAL MEDICINE

## 2023-05-17 PROCEDURE — 99499 UNLISTED E&M SERVICE: CPT | Mod: S$GLB,,, | Performed by: INTERNAL MEDICINE

## 2023-05-17 PROCEDURE — 85025 COMPLETE CBC W/AUTO DIFF WBC: CPT | Performed by: INTERNAL MEDICINE

## 2023-05-17 PROCEDURE — 84443 ASSAY THYROID STIM HORMONE: CPT | Performed by: INTERNAL MEDICINE

## 2023-05-17 PROCEDURE — 3288F PR FALLS RISK ASSESSMENT DOCUMENTED: ICD-10-PCS | Mod: CPTII,,, | Performed by: INTERNAL MEDICINE

## 2023-05-17 NOTE — TELEPHONE ENCOUNTER
----- Message from Stefanie Luis RN sent at 5/17/2023 12:39 PM CDT -----    ----- Message -----  From: Margarita Pandya MD  Sent: 5/17/2023  11:36 AM CDT  To: 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  Hopefully we can get the PET done timely. Please add him to your tracking list.      Thanks in advance,     Margarita Pandya M.D.   Oncology and Benign Hematology

## 2023-05-18 ENCOUNTER — HOSPITAL ENCOUNTER (OUTPATIENT)
Dept: RADIOLOGY | Facility: HOSPITAL | Age: 83
Discharge: HOME | End: 2023-05-18
Attending: INTERNAL MEDICINE
Payer: MEDICARE

## 2023-05-18 ENCOUNTER — HOSPITAL ENCOUNTER (OUTPATIENT)
Dept: CARDIOLOGY | Facility: HOSPITAL | Age: 83
Discharge: HOME | End: 2023-05-18
Attending: INTERNAL MEDICINE
Payer: MEDICARE

## 2023-05-18 DIAGNOSIS — I10 ESSENTIAL HYPERTENSION: ICD-10-CM

## 2023-05-18 DIAGNOSIS — M48.02 SPINAL STENOSIS IN CERVICAL REGION: ICD-10-CM

## 2023-05-18 DIAGNOSIS — M54.2 CERVICALGIA: ICD-10-CM

## 2023-05-18 DIAGNOSIS — M48.02 SPINAL STENOSIS, CERVICAL REGION: Primary | ICD-10-CM

## 2023-05-18 DIAGNOSIS — R94.31 ABNORMAL ELECTROCARDIOGRAM (ECG) (EKG): ICD-10-CM

## 2023-05-18 DIAGNOSIS — I25.118 ATHEROSCLEROSIS OF NATIVE CORONARY ARTERY OF NATIVE HEART WITH OTHER FORM OF ANGINA PECTORIS: ICD-10-CM

## 2023-05-18 LAB
CV STRESS BASE HR: 64 BPM
DIASTOLIC BLOOD PRESSURE: 88 MMHG
OHS CV CPX 85 PERCENT MAX PREDICTED HEART RATE MALE: 118
OHS CV CPX MAX PREDICTED HEART RATE: 139
OHS CV CPX PATIENT IS FEMALE: 0
OHS CV CPX PATIENT IS MALE: 1
OHS CV CPX PEAK DIASTOLIC BLOOD PRESSURE: 80 MMHG
OHS CV CPX PEAK HEAR RATE: 83 BPM
OHS CV CPX PEAK RATE PRESSURE PRODUCT: NORMAL
OHS CV CPX PEAK SYSTOLIC BLOOD PRESSURE: 148 MMHG
OHS CV CPX PERCENT MAX PREDICTED HEART RATE ACHIEVED: 60
OHS CV CPX RATE PRESSURE PRODUCT PRESENTING: 9024
STRESS ST DEPRESSION: 1 MM
SYSTOLIC BLOOD PRESSURE: 141 MMHG

## 2023-05-18 PROCEDURE — 78452 HT MUSCLE IMAGE SPECT MULT: CPT | Mod: TC

## 2023-05-18 PROCEDURE — 93017 CV STRESS TEST TRACING ONLY: CPT

## 2023-05-18 PROCEDURE — 63600175 PHARM REV CODE 636 W HCPCS: Performed by: INTERNAL MEDICINE

## 2023-05-18 PROCEDURE — A9502 TC99M TETROFOSMIN: HCPCS

## 2023-05-18 PROCEDURE — 78452 NM MYOCARDIAL PERFUSION SPECT MULTI PHARM: ICD-10-PCS | Mod: 26,,, | Performed by: RADIOLOGY

## 2023-05-18 PROCEDURE — 93016 CV STRESS TEST SUPVJ ONLY: CPT | Mod: ,,, | Performed by: INTERNAL MEDICINE

## 2023-05-18 PROCEDURE — 93018 NUCLEAR STRESS TEST (CUPID ONLY): ICD-10-PCS | Mod: ,,, | Performed by: INTERNAL MEDICINE

## 2023-05-18 PROCEDURE — 93016 NUCLEAR STRESS TEST (CUPID ONLY): ICD-10-PCS | Mod: ,,, | Performed by: INTERNAL MEDICINE

## 2023-05-18 PROCEDURE — 78452 HT MUSCLE IMAGE SPECT MULT: CPT | Mod: 26,,, | Performed by: RADIOLOGY

## 2023-05-18 PROCEDURE — 93018 CV STRESS TEST I&R ONLY: CPT | Mod: ,,, | Performed by: INTERNAL MEDICINE

## 2023-05-18 RX ORDER — REGADENOSON 0.08 MG/ML
0.4 INJECTION, SOLUTION INTRAVENOUS ONCE
Status: COMPLETED | OUTPATIENT
Start: 2023-05-18 | End: 2023-05-18

## 2023-05-18 RX ADMIN — REGADENOSON 0.4 MG: 0.08 INJECTION, SOLUTION INTRAVENOUS at 11:05

## 2023-05-19 ENCOUNTER — PATIENT MESSAGE (OUTPATIENT)
Dept: CARDIOLOGY | Facility: CLINIC | Age: 83
End: 2023-05-19
Payer: MEDICARE

## 2023-05-19 LAB — CGA SERPL-MCNC: 77 NG/ML

## 2023-05-19 NOTE — PROGRESS NOTES
Please let Mr. Holden knows that I have reviewed the stress test results.  It showed evidence of possible small heart attack in the past.  However at this time the heart muscle is getting enough blood flow and no intervention is needed.  We will just continue with the cholesterol medicine and aspirin.  Follow up as scheduled and we will discuss the results in details.  Sincerely,  Garland Barraza MD.   Interventional Cardiologist  [facility], [place]

## 2023-05-22 LAB — SEROTONIN: <30 NG/ML

## 2023-05-24 LAB — 5OH-INDOLEACETATE SERPL-MCNC: 6 NG/ML

## 2023-05-25 ENCOUNTER — HOSPITAL ENCOUNTER (OUTPATIENT)
Dept: RADIOLOGY | Facility: HOSPITAL | Age: 83
Discharge: HOME | End: 2023-05-25
Attending: INTERNAL MEDICINE
Payer: MEDICARE

## 2023-05-25 DIAGNOSIS — C80.1 OCCULT MALIGNANCY: ICD-10-CM

## 2023-05-25 DIAGNOSIS — D3A.8 NEUROENDOCRINE TUMOR: ICD-10-CM

## 2023-05-25 PROCEDURE — 78815 PET IMAGE W/CT SKULL-THIGH: CPT | Mod: 26,PI,, | Performed by: RADIOLOGY

## 2023-05-25 PROCEDURE — 78815 PET IMAGE W/CT SKULL-THIGH: CPT | Mod: TC

## 2023-05-25 PROCEDURE — 78815 NM PET CU64 DOTATATE, SKULL TO MID THIGH: ICD-10-PCS | Mod: 26,PI,, | Performed by: RADIOLOGY

## 2023-05-26 ENCOUNTER — TELEPHONE (OUTPATIENT)
Dept: HEMATOLOGY/ONCOLOGY | Facility: CLINIC | Age: 83
End: 2023-05-26
Payer: MEDICARE

## 2023-05-26 ENCOUNTER — PATIENT MESSAGE (OUTPATIENT)
Dept: FAMILY MEDICINE | Facility: CLINIC | Age: 83
End: 2023-05-26

## 2023-05-26 LAB — PANCREASTATIN SERPL-MCNC: 73 PG/ML (ref 10–135)

## 2023-05-26 NOTE — TELEPHONE ENCOUNTER
I spoke to spouse who said they didn't call. She said it was another physician trying to find out who read the scan from yesterday. She didn't have a number for the physician and the only number that was left was dani.

## 2023-05-26 NOTE — TELEPHONE ENCOUNTER
----- Message from Seema Benz sent at 5/26/2023  2:00 PM CDT -----  Type:  Needs Medical Advice    Who Called: pt  Symptoms (please be specific): pt is calling to have someone contact him with the results from MRI     Would the patient rather a call back or a response via [facility]? call  Best Call Back Number: [phone]  Additional Information:

## 2023-05-27 ENCOUNTER — HOSPITAL ENCOUNTER (OUTPATIENT)
Dept: RADIOLOGY | Facility: HOSPITAL | Age: 83
Discharge: HOME | End: 2023-05-27
Attending: INTERNAL MEDICINE
Payer: MEDICARE

## 2023-05-27 ENCOUNTER — HOSPITAL ENCOUNTER (OUTPATIENT)
Dept: RADIOLOGY | Facility: HOSPITAL | Age: 83
Discharge: HOME | End: 2023-05-27
Attending: FAMILY MEDICINE
Payer: MEDICARE

## 2023-05-27 ENCOUNTER — PATIENT MESSAGE (OUTPATIENT)
Dept: FAMILY MEDICINE | Facility: CLINIC | Age: 83
End: 2023-05-27
Payer: MEDICARE

## 2023-05-27 ENCOUNTER — PATIENT MESSAGE (OUTPATIENT)
Dept: HEMATOLOGY/ONCOLOGY | Facility: CLINIC | Age: 83
End: 2023-05-27
Payer: MEDICARE

## 2023-05-27 DIAGNOSIS — C80.1 OCCULT MALIGNANCY: ICD-10-CM

## 2023-05-27 DIAGNOSIS — D3A.8 NEUROENDOCRINE TUMOR: ICD-10-CM

## 2023-05-27 DIAGNOSIS — M48.02 SPINAL STENOSIS, CERVICAL REGION: ICD-10-CM

## 2023-05-27 PROCEDURE — 72141 MRI NECK SPINE W/O DYE: CPT | Mod: TC

## 2023-05-27 PROCEDURE — 70552 MRI BRAIN STEM W/DYE: CPT | Mod: TC

## 2023-05-27 PROCEDURE — 25500020 PHARM REV CODE 255: Performed by: INTERNAL MEDICINE

## 2023-05-27 PROCEDURE — 72141 MRI CERVICAL SPINE WITHOUT CONTRAST: ICD-10-PCS | Mod: 26,,, | Performed by: RADIOLOGY

## 2023-05-27 PROCEDURE — 70552 MRI BRAIN STEM W/DYE: CPT | Mod: 26,,, | Performed by: RADIOLOGY

## 2023-05-27 PROCEDURE — A9585 GADOBUTROL INJECTION: HCPCS | Performed by: INTERNAL MEDICINE

## 2023-05-27 PROCEDURE — 72141 MRI NECK SPINE W/O DYE: CPT | Mod: 26,,, | Performed by: RADIOLOGY

## 2023-05-27 PROCEDURE — 70552 MRI BRAIN WITH CONTRAST: ICD-10-PCS | Mod: 26,,, | Performed by: RADIOLOGY

## 2023-05-27 RX ORDER — GADOBUTROL 604.72 MG/ML
7.5 INJECTION INTRAVENOUS
Status: COMPLETED | OUTPATIENT
Start: 2023-05-27 | End: 2023-05-27

## 2023-05-27 RX ADMIN — GADOBUTROL 7.5 ML: 604.72 INJECTION INTRAVENOUS at 04:05

## 2023-05-29 ENCOUNTER — HOSPITAL ENCOUNTER (OUTPATIENT)
Dept: CARDIOLOGY | Facility: HOSPITAL | Age: 83
Discharge: HOME | End: 2023-05-29
Attending: INTERNAL MEDICINE
Payer: MEDICARE

## 2023-05-29 VITALS — WEIGHT: 161 LBS | HEIGHT: 66 IN | BODY MASS INDEX: 25.88 KG/M2

## 2023-05-29 DIAGNOSIS — D3A.8 NEUROENDOCRINE TUMOR: ICD-10-CM

## 2023-05-29 DIAGNOSIS — C80.1 OCCULT MALIGNANCY: ICD-10-CM

## 2023-05-29 LAB
ASCENDING AORTA: 2.96 CM
AV INDEX (PROSTH): 0.89
AV MEAN GRADIENT: 4 MMHG
AV PEAK GRADIENT: 7 MMHG
AV VALVE AREA: 2.83 CM2
AV VELOCITY RATIO: 0.95
BSA FOR ECHO PROCEDURE: 1.84 M2
CV ECHO LV RWT: 0.38 CM
DOP CALC AO PEAK VEL: 1.28 M/S
DOP CALC AO VTI: 28.5 CM
DOP CALC LVOT AREA: 3.2 CM2
DOP CALC LVOT DIAMETER: 2.01 CM
DOP CALC LVOT PEAK VEL: 1.22 M/S
DOP CALC LVOT STROKE VOLUME: 80.56 CM3
DOP CALCLVOT PEAK VEL VTI: 25.4 CM
E WAVE DECELERATION TIME: 184.22 MSEC
E/A RATIO: 0.63
E/E' RATIO: 10.83 M/S
ECHO LV POSTERIOR WALL: 0.73 CM (ref 0.6–1.1)
EJECTION FRACTION: 65 %
FRACTIONAL SHORTENING: 36 % (ref 28–44)
INTERVENTRICULAR SEPTUM: 0.89 CM (ref 0.6–1.1)
IVRT: 131.3 MSEC
LA MAJOR: 5.31 CM
LA MINOR: 4.9 CM
LA WIDTH: 4.8 CM
LEFT ATRIUM SIZE: 3.53 CM
LEFT ATRIUM VOLUME INDEX MOD: 25.8 ML/M2
LEFT ATRIUM VOLUME INDEX: 40.3 ML/M2
LEFT ATRIUM VOLUME MOD: 46.97 CM3
LEFT ATRIUM VOLUME: 73.41 CM3
LEFT INTERNAL DIMENSION IN SYSTOLE: 2.49 CM (ref 2.1–4)
LEFT VENTRICLE DIASTOLIC VOLUME INDEX: 32.78 ML/M2
LEFT VENTRICLE DIASTOLIC VOLUME: 59.66 ML
LEFT VENTRICLE MASS INDEX: 49 G/M2
LEFT VENTRICLE SYSTOLIC VOLUME INDEX: 14.8 ML/M2
LEFT VENTRICLE SYSTOLIC VOLUME: 26.95 ML
LEFT VENTRICULAR INTERNAL DIMENSION IN DIASTOLE: 3.87 CM (ref 3.5–6)
LEFT VENTRICULAR MASS: 90.05 G
LV LATERAL E/E' RATIO: 13 M/S
LV SEPTAL E/E' RATIO: 9.29 M/S
LVOT MG: 2.87 MMHG
LVOT MV: 0.8 CM/S
MV PEAK A VEL: 1.03 M/S
MV PEAK E VEL: 0.65 M/S
MV STENOSIS PRESSURE HALF TIME: 53.42 MS
MV VALVE AREA P 1/2 METHOD: 4.12 CM2
OHS LV EJECTION FRACTION SIMPSONS BIPLANE MOD: 5 %
PISA TR MAX VEL: 2.23 M/S
PULM VEIN S/D RATIO: 1.49
PV PEAK D VEL: 0.37 M/S
PV PEAK S VEL: 0.55 M/S
RA MAJOR: 5.37 CM
RA PRESSURE: 3 MMHG
RA WIDTH: 4.1 CM
RIGHT VENTRICULAR END-DIASTOLIC DIMENSION: 3.04 CM
RV TISSUE DOPPLER FREE WALL SYSTOLIC VELOCITY 1 (APICAL 4 CHAMBER VIEW): 0.01 CM/S
STJ: 2.54 CM
TDI LATERAL: 0.05 M/S
TDI SEPTAL: 0.07 M/S
TDI: 0.06 M/S
TR MAX PG: 20 MMHG
TV REST PULMONARY ARTERY PRESSURE: 23 MMHG

## 2023-05-29 PROCEDURE — 93306 TTE W/DOPPLER COMPLETE: CPT | Mod: 26,,, | Performed by: INTERNAL MEDICINE

## 2023-05-29 PROCEDURE — 93356 ECHO (CUPID ONLY): ICD-10-PCS | Mod: ,,, | Performed by: INTERNAL MEDICINE

## 2023-05-29 PROCEDURE — 93356 MYOCRD STRAIN IMG SPCKL TRCK: CPT | Mod: ,,, | Performed by: INTERNAL MEDICINE

## 2023-05-29 PROCEDURE — 93356 MYOCRD STRAIN IMG SPCKL TRCK: CPT

## 2023-05-29 PROCEDURE — 93306 ECHO (CUPID ONLY): ICD-10-PCS | Mod: 26,,, | Performed by: INTERNAL MEDICINE

## 2023-06-15 ENCOUNTER — PATIENT MESSAGE (OUTPATIENT)
Dept: FAMILY MEDICINE | Facility: CLINIC | Age: 83
End: 2023-06-15
Payer: MEDICARE

## 2023-06-15 DIAGNOSIS — E29.1 TESTICULAR HYPOFUNCTION: Primary | ICD-10-CM

## 2023-06-20 ENCOUNTER — PATIENT MESSAGE (OUTPATIENT)
Dept: FAMILY MEDICINE | Facility: CLINIC | Age: 83
End: 2023-06-20
Payer: MEDICARE

## 2023-06-21 ENCOUNTER — LAB VISIT (OUTPATIENT)
Dept: LAB | Facility: HOSPITAL | Age: 83
End: 2023-06-21
Attending: FAMILY MEDICINE
Payer: MEDICARE

## 2023-06-21 DIAGNOSIS — E29.1 TESTICULAR HYPOFUNCTION: ICD-10-CM

## 2023-06-21 LAB — TESTOST SERPL-MCNC: 171 NG/DL (ref 304–1227)

## 2023-06-21 PROCEDURE — 36415 COLL VENOUS BLD VENIPUNCTURE: CPT | Mod: PO | Performed by: FAMILY MEDICINE

## 2023-06-21 PROCEDURE — 84402 ASSAY OF FREE TESTOSTERONE: CPT | Performed by: FAMILY MEDICINE

## 2023-06-21 PROCEDURE — 84403 ASSAY OF TOTAL TESTOSTERONE: CPT | Performed by: FAMILY MEDICINE

## 2023-06-23 LAB — TESTOST FREE SERPL-MCNC: 1.8 PG/ML

## 2023-06-29 ENCOUNTER — LAB VISIT (OUTPATIENT)
Dept: LAB | Facility: HOSPITAL | Age: 83
End: 2023-06-29
Attending: NURSE PRACTITIONER
Payer: MEDICARE

## 2023-06-29 ENCOUNTER — OFFICE VISIT (OUTPATIENT)
Dept: UROLOGY | Facility: CLINIC | Age: 83
End: 2023-06-29
Payer: MEDICARE

## 2023-06-29 VITALS
HEIGHT: 66 IN | BODY MASS INDEX: 25.39 KG/M2 | HEART RATE: 62 BPM | WEIGHT: 158 LBS | SYSTOLIC BLOOD PRESSURE: 145 MMHG | DIASTOLIC BLOOD PRESSURE: 69 MMHG

## 2023-06-29 DIAGNOSIS — R79.89 LOW TESTOSTERONE IN MALE: ICD-10-CM

## 2023-06-29 DIAGNOSIS — N52.01 ERECTILE DYSFUNCTION DUE TO ARTERIAL INSUFFICIENCY: ICD-10-CM

## 2023-06-29 DIAGNOSIS — R71.8 ELEVATED HEMATOCRIT: ICD-10-CM

## 2023-06-29 DIAGNOSIS — N13.8 BPH WITH URINARY OBSTRUCTION: Primary | ICD-10-CM

## 2023-06-29 DIAGNOSIS — E29.1 HYPOGONADISM IN MALE: ICD-10-CM

## 2023-06-29 DIAGNOSIS — N40.1 BPH WITH URINARY OBSTRUCTION: Primary | ICD-10-CM

## 2023-06-29 DIAGNOSIS — N40.1 BPH WITH URINARY OBSTRUCTION: ICD-10-CM

## 2023-06-29 DIAGNOSIS — N13.8 BPH WITH URINARY OBSTRUCTION: ICD-10-CM

## 2023-06-29 LAB
BASOPHILS # BLD AUTO: 0.02 K/UL (ref 0–0.2)
BASOPHILS NFR BLD: 0.3 % (ref 0–1.9)
DIFFERENTIAL METHOD: ABNORMAL
EOSINOPHIL # BLD AUTO: 0.1 K/UL (ref 0–0.5)
EOSINOPHIL NFR BLD: 2.2 % (ref 0–8)
ERYTHROCYTE [DISTWIDTH] IN BLOOD BY AUTOMATED COUNT: 13.9 % (ref 11.5–14.5)
HCT VFR BLD AUTO: 50.8 % (ref 40–54)
HGB BLD-MCNC: 16.3 G/DL (ref 14–18)
IMM GRANULOCYTES # BLD AUTO: 0.04 K/UL (ref 0–0.04)
IMM GRANULOCYTES NFR BLD AUTO: 0.6 % (ref 0–0.5)
LYMPHOCYTES # BLD AUTO: 1 K/UL (ref 1–4.8)
LYMPHOCYTES NFR BLD: 16.2 % (ref 18–48)
MCH RBC QN AUTO: 29 PG (ref 27–31)
MCHC RBC AUTO-ENTMCNC: 32.1 G/DL (ref 32–36)
MCV RBC AUTO: 90 FL (ref 82–98)
MONOCYTES # BLD AUTO: 1.6 K/UL (ref 0.3–1)
MONOCYTES NFR BLD: 26.1 % (ref 4–15)
NEUTROPHILS # BLD AUTO: 3.4 K/UL (ref 1.8–7.7)
NEUTROPHILS NFR BLD: 54.6 % (ref 38–73)
NRBC BLD-RTO: 0 /100 WBC
PLATELET # BLD AUTO: 154 K/UL (ref 150–450)
PMV BLD AUTO: 10.2 FL (ref 9.2–12.9)
PROLACTIN SERPL IA-MCNC: 3.6 NG/ML (ref 3.5–19.4)
RBC # BLD AUTO: 5.62 M/UL (ref 4.6–6.2)
WBC # BLD AUTO: 6.29 K/UL (ref 3.9–12.7)

## 2023-06-29 PROCEDURE — 1159F PR MEDICATION LIST DOCUMENTED IN MEDICAL RECORD: ICD-10-PCS | Mod: CPTII,S$GLB,, | Performed by: NURSE PRACTITIONER

## 2023-06-29 PROCEDURE — 99214 PR OFFICE/OUTPT VISIT, EST, LEVL IV, 30-39 MIN: ICD-10-PCS | Mod: S$GLB,,, | Performed by: NURSE PRACTITIONER

## 2023-06-29 PROCEDURE — 3077F SYST BP >= 140 MM HG: CPT | Mod: CPTII,S$GLB,, | Performed by: NURSE PRACTITIONER

## 2023-06-29 PROCEDURE — 3288F FALL RISK ASSESSMENT DOCD: CPT | Mod: CPTII,S$GLB,, | Performed by: NURSE PRACTITIONER

## 2023-06-29 PROCEDURE — 1126F PR PAIN SEVERITY QUANTIFIED, NO PAIN PRESENT: ICD-10-PCS | Mod: CPTII,S$GLB,, | Performed by: NURSE PRACTITIONER

## 2023-06-29 PROCEDURE — 3288F PR FALLS RISK ASSESSMENT DOCUMENTED: ICD-10-PCS | Mod: CPTII,S$GLB,, | Performed by: NURSE PRACTITIONER

## 2023-06-29 PROCEDURE — 99999 PR PBB SHADOW E&M-EST. PATIENT-LVL IV: ICD-10-PCS | Mod: PBBFAC,,, | Performed by: NURSE PRACTITIONER

## 2023-06-29 PROCEDURE — 3078F DIAST BP <80 MM HG: CPT | Mod: CPTII,S$GLB,, | Performed by: NURSE PRACTITIONER

## 2023-06-29 PROCEDURE — 3077F PR MOST RECENT SYSTOLIC BLOOD PRESSURE >= 140 MM HG: ICD-10-PCS | Mod: CPTII,S$GLB,, | Performed by: NURSE PRACTITIONER

## 2023-06-29 PROCEDURE — 84146 ASSAY OF PROLACTIN: CPT | Performed by: NURSE PRACTITIONER

## 2023-06-29 PROCEDURE — 99999 PR PBB SHADOW E&M-EST. PATIENT-LVL IV: CPT | Mod: PBBFAC,,, | Performed by: NURSE PRACTITIONER

## 2023-06-29 PROCEDURE — 1160F PR REVIEW ALL MEDS BY PRESCRIBER/CLIN PHARMACIST DOCUMENTED: ICD-10-PCS | Mod: CPTII,S$GLB,, | Performed by: NURSE PRACTITIONER

## 2023-06-29 PROCEDURE — 1160F RVW MEDS BY RX/DR IN RCRD: CPT | Mod: CPTII,S$GLB,, | Performed by: NURSE PRACTITIONER

## 2023-06-29 PROCEDURE — 3078F PR MOST RECENT DIASTOLIC BLOOD PRESSURE < 80 MM HG: ICD-10-PCS | Mod: CPTII,S$GLB,, | Performed by: NURSE PRACTITIONER

## 2023-06-29 PROCEDURE — 99214 OFFICE O/P EST MOD 30 MIN: CPT | Mod: S$GLB,,, | Performed by: NURSE PRACTITIONER

## 2023-06-29 PROCEDURE — 1101F PR PT FALLS ASSESS DOC 0-1 FALLS W/OUT INJ PAST YR: ICD-10-PCS | Mod: CPTII,S$GLB,, | Performed by: NURSE PRACTITIONER

## 2023-06-29 PROCEDURE — 1126F AMNT PAIN NOTED NONE PRSNT: CPT | Mod: CPTII,S$GLB,, | Performed by: NURSE PRACTITIONER

## 2023-06-29 PROCEDURE — 1159F MED LIST DOCD IN RCRD: CPT | Mod: CPTII,S$GLB,, | Performed by: NURSE PRACTITIONER

## 2023-06-29 PROCEDURE — 85025 COMPLETE CBC W/AUTO DIFF WBC: CPT | Performed by: NURSE PRACTITIONER

## 2023-06-29 PROCEDURE — 84153 ASSAY OF PSA TOTAL: CPT | Performed by: NURSE PRACTITIONER

## 2023-06-29 PROCEDURE — 1101F PT FALLS ASSESS-DOCD LE1/YR: CPT | Mod: CPTII,S$GLB,, | Performed by: NURSE PRACTITIONER

## 2023-06-29 RX ORDER — RIVASTIGMINE TARTRATE 3 MG/1
3 CAPSULE ORAL 2 TIMES DAILY
COMMUNITY
Start: 2023-06-08 | End: 2024-02-22

## 2023-06-29 RX ORDER — RIVASTIGMINE TARTRATE 1.5 MG/1
CAPSULE ORAL
COMMUNITY
End: 2023-07-14

## 2023-06-29 RX ORDER — SILDENAFIL 100 MG/1
100 TABLET, FILM COATED ORAL DAILY PRN
Qty: 10 TABLET | Refills: 12 | Status: SHIPPED | OUTPATIENT
Start: 2023-06-29 | End: 2024-06-28

## 2023-06-29 NOTE — PROGRESS NOTES
CHIEF COMPLAINT:    Navin Henning Jr. is a 83 y.o. male presents today for Low Testosterone.     HISTORY OF PRESENTING ILLINESS:    Navin Henning Jr. is a 83 y.o. male new to our Urology Clinic. This is a new patient to for me. I personally reviewed their recent medical records as well as their outside medical, surgical, family, & social history.     Here today with his wife to discuss Testosterone Replacement. He has a history of Hypogonadism; previously managed with Testopel, then switched to T. Cypionate 200mg every 2 weeks. He had been seen by an outside Urologist who recently retired.    He has been off TRT for ~ 3 months. 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 (-) pet scan.   Here today to discuss TRT and options.    Overall he feels good. Not feeling any more fatigued since being off TRT.   Ok without taking it.    Ok with urination; takes Flomax daily.   ED > 1 year. Has taken Viagra in the past; gets erections but trouble maintaining with Viagra on board. His meds  would like to try a new Rx.        REVIEW OF SYSTEMS:  Review of Systems   Constitutional: Negative.  Negative for chills, fever and malaise/fatigue (feels good overall).   Eyes:  Negative for double vision.   Respiratory:  Negative for cough and shortness of breath.    Cardiovascular:  Negative for chest pain.   Gastrointestinal:  Negative for abdominal pain, constipation, diarrhea, nausea and vomiting.   Genitourinary:  Positive for frequency. Negative for dysuria, flank pain and hematuria.        Ok with urination     Neurological:  Negative for dizziness and seizures.   Endo/Heme/Allergies:  Negative for polydipsia.   Psychiatric/Behavioral:  Positive for memory loss (being treated for dementia).        PATIENT HISTORY:    Past Medical History:   Diagnosis Date    Asthma     COPD (chronic obstructive pulmonary disease)     Depression     High cholesterol        History reviewed. No pertinent surgical  history.    History reviewed. No pertinent family history.    Social History     Socioeconomic History    Marital status:    Tobacco Use    Smoking status: Former     Packs/day: 2.00     Years: 19.00     Pack years: 38.00     Types: Cigarettes     Quit date:      Years since quittin.5    Smokeless tobacco: Never   Substance and Sexual Activity    Alcohol use: No    Drug use: No       Allergies:  Mirtazapine, Doxycycline, Penicillins, and Sulfamethoxazole-trimethoprim    Medications:    Current Outpatient Medications:     albuterol (VENTOLIN HFA) 90 mcg/actuation inhaler, Inhale 2 puffs into the lungs every 4 (four) hours as needed for Wheezing or Shortness of Breath. Rescue, Disp: 18 g, Rfl: 0    ammonium lactate (LAC-HYDRIN) 12 % lotion, ammonium lactate 12 % lotion  APPLY LOTION TOPICALLY THREE TIMES DAILY AS NEEDED TO DRY SKIN OF THE BODY AND SCALP, Disp: , Rfl:     azelastine (ASTELIN) 137 mcg (0.1 %) nasal spray, 2 sprays (274 mcg total) by Nasal route 2 (two) times daily., Disp: 60 mL, Rfl: 3    budesonide-formoterol 160-4.5 mcg (SYMBICORT) 160-4.5 mcg/actuation HFAA, Inhale 2 puffs into the lungs every 12 (twelve) hours. Controller, Disp: 10.2 g, Rfl: 5    donepeziL (ARICEPT) 5 MG tablet, Take 5 mg by mouth once daily., Disp: , Rfl:     famotidine (PEPCID) 20 MG tablet, Take 1 tablet (20 mg total) by mouth nightly as needed for Heartburn., Disp: 90 tablet, Rfl: 3    FLUoxetine 40 MG capsule, Take 40 mg by mouth., Disp: , Rfl:     fluticasone propionate (FLONASE) 50 mcg/actuation nasal spray, 1 spray (50 mcg total) by Each Nostril route 2 (two) times a day., Disp: 16 g, Rfl: 5    HYDROcodone-acetaminophen (NORCO) 5-325 mg per tablet, hydrocodone 5 mg-acetaminophen 325 mg tablet  TAKE 1 TABLET BY MOUTH TWICE DAILY AS NEEDED FOR PAIN, Disp: , Rfl:     hydrocortisone 2.5 % lotion, APPLY LOTION EXTERNALLY TWICE DAILY AS NEEDED FOR SCALING AND REDNESS OF FACE. USE SPARINGLY. CAN THIN SKIN IF  OVERUSED, Disp: , Rfl:     ketoconazole (NIZORAL) 2 % shampoo, SHAMPOO THREE TIMES WEEKLY TO AFFECTED AREAS. LET SIT FOR 5 MINUTES AND WASH OFF. FOR FACE, Disp: , Rfl:     levocetirizine (XYZAL) 5 MG tablet, Take 1 tablet (5 mg total) by mouth every evening., Disp: 90 tablet, Rfl: 3    melatonin 10 mg TbDL, Take 1 tablet by mouth every evening., Disp: , Rfl:     memantine (NAMENDA XR) 28 mg CSpX, , Disp: , Rfl:     mirtazapine (REMERON) 15 MG tablet, mirtazapine 15 mg tablet  Take 1 tablet every day by oral route in the evening for 30 days., Disp: , Rfl:     multivitamin capsule, Take 1 capsule by mouth once daily., Disp: , Rfl:     pravastatin (PRAVACHOL) 20 MG tablet, Take 1 tablet (20 mg total) by mouth every evening., Disp: 90 tablet, Rfl: 3    rivastigmine tartrate (EXELON) 1.5 MG capsule, rivastigmine 1.5 mg capsule, Disp: , Rfl:     rivastigmine tartrate (EXELON) 1.5 MG capsule, Take 1.5 mg by mouth 2 (two) times daily., Disp: , Rfl:     sildenafiL (VIAGRA) 100 MG tablet, Take 1 tablet (100 mg total) by mouth daily as needed for Erectile Dysfunction (take on an empty stomach 30-60 minutes before)., Disp: 10 tablet, Rfl: 12    tamsulosin (FLOMAX) 0.4 mg Cap, Take 1 capsule by mouth once daily., Disp: , Rfl:     testosterone (TESTOPEL) 75 mg Pllt, , Disp: , Rfl:     traMADoL (ULTRAM) 50 mg tablet, Take 50 mg by mouth daily as needed., Disp: , Rfl:     PHYSICAL EXAMINATION:  Physical Exam  Vitals and nursing note reviewed.   Constitutional:       General: He is awake.      Appearance: Normal appearance.   HENT:      Head: Normocephalic.      Right Ear: External ear normal.      Left Ear: External ear normal.      Nose: Nose normal.   Cardiovascular:      Rate and Rhythm: Normal rate.   Pulmonary:      Effort: Pulmonary effort is normal. No respiratory distress.   Abdominal:      Tenderness: There is no abdominal tenderness. There is no right CVA tenderness or left CVA tenderness.   Musculoskeletal:          General: Normal range of motion.      Cervical back: Normal range of motion.   Skin:     General: Skin is warm and dry.   Neurological:      General: No focal deficit present.      Mental Status: He is alert and oriented to person, place, and time.   Psychiatric:         Mood and Affect: Mood normal.         Behavior: Behavior is cooperative.         LABS:    06/21/2023 T was 171 at 7:46am    05/17/2023 HCT was 54.7      No results found for: PSA, PSADIAG, PSATOTAL, PHIND    Lab Results   Component Value Date    CREATININE 1.2 05/17/2023    EGFRNORACEVR >60.0 05/17/2023             IMPRESSION:    Encounter Diagnoses   Name Primary?    BPH with urinary obstruction Yes    Hypogonadism in male     Elevated hematocrit     Low testosterone in male     Erectile dysfunction due to arterial insufficiency          Assessment:       1. BPH with urinary obstruction    2. Hypogonadism in male    3. Elevated hematocrit    4. Low testosterone in male    5. Erectile dysfunction due to arterial insufficiency        Plan:         I spent 45 minutes with the patient of which more than half was spent in direct consultation with the patient in regards to our treatment and plan.  We addressed the office findings and recent labs.   Education and recommendations of today's plan of care including home remedies and needed follow up with PCP.   We discussed the chief complaint; reviewed the LUTS and the possible contributory factors.   Discussed TRT and risks.   If feeling ok I do not recommend continueing; especially with elevated HCT  Will repeat CBC today; if remains elevated then have him donate blood.   Check PSA and prolactin due to long term TRT  Discussed ED and contributory factors  Other options discussed for ED but prefers retry Viagra; now on empty stomach  Declines 2nd and 3rd line therapy.   Recommended lifestyle modifications with a proper, healthy diet, good hydration but during the day. Reducing bladder irritants.   Benefits  of regular exercise.

## 2023-06-30 LAB — PSA SERPL-MCNC: 1.3 NG/ML

## 2023-07-14 ENCOUNTER — HOSPITAL ENCOUNTER (OUTPATIENT)
Dept: PULMONOLOGY | Facility: CLINIC | Age: 83
Discharge: HOME | End: 2023-07-14
Payer: MEDICARE

## 2023-07-14 ENCOUNTER — OFFICE VISIT (OUTPATIENT)
Dept: PULMONOLOGY | Facility: CLINIC | Age: 83
End: 2023-07-14
Payer: MEDICARE

## 2023-07-14 VITALS
SYSTOLIC BLOOD PRESSURE: 124 MMHG | BODY MASS INDEX: 25.19 KG/M2 | HEIGHT: 66 IN | HEART RATE: 94 BPM | WEIGHT: 156.75 LBS | OXYGEN SATURATION: 71 % | DIASTOLIC BLOOD PRESSURE: 62 MMHG

## 2023-07-14 DIAGNOSIS — R23.2 FLUSHING: ICD-10-CM

## 2023-07-14 DIAGNOSIS — R05.3 CHRONIC COUGH: ICD-10-CM

## 2023-07-14 DIAGNOSIS — N18.31 STAGE 3A CHRONIC KIDNEY DISEASE: ICD-10-CM

## 2023-07-14 DIAGNOSIS — E66.3 OVERWEIGHT WITH BODY MASS INDEX (BMI) OF 26 TO 26.9 IN ADULT: ICD-10-CM

## 2023-07-14 DIAGNOSIS — J30.9 ALLERGIC RHINITIS, UNSPECIFIED SEASONALITY, UNSPECIFIED TRIGGER: Primary | ICD-10-CM

## 2023-07-14 DIAGNOSIS — J44.89 ASTHMA-COPD OVERLAP SYNDROME: ICD-10-CM

## 2023-07-14 DIAGNOSIS — I70.0 AORTIC ATHEROSCLEROSIS: ICD-10-CM

## 2023-07-14 PROCEDURE — 94729 PR C02/MEMBANE DIFFUSE CAPACITY: ICD-10-PCS | Mod: S$GLB,,, | Performed by: INTERNAL MEDICINE

## 2023-07-14 PROCEDURE — 99213 PR OFFICE/OUTPT VISIT, EST, LEVL III, 20-29 MIN: ICD-10-PCS | Mod: 25,S$GLB,, | Performed by: STUDENT IN AN ORGANIZED HEALTH CARE EDUCATION/TRAINING PROGRAM

## 2023-07-14 PROCEDURE — 1101F PT FALLS ASSESS-DOCD LE1/YR: CPT | Mod: CPTII,S$GLB,, | Performed by: STUDENT IN AN ORGANIZED HEALTH CARE EDUCATION/TRAINING PROGRAM

## 2023-07-14 PROCEDURE — 94060 PR EVAL OF BRONCHOSPASM: ICD-10-PCS | Mod: S$GLB,,, | Performed by: INTERNAL MEDICINE

## 2023-07-14 PROCEDURE — 3288F PR FALLS RISK ASSESSMENT DOCUMENTED: ICD-10-PCS | Mod: CPTII,S$GLB,, | Performed by: STUDENT IN AN ORGANIZED HEALTH CARE EDUCATION/TRAINING PROGRAM

## 2023-07-14 PROCEDURE — 1159F PR MEDICATION LIST DOCUMENTED IN MEDICAL RECORD: ICD-10-PCS | Mod: CPTII,S$GLB,, | Performed by: STUDENT IN AN ORGANIZED HEALTH CARE EDUCATION/TRAINING PROGRAM

## 2023-07-14 PROCEDURE — 3078F DIAST BP <80 MM HG: CPT | Mod: CPTII,S$GLB,, | Performed by: STUDENT IN AN ORGANIZED HEALTH CARE EDUCATION/TRAINING PROGRAM

## 2023-07-14 PROCEDURE — 1159F MED LIST DOCD IN RCRD: CPT | Mod: CPTII,S$GLB,, | Performed by: STUDENT IN AN ORGANIZED HEALTH CARE EDUCATION/TRAINING PROGRAM

## 2023-07-14 PROCEDURE — 1126F PR PAIN SEVERITY QUANTIFIED, NO PAIN PRESENT: ICD-10-PCS | Mod: CPTII,S$GLB,, | Performed by: STUDENT IN AN ORGANIZED HEALTH CARE EDUCATION/TRAINING PROGRAM

## 2023-07-14 PROCEDURE — 99213 OFFICE O/P EST LOW 20 MIN: CPT | Mod: 25,S$GLB,, | Performed by: STUDENT IN AN ORGANIZED HEALTH CARE EDUCATION/TRAINING PROGRAM

## 2023-07-14 PROCEDURE — 3074F SYST BP LT 130 MM HG: CPT | Mod: CPTII,S$GLB,, | Performed by: STUDENT IN AN ORGANIZED HEALTH CARE EDUCATION/TRAINING PROGRAM

## 2023-07-14 PROCEDURE — 3074F PR MOST RECENT SYSTOLIC BLOOD PRESSURE < 130 MM HG: ICD-10-PCS | Mod: CPTII,S$GLB,, | Performed by: STUDENT IN AN ORGANIZED HEALTH CARE EDUCATION/TRAINING PROGRAM

## 2023-07-14 PROCEDURE — 1101F PR PT FALLS ASSESS DOC 0-1 FALLS W/OUT INJ PAST YR: ICD-10-PCS | Mod: CPTII,S$GLB,, | Performed by: STUDENT IN AN ORGANIZED HEALTH CARE EDUCATION/TRAINING PROGRAM

## 2023-07-14 PROCEDURE — 99999 PR PBB SHADOW E&M-EST. PATIENT-LVL III: ICD-10-PCS | Mod: PBBFAC,,, | Performed by: STUDENT IN AN ORGANIZED HEALTH CARE EDUCATION/TRAINING PROGRAM

## 2023-07-14 PROCEDURE — 94060 EVALUATION OF WHEEZING: CPT | Mod: S$GLB,,, | Performed by: INTERNAL MEDICINE

## 2023-07-14 PROCEDURE — 3288F FALL RISK ASSESSMENT DOCD: CPT | Mod: CPTII,S$GLB,, | Performed by: STUDENT IN AN ORGANIZED HEALTH CARE EDUCATION/TRAINING PROGRAM

## 2023-07-14 PROCEDURE — 94727 PR PULM FUNCTION TEST BY GAS: ICD-10-PCS | Mod: S$GLB,,, | Performed by: INTERNAL MEDICINE

## 2023-07-14 PROCEDURE — 99999 PR PBB SHADOW E&M-EST. PATIENT-LVL III: CPT | Mod: PBBFAC,,, | Performed by: STUDENT IN AN ORGANIZED HEALTH CARE EDUCATION/TRAINING PROGRAM

## 2023-07-14 PROCEDURE — 3078F PR MOST RECENT DIASTOLIC BLOOD PRESSURE < 80 MM HG: ICD-10-PCS | Mod: CPTII,S$GLB,, | Performed by: STUDENT IN AN ORGANIZED HEALTH CARE EDUCATION/TRAINING PROGRAM

## 2023-07-14 PROCEDURE — 94729 DIFFUSING CAPACITY: CPT | Mod: S$GLB,,, | Performed by: INTERNAL MEDICINE

## 2023-07-14 PROCEDURE — 1126F AMNT PAIN NOTED NONE PRSNT: CPT | Mod: CPTII,S$GLB,, | Performed by: STUDENT IN AN ORGANIZED HEALTH CARE EDUCATION/TRAINING PROGRAM

## 2023-07-14 PROCEDURE — 94727 GAS DIL/WSHOT DETER LNG VOL: CPT | Mod: S$GLB,,, | Performed by: INTERNAL MEDICINE

## 2023-07-14 NOTE — PROGRESS NOTES
"Subjective:     Reason for visit: chronic cough    Patient ID:  Navin Henning Jr. is a 83 y.o. male with Alzheimer's, anxiety/depression, HTN, CKD 3, hyperlipidemia, allergic rhinosinusitis      Interval History:  Much better.  Cough is all but resolved.  Overall he is feeling really well      Additional Pulmonary History:  Childhood Illnesses:  No  Occupational/Environmental:  Worked in a chemical plant for 25 years  Tobacco/Smokin year history.  Quit     Objective:     Vitals:    23 1507   BP: 124/62   BP Location: Right arm   Patient Position: Sitting   BP Method: Medium (Manual)   Pulse: 94   SpO2: (!) 71%   Weight: 71.1 kg (156 lb 12 oz)   Height: 5' 6" (1.676 m)           Physical Exam  Vitals and nursing note reviewed.   Constitutional:       General: He is not in acute distress.     Appearance: He is overweight. He is not ill-appearing, toxic-appearing or diaphoretic.   HENT:      Head: Normocephalic and atraumatic.      Nose: No rhinorrhea.      Mouth/Throat:      Mouth: Mucous membranes are moist.   Eyes:      General: No scleral icterus.     Extraocular Movements: Extraocular movements intact.   Cardiovascular:      Rate and Rhythm: Normal rate and regular rhythm.   Pulmonary:      Effort: No tachypnea, accessory muscle usage, respiratory distress or retractions.   Abdominal:      General: There is no distension.   Skin:     General: Skin is warm and dry.      Coloration: Skin is not jaundiced.      Findings: No rash.   Neurological:      General: No focal deficit present.      Mental Status: Mental status is at baseline.        Personal Diagnostic Review and Interpretation  2023 PET DOTATATE:  No somatostatin receptor avid disease    2023 CT chest without contrast:  Aortic and coronary artery atherosclerosis; paraseptal emphysema in the bilateral upper lobes with small scarring; mildly ectatic airways in the RML; right-sided calcified granuloma; bibasilar reticulations "     08/09/2021 CT abdomen and pelvis:  Bibasilar atelectasis      Pertinent Studies Reviewed & Interpreted:     Pulmonary Function Tests:   07/14/2023: FEV1 79, FVC 89, FEV1/FVC 66; post bronchodilator response 13.7% at 260 cc.  TLC 82, ERV 22.  DLCO 58    6 Minute Walk Tests:   None    Echocardiograms:   03/14/2023:  EF 55% with elevated MITCH 30 and concentric LVH; abnormal septal motion; PASP 9      Assessment & Plan:       Problem List Items Addressed This Visit          ENT    Allergic rhinosinusitis - Primary    Overview     Significant sinus symptoms with almost constant postnasal gtt.   This appears to have been the primary  behind his chronic cough.  Sinus regimen seems to have improved symptoms.            Pulmonary    Asthma-COPD overlap syndrome    Overview     Substantial smoking history.  Emphysematous changes seen on recent CT chest.  PFTs with mild obstruction and significant bronchodilator response.  Currently on LABA/ICS which can be uses both a controller and rescue         Chronic cough    Overview     Occurs throughout the day but mostly at night.  Some occupational exposure with remote smoking history.  Significant sinus disease.  Now resolved with management of his sinus disease            Cardiac/Vascular    Aortic atherosclerosis    Overview     Noted on recent 2023 CT chest.  Not currently on ASA or statin            Renal/    Stage 3a chronic kidney disease    Overview     Complicates every aspect of patient care.            Endocrine    Overweight with body mass index (BMI) of 26 to 26.9 in adult    Overview     Patient would benefit from weight loss            Other    Flushing    Overview     Associated with diarrhea, hypoglycemia and other endocrine this regulations.  Suspected neuroendocrine tumor driving all this, however PET DOTATATE negative as well as laboratories.               Portions of the record may have been created with voice-recognition software. Occasional  wrong-word or sound-a-like substitutions may have occurred due to the inherent limitations of voice-recognition software. Read the chart carefully and recognize, using context, where substitutions have occurred.

## 2023-07-20 ENCOUNTER — PATIENT MESSAGE (OUTPATIENT)
Dept: UROLOGY | Facility: CLINIC | Age: 83
End: 2023-07-20
Payer: MEDICARE

## 2023-08-10 ENCOUNTER — PATIENT MESSAGE (OUTPATIENT)
Dept: FAMILY MEDICINE | Facility: CLINIC | Age: 83
End: 2023-08-10
Payer: MEDICARE

## 2023-08-17 ENCOUNTER — OFFICE VISIT (OUTPATIENT)
Dept: FAMILY MEDICINE | Facility: CLINIC | Age: 83
End: 2023-08-17
Payer: MEDICARE

## 2023-08-17 VITALS
HEIGHT: 66 IN | HEART RATE: 65 BPM | SYSTOLIC BLOOD PRESSURE: 132 MMHG | BODY MASS INDEX: 25.66 KG/M2 | OXYGEN SATURATION: 94 % | RESPIRATION RATE: 18 BRPM | DIASTOLIC BLOOD PRESSURE: 70 MMHG | WEIGHT: 159.63 LBS

## 2023-08-17 DIAGNOSIS — J84.10 CALCIFIED GRANULOMA OF LUNG: ICD-10-CM

## 2023-08-17 DIAGNOSIS — I70.0 AORTIC ATHEROSCLEROSIS: ICD-10-CM

## 2023-08-17 DIAGNOSIS — N18.31 STAGE 3A CHRONIC KIDNEY DISEASE: ICD-10-CM

## 2023-08-17 DIAGNOSIS — E66.3 OVERWEIGHT WITH BODY MASS INDEX (BMI) OF 25 TO 25.9 IN ADULT: ICD-10-CM

## 2023-08-17 DIAGNOSIS — J44.89 ASTHMA-COPD OVERLAP SYNDROME: ICD-10-CM

## 2023-08-17 DIAGNOSIS — Z00.00 ENCOUNTER FOR PREVENTIVE HEALTH EXAMINATION: Primary | ICD-10-CM

## 2023-08-17 DIAGNOSIS — G30.9 ALZHEIMER'S DISEASE, UNSPECIFIED (CODE): ICD-10-CM

## 2023-08-17 DIAGNOSIS — F33.1 MAJOR DEPRESSIVE DISORDER, RECURRENT, MODERATE: ICD-10-CM

## 2023-08-17 DIAGNOSIS — F10.21 ALCOHOL DEPENDENCE, IN REMISSION: ICD-10-CM

## 2023-08-17 DIAGNOSIS — I25.118 ATHEROSCLEROSIS OF NATIVE CORONARY ARTERY OF NATIVE HEART WITH OTHER FORM OF ANGINA PECTORIS: ICD-10-CM

## 2023-08-17 DIAGNOSIS — Z00.00 ENCOUNTER FOR MEDICARE ANNUAL WELLNESS EXAM: ICD-10-CM

## 2023-08-17 PROBLEM — R61 CHRONIC NIGHT SWEATS: Status: RESOLVED | Noted: 2023-05-11 | Resolved: 2023-08-17

## 2023-08-17 PROBLEM — R05.3 CHRONIC COUGH: Status: RESOLVED | Noted: 2023-05-11 | Resolved: 2023-08-17

## 2023-08-17 PROBLEM — R42 DIZZINESS: Status: RESOLVED | Noted: 2023-05-17 | Resolved: 2023-08-17

## 2023-08-17 PROBLEM — R19.7 DIARRHEA: Status: RESOLVED | Noted: 2023-05-11 | Resolved: 2023-08-17

## 2023-08-17 PROCEDURE — 1101F PT FALLS ASSESS-DOCD LE1/YR: CPT | Mod: HCNC,CPTII,S$GLB, | Performed by: NURSE PRACTITIONER

## 2023-08-17 PROCEDURE — 1126F AMNT PAIN NOTED NONE PRSNT: CPT | Mod: HCNC,CPTII,S$GLB, | Performed by: NURSE PRACTITIONER

## 2023-08-17 PROCEDURE — 3078F PR MOST RECENT DIASTOLIC BLOOD PRESSURE < 80 MM HG: ICD-10-PCS | Mod: HCNC,CPTII,S$GLB, | Performed by: NURSE PRACTITIONER

## 2023-08-17 PROCEDURE — 3075F PR MOST RECENT SYSTOLIC BLOOD PRESS GE 130-139MM HG: ICD-10-PCS | Mod: HCNC,CPTII,S$GLB, | Performed by: NURSE PRACTITIONER

## 2023-08-17 PROCEDURE — 99999 PR PBB SHADOW E&M-EST. PATIENT-LVL V: ICD-10-PCS | Mod: PBBFAC,HCNC,, | Performed by: NURSE PRACTITIONER

## 2023-08-17 PROCEDURE — 1159F PR MEDICATION LIST DOCUMENTED IN MEDICAL RECORD: ICD-10-PCS | Mod: HCNC,CPTII,S$GLB, | Performed by: NURSE PRACTITIONER

## 2023-08-17 PROCEDURE — G0439 PR MEDICARE ANNUAL WELLNESS SUBSEQUENT VISIT: ICD-10-PCS | Mod: HCNC,S$GLB,, | Performed by: NURSE PRACTITIONER

## 2023-08-17 PROCEDURE — 3288F FALL RISK ASSESSMENT DOCD: CPT | Mod: HCNC,CPTII,S$GLB, | Performed by: NURSE PRACTITIONER

## 2023-08-17 PROCEDURE — 3078F DIAST BP <80 MM HG: CPT | Mod: HCNC,CPTII,S$GLB, | Performed by: NURSE PRACTITIONER

## 2023-08-17 PROCEDURE — 3288F PR FALLS RISK ASSESSMENT DOCUMENTED: ICD-10-PCS | Mod: HCNC,CPTII,S$GLB, | Performed by: NURSE PRACTITIONER

## 2023-08-17 PROCEDURE — 1160F PR REVIEW ALL MEDS BY PRESCRIBER/CLIN PHARMACIST DOCUMENTED: ICD-10-PCS | Mod: HCNC,CPTII,S$GLB, | Performed by: NURSE PRACTITIONER

## 2023-08-17 PROCEDURE — 1159F MED LIST DOCD IN RCRD: CPT | Mod: HCNC,CPTII,S$GLB, | Performed by: NURSE PRACTITIONER

## 2023-08-17 PROCEDURE — 1126F PR PAIN SEVERITY QUANTIFIED, NO PAIN PRESENT: ICD-10-PCS | Mod: HCNC,CPTII,S$GLB, | Performed by: NURSE PRACTITIONER

## 2023-08-17 PROCEDURE — 3075F SYST BP GE 130 - 139MM HG: CPT | Mod: HCNC,CPTII,S$GLB, | Performed by: NURSE PRACTITIONER

## 2023-08-17 PROCEDURE — 99999 PR PBB SHADOW E&M-EST. PATIENT-LVL V: CPT | Mod: PBBFAC,HCNC,, | Performed by: NURSE PRACTITIONER

## 2023-08-17 PROCEDURE — 1101F PR PT FALLS ASSESS DOC 0-1 FALLS W/OUT INJ PAST YR: ICD-10-PCS | Mod: HCNC,CPTII,S$GLB, | Performed by: NURSE PRACTITIONER

## 2023-08-17 PROCEDURE — 1160F RVW MEDS BY RX/DR IN RCRD: CPT | Mod: HCNC,CPTII,S$GLB, | Performed by: NURSE PRACTITIONER

## 2023-08-17 PROCEDURE — G0439 PPPS, SUBSEQ VISIT: HCPCS | Mod: HCNC,S$GLB,, | Performed by: NURSE PRACTITIONER

## 2023-08-17 RX ORDER — PHENYLEPHRINE HCL 10 MG
1000 TABLET ORAL DAILY
COMMUNITY

## 2023-08-17 RX ORDER — ASPIRIN 81 MG/1
1 TABLET ORAL DAILY
COMMUNITY

## 2023-08-17 NOTE — PROGRESS NOTES
"  Navin Henning presented for a  Medicare AWV and comprehensive Health Risk Assessment today. The following components were reviewed and updated:    Medical history  Family History  Social history  Allergies and Current Medications  Health Risk Assessment  Health Maintenance  Care Team         ** See Completed Assessments for Annual Wellness Visit within the encounter summary.**         The following assessments were completed:  Living Situation  CAGE  Depression Screening  Timed Get Up and Go  Whisper Test  Cognitive Function Screening        Nutrition Screening  ADL Screening  PAQ Screening        Vitals:    08/17/23 1252   BP: 132/70   BP Location: Right arm   Patient Position: Sitting   Pulse: 65   Resp: 18   SpO2: (!) 94%   Weight: 72.4 kg (159 lb 9.8 oz)   Height: 5' 6" (1.676 m)     Body mass index is 25.76 kg/m².  Physical Exam  Vitals reviewed.   Constitutional:       General: He is not in acute distress.     Appearance: Normal appearance. He is well-developed, well-groomed and overweight.   HENT:      Head: Normocephalic.      Right Ear: Decreased hearing noted.      Left Ear: Decreased hearing noted.   Cardiovascular:      Rate and Rhythm: Normal rate and regular rhythm.      Pulses:           Radial pulses are 2+ on the right side and 2+ on the left side.        Dorsalis pedis pulses are 2+ on the right side and 2+ on the left side.      Heart sounds: No murmur heard.  Pulmonary:      Effort: Pulmonary effort is normal. No respiratory distress.   Abdominal:      General: There is no distension.   Skin:     General: Skin is warm and dry.      Coloration: Skin is not pale.   Neurological:      Mental Status: He is alert and oriented to person, place, and time.      Coordination: Coordination normal.   Psychiatric:         Attention and Perception: Attention normal.         Mood and Affect: Mood and affect normal.         Speech: Speech normal.         Behavior: Behavior normal. Behavior is cooperative. "             Diagnoses and health risks identified today and associated recommendations/orders:    1. Encounter for preventive health examination  Pain level assessed and reviewed. Patient provided with non-opioid treatment options for relief of  arthritic  pain; tramadol used only when absolutely necessary. Reviewed potential opioid use disorder risk factors. Patient instructed to follow up with PCP and/or Pain Medicine.    2. Alzheimer's disease, unspecified (CODE)  Chronic; stable on medication. Follow up with PCP.    3. Asthma-COPD overlap syndrome  Chronic; stable on medication. Followed by Pulmonology.    4. Alcohol dependence, in remission  Hx of alcohol dependence; quit drinking over 20 years ago. Follow up with PCP.    5. Major depressive disorder, recurrent, moderate  Chronic; stable on medication. Follow up with PCP.    6. Aortic atherosclerosis  Chronic; stable on medication. Follow up with PCP.    7. Calcified granuloma of lung  Chronic; stable. As seen on previous imaging. Follow up with PCP.    8. Atherosclerosis of native coronary artery of native heart with other form of angina pectoris  Chronic; stable on medication. As seen on previous imaging. Follow up with PCP.    9. Stage 3a chronic kidney disease  Chronic; stable. Follow up with PCP.    10. Overweight with body mass index (BMI) of 25 to 25.9 in adult  Eat a low salt/low fat diet and discussed importance of engaging in physical activity at least 5x/week for a minimum of 30 min/day.    11. Encounter for Medicare annual wellness exam  - Ambulatory Referral/Consult to Enhanced Annual Wellness Visit (eAWV)      Provided Navin with a 5-10 year written screening schedule and personal prevention plan. Recommendations were developed using the USPSTF age appropriate recommendations. Education, counseling, and referrals were provided as needed. After Visit Summary given to patient which includes a list of additional screenings/tests needed.    Follow up  for your next annual wellness visit.    Karen Sullivan NP      Advance Care Planning     I offered to discuss advanced care planning, including how to pick a person who would make decisions for you if you were unable to make them for yourself, called a health care power of , and what kind of decisions you might make such as use of life sustaining treatments such as ventilators and tube feeding when faced with a life limiting illness recorded on a living will that they will need to know. (How you want to be cared for as you near the end of your natural life)     X  Patient has advanced directives written and agrees to provide copies to the institution.

## 2023-08-17 NOTE — PATIENT INSTRUCTIONS
Counseling and Referral of Other Preventative  (Italic type indicates deductible and co-insurance are waived)    Patient Name: Navin Henning  Today's Date: 8/17/2023    Health Maintenance       Date Due Completion Date    TETANUS VACCINE 09/08/2023 (Originally 7/26/2021) 7/26/2011    COVID-19 Vaccine (4 - Pfizer series) 09/08/2023 (Originally 12/14/2021) 10/19/2021    Influenza Vaccine (1) 09/01/2023 9/23/2022    Hemoglobin A1c (Prediabetes) 01/25/2024 1/25/2023    Aspirin/Antiplatelet Therapy 08/17/2024 8/17/2023    Lipid Panel 07/12/2027 7/12/2022        No orders of the defined types were placed in this encounter.      The following information is provided to all patients.  This information is to help you find resources for any of the problems found today that may be affecting your health:                Living healthy guide: www.[facility]      Understanding Diabetes: www.diabetes.org      Eating healthy: www.cdc.gov/healthyweight      CDC home safety checklist: www.cdc.gov/steadi/patient.html      Agency on Aging: www.goea.[place].[facility]      Alcoholics anonymous (AA): www.aa.org      Physical Activity: www.tom.nih.gov/dh2tvhk      Tobacco use: www.quitwithusla.org

## 2023-08-18 PROBLEM — J84.10 CALCIFIED GRANULOMA OF LUNG: Status: ACTIVE | Noted: 2023-08-18

## 2023-08-18 PROBLEM — J98.4 CALCIFIED GRANULOMA OF LUNG: Status: ACTIVE | Noted: 2023-08-18

## 2023-08-18 PROBLEM — I25.10 ATHEROSCLEROSIS OF NATIVE CORONARY ARTERY OF NATIVE HEART: Status: ACTIVE | Noted: 2023-08-18

## 2023-10-10 DIAGNOSIS — R05.3 CHRONIC COUGH: ICD-10-CM

## 2023-10-10 DIAGNOSIS — J30.9 ALLERGIC RHINITIS, UNSPECIFIED SEASONALITY, UNSPECIFIED TRIGGER: ICD-10-CM

## 2023-10-10 RX ORDER — AZELASTINE 1 MG/ML
2 SPRAY, METERED NASAL 2 TIMES DAILY
Qty: 60 ML | Refills: 3 | Status: SHIPPED | OUTPATIENT
Start: 2023-10-10 | End: 2024-10-09

## 2023-12-13 ENCOUNTER — OFFICE VISIT (OUTPATIENT)
Dept: URGENT CARE | Facility: CLINIC | Age: 83
End: 2023-12-13
Payer: MEDICARE

## 2023-12-13 VITALS
OXYGEN SATURATION: 95 % | SYSTOLIC BLOOD PRESSURE: 130 MMHG | BODY MASS INDEX: 25.55 KG/M2 | DIASTOLIC BLOOD PRESSURE: 63 MMHG | TEMPERATURE: 98 F | WEIGHT: 159 LBS | HEIGHT: 66 IN | HEART RATE: 74 BPM | RESPIRATION RATE: 19 BRPM

## 2023-12-13 DIAGNOSIS — R06.2 WHEEZING: ICD-10-CM

## 2023-12-13 DIAGNOSIS — J40 BRONCHITIS: ICD-10-CM

## 2023-12-13 DIAGNOSIS — J44.1 COPD WITH ACUTE EXACERBATION: Primary | ICD-10-CM

## 2023-12-13 LAB
CTP QC/QA: YES
CTP QC/QA: YES
POC MOLECULAR INFLUENZA A AGN: NEGATIVE
POC MOLECULAR INFLUENZA B AGN: NEGATIVE
SARS-COV-2 AG RESP QL IA.RAPID: NEGATIVE

## 2023-12-13 PROCEDURE — 87811 SARS-COV-2 COVID19 W/OPTIC: CPT | Mod: QW,S$GLB,, | Performed by: PHYSICIAN ASSISTANT

## 2023-12-13 PROCEDURE — 87811 SARS CORONAVIRUS 2 ANTIGEN POCT, MANUAL READ: ICD-10-PCS | Mod: QW,S$GLB,, | Performed by: PHYSICIAN ASSISTANT

## 2023-12-13 PROCEDURE — 87502 INFLUENZA DNA AMP PROBE: CPT | Mod: QW,S$GLB,, | Performed by: PHYSICIAN ASSISTANT

## 2023-12-13 PROCEDURE — 87502 POCT INFLUENZA A/B MOLECULAR: ICD-10-PCS | Mod: QW,S$GLB,, | Performed by: PHYSICIAN ASSISTANT

## 2023-12-13 PROCEDURE — 99214 OFFICE O/P EST MOD 30 MIN: CPT | Mod: S$GLB,,, | Performed by: PHYSICIAN ASSISTANT

## 2023-12-13 PROCEDURE — 99214 PR OFFICE/OUTPT VISIT, EST, LEVL IV, 30-39 MIN: ICD-10-PCS | Mod: S$GLB,,, | Performed by: PHYSICIAN ASSISTANT

## 2023-12-13 RX ORDER — PREDNISONE 20 MG/1
40 TABLET ORAL DAILY
Qty: 10 TABLET | Refills: 0 | Status: SHIPPED | OUTPATIENT
Start: 2023-12-13 | End: 2023-12-18

## 2023-12-13 RX ORDER — AZITHROMYCIN 250 MG/1
TABLET, FILM COATED ORAL
Qty: 6 TABLET | Refills: 0 | Status: SHIPPED | OUTPATIENT
Start: 2023-12-13 | End: 2024-02-22

## 2023-12-13 NOTE — PATIENT INSTRUCTIONS
- Rest.    - Drink plenty of fluids.    - Tylenol (acetaminophen) as directed as needed for fever/pain. Avoid tylenol if you have a history of liver disease. Do not take ibuprofen if you have a history of GI bleeding, kidney disease, gastric surgery, or if you take blood thinners.     - You can take over-the-counter claritin, zyrtec, allegra, or xyzal as directed. These are antihistamines that can help with runny nose, nasal congestion, sneezing, and helps to dry up post-nasal drip, which usually causes sore throat and cough.   - If you do NOT have high blood pressure, you may use a decongestant form (D)  of this medication (ie. Claritin- D, zyrtec-D, allegra-D) or if you do not take the D form, you can take sudafed (pseudoephedrine) over the counter, which is a decongestant. Do NOT take two decongestant (D) medications at the same time (such as mucinex-D and claritin-D or plain sudafed and claritin D)    - You can use Flonase (fluticasone) nasal spray as directed for sinus congestion and postnasal drip. This is a steroid nasal spray that works locally over time to decrease the inflammation in your nose/sinuses and help with allergic symptoms. This is not an quick- relief spray like afrin, but it works well if used daily.  Discontinue if you develop nose bleed  - use OTC nasal saline prior to Flonase.  - you can use OTC nasal saline such as Ocean Spray Nasal Saline 1-3 puffs each nostril every 2-3 hours then blow out onto tissue. This is to irrigate the nasal passage way to clear the sinus openings. Use until sinus problem resolved.    - you can take plain OTC Mucinex (guaifenesin) 1200 mg twice a day to help loosen mucous.     -warm salt water gargles can help with sore throat    - warm tea with honey can help with cough. Honey is a natural cough suppressant.    - Dextromethorphan (DM) is a cough suppressant over the counter (ie. mucinex DM, robitussin, delsym; dayquil/nyquil has DM as well.)    - You received a  steroid (prednisone) today.  This can elevate your blood pressure, elevate your blood sugar, water weight gain, nervous energy, redness to the face and dimpling of the skin where the shot goes in.   - Do not use steroids more than 3 times per year.   - If you have diabetes, please check you blood sugar frequently.  - If you have high blood pressure, please check your blood pressure frequently.   - You have been given an antibiotic to treat your condition today.    - Please complete the antibiotic as directed on the bottle.   - If you are female and on oral birth control pills, use additional methods to prevent pregnancy while on antibiotics and for one cycle after.   - you can take otc probiotic to limit upset stomach    - Follow up with your PCP or specialty clinic as directed in the next 1-2 weeks if not improved or as needed.  You can call [phone] to schedule an appointment with the appropriate provider.      - Go to the ER if you develop new or worsening symptoms.     - You must understand that you have received an Urgent Care treatment only and that you may be released before all of your medical problems are known or treated.   - You, the patient, will arrange for follow up care as instructed.   - If your condition worsens or fails to improve we recommend that you receive another evaluation at the ER immediately or contact your PCP to discuss your concerns or return here.

## 2023-12-13 NOTE — PROGRESS NOTES
"Subjective:      Patient ID: Navin Henning Jr. is a 83 y.o. male.    Vitals:  height is 5' 6" (1.676 m) and weight is 72.1 kg (159 lb). His oral temperature is 98 °F (36.7 °C). His blood pressure is 130/63 and his pulse is 74. His respiration is 19 and oxygen saturation is 95%.     Chief Complaint: Cough    Pt with hx of asthma, COPD, depression, HLD, anxiety, CKD, HTN, DM, insomnia, dementia, hypoglycemia, CAD presents with symptoms of- Cough, Congestion, Runny Nose, Sore Throat, Mild Headache, wheezing/dyspnea, and Fatigue that started yesterday. Has taken otc cough medication.     Cough  This is a new problem. The current episode started in the past 7 days. The problem has been gradually worsening. The problem occurs constantly. The cough is Productive of sputum. Associated symptoms include headaches, nasal congestion, postnasal drip, a sore throat, shortness of breath and wheezing. Pertinent negatives include no chills, ear pain, eye redness, fever or rash. Nothing aggravates the symptoms. He has tried OTC cough suppressant (Tylenol) for the symptoms. The treatment provided mild relief. His past medical history is significant for asthma. There is no history of bronchitis or COPD.     Past Medical History:   Diagnosis Date    Asthma     COPD (chronic obstructive pulmonary disease)     Depression     High cholesterol      Patient Active Problem List   Diagnosis    Asthma-COPD overlap syndrome    Personal history of tobacco use, presenting hazards to health    Actinic keratosis    Seborrheic keratoses    Allergic rhinosinusitis    Anxiety disorder    Sensorineural hearing loss, bilateral    Stage 3a chronic kidney disease    Testicular hypofunction    Essential hypertension    Family history of diabetes mellitus (DM)    Hypertriglyceridemia    Impaired fasting glucose    Insomnia    Memory loss    Nondependent alcohol abuse, in remission    Overweight with body mass index (BMI) of 25 to 25.9 in adult    Major " depressive disorder, recurrent, moderate    Benign prostatic hyperplasia without lower urinary tract symptoms    Alcohol dependence, in remission    Aortic atherosclerosis    Alzheimer's disease, unspecified (CODE)    Hypoglycemia    Flushing    Secondary erythrocytosis    Atherosclerosis of native coronary artery of native heart    Calcified granuloma of lung     Review of patient's allergies indicates:   Allergen Reactions    Suvorexant Anxiety and Other (See Comments)    Doxycycline Rash and Hives    Penicillins Rash     Tolerates cefdinir with no issues    Sulfamethoxazole-trimethoprim Rash         Constitution: Positive for fatigue. Negative for chills, sweating and fever.   HENT:  Positive for congestion, postnasal drip and sore throat. Negative for ear pain.    Neck: Negative for neck pain and neck stiffness.   Cardiovascular:  Negative for leg swelling and palpitations.   Eyes:  Negative for eye itching, eye pain and eye redness.   Respiratory:  Positive for cough, sputum production, COPD, shortness of breath, wheezing and asthma.    Gastrointestinal:  Negative for abdominal pain, nausea, vomiting and diarrhea.   Genitourinary:  Negative for dysuria, frequency, urgency, flank pain and hematuria.   Musculoskeletal:  Negative for pain and joint pain.   Skin:  Negative for color change and rash.   Allergic/Immunologic: Positive for asthma.   Neurological:  Positive for headaches. Negative for dizziness, passing out, disorientation and numbness.   Psychiatric/Behavioral:  Negative for disorientation.       Objective:     Physical Exam   Constitutional: He is oriented to person, place, and time. He appears well-developed. He is cooperative.  Non-toxic appearance. He does not appear ill. No distress.   HENT:   Head: Normocephalic and atraumatic.   Ears:   Right Ear: Hearing, tympanic membrane, external ear and ear canal normal.   Left Ear: Hearing, tympanic membrane, external ear and ear canal normal.   Nose:  Mucosal edema and rhinorrhea present. No nasal deformity. No epistaxis. Right sinus exhibits no maxillary sinus tenderness and no frontal sinus tenderness. Left sinus exhibits no maxillary sinus tenderness and no frontal sinus tenderness.   Mouth/Throat: Uvula is midline, oropharynx is clear and moist and mucous membranes are normal. No trismus in the jaw. Normal dentition. No uvula swelling. No oropharyngeal exudate, posterior oropharyngeal edema or posterior oropharyngeal erythema. No tonsillar exudate.   Eyes: Conjunctivae and lids are normal. No scleral icterus.   Neck: Trachea normal and phonation normal. Neck supple. No edema present. No erythema present. No neck rigidity present.   Cardiovascular: Normal rate, regular rhythm, normal heart sounds and normal pulses.   Pulmonary/Chest: Effort normal. No accessory muscle usage or stridor. No tachypnea and no bradypnea. No respiratory distress. He has no decreased breath sounds. He has wheezes (expiratory in all lung fields.). He has no rhonchi. He has no rales.   Abdominal: Normal appearance.   Musculoskeletal: Normal range of motion.         General: No deformity. Normal range of motion.      Right lower leg: No edema.      Left lower leg: No edema.   Lymphadenopathy:     He has no cervical adenopathy.   Neurological: He is alert and oriented to person, place, and time. He exhibits normal muscle tone. Coordination normal.   Skin: Skin is warm, dry, intact, not diaphoretic and not pale.   Psychiatric: His speech is normal and behavior is normal. Judgment and thought content normal.   Nursing note and vitals reviewed.      Results for orders placed or performed in visit on 12/13/23   SARS Coronavirus 2 Antigen, POCT Manual Read   Result Value Ref Range    SARS Coronavirus 2 Antigen Negative Negative     Acceptable Yes    POCT Influenza A/B MOLECULAR   Result Value Ref Range    POC Molecular Influenza A Ag Negative Negative, Not Reported    POC  Molecular Influenza B Ag Negative Negative, Not Reported     Acceptable Yes        Assessment:     1. COPD with acute exacerbation    2. Bronchitis    3. Wheezing        Plan:   Pt has controller and rescue inhalers at home. Follow up with PCP,  sooner or Er prn new or worsening symptoms. Pt expresses understanding, agrees with plan.   - Discussed ddx, home care, tx options, and given follow up precautions.  I have reviewed the patient's chart to view previous visits, labs, and imaging to assess PMH and look for any trends or previous treatments.    COPD with acute exacerbation  -     SARS Coronavirus 2 Antigen, POCT Manual Read  -     POCT Influenza A/B MOLECULAR  -     azithromycin (Z-JESSICA) 250 MG tablet; Take 2 tablets by mouth on day 1; Take 1 tablet by mouth on days 2-5  Dispense: 6 tablet; Refill: 0  -     predniSONE (DELTASONE) 20 MG tablet; Take 2 tablets (40 mg total) by mouth once daily. for 5 days  Dispense: 10 tablet; Refill: 0    Bronchitis    Wheezing      Patient Instructions   - Rest.    - Drink plenty of fluids.    - Tylenol (acetaminophen) as directed as needed for fever/pain. Avoid tylenol if you have a history of liver disease. Do not take ibuprofen if you have a history of GI bleeding, kidney disease, gastric surgery, or if you take blood thinners.     - You can take over-the-counter claritin, zyrtec, allegra, or xyzal as directed. These are antihistamines that can help with runny nose, nasal congestion, sneezing, and helps to dry up post-nasal drip, which usually causes sore throat and cough.   - If you do NOT have high blood pressure, you may use a decongestant form (D)  of this medication (ie. Claritin- D, zyrtec-D, allegra-D) or if you do not take the D form, you can take sudafed (pseudoephedrine) over the counter, which is a decongestant. Do NOT take two decongestant (D) medications at the same time (such as mucinex-D and claritin-D or plain sudafed and claritin D)    - You  can use Flonase (fluticasone) nasal spray as directed for sinus congestion and postnasal drip. This is a steroid nasal spray that works locally over time to decrease the inflammation in your nose/sinuses and help with allergic symptoms. This is not an quick- relief spray like afrin, but it works well if used daily.  Discontinue if you develop nose bleed  - use OTC nasal saline prior to Flonase.  - you can use OTC nasal saline such as Ocean Spray Nasal Saline 1-3 puffs each nostril every 2-3 hours then blow out onto tissue. This is to irrigate the nasal passage way to clear the sinus openings. Use until sinus problem resolved.    - you can take plain OTC Mucinex (guaifenesin) 1200 mg twice a day to help loosen mucous.     -warm salt water gargles can help with sore throat    - warm tea with honey can help with cough. Honey is a natural cough suppressant.    - Dextromethorphan (DM) is a cough suppressant over the counter (ie. mucinex DM, robitussin, delsym; dayquil/nyquil has DM as well.)    - You received a steroid (prednisone) today.  This can elevate your blood pressure, elevate your blood sugar, water weight gain, nervous energy, redness to the face and dimpling of the skin where the shot goes in.   - Do not use steroids more than 3 times per year.   - If you have diabetes, please check you blood sugar frequently.  - If you have high blood pressure, please check your blood pressure frequently.   - You have been given an antibiotic to treat your condition today.    - Please complete the antibiotic as directed on the bottle.   - If you are female and on oral birth control pills, use additional methods to prevent pregnancy while on antibiotics and for one cycle after.   - you can take otc probiotic to limit upset stomach    - Follow up with your PCP or specialty clinic as directed in the next 1-2 weeks if not improved or as needed.  You can call [phone] to schedule an appointment with the appropriate provider.       - Go to the ER if you develop new or worsening symptoms.     - You must understand that you have received an Urgent Care treatment only and that you may be released before all of your medical problems are known or treated.   - You, the patient, will arrange for follow up care as instructed.   - If your condition worsens or fails to improve we recommend that you receive another evaluation at the ER immediately or contact your PCP to discuss your concerns or return here.              Additional MDM:     Heart Failure Score:   COPD = Yes

## 2023-12-20 ENCOUNTER — OFFICE VISIT (OUTPATIENT)
Dept: URGENT CARE | Facility: CLINIC | Age: 83
End: 2023-12-20
Payer: MEDICARE

## 2023-12-20 VITALS
HEART RATE: 61 BPM | RESPIRATION RATE: 20 BRPM | OXYGEN SATURATION: 97 % | BODY MASS INDEX: 25.55 KG/M2 | TEMPERATURE: 98 F | HEIGHT: 66 IN | WEIGHT: 159 LBS | SYSTOLIC BLOOD PRESSURE: 161 MMHG | DIASTOLIC BLOOD PRESSURE: 75 MMHG

## 2023-12-20 DIAGNOSIS — R06.2 WHEEZING ON AUSCULTATION: ICD-10-CM

## 2023-12-20 DIAGNOSIS — R05.9 COUGH, UNSPECIFIED TYPE: ICD-10-CM

## 2023-12-20 DIAGNOSIS — J44.1 CHRONIC OBSTRUCTIVE PULMONARY DISEASE WITH ACUTE EXACERBATION: Primary | ICD-10-CM

## 2023-12-20 PROCEDURE — 96372 THER/PROPH/DIAG INJ SC/IM: CPT | Mod: 59,S$GLB,, | Performed by: NURSE PRACTITIONER

## 2023-12-20 PROCEDURE — 71046 XR CHEST PA AND LATERAL: ICD-10-PCS | Mod: FY,S$GLB,, | Performed by: RADIOLOGY

## 2023-12-20 PROCEDURE — 99214 OFFICE O/P EST MOD 30 MIN: CPT | Mod: 25,S$GLB,, | Performed by: NURSE PRACTITIONER

## 2023-12-20 PROCEDURE — 94640 AIRWAY INHALATION TREATMENT: CPT | Mod: S$GLB,,, | Performed by: FAMILY MEDICINE

## 2023-12-20 PROCEDURE — 71046 X-RAY EXAM CHEST 2 VIEWS: CPT | Mod: FY,S$GLB,, | Performed by: RADIOLOGY

## 2023-12-20 PROCEDURE — 96372 PR INJECTION,THERAP/PROPH/DIAG2ST, IM OR SUBCUT: ICD-10-PCS | Mod: 59,S$GLB,, | Performed by: NURSE PRACTITIONER

## 2023-12-20 PROCEDURE — 99214 PR OFFICE/OUTPT VISIT, EST, LEVL IV, 30-39 MIN: ICD-10-PCS | Mod: 25,S$GLB,, | Performed by: NURSE PRACTITIONER

## 2023-12-20 PROCEDURE — 94640 PR INHAL RX, AIRWAY OBST/DX SPUTUM INDUCT: ICD-10-PCS | Mod: S$GLB,,, | Performed by: FAMILY MEDICINE

## 2023-12-20 RX ORDER — BENZONATATE 100 MG/1
100 CAPSULE ORAL 3 TIMES DAILY PRN
Qty: 20 CAPSULE | Refills: 0 | Status: SHIPPED | OUTPATIENT
Start: 2023-12-20 | End: 2024-02-22

## 2023-12-20 RX ORDER — ALBUTEROL SULFATE 0.83 MG/ML
2.5 SOLUTION RESPIRATORY (INHALATION)
Status: COMPLETED | OUTPATIENT
Start: 2023-12-20 | End: 2023-12-20

## 2023-12-20 RX ORDER — IPRATROPIUM BROMIDE 0.5 MG/2.5ML
0.5 SOLUTION RESPIRATORY (INHALATION)
Status: COMPLETED | OUTPATIENT
Start: 2023-12-20 | End: 2023-12-20

## 2023-12-20 RX ORDER — PREDNISONE 20 MG/1
20 TABLET ORAL DAILY
Qty: 3 TABLET | Refills: 0 | Status: SHIPPED | OUTPATIENT
Start: 2023-12-21 | End: 2023-12-24

## 2023-12-20 RX ADMIN — ALBUTEROL SULFATE 2.5 MG: 0.83 SOLUTION RESPIRATORY (INHALATION) at 10:12

## 2023-12-20 RX ADMIN — IPRATROPIUM BROMIDE 0.5 MG: 0.5 SOLUTION RESPIRATORY (INHALATION) at 10:12

## 2023-12-20 NOTE — PATIENT INSTRUCTIONS
"You received a steroid today - this can elevate your blood pressure, elevate your blood sugar, water weight gain, nervous energy, redness to the face and dimpling of the skin where the shot goes in if you had an injection.   Do not use steroids more than 3 times per year.   If you have diabetes, please check you blood sugar frequently.  If you have high blood pressure, please check your blood pressure frequently.      If your condition worsens or fails to improve we recommend that you receive another evaluation at the ER immediately or contact your PCP to discuss your concerns or return here. You must understand that you've received an urgent care treatment only and that you may be released before all your medical problems are known or treated. You the patient will arrange for follouwp care as instructed.   Keep the scheduled appointment with your specialist as discussed.    You received a steroid shot on today   Rest and fluids are important  Take inhaler as prescribed and needed for wheezing  -  Flonase (fluticasone) is a nasal spray which is available over the counter and may help with your symptoms.   -  If you have hypertension avoid using the "D" which is the decongestant.  Instead you can use Coricidin HBP for cold and cough symptoms.    -  If you just have drainage you can take plain Zyrtec, Claritin or Allegra   -  If you just have a congested feeling you can take pseudoephedrine (unless you have high blood pressure) which you have to sign for behind the counter. Do not buy the phenylephrine which is on the shelf as it is not effective   -  Rest and fluids are also important.   -  Tylenol or ibuprofen can also be used as directed for pain unless you have an allergy to them or medical condition such as stomach ulcers, kidney or liver disease or blood thinners etc for which you should not be taking these type of medications.   Please follow up with your primary care doctor or specialist in the next 48-72hrs " as needed and if no improvement  If you  smoke, please stop smoking.

## 2023-12-20 NOTE — PROGRESS NOTES
"Subjective:      Patient ID: Navin Henning Jr. is a 83 y.o. male.    Vitals:  height is 5' 6" (1.676 m) and weight is 72.1 kg (159 lb). His oral temperature is 97.8 °F (36.6 °C). His blood pressure is 161/75 (abnormal) and his pulse is 61. His respiration is 20 and oxygen saturation is 97%.     Chief Complaint: Shortness of Breath (Congestion cough with  wheezing)    83 yr male present with Congestion, cough and wheezing. Pt states that this is his second visit for this same problem. Pt states that he has been having these symptoms for about 10 days. Pt has been taking Dayquil nebulizer and prescribed medication from previous visit    Provider note below:  This is a 83 y.o. male  With history of anxiety disorder, asthma COPD overlap syndrome, calcific granuloma of lung, essential hypertension, aortic atherosclerosis, stage 3 chronic kidney disease, who presents today with a chief complaint of  cough, congestion and wheezing with shortness of breath that started 10 days ago, patient reports he was seen last week and was prescribed the oral prednisone along with the Z-Richard but he did not improve with his symptoms, patient reports he is still short of breath, patient reports he is using both his inhalers as prescribed, , denies fever, body aches or chills, denies leg swelling,  denies nausea, vomiting, diarrhea or abdominal pain, denies chest pain or dizziness positional lightheadedness, denies sore throat or trouble swallowing, denies loss of taste or smell, or any other symptoms        Shortness of Breath  This is a new problem. The current episode started 1 to 4 weeks ago. The problem occurs every few minutes. The problem has been gradually worsening. Associated symptoms include headaches and wheezing. Pertinent negatives include no sputum production. He has tried beta agonist inhalers, steroid inhalers and oral steroids (Dayquil nebulizer) for the symptoms. The treatment provided no relief. His past medical " history is significant for asthma, bronchiolitis and pneumonia.       Respiratory:  Positive for cough, shortness of breath and wheezing. Negative for sputum production.    Neurological:  Positive for headaches.      Past Medical History:   Diagnosis Date    Asthma     COPD (chronic obstructive pulmonary disease)     Depression     High cholesterol      Review of patient's allergies indicates:   Allergen Reactions    Suvorexant Anxiety and Other (See Comments)    Doxycycline Rash and Hives    Penicillins Rash     Tolerates cefdinir with no issues    Sulfamethoxazole-trimethoprim Rash     Current Outpatient Medications on File Prior to Visit   Medication Sig Dispense Refill    albuterol (VENTOLIN HFA) 90 mcg/actuation inhaler Inhale 2 puffs into the lungs every 4 (four) hours as needed for Wheezing or Shortness of Breath. Rescue 18 g 0    ammonium lactate (LAC-HYDRIN) 12 % lotion ammonium lactate 12 % lotion   APPLY LOTION TOPICALLY THREE TIMES DAILY AS NEEDED TO DRY SKIN OF THE BODY AND SCALP      aspirin (ECOTRIN) 81 MG EC tablet Take 1 tablet by mouth once daily.      azelastine (ASTELIN) 137 mcg (0.1 %) nasal spray 2 sprays (274 mcg total) by Nasal route 2 (two) times daily. 60 mL 3    azithromycin (Z-JESSICA) 250 MG tablet Take 2 tablets by mouth on day 1; Take 1 tablet by mouth on days 2-5 6 tablet 0    budesonide-formoterol 160-4.5 mcg (SYMBICORT) 160-4.5 mcg/actuation HFAA Inhale 2 puffs into the lungs every 12 (twelve) hours. Controller 10.2 g 5    cinnamon bark 500 mg capsule Take 1,000 mg by mouth once daily.      famotidine (PEPCID) 20 MG tablet Take 1 tablet (20 mg total) by mouth nightly as needed for Heartburn. 90 tablet 3    FLUoxetine 40 MG capsule Take 40 mg by mouth.      fluticasone propionate (FLONASE) 50 mcg/actuation nasal spray 1 spray (50 mcg total) by Each Nostril route 2 (two) times a day. 16 g 5    hydrocortisone 2.5 % lotion APPLY LOTION EXTERNALLY TWICE DAILY AS NEEDED FOR SCALING AND  REDNESS OF FACE. USE SPARINGLY. CAN THIN SKIN IF OVERUSED      ketoconazole (NIZORAL) 2 % shampoo SHAMPOO THREE TIMES WEEKLY TO AFFECTED AREAS. LET SIT FOR 5 MINUTES AND WASH OFF. FOR FACE      levocetirizine (XYZAL) 5 MG tablet Take 1 tablet (5 mg total) by mouth every evening. 90 tablet 3    melatonin 10 mg TbDL Take 1 tablet by mouth every evening.      memantine (NAMENDA XR) 28 mg CSpX       mirtazapine (REMERON) 15 MG tablet mirtazapine 15 mg tablet   Take 1 tablet every day by oral route in the evening for 30 days.      multivitamin capsule Take 1 capsule by mouth once daily.      pravastatin (PRAVACHOL) 20 MG tablet Take 1 tablet (20 mg total) by mouth every evening. 90 tablet 3    rivastigmine tartrate (EXELON) 3 MG capsule Take 3 mg by mouth 2 (two) times daily.      sildenafiL (VIAGRA) 100 MG tablet Take 1 tablet (100 mg total) by mouth daily as needed for Erectile Dysfunction (take on an empty stomach 30-60 minutes before). 10 tablet 12    tamsulosin (FLOMAX) 0.4 mg Cap Take 1 capsule by mouth once daily.      traMADoL (ULTRAM) 50 mg tablet Take 50 mg by mouth daily as needed.      TURMERIC ORAL Take 1 capsule by mouth Daily.       No current facility-administered medications on file prior to visit.      Objective:     Physical Exam   Constitutional: He is oriented to person, place, and time. He appears well-developed. He is cooperative.  Non-toxic appearance. He does not appear ill. No distress.   HENT:   Head: Normocephalic and atraumatic.   Ears:   Right Ear: Hearing, tympanic membrane, external ear and ear canal normal.   Left Ear: Hearing, tympanic membrane, external ear and ear canal normal.   Nose: Nose normal. No mucosal edema, rhinorrhea or nasal deformity. No epistaxis. Right sinus exhibits no maxillary sinus tenderness and no frontal sinus tenderness. Left sinus exhibits no maxillary sinus tenderness and no frontal sinus tenderness.   Mouth/Throat: Uvula is midline, oropharynx is clear and  moist and mucous membranes are normal. No trismus in the jaw. Normal dentition. No uvula swelling. No oropharyngeal exudate, posterior oropharyngeal edema or posterior oropharyngeal erythema.   Eyes: Conjunctivae and lids are normal. No scleral icterus.   Neck: Trachea normal and phonation normal. Neck supple. No edema present. No erythema present. No neck rigidity present.   Cardiovascular: Normal rate, regular rhythm, normal heart sounds and normal pulses.   Pulmonary/Chest: Effort normal. No stridor. No respiratory distress. He has no decreased breath sounds. He has wheezes (exp to all lung fields). He has no rhonchi. He has no rales.   Post treatment assessment: subjective improvement in symptoms - improvement in wheezing    Pulse ox pre tx 95%  Pulse ox post Tx 97%    Good air movement throughout all lung fields post tx         Comments: Post treatment assessment: subjective improvement in symptoms - improvement in wheezing    Pulse ox pre tx 95%  Pulse ox post Tx 97%    Good air movement throughout all lung fields post tx    Abdominal: Normal appearance.   Musculoskeletal: Normal range of motion.         General: No deformity. Normal range of motion.   Neurological: He is alert and oriented to person, place, and time. He exhibits normal muscle tone. Coordination normal.   Skin: Skin is warm, dry, intact, not diaphoretic and not pale.   Psychiatric: His speech is normal and behavior is normal. Judgment and thought content normal.   Nursing note and vitals reviewed.    X-Ray Chest PA And Lateral    Result Date: 12/20/2023  EXAMINATION: XR CHEST PA AND LATERAL CLINICAL HISTORY: Wheezing TECHNIQUE: PA and lateral views of the chest were performed. COMPARISON: No 02/13/2023 ne FINDINGS: Heart size normal.  The lungs are clear.  No pleural effusion.     No significant changes Electronically signed by: Navin Mosquera MD Date:    12/20/2023 Time:    10:54       Patient in no acute distress.  Vitals  reassuring.  Discussed results/diagnosis/plan in depth with patient in clinic. Strict precautions given to patient to monitor for worsening signs and symptoms. Advised to follow up with primary.All questions answered. Strict ER precautions given. If your symptoms worsens or fail to improve you should go to the Emergency Room. Discharge and follow-up instructions given verbally/printed. Discharge and follow-up instructions discussed with the patient who expressed understanding and willingness to comply with my recommendations.Patient voiced understanding and in agreement with current treatment plan.     Please be advised this text was dictated with Walle[employer] software and may contain errors due to translation.    Assessment:     1. Chronic obstructive pulmonary disease with acute exacerbation    2. Wheezing on auscultation    3. Cough, unspecified type        Plan:       Chronic obstructive pulmonary disease with acute exacerbation  -     ipratropium 0.02 % nebulizer solution 0.5 mg  -     albuterol nebulizer solution 2.5 mg  -     methylPREDNISolone sod suc(PF) injection 125 mg  -     predniSONE (DELTASONE) 20 MG tablet; Take 1 tablet (20 mg total) by mouth once daily. for 3 days  Dispense: 3 tablet; Refill: 0    Wheezing on auscultation  -     X-Ray Chest PA And Lateral; Future; Expected date: 12/20/2023  -     predniSONE (DELTASONE) 20 MG tablet; Take 1 tablet (20 mg total) by mouth once daily. for 3 days  Dispense: 3 tablet; Refill: 0    Cough, unspecified type  -     benzonatate (TESSALON) 100 MG capsule; Take 1 capsule (100 mg total) by mouth 3 (three) times daily as needed for Cough.  Dispense: 20 capsule; Refill: 0          Medical Decision Making:   Clinical Tests:   Radiological Study: Ordered and Reviewed  Urgent Care Management:  Patient in no acute distress.  Vitals reassuring.  On exam, patient is nontoxic appearing and afebrile.   And physical exam with expiratory wheezing noted throughout all lung  fields.  Patient pulse ox 95%.  Patient reports shortness of breath.  Chest x-ray obtained.  DuoNeb treatment given in clinic.  Pulse ox improved to 97%.  Significant improvement in wheezing and symptoms along with good air movement post treatment.  Patient with history of COPD.  Patient was seen 7 days ago and was prescribed the 5 days' course of oral steroids and Z-Richard patient reported no improvement with symptoms.  Patient also using his inhalers as prescribed.  Chest x-ray obtained.  Negative chest x-ray.  Methylprednisolone injection given in clinic with detailed education provided about side effects and recommendations.  Patient with no history of diabetes.  I will also prescribe him a short course of 3 days of steroids oral to start tomorrow.  I had extensive discussion with patient and his wife for further evaluation and go to ER if no improvement in symptoms or worsening symptoms.  Patient agreed with the treatment plan that if he does not improve he will go to the ER for further evaluation.  As they can not get an appointment with the pulmonologist currently.  I reiterated the importance of further evaluation. Medication prescribed and over-the-counter medication discussed with patient at length.  Proper hydration advised.  I reiterated the importance of further evaluation if no improvement symptoms and follow-up with primary. Patient voiced understanding and in agreement with current treatment plan.             Patient Instructions   You received a steroid today - this can elevate your blood pressure, elevate your blood sugar, water weight gain, nervous energy, redness to the face and dimpling of the skin where the shot goes in if you had an injection.   Do not use steroids more than 3 times per year.   If you have diabetes, please check you blood sugar frequently.  If you have high blood pressure, please check your blood pressure frequently.      If your condition worsens or fails to improve we recommend  "that you receive another evaluation at the ER immediately or contact your PCP to discuss your concerns or return here. You must understand that you've received an urgent care treatment only and that you may be released before all your medical problems are known or treated. You the patient will arrange for follouwp care as instructed.   Keep the scheduled appointment with your specialist as discussed.    You received a steroid shot on today   Rest and fluids are important  Take inhaler as prescribed and needed for wheezing  -  Flonase (fluticasone) is a nasal spray which is available over the counter and may help with your symptoms.   -  If you have hypertension avoid using the "D" which is the decongestant.  Instead you can use Coricidin HBP for cold and cough symptoms.    -  If you just have drainage you can take plain Zyrtec, Claritin or Allegra   -  If you just have a congested feeling you can take pseudoephedrine (unless you have high blood pressure) which you have to sign for behind the counter. Do not buy the phenylephrine which is on the shelf as it is not effective   -  Rest and fluids are also important.   -  Tylenol or ibuprofen can also be used as directed for pain unless you have an allergy to them or medical condition such as stomach ulcers, kidney or liver disease or blood thinners etc for which you should not be taking these type of medications.   Please follow up with your primary care doctor or specialist in the next 48-72hrs as needed and if no improvement  If you  smoke, please stop smoking.     "

## 2023-12-26 ENCOUNTER — PATIENT MESSAGE (OUTPATIENT)
Dept: PULMONOLOGY | Facility: CLINIC | Age: 83
End: 2023-12-26
Payer: MEDICARE

## 2023-12-26 DIAGNOSIS — R05.3 CHRONIC COUGH: Primary | ICD-10-CM

## 2024-01-03 RX ORDER — LEVOFLOXACIN 500 MG/1
500 TABLET, FILM COATED ORAL DAILY
Qty: 7 TABLET | Refills: 0 | Status: SHIPPED | OUTPATIENT
Start: 2024-01-03 | End: 2024-02-22

## 2024-01-12 ENCOUNTER — PATIENT MESSAGE (OUTPATIENT)
Dept: PULMONOLOGY | Facility: CLINIC | Age: 84
End: 2024-01-12
Payer: MEDICARE

## 2024-01-12 DIAGNOSIS — J44.89 ASTHMA-COPD OVERLAP SYNDROME: Primary | ICD-10-CM

## 2024-01-16 RX ORDER — MONTELUKAST SODIUM 10 MG/1
10 TABLET ORAL NIGHTLY
Qty: 90 TABLET | Refills: 3 | Status: SHIPPED | OUTPATIENT
Start: 2024-01-16 | End: 2025-01-10

## 2024-02-17 DIAGNOSIS — I25.118 ATHEROSCLEROSIS OF NATIVE CORONARY ARTERY OF NATIVE HEART WITH OTHER FORM OF ANGINA PECTORIS: ICD-10-CM

## 2024-02-17 DIAGNOSIS — I44.7 LEFT BUNDLE BRANCH BLOCK: ICD-10-CM

## 2024-02-19 RX ORDER — PRAVASTATIN SODIUM 20 MG/1
20 TABLET ORAL NIGHTLY
Qty: 90 TABLET | Refills: 3 | Status: SHIPPED | OUTPATIENT
Start: 2024-02-19 | End: 2024-03-28

## 2024-02-22 ENCOUNTER — OFFICE VISIT (OUTPATIENT)
Dept: FAMILY MEDICINE | Facility: CLINIC | Age: 84
End: 2024-02-22
Payer: MEDICARE

## 2024-02-22 ENCOUNTER — LAB VISIT (OUTPATIENT)
Dept: LAB | Facility: HOSPITAL | Age: 84
End: 2024-02-22
Attending: FAMILY MEDICINE
Payer: MEDICARE

## 2024-02-22 VITALS
BODY MASS INDEX: 26.65 KG/M2 | WEIGHT: 165.81 LBS | SYSTOLIC BLOOD PRESSURE: 132 MMHG | DIASTOLIC BLOOD PRESSURE: 80 MMHG | HEART RATE: 60 BPM | OXYGEN SATURATION: 95 % | HEIGHT: 66 IN

## 2024-02-22 DIAGNOSIS — R73.01 IMPAIRED FASTING GLUCOSE: ICD-10-CM

## 2024-02-22 DIAGNOSIS — R23.2 FLUSHING: ICD-10-CM

## 2024-02-22 DIAGNOSIS — I70.0 AORTIC ATHEROSCLEROSIS: ICD-10-CM

## 2024-02-22 DIAGNOSIS — I25.118 ATHEROSCLEROTIC HEART DISEASE OF NATIVE CORONARY ARTERY WITH OTHER FORMS OF ANGINA PECTORIS: ICD-10-CM

## 2024-02-22 DIAGNOSIS — E66.3 OVERWEIGHT WITH BODY MASS INDEX (BMI) OF 26 TO 26.9 IN ADULT: ICD-10-CM

## 2024-02-22 DIAGNOSIS — E16.2 HYPOGLYCEMIA: ICD-10-CM

## 2024-02-22 DIAGNOSIS — I10 ESSENTIAL HYPERTENSION: Primary | ICD-10-CM

## 2024-02-22 DIAGNOSIS — E78.2 MIXED HYPERLIPIDEMIA: ICD-10-CM

## 2024-02-22 DIAGNOSIS — F51.01 PRIMARY INSOMNIA: ICD-10-CM

## 2024-02-22 DIAGNOSIS — J44.89 ASTHMA-COPD OVERLAP SYNDROME: ICD-10-CM

## 2024-02-22 DIAGNOSIS — N40.0 BENIGN PROSTATIC HYPERPLASIA WITHOUT LOWER URINARY TRACT SYMPTOMS: ICD-10-CM

## 2024-02-22 DIAGNOSIS — G30.9 ALZHEIMER'S DISEASE, UNSPECIFIED (CODE): ICD-10-CM

## 2024-02-22 DIAGNOSIS — F33.1 MAJOR DEPRESSIVE DISORDER, RECURRENT, MODERATE: ICD-10-CM

## 2024-02-22 DIAGNOSIS — E29.1 TESTICULAR HYPOFUNCTION: ICD-10-CM

## 2024-02-22 DIAGNOSIS — I10 ESSENTIAL HYPERTENSION: ICD-10-CM

## 2024-02-22 PROBLEM — F10.21 ALCOHOL DEPENDENCE, IN REMISSION: Status: RESOLVED | Noted: 2023-01-25 | Resolved: 2024-02-22

## 2024-02-22 PROBLEM — E78.5 HYPERLIPIDEMIA, UNSPECIFIED: Status: ACTIVE | Noted: 2019-03-03

## 2024-02-22 PROBLEM — C80.1 MALIGNANT (PRIMARY) NEOPLASM, UNSPECIFIED: Status: ACTIVE | Noted: 2024-02-22

## 2024-02-22 PROBLEM — C80.1 MALIGNANT (PRIMARY) NEOPLASM, UNSPECIFIED: Status: RESOLVED | Noted: 2024-02-22 | Resolved: 2024-02-22

## 2024-02-22 PROBLEM — N18.31 STAGE 3A CHRONIC KIDNEY DISEASE: Status: RESOLVED | Noted: 2018-11-02 | Resolved: 2024-02-22

## 2024-02-22 PROBLEM — E78.1 HYPERTRIGLYCERIDEMIA: Status: RESOLVED | Noted: 2018-11-02 | Resolved: 2024-02-22

## 2024-02-22 PROBLEM — I73.89 OTHER SPECIFIED PERIPHERAL VASCULAR DISEASES: Status: RESOLVED | Noted: 2024-02-22 | Resolved: 2024-02-22

## 2024-02-22 PROBLEM — D3A.8 OTHER BENIGN NEUROENDOCRINE TUMORS: Status: ACTIVE | Noted: 2024-02-22

## 2024-02-22 PROBLEM — I73.89 OTHER SPECIFIED PERIPHERAL VASCULAR DISEASES: Status: ACTIVE | Noted: 2024-02-22

## 2024-02-22 PROBLEM — D75.1 SECONDARY ERYTHROCYTOSIS: Status: RESOLVED | Noted: 2023-05-17 | Resolved: 2024-02-22

## 2024-02-22 PROBLEM — J44.9 COPD WITHOUT EXACERBATION: Status: ACTIVE | Noted: 2019-03-03

## 2024-02-22 PROBLEM — D3A.8 OTHER BENIGN NEUROENDOCRINE TUMORS: Status: RESOLVED | Noted: 2024-02-22 | Resolved: 2024-02-22

## 2024-02-22 PROBLEM — J43.8 OTHER EMPHYSEMA: Status: ACTIVE | Noted: 2019-03-03

## 2024-02-22 PROBLEM — J44.1 CHRONIC OBSTRUCTIVE PULMONARY DISEASE WITH ACUTE EXACERBATION: Status: ACTIVE | Noted: 2019-03-03

## 2024-02-22 LAB
ANION GAP SERPL CALC-SCNC: 8 MMOL/L (ref 8–16)
BASOPHILS # BLD AUTO: 0.02 K/UL (ref 0–0.2)
BASOPHILS NFR BLD: 0.3 % (ref 0–1.9)
BUN SERPL-MCNC: 17 MG/DL (ref 8–23)
CALCIUM SERPL-MCNC: 10 MG/DL (ref 8.7–10.5)
CHLORIDE SERPL-SCNC: 106 MMOL/L (ref 95–110)
CHOLEST SERPL-MCNC: 164 MG/DL (ref 120–199)
CHOLEST/HDLC SERPL: 3.3 {RATIO} (ref 2–5)
CO2 SERPL-SCNC: 28 MMOL/L (ref 23–29)
CREAT SERPL-MCNC: 1.2 MG/DL (ref 0.5–1.4)
DIFFERENTIAL METHOD BLD: ABNORMAL
EOSINOPHIL # BLD AUTO: 0 K/UL (ref 0–0.5)
EOSINOPHIL NFR BLD: 0 % (ref 0–8)
ERYTHROCYTE [DISTWIDTH] IN BLOOD BY AUTOMATED COUNT: 14.4 % (ref 11.5–14.5)
EST. GFR  (NO RACE VARIABLE): >60 ML/MIN/1.73 M^2
ESTIMATED AVG GLUCOSE: 108 MG/DL (ref 68–131)
GLUCOSE SERPL-MCNC: 90 MG/DL (ref 70–110)
HBA1C MFR BLD: 5.4 % (ref 4–5.6)
HCT VFR BLD AUTO: 47 % (ref 40–54)
HDLC SERPL-MCNC: 50 MG/DL (ref 40–75)
HDLC SERPL: 30.5 % (ref 20–50)
HGB BLD-MCNC: 15.4 G/DL (ref 14–18)
IMM GRANULOCYTES # BLD AUTO: 0.06 K/UL (ref 0–0.04)
IMM GRANULOCYTES NFR BLD AUTO: 1 % (ref 0–0.5)
LDLC SERPL CALC-MCNC: 101 MG/DL (ref 63–159)
LYMPHOCYTES # BLD AUTO: 1 K/UL (ref 1–4.8)
LYMPHOCYTES NFR BLD: 16.9 % (ref 18–48)
MCH RBC QN AUTO: 30 PG (ref 27–31)
MCHC RBC AUTO-ENTMCNC: 32.8 G/DL (ref 32–36)
MCV RBC AUTO: 91 FL (ref 82–98)
MONOCYTES # BLD AUTO: 1.3 K/UL (ref 0.3–1)
MONOCYTES NFR BLD: 21.7 % (ref 4–15)
NEUTROPHILS # BLD AUTO: 3.7 K/UL (ref 1.8–7.7)
NEUTROPHILS NFR BLD: 60.1 % (ref 38–73)
NONHDLC SERPL-MCNC: 114 MG/DL
NRBC BLD-RTO: 0 /100 WBC
PLATELET # BLD AUTO: 155 K/UL (ref 150–450)
PMV BLD AUTO: 11.4 FL (ref 9.2–12.9)
POTASSIUM SERPL-SCNC: 5.2 MMOL/L (ref 3.5–5.1)
RBC # BLD AUTO: 5.14 M/UL (ref 4.6–6.2)
SODIUM SERPL-SCNC: 142 MMOL/L (ref 136–145)
TRIGL SERPL-MCNC: 65 MG/DL (ref 30–150)
WBC # BLD AUTO: 6.08 K/UL (ref 3.9–12.7)

## 2024-02-22 PROCEDURE — 1157F ADVNC CARE PLAN IN RCRD: CPT | Mod: HCNC,CPTII,S$GLB, | Performed by: FAMILY MEDICINE

## 2024-02-22 PROCEDURE — 3288F FALL RISK ASSESSMENT DOCD: CPT | Mod: HCNC,CPTII,S$GLB, | Performed by: FAMILY MEDICINE

## 2024-02-22 PROCEDURE — 83036 HEMOGLOBIN GLYCOSYLATED A1C: CPT | Mod: HCNC | Performed by: FAMILY MEDICINE

## 2024-02-22 PROCEDURE — 80061 LIPID PANEL: CPT | Mod: HCNC | Performed by: FAMILY MEDICINE

## 2024-02-22 PROCEDURE — 3079F DIAST BP 80-89 MM HG: CPT | Mod: HCNC,CPTII,S$GLB, | Performed by: FAMILY MEDICINE

## 2024-02-22 PROCEDURE — 1159F MED LIST DOCD IN RCRD: CPT | Mod: HCNC,CPTII,S$GLB, | Performed by: FAMILY MEDICINE

## 2024-02-22 PROCEDURE — 1101F PT FALLS ASSESS-DOCD LE1/YR: CPT | Mod: HCNC,CPTII,S$GLB, | Performed by: FAMILY MEDICINE

## 2024-02-22 PROCEDURE — 1126F AMNT PAIN NOTED NONE PRSNT: CPT | Mod: HCNC,CPTII,S$GLB, | Performed by: FAMILY MEDICINE

## 2024-02-22 PROCEDURE — 85025 COMPLETE CBC W/AUTO DIFF WBC: CPT | Mod: HCNC | Performed by: FAMILY MEDICINE

## 2024-02-22 PROCEDURE — 99999 PR PBB SHADOW E&M-EST. PATIENT-LVL V: CPT | Mod: PBBFAC,HCNC,, | Performed by: FAMILY MEDICINE

## 2024-02-22 PROCEDURE — 3075F SYST BP GE 130 - 139MM HG: CPT | Mod: HCNC,CPTII,S$GLB, | Performed by: FAMILY MEDICINE

## 2024-02-22 PROCEDURE — 99214 OFFICE O/P EST MOD 30 MIN: CPT | Mod: HCNC,S$GLB,, | Performed by: FAMILY MEDICINE

## 2024-02-22 PROCEDURE — 1160F RVW MEDS BY RX/DR IN RCRD: CPT | Mod: HCNC,CPTII,S$GLB, | Performed by: FAMILY MEDICINE

## 2024-02-22 PROCEDURE — 36415 COLL VENOUS BLD VENIPUNCTURE: CPT | Mod: HCNC,PO | Performed by: FAMILY MEDICINE

## 2024-02-22 PROCEDURE — 80048 BASIC METABOLIC PNL TOTAL CA: CPT | Mod: HCNC | Performed by: FAMILY MEDICINE

## 2024-02-22 RX ORDER — RIVASTIGMINE TARTRATE 4.5 MG/1
4.5 CAPSULE ORAL 2 TIMES DAILY
COMMUNITY
Start: 2024-02-15

## 2024-02-22 RX ORDER — BUDESONIDE AND FORMOTEROL FUMARATE DIHYDRATE 160; 4.5 UG/1; UG/1
2 AEROSOL RESPIRATORY (INHALATION) EVERY 12 HOURS
Qty: 30 G | Refills: 1 | Status: SHIPPED | OUTPATIENT
Start: 2024-02-22 | End: 2025-02-21

## 2024-02-22 RX ORDER — CLINDAMYCIN HYDROCHLORIDE 150 MG/1
CAPSULE ORAL
COMMUNITY
End: 2024-02-22

## 2024-02-22 NOTE — PROGRESS NOTES
Subjective:         Patient ID: Navin Henning Jr. is a 83 y.o. male.    Chief Complaint: Follow-up    Patient Active Problem List   Diagnosis    Mixed hyperlipidemia    Asthma-COPD overlap syndrome    Personal history of tobacco use, presenting hazards to health    Actinic keratosis    Seborrheic keratoses    Allergic rhinosinusitis    Anxiety disorder    Sensorineural hearing loss, bilateral    Testicular hypofunction    Essential hypertension    Family history of diabetes mellitus (DM)    Impaired fasting glucose    Insomnia    Memory loss    Nondependent alcohol abuse, in remission    Overweight with body mass index (BMI) of 26 to 26.9 in adult    Major depressive disorder, recurrent, moderate    Benign prostatic hyperplasia without lower urinary tract symptoms    Aortic atherosclerosis    Alzheimer's disease, unspecified (CODE)    Hypoglycemia    Flushing    Atherosclerotic heart disease of native coronary artery with other forms of angina pectoris    Calcified granuloma of lung      HPI    Navin is a 83 y.o. male who presents today for follow up of chronic medical problems.   Overall reports doing well without concerns.   Hearing aids in place.     Review of Systems   Constitutional:  Negative for activity change and unexpected weight change.   HENT:  Positive for hearing loss. Negative for rhinorrhea and trouble swallowing.    Eyes:  Negative for discharge and visual disturbance.   Respiratory:  Negative for chest tightness and wheezing.    Cardiovascular:  Negative for chest pain and palpitations.   Gastrointestinal:  Negative for blood in stool, constipation, diarrhea and vomiting.   Endocrine: Negative for polydipsia and polyuria.   Genitourinary:  Negative for difficulty urinating, hematuria and urgency.   Musculoskeletal:  Negative for arthralgias, joint swelling and neck pain.   Neurological:  Negative for weakness and headaches.   Psychiatric/Behavioral:  Negative for confusion and dysphoric mood.   "  All other systems reviewed and are negative.         Objective:     Vitals:    02/22/24 0936   BP: 132/80   BP Location: Left arm   Patient Position: Sitting   BP Method: Medium (Manual)   Pulse: 60   SpO2: 95%   Weight: 75.2 kg (165 lb 12.6 oz)   Height: 5' 6" (1.676 m)         Physical Exam  Vitals and nursing note reviewed.   Constitutional:       General: He is not in acute distress.     Appearance: Normal appearance. He is not ill-appearing, toxic-appearing or diaphoretic.   HENT:      Head: Normocephalic and atraumatic.   Eyes:      General: No scleral icterus.     Conjunctiva/sclera: Conjunctivae normal.   Cardiovascular:      Rate and Rhythm: Normal rate.      Heart sounds: Normal heart sounds. No murmur heard.  Pulmonary:      Effort: Pulmonary effort is normal. No respiratory distress.      Breath sounds: Normal breath sounds.   Abdominal:      General: Bowel sounds are normal.   Skin:     Coloration: Skin is not pale.   Neurological:      Mental Status: He is alert. Mental status is at baseline.   Psychiatric:         Attention and Perception: Attention and perception normal.         Mood and Affect: Mood and affect normal.         Speech: Speech normal.         Behavior: Behavior normal.         Cognition and Memory: Cognition and memory normal.         Judgment: Judgment normal.       Assessment:       1. Essential hypertension    2. Mixed hyperlipidemia    3. Atherosclerotic heart disease of native coronary artery with other forms of angina pectoris    4. Aortic atherosclerosis    5. Impaired fasting glucose    6. Hypoglycemia    7. Asthma-COPD overlap syndrome    8. Flushing    9. Overweight with body mass index (BMI) of 26 to 26.9 in adult    10. Alzheimer's disease, unspecified (CODE)    11. Major depressive disorder, recurrent, moderate    12. Primary insomnia    13. Testicular hypofunction    14. Benign prostatic hyperplasia without lower urinary tract symptoms          Plan:   Recent relevant " labs results reviewed with patient.         1. Essential hypertension  Comments:  Well controlled, chronic. Diet controlled    Orders:  -     Lipid Panel; Future; Expected date: 02/22/2024  -     CBC Auto Differential; Future; Expected date: 02/22/2024  -     BASIC METABOLIC PANEL; Future; Expected date: 02/22/2024  -     Hemoglobin A1C; Future; Expected date: 02/22/2024    2. Mixed hyperlipidemia  -     Lipid Panel; Future; Expected date: 02/22/2024  -     CBC Auto Differential; Future; Expected date: 02/22/2024  -     BASIC METABOLIC PANEL; Future; Expected date: 02/22/2024  -     Hemoglobin A1C; Future; Expected date: 02/22/2024  Continue statin. Update lipid panel    3. Atherosclerotic heart disease of native coronary artery with other forms of angina pectoris  4. Aortic atherosclerosis  Overview:  Noted on recent 2023 CT chest.  Cont statin and ASA    5. Impaired fasting glucose  -     Hemoglobin A1C; Future; Expected date: 02/22/2024  Resolved    6. Hypoglycemia  Overview:  Intermittent hypoglycemia of unknown cause.  Elevated C-peptide. Work up for neuroendocrine tumor neg. Symptoms less frequent now.  Orders:  -     Hemoglobin A1C; Future; Expected date: 02/22/2024    7. Asthma-COPD overlap syndrome  Overview:  Smoking history.  Emphysematous changes seen on recent CT chest.  PFTs with mild obstruction and significant bronchodilator response.  Currently on LABA/ICS which can be uses both a controller and rescue    Orders:  -     budesonide-formoterol 160-4.5 mcg (SYMBICORT) 160-4.5 mcg/actuation HFAA; Inhale 2 puffs into the lungs every 12 (twelve) hours. Controller  Dispense: 30 g; Refill: 1    8. Flushing  Overview:  Associated with diarrhea, hypoglycemia and other endocrine irregularites. Suspected neuroendocrine tumor driving all this, however PET DOTATATE negative as well as laboratories. Recently has been asymptomatic.    9. Overweight with body mass index (BMI) of 26 to 26.9 in adult  Trend  appropriate for age.     10. Alzheimer's disease, unspecified (CODE)  - Per Neurology  Progressive, but no recent changes requiring additional resources at this time.     11. Major depressive disorder, recurrent, moderate  12. Primary insomnia  - Per psychiatry  Symptoms controlled  Chronic    13. Testicular hypofunction  14. Benign prostatic hyperplasia without lower urinary tract symptoms  - Per Urology  Stable, controlled on meds    Patient's questions answered. Plan reviewed with patient at the end of visit. Relevant precautions to chief complaint and reasons to seek further medical care or to contact the office sooner reviewed with patient.     Follow up in about 6 months (around 8/22/2024) for Hypertension Follow-up.

## 2024-03-06 DIAGNOSIS — J30.9 ALLERGIC RHINITIS, UNSPECIFIED SEASONALITY, UNSPECIFIED TRIGGER: ICD-10-CM

## 2024-03-06 DIAGNOSIS — R05.3 CHRONIC COUGH: ICD-10-CM

## 2024-03-10 RX ORDER — LEVOCETIRIZINE DIHYDROCHLORIDE 5 MG/1
5 TABLET, FILM COATED ORAL NIGHTLY
Qty: 90 TABLET | Refills: 3 | Status: SHIPPED | OUTPATIENT
Start: 2024-03-10 | End: 2025-03-10

## 2024-03-28 ENCOUNTER — OFFICE VISIT (OUTPATIENT)
Dept: CARDIOLOGY | Facility: CLINIC | Age: 84
End: 2024-03-28
Payer: MEDICARE

## 2024-03-28 VITALS
HEART RATE: 74 BPM | WEIGHT: 167.56 LBS | DIASTOLIC BLOOD PRESSURE: 65 MMHG | HEIGHT: 66 IN | SYSTOLIC BLOOD PRESSURE: 147 MMHG | BODY MASS INDEX: 26.93 KG/M2

## 2024-03-28 DIAGNOSIS — I25.118 ATHEROSCLEROTIC HEART DISEASE OF NATIVE CORONARY ARTERY WITH OTHER FORMS OF ANGINA PECTORIS: ICD-10-CM

## 2024-03-28 DIAGNOSIS — E78.2 MIXED HYPERLIPIDEMIA: Primary | ICD-10-CM

## 2024-03-28 DIAGNOSIS — I70.0 AORTIC ATHEROSCLEROSIS: ICD-10-CM

## 2024-03-28 DIAGNOSIS — I10 ESSENTIAL HYPERTENSION: ICD-10-CM

## 2024-03-28 DIAGNOSIS — I25.2 HISTORY OF MI (MYOCARDIAL INFARCTION): Chronic | ICD-10-CM

## 2024-03-28 DIAGNOSIS — I44.7 LEFT BUNDLE BRANCH BLOCK: Chronic | ICD-10-CM

## 2024-03-28 PROCEDURE — 3288F FALL RISK ASSESSMENT DOCD: CPT | Mod: HCNC,CPTII,S$GLB, | Performed by: INTERNAL MEDICINE

## 2024-03-28 PROCEDURE — 93000 ELECTROCARDIOGRAM COMPLETE: CPT | Mod: HCNC,S$GLB,, | Performed by: INTERNAL MEDICINE

## 2024-03-28 PROCEDURE — 3078F DIAST BP <80 MM HG: CPT | Mod: HCNC,CPTII,S$GLB, | Performed by: INTERNAL MEDICINE

## 2024-03-28 PROCEDURE — 1159F MED LIST DOCD IN RCRD: CPT | Mod: HCNC,CPTII,S$GLB, | Performed by: INTERNAL MEDICINE

## 2024-03-28 PROCEDURE — 99999 PR PBB SHADOW E&M-EST. PATIENT-LVL IV: CPT | Mod: PBBFAC,HCNC,, | Performed by: INTERNAL MEDICINE

## 2024-03-28 PROCEDURE — 99214 OFFICE O/P EST MOD 30 MIN: CPT | Mod: HCNC,S$GLB,, | Performed by: INTERNAL MEDICINE

## 2024-03-28 PROCEDURE — 3077F SYST BP >= 140 MM HG: CPT | Mod: HCNC,CPTII,S$GLB, | Performed by: INTERNAL MEDICINE

## 2024-03-28 PROCEDURE — 1101F PT FALLS ASSESS-DOCD LE1/YR: CPT | Mod: HCNC,CPTII,S$GLB, | Performed by: INTERNAL MEDICINE

## 2024-03-28 PROCEDURE — 1157F ADVNC CARE PLAN IN RCRD: CPT | Mod: HCNC,CPTII,S$GLB, | Performed by: INTERNAL MEDICINE

## 2024-03-28 PROCEDURE — 1126F AMNT PAIN NOTED NONE PRSNT: CPT | Mod: HCNC,CPTII,S$GLB, | Performed by: INTERNAL MEDICINE

## 2024-03-28 RX ORDER — GENTAMICIN SULFATE 1 MG/G
OINTMENT TOPICAL 2 TIMES DAILY
COMMUNITY
Start: 2024-03-12

## 2024-03-28 RX ORDER — ROSUVASTATIN CALCIUM 20 MG/1
20 TABLET, COATED ORAL DAILY
Qty: 90 TABLET | Refills: 3 | Status: SHIPPED | OUTPATIENT
Start: 2024-03-28 | End: 2025-03-28

## 2024-03-28 NOTE — PROGRESS NOTES
Subjective:   @Patient ID:  Navin Henning Jr. is a 83 y.o. male who presents for evaluation of LBBB      HPI:     3/28/2024:  Follow up.  Nuclear stress test with fixed defects without ischemia.  He is doing well.  Denies any chest pain or significant dyspnea on exertion.  Blood pressure well-controlled at home.  He is compliant with pravastatin and aspirin.  EKG today sinus bradycardia with left bundle-branch block        4/2023: Here for initial evaluation. Referred by Dr. Barrett for LBBB  He is accompanied by his wife. He is a pleasant gentleman.  Does have chronic left bundle-branch block for many years.  For the last 2 -3 years he has been having significant night sweats. No chest pain.  Stable dyspnea on exertion. Recent CT of the chest showed severe coronary atherosclerosis in the left main as well as LAD circumflex and RCA  He had a stress test 15 years ago that was okay  He does have vertigo the last few days.  He had history of vertigo pain many years ago and he was seen by ENT    Stopped smoking 1976. He smoked for 20 yrs    Prior cardiovascular  Hx  --------------------------------    - Stress MPI 5/2023 Fixed defect, no ischemia     1. Findings concerning for a moderately sized infarct involving the inferoseptal wall.  2. No convincing evidence of reversible ischemia.  3. The inferoseptal wall appears hypokinetic, which would correlate with the infarct, however, hypokinesis in this location may be seen in the absence of significant perfusion defects.  4. Left ventricular ejection fraction 49% during stress and 50% during rest.           - ECHO   3/14/2023   The left ventricle is normal in size with concentric remodeling and normal systolic function.  The estimated ejection fraction is 55%.  There is abnormal septal wall motion consistent with left bundle branch block.  Normal left ventricular diastolic function.  The estimated PA systolic pressure is 9 mmHg.  Normal right ventricular size with normal  right ventricular systolic function.  Normal central venous pressure (3 mmHg).        - EKG 3/2019   SR with LBBB        Patient Active Problem List    Diagnosis Date Noted    Left bundle branch block 03/28/2024    History of MI (myocardial infarction) 03/28/2024     Seen in the nuclear stress test      Atherosclerotic heart disease of native coronary artery with other forms of angina pectoris 08/18/2023     Noted by CLARITZA COFFEY MD  last documented on [id number]      Calcified granuloma of lung 08/18/2023    Flushing 05/11/2023     Associated with diarrhea, hypoglycemia and other endocrine this regulations.  Suspected neuroendocrine tumor driving all this, however PET DOTATATE negative as well as laboratories.      Hypoglycemia 02/03/2023     Intermittent hypoglycemia of unknown cause.  Elevated C-peptide.  Following with endocrinology.  See the section on night sweats.      Aortic atherosclerosis 01/25/2023     Noted on recent 2023 CT chest.  Not currently on ASA or statin      Alzheimer's disease, unspecified (CODE) 01/25/2023    Major depressive disorder, recurrent, moderate 07/11/2022    Benign prostatic hyperplasia without lower urinary tract symptoms 07/11/2022    Personal history of tobacco use, presenting hazards to health 01/10/2022     Quit 1976.  Twenty pack year history before that.  Worked in a chemical plant.  Congratulated on quitting encouraged ongoing cessation.      Actinic keratosis 01/10/2022    Seborrheic keratoses 01/10/2022    Anxiety disorder 01/10/2022    Sensorineural hearing loss, bilateral 01/10/2022    Testicular hypofunction 01/10/2022    Essential hypertension 01/10/2022    Impaired fasting glucose 01/10/2022    Insomnia 01/10/2022    Nondependent alcohol abuse, in remission 01/10/2022    Overweight with body mass index (BMI) of 26 to 26.9 in adult 01/10/2022     Patient would benefit from weight loss      Memory loss 08/26/2019    Mixed hyperlipidemia 03/03/2019    Asthma-COPD  overlap syndrome 2019     Substantial smoking history.  Emphysematous changes seen on recent CT chest.  PFTs with mild obstruction and significant bronchodilator response.  Currently on LABA/ICS which can be uses both a controller and rescue  Noted by ENMANUEL THURMAN DO  last documented on 2023      Allergic rhinosinusitis 2018     Significant sinus symptoms with almost constant postnasal gtt.   This appears to have been the primary  behind his chronic cough.  Sinus regimen seems to have improved symptoms.      Family history of diabetes mellitus (DM) 2018           Right Arm BP - Sittin/68  Left Arm BP - Sittin/65        LAST HbA1c  Lab Results   Component Value Date    HGBA1C 5.4 2024       Lipid panel  Lab Results   Component Value Date    CHOL 164 2024    CHOL 199 2022    CHOL 183 2021     Lab Results   Component Value Date    HDL 50 2024    HDL 40 2022    HDL 35 (L) 2021     Lab Results   Component Value Date    LDLCALC 101.0 2024    LDLCALC 128.2 2022    LDLCALC 115.0 2021     Lab Results   Component Value Date    TRIG 65 2024    TRIG 154 (H) 2022    TRIG 165 (H) 2021     Lab Results   Component Value Date    CHOLHDL 30.5 2024    CHOLHDL 20.1 2022    CHOLHDL 19.1 (L) 2021            Review of Systems   Constitutional: Negative for chills and fever.   HENT:  Positive for hearing loss.         As in HPI   Eyes:  Negative for blurred vision.   Cardiovascular:         As in HPI   Respiratory:          As in HPI   Hematologic/Lymphatic: Negative for bleeding problem.   Skin:  Negative for itching.   Musculoskeletal:  Negative for falls.   Gastrointestinal:  Negative for abdominal pain and hematochezia.   Genitourinary:  Negative for hematuria.   Neurological:  Negative for dizziness and loss of balance.   Psychiatric/Behavioral:  Negative for altered mental status and depression.         Objective:   Physical Exam  Constitutional:       Appearance: He is well-developed.   HENT:      Head: Normocephalic and atraumatic.   Eyes:      Conjunctiva/sclera: Conjunctivae normal.   Neck:      Vascular: No carotid bruit or JVD.   Cardiovascular:      Rate and Rhythm: Normal rate and regular rhythm.      Pulses:           Carotid pulses are 2+ on the right side and 2+ on the left side.       Radial pulses are 2+ on the right side and 2+ on the left side.      Heart sounds: Normal heart sounds. No murmur heard.     No friction rub. No gallop.   Pulmonary:      Effort: Pulmonary effort is normal. No respiratory distress.      Breath sounds: No stridor. No wheezing.      Comments: Prolonged expiratory phase  Abdominal:      General: Abdomen is flat.      Palpations: Abdomen is soft.   Musculoskeletal:      Cervical back: Neck supple.      Right lower leg: No edema.      Left lower leg: No edema.   Skin:     General: Skin is warm and dry.   Neurological:      Mental Status: He is alert and oriented to person, place, and time.   Psychiatric:         Behavior: Behavior normal.         Assessment:     1. Mixed hyperlipidemia    2. Essential hypertension    3. Aortic atherosclerosis    4. Atherosclerotic heart disease of native coronary artery with other forms of angina pectoris    5. Left bundle branch block    6. History of MI (myocardial infarction)        Plan:   At the present time he is asymptomatic from cardiac standpoint  Nuclear stress test with fixed defects without any ischemia    We will continue medical therapy  Switch pravastatin to rosuvastatin 20 mg.  LDL goal lower than 70  Continue aspirin 81 mg daily  Risk factors reduction  Monitor blood pressure and keep log.  Goal blood pressure less than 130/80    1 year follow-up or sooner p.r.n.    Pertinent cardiac images and EKG reviewed independently.    Continue with current medical plan and lifestyle changes.  Return sooner for concerns or  questions. If symptoms persist go to the ED  I have reviewed all pertinent data including patient's medical history in detail and updated the computerized patient record.     Orders Placed This Encounter   Procedures    IN OFFICE EKG 12-LEAD (to [place])     Standing Status:   Future     Standing Expiration Date:   3/28/2025       Follow up as scheduled.     He expressed verbal understanding and agreed with the plan    Patient's Medications   New Prescriptions    ROSUVASTATIN (CRESTOR) 20 MG TABLET    Take 1 tablet (20 mg total) by mouth once daily.   Previous Medications    ALBUTEROL (VENTOLIN HFA) 90 MCG/ACTUATION INHALER    Inhale 2 puffs into the lungs every 4 (four) hours as needed for Wheezing or Shortness of Breath. Rescue    AMMONIUM LACTATE (LAC-HYDRIN) 12 % LOTION    ammonium lactate 12 % lotion   APPLY LOTION TOPICALLY THREE TIMES DAILY AS NEEDED TO DRY SKIN OF THE BODY AND SCALP    ASPIRIN (ECOTRIN) 81 MG EC TABLET    Take 1 tablet by mouth once daily.    AZELASTINE (ASTELIN) 137 MCG (0.1 %) NASAL SPRAY    2 sprays (274 mcg total) by Nasal route 2 (two) times daily.    BUDESONIDE-FORMOTEROL 160-4.5 MCG (SYMBICORT) 160-4.5 MCG/ACTUATION HFAA    Inhale 2 puffs into the lungs every 12 (twelve) hours. Controller    CINNAMON BARK 500 MG CAPSULE    Take 1,000 mg by mouth once daily.    FAMOTIDINE (PEPCID) 20 MG TABLET    Take 1 tablet (20 mg total) by mouth nightly as needed for Heartburn.    FLUOXETINE 40 MG CAPSULE    Take 40 mg by mouth.    FLUTICASONE PROPIONATE (FLONASE) 50 MCG/ACTUATION NASAL SPRAY    1 spray (50 mcg total) by Each Nostril route 2 (two) times a day.    GENTAMICIN (GARAMYCIN) 0.1 % OINTMENT    Apply topically 2 (two) times daily.    KETOCONAZOLE (NIZORAL) 2 % SHAMPOO    SHAMPOO THREE TIMES WEEKLY TO AFFECTED AREAS. LET SIT FOR 5 MINUTES AND WASH OFF. FOR FACE    LEVOCETIRIZINE (XYZAL) 5 MG TABLET    Take 1 tablet (5 mg total) by mouth every evening.    MELATONIN 10 MG TBDL    Take 1 tablet  by mouth every evening.    MEMANTINE (NAMENDA XR) 28 MG CSPX        MIRTAZAPINE (REMERON) 15 MG TABLET    mirtazapine 15 mg tablet   Take 1 tablet every day by oral route in the evening for 30 days.    MONTELUKAST (SINGULAIR) 10 MG TABLET    Take 1 tablet (10 mg total) by mouth every evening.    MULTIVITAMIN CAPSULE    Take 1 capsule by mouth once daily.    RIVASTIGMINE TARTRATE 4.5 MG CAPSULE    Take 4.5 mg by mouth 2 (two) times a day.    SILDENAFIL (VIAGRA) 100 MG TABLET    Take 1 tablet (100 mg total) by mouth daily as needed for Erectile Dysfunction (take on an empty stomach 30-60 minutes before).    TAMSULOSIN (FLOMAX) 0.4 MG CAP    Take 1 capsule by mouth once daily.    TURMERIC ORAL    Take 1 capsule by mouth Daily.   Modified Medications    No medications on file   Discontinued Medications    HYDROCORTISONE 2.5 % LOTION    APPLY LOTION EXTERNALLY TWICE DAILY AS NEEDED FOR SCALING AND REDNESS OF FACE. USE SPARINGLY. CAN THIN SKIN IF OVERUSED    PRAVASTATIN (PRAVACHOL) 20 MG TABLET    TAKE 1 TABLET EVERY EVENING

## 2024-04-01 LAB
OHS QRS DURATION: 134 MS
OHS QTC CALCULATION: 449 MS

## 2024-04-12 ENCOUNTER — TELEPHONE (OUTPATIENT)
Dept: ADMINISTRATIVE | Facility: CLINIC | Age: 84
End: 2024-04-12
Payer: MEDICARE

## 2024-04-12 NOTE — TELEPHONE ENCOUNTER
Called pt, no answer; left message informing pt I was calling to remind pt of his in office EAWV on 4/15/24 and to return my call if he had any questions; left my name and number.

## 2024-04-15 ENCOUNTER — LAB VISIT (OUTPATIENT)
Dept: LAB | Facility: HOSPITAL | Age: 84
End: 2024-04-15
Attending: NURSE PRACTITIONER
Payer: MEDICARE

## 2024-04-15 ENCOUNTER — OFFICE VISIT (OUTPATIENT)
Dept: FAMILY MEDICINE | Facility: CLINIC | Age: 84
End: 2024-04-15
Payer: MEDICARE

## 2024-04-15 VITALS
WEIGHT: 165.44 LBS | HEIGHT: 66 IN | OXYGEN SATURATION: 95 % | SYSTOLIC BLOOD PRESSURE: 120 MMHG | DIASTOLIC BLOOD PRESSURE: 60 MMHG | BODY MASS INDEX: 26.59 KG/M2 | HEART RATE: 63 BPM

## 2024-04-15 DIAGNOSIS — G30.9 ALZHEIMER'S DISEASE, UNSPECIFIED (CODE): ICD-10-CM

## 2024-04-15 DIAGNOSIS — J84.10 CALCIFIED GRANULOMA OF LUNG: ICD-10-CM

## 2024-04-15 DIAGNOSIS — E78.2 MIXED HYPERLIPIDEMIA: ICD-10-CM

## 2024-04-15 DIAGNOSIS — E66.3 OVERWEIGHT WITH BODY MASS INDEX (BMI) OF 26 TO 26.9 IN ADULT: ICD-10-CM

## 2024-04-15 DIAGNOSIS — E87.5 HYPERKALEMIA: ICD-10-CM

## 2024-04-15 DIAGNOSIS — I25.118 ATHEROSCLEROTIC HEART DISEASE OF NATIVE CORONARY ARTERY WITH OTHER FORMS OF ANGINA PECTORIS: ICD-10-CM

## 2024-04-15 DIAGNOSIS — Z00.00 ENCOUNTER FOR PREVENTIVE HEALTH EXAMINATION: Primary | ICD-10-CM

## 2024-04-15 DIAGNOSIS — I70.0 AORTIC ATHEROSCLEROSIS: ICD-10-CM

## 2024-04-15 DIAGNOSIS — J44.89 ASTHMA-COPD OVERLAP SYNDROME: ICD-10-CM

## 2024-04-15 DIAGNOSIS — F33.40 MDD (RECURRENT MAJOR DEPRESSIVE DISORDER) IN REMISSION: ICD-10-CM

## 2024-04-15 DIAGNOSIS — I10 ESSENTIAL HYPERTENSION: ICD-10-CM

## 2024-04-15 PROCEDURE — 99999 PR PBB SHADOW E&M-EST. PATIENT-LVL IV: CPT | Mod: PBBFAC,HCNC,, | Performed by: NURSE PRACTITIONER

## 2024-04-15 PROCEDURE — 3288F FALL RISK ASSESSMENT DOCD: CPT | Mod: HCNC,CPTII,S$GLB, | Performed by: NURSE PRACTITIONER

## 2024-04-15 PROCEDURE — 1159F MED LIST DOCD IN RCRD: CPT | Mod: HCNC,CPTII,S$GLB, | Performed by: NURSE PRACTITIONER

## 2024-04-15 PROCEDURE — 3074F SYST BP LT 130 MM HG: CPT | Mod: HCNC,CPTII,S$GLB, | Performed by: NURSE PRACTITIONER

## 2024-04-15 PROCEDURE — 3078F DIAST BP <80 MM HG: CPT | Mod: HCNC,CPTII,S$GLB, | Performed by: NURSE PRACTITIONER

## 2024-04-15 PROCEDURE — 1126F AMNT PAIN NOTED NONE PRSNT: CPT | Mod: HCNC,CPTII,S$GLB, | Performed by: NURSE PRACTITIONER

## 2024-04-15 PROCEDURE — 1101F PT FALLS ASSESS-DOCD LE1/YR: CPT | Mod: HCNC,CPTII,S$GLB, | Performed by: NURSE PRACTITIONER

## 2024-04-15 PROCEDURE — 1170F FXNL STATUS ASSESSED: CPT | Mod: HCNC,CPTII,S$GLB, | Performed by: NURSE PRACTITIONER

## 2024-04-15 PROCEDURE — G0439 PPPS, SUBSEQ VISIT: HCPCS | Mod: HCNC,S$GLB,, | Performed by: NURSE PRACTITIONER

## 2024-04-15 PROCEDURE — 80048 BASIC METABOLIC PNL TOTAL CA: CPT | Mod: HCNC | Performed by: NURSE PRACTITIONER

## 2024-04-15 PROCEDURE — 1160F RVW MEDS BY RX/DR IN RCRD: CPT | Mod: HCNC,CPTII,S$GLB, | Performed by: NURSE PRACTITIONER

## 2024-04-15 PROCEDURE — 36415 COLL VENOUS BLD VENIPUNCTURE: CPT | Mod: HCNC,PO | Performed by: NURSE PRACTITIONER

## 2024-04-15 PROCEDURE — 1123F ACP DISCUSS/DSCN MKR DOCD: CPT | Mod: HCNC,CPTII,S$GLB, | Performed by: NURSE PRACTITIONER

## 2024-04-15 NOTE — PROGRESS NOTES
"  Navin Henning presented for a  Medicare AWV and comprehensive Health Risk Assessment today. The following components were reviewed and updated:    Medical history  Family History  Social history  Allergies and Current Medications  Health Risk Assessment  Health Maintenance  Care Team         ** See Completed Assessments for Annual Wellness Visit within the encounter summary.**         The following assessments were completed:  Living Situation  CAGE  Depression Screening  Timed Get Up and Go  Whisper Test  Cognitive Function Screening      Nutrition Screening  ADL Screening  PAQ Screening      Opioid documentation:      Patient does not have a current opioid prescription.        Vitals:    04/15/24 1315   BP: 120/60   BP Location: Left arm   Patient Position: Sitting   BP Method: Medium (Manual)   Pulse: 63   SpO2: 95%   Weight: 75 kg (165 lb 7.3 oz)   Height: 5' 6" (1.676 m)     Body mass index is 26.71 kg/m².    Physical Exam  Vitals reviewed.   Constitutional:       General: He is awake. He is not in acute distress.     Appearance: Normal appearance. He is well-developed, well-groomed and overweight.   HENT:      Head: Normocephalic.      Ears:      Comments: hearing aid in use  Cardiovascular:      Rate and Rhythm: Normal rate.   Pulmonary:      Effort: Pulmonary effort is normal. No respiratory distress.   Skin:     Coloration: Skin is not pale.   Neurological:      Mental Status: He is alert and oriented to person, place, and time.      Coordination: Coordination normal.      Gait: Gait normal.   Psychiatric:         Attention and Perception: Attention normal.         Mood and Affect: Mood and affect normal.         Speech: Speech normal.         Behavior: Behavior normal. Behavior is cooperative.         Thought Content: Thought content normal.         Cognition and Memory: Cognition normal. Memory is impaired. He exhibits impaired recent memory.             Diagnoses and health risks identified today and " associated recommendations/orders:    1. Encounter for preventive health examination    2. Alzheimer's disease, unspecified (CODE)  Chronic; stable.  Followed by Neurology.    3. Asthma-COPD overlap syndrome  Chronic; stable on inhaler treatments.  Followed by pulmonology.    4. MDD (recurrent major depressive disorder) in remission  Chronic; stable on fluoxetine.  Followed by Psychiatry.    5. Aortic atherosclerosis  Chronic; stable on statin medication. Followed by Cardiology.    6. Calcified granuloma of lung  As seen on previous imaging; follow up with PCP.    7. Atherosclerotic heart disease of native coronary artery with other forms of angina pectoris  Chronic; stable on statin and ASA medication. Follow up with PCP.    8. Essential hypertension  Chronic; stable, not on any medication. Follow up with PCP.    9. Mixed hyperlipidemia  Chronic; stable on statin medication. Follow up with PCP.    10. Hyperkalemia  - BASIC METABOLIC PANEL; Future    11. Overweight with body mass index (BMI) of 26 to 26.9 in adult  Eat a low salt/low fat diet and discussed importance of engaging in physical activity at least 5x/week for a minimum of 30 min/day.      Provided Navin with a 5-10 year written screening schedule and personal prevention plan. Recommendations were developed using the USPSTF age appropriate recommendations. Education, counseling, and referrals were provided as needed. After Visit Summary given to patient which includes a list of additional screenings/tests needed.    Follow up for your next annual wellness visit.    Karen Sullivan NP        I offered to discuss advanced care planning, including how to pick a person who would make decisions for you if you were unable to make them for yourself, called a health care power of , and what kind of decisions you might make such as use of life sustaining treatments such as ventilators and tube feeding when faced with a life limiting illness recorded on a  living will that they will need to know. (How you want to be cared for as you near the end of your natural life)     X  Patient has advanced directives on file, which we reviewed, and they do not wish to make changes.

## 2024-04-15 NOTE — PATIENT INSTRUCTIONS
Counseling and Referral of Other Preventative  (Italic type indicates deductible and co-insurance are waived)    Patient Name: Navin Henning  Today's Date: 4/15/2024    Health Maintenance       Date Due Completion Date    Annual UACr Never done ---    TETANUS VACCINE 07/26/2021 7/26/2011    COVID-19 Vaccine (4 - 2023-24 season) 09/01/2023 10/19/2021    Hemoglobin A1c (Prediabetes) 02/22/2025 2/22/2024    Aspirin/Antiplatelet Therapy 04/15/2025 4/15/2024    Lipid Panel 02/22/2029 2/22/2024        No orders of the defined types were placed in this encounter.      The following information is provided to all patients.  This information is to help you find resources for any of the problems found today that may be affecting your health:                  Living healthy guide: www.[facility].[place].[facility]      Understanding Diabetes: www.diabetes.org      Eating healthy: www.cdc.gov/healthyweight      CDC home safety checklist: www.cdc.gov/steadi/patient.html      Agency on Aging: www.goea.[place].[facility]      Alcoholics anonymous (AA): www.aa.org      Physical Activity: www.tom.nih.gov/jl5vmeb      Tobacco use: www.quitwithusla.org

## 2024-04-16 LAB
ANION GAP SERPL CALC-SCNC: 7 MMOL/L (ref 8–16)
BUN SERPL-MCNC: 15 MG/DL (ref 8–23)
CALCIUM SERPL-MCNC: 9.5 MG/DL (ref 8.7–10.5)
CHLORIDE SERPL-SCNC: 107 MMOL/L (ref 95–110)
CO2 SERPL-SCNC: 26 MMOL/L (ref 23–29)
CREAT SERPL-MCNC: 1.1 MG/DL (ref 0.5–1.4)
EST. GFR  (NO RACE VARIABLE): >60 ML/MIN/1.73 M^2
GLUCOSE SERPL-MCNC: 80 MG/DL (ref 70–110)
POTASSIUM SERPL-SCNC: 4.5 MMOL/L (ref 3.5–5.1)
SODIUM SERPL-SCNC: 140 MMOL/L (ref 136–145)

## 2024-05-03 ENCOUNTER — HOSPITAL ENCOUNTER (EMERGENCY)
Facility: HOSPITAL | Age: 84
Discharge: HOME | End: 2024-05-03
Attending: EMERGENCY MEDICINE
Payer: MEDICARE

## 2024-05-03 VITALS
OXYGEN SATURATION: 96 % | SYSTOLIC BLOOD PRESSURE: 176 MMHG | BODY MASS INDEX: 25.71 KG/M2 | DIASTOLIC BLOOD PRESSURE: 84 MMHG | HEIGHT: 66 IN | WEIGHT: 160 LBS | HEART RATE: 56 BPM | TEMPERATURE: 99 F | RESPIRATION RATE: 16 BRPM

## 2024-05-03 DIAGNOSIS — K57.32 DIVERTICULITIS OF DESCENDING COLON: Primary | ICD-10-CM

## 2024-05-03 DIAGNOSIS — R10.32 LEFT LOWER QUADRANT ABDOMINAL PAIN: ICD-10-CM

## 2024-05-03 LAB
ALBUMIN SERPL BCP-MCNC: 4.2 G/DL (ref 3.5–5.2)
ALP SERPL-CCNC: 94 U/L (ref 55–135)
ALT SERPL W/O P-5'-P-CCNC: 13 U/L (ref 10–44)
ANION GAP SERPL CALC-SCNC: 13 MMOL/L (ref 8–16)
AST SERPL-CCNC: 27 U/L (ref 10–40)
BASOPHILS # BLD AUTO: 0.01 K/UL (ref 0–0.2)
BASOPHILS NFR BLD: 0.1 % (ref 0–1.9)
BILIRUB SERPL-MCNC: 0.5 MG/DL (ref 0.1–1)
BILIRUB UR QL STRIP: NEGATIVE
BUN SERPL-MCNC: 17 MG/DL (ref 8–23)
CALCIUM SERPL-MCNC: 9.4 MG/DL (ref 8.7–10.5)
CHLORIDE SERPL-SCNC: 104 MMOL/L (ref 95–110)
CLARITY UR: CLEAR
CO2 SERPL-SCNC: 24 MMOL/L (ref 23–29)
COLOR UR: YELLOW
CREAT SERPL-MCNC: 1.2 MG/DL (ref 0.5–1.4)
DIFFERENTIAL METHOD BLD: ABNORMAL
EOSINOPHIL # BLD AUTO: 0 K/UL (ref 0–0.5)
EOSINOPHIL NFR BLD: 0.1 % (ref 0–8)
ERYTHROCYTE [DISTWIDTH] IN BLOOD BY AUTOMATED COUNT: 13.3 % (ref 11.5–14.5)
EST. GFR  (NO RACE VARIABLE): >60 ML/MIN/1.73 M^2
GLUCOSE SERPL-MCNC: 82 MG/DL (ref 70–110)
GLUCOSE UR QL STRIP: NEGATIVE
HCT VFR BLD AUTO: 47.6 % (ref 40–54)
HGB BLD-MCNC: 15.7 G/DL (ref 14–18)
HGB UR QL STRIP: ABNORMAL
IMM GRANULOCYTES # BLD AUTO: 0.08 K/UL (ref 0–0.04)
IMM GRANULOCYTES NFR BLD AUTO: 0.9 % (ref 0–0.5)
KETONES UR QL STRIP: NEGATIVE
LEUKOCYTE ESTERASE UR QL STRIP: NEGATIVE
LIPASE SERPL-CCNC: 149 U/L (ref 4–60)
LYMPHOCYTES # BLD AUTO: 1.2 K/UL (ref 1–4.8)
LYMPHOCYTES NFR BLD: 13.2 % (ref 18–48)
MCH RBC QN AUTO: 29.6 PG (ref 27–31)
MCHC RBC AUTO-ENTMCNC: 33 G/DL (ref 32–36)
MCV RBC AUTO: 90 FL (ref 82–98)
MONOCYTES # BLD AUTO: 2.2 K/UL (ref 0.3–1)
MONOCYTES NFR BLD: 25.2 % (ref 4–15)
NEUTROPHILS # BLD AUTO: 5.4 K/UL (ref 1.8–7.7)
NEUTROPHILS NFR BLD: 60.5 % (ref 38–73)
NITRITE UR QL STRIP: NEGATIVE
NRBC BLD-RTO: 0 /100 WBC
PH UR STRIP: 6 [PH] (ref 5–8)
PLATELET # BLD AUTO: 158 K/UL (ref 150–450)
PLATELET BLD QL SMEAR: ABNORMAL
PMV BLD AUTO: 10.6 FL (ref 9.2–12.9)
POTASSIUM SERPL-SCNC: 4.8 MMOL/L (ref 3.5–5.1)
PROT SERPL-MCNC: 7.7 G/DL (ref 6–8.4)
PROT UR QL STRIP: NEGATIVE
RBC # BLD AUTO: 5.3 M/UL (ref 4.6–6.2)
SODIUM SERPL-SCNC: 141 MMOL/L (ref 136–145)
SP GR UR STRIP: >1.03 (ref 1–1.03)
URN SPEC COLLECT METH UR: ABNORMAL
UROBILINOGEN UR STRIP-ACNC: NEGATIVE EU/DL
WBC # BLD AUTO: 8.86 K/UL (ref 3.9–12.7)

## 2024-05-03 PROCEDURE — 25000003 PHARM REV CODE 250: Mod: HCNC

## 2024-05-03 PROCEDURE — 99285 EMERGENCY DEPT VISIT HI MDM: CPT | Mod: 25,HCNC

## 2024-05-03 PROCEDURE — 25000003 PHARM REV CODE 250: Mod: HCNC | Performed by: EMERGENCY MEDICINE

## 2024-05-03 PROCEDURE — 83690 ASSAY OF LIPASE: CPT | Mod: HCNC

## 2024-05-03 PROCEDURE — 96374 THER/PROPH/DIAG INJ IV PUSH: CPT | Mod: 59,HCNC

## 2024-05-03 PROCEDURE — 96361 HYDRATE IV INFUSION ADD-ON: CPT | Mod: HCNC

## 2024-05-03 PROCEDURE — 81003 URINALYSIS AUTO W/O SCOPE: CPT | Mod: HCNC

## 2024-05-03 PROCEDURE — 80053 COMPREHEN METABOLIC PANEL: CPT | Mod: HCNC

## 2024-05-03 PROCEDURE — 63600175 PHARM REV CODE 636 W HCPCS: Mod: HCNC

## 2024-05-03 PROCEDURE — 25500020 PHARM REV CODE 255: Mod: HCNC

## 2024-05-03 PROCEDURE — 85025 COMPLETE CBC W/AUTO DIFF WBC: CPT | Mod: HCNC

## 2024-05-03 RX ORDER — ACETAMINOPHEN 500 MG
1000 TABLET ORAL EVERY 6 HOURS PRN
Qty: 56 TABLET | Refills: 0 | Status: SHIPPED | OUTPATIENT
Start: 2024-05-03 | End: 2024-05-03

## 2024-05-03 RX ORDER — MORPHINE SULFATE 4 MG/ML
4 INJECTION, SOLUTION INTRAMUSCULAR; INTRAVENOUS
Status: COMPLETED | OUTPATIENT
Start: 2024-05-03 | End: 2024-05-03

## 2024-05-03 RX ORDER — OXYCODONE HYDROCHLORIDE 5 MG/1
5 TABLET ORAL EVERY 6 HOURS PRN
Qty: 8 TABLET | Refills: 0 | Status: SHIPPED | OUTPATIENT
Start: 2024-05-03

## 2024-05-03 RX ORDER — ACETAMINOPHEN 500 MG
1000 TABLET ORAL
Status: COMPLETED | OUTPATIENT
Start: 2024-05-03 | End: 2024-05-03

## 2024-05-03 RX ORDER — AMOXICILLIN AND CLAVULANATE POTASSIUM 875; 125 MG/1; MG/1
1 TABLET, FILM COATED ORAL
Status: COMPLETED | OUTPATIENT
Start: 2024-05-03 | End: 2024-05-03

## 2024-05-03 RX ORDER — AMOXICILLIN AND CLAVULANATE POTASSIUM 875; 125 MG/1; MG/1
1 TABLET, FILM COATED ORAL 2 TIMES DAILY
Qty: 14 TABLET | Refills: 0 | Status: SHIPPED | OUTPATIENT
Start: 2024-05-03 | End: 2024-05-06

## 2024-05-03 RX ORDER — ACETAMINOPHEN 500 MG
1000 TABLET ORAL EVERY 6 HOURS PRN
Qty: 56 TABLET | Refills: 0 | Status: SHIPPED | OUTPATIENT
Start: 2024-05-03 | End: 2024-05-17

## 2024-05-03 RX ADMIN — AMOXICILLIN AND CLAVULANATE POTASSIUM 1 TABLET: 875; 125 TABLET, FILM COATED ORAL at 06:05

## 2024-05-03 RX ADMIN — MORPHINE SULFATE 4 MG: 4 INJECTION, SOLUTION INTRAMUSCULAR; INTRAVENOUS at 05:05

## 2024-05-03 RX ADMIN — IOHEXOL 75 ML: 350 INJECTION, SOLUTION INTRAVENOUS at 04:05

## 2024-05-03 RX ADMIN — ACETAMINOPHEN 1000 MG: 500 TABLET ORAL at 06:05

## 2024-05-03 RX ADMIN — SODIUM CHLORIDE 1000 ML: 9 INJECTION, SOLUTION INTRAVENOUS at 04:05

## 2024-05-03 NOTE — DISCHARGE INSTRUCTIONS
Mr. Henning,     Thank you for letting me care for you today! It was nice meeting you, and I hope you feel better soon.   If you would like access to your chart and what was done today please utilize the [facility].   Please don't hesitate to return if your symptoms worsen or you develop any other worrisome symptoms.    Our goal in the emergency department is to always give you outstanding care and exceptional service. You may receive a survey by mail or e-mail in the next week regarding your experience in our ED. We would greatly appreciate you completing and returning the survey. Your feedback provides us with a way to recognize our staff who give very good care and it helps us learn how to improve when your experience was below our aspiration of excellence.     Sincerely,    Usha Hudson PA-C  Emergency Department Physician Assistant  [facility], [place], and [place]

## 2024-05-03 NOTE — ED NOTES
Pt received po antibiotics, will monitor x1hr per providers orders to monitor for adverse reaction. Pt verbalized understanding for notifying if he begins to feel signs of reaction.

## 2024-05-03 NOTE — ED PROVIDER NOTES
Encounter Date: 5/3/2024       History     Chief Complaint   Patient presents with    Abdominal Pain     Left sided upper quadrant abdominal pain and abdominal bloating that started this morning. He denies any nausea, vomiting, diarrhea, chest pain, or shortness of breath.   Patient reports abdominal pain increases when he takes a deep breath.  Last bowel movement this morning.     84-year-old male with past medical history of ileus, asthma, COPD, depression presents to the ED with abdominal pain that started this morning. Patient reports left-sided abdominal pain and bloating.  Rates pain as 7/10 in severity. Has not pass gas or belch, unable to recall the last time he did. Pt increases with movement and taking a deep breath. Last BM was this morning. Has about 3 total BM per week. Able to tolerate p.o. Unable to compare if symptoms are similar to his last ileus back in 2019.  No urinary complaints.  No fever, nausea, vomiting, chest pain, shortness of breath.  No other acute complaints today.      The history is provided by the patient.     Review of patient's allergies indicates:   Allergen Reactions    Suvorexant Anxiety and Other (See Comments)    Trazodone Other (See Comments)    Doxycycline Rash and Hives    Penicillins Rash     Tolerates cefdinir with no issues    Sulfamethoxazole-trimethoprim Rash     Past Medical History:   Diagnosis Date    Asthma     COPD (chronic obstructive pulmonary disease)     Depression     High cholesterol      Past Surgical History:   Procedure Laterality Date    SKIN CANCER EXCISION  04/02/2024    [facility] right ear[facility]     Family History   Problem Relation Name Age of Onset    Heart disease Mother      Diabetes Mother      Emphysema Father      Cancer Brother x1     Bladder Cancer Brother x1     Depression Daughter Rica     Hashimoto's thyroiditis Daughter Rica     Fibromyalgia Daughter Rica     Hashimoto's thyroiditis Daughter Jaymie      Depression Daughter Eveline      Social History     Tobacco Use    Smoking status: Former     Types: Cigarettes     Start date:      Quit date:      Years since quittin.3    Smokeless tobacco: Never    Tobacco comments:     Started smoking  to , about 2 ppd   Substance Use Topics    Alcohol use: No     Comment: quit drinking 10/26/2002    Drug use: No     Review of Systems   Constitutional:  Negative for chills and fever.   HENT:  Negative for congestion.    Respiratory:  Negative for cough, shortness of breath and wheezing.    Cardiovascular:  Negative for chest pain.   Gastrointestinal:  Positive for abdominal distention and abdominal pain. Negative for nausea and vomiting.   Genitourinary:  Negative for dysuria and frequency.   Musculoskeletal:  Negative for back pain, neck pain and neck stiffness.       Physical Exam     Initial Vitals [24 1405]   BP Pulse Resp Temp SpO2   (!) 169/72 61 16 98.7 °F (37.1 °C) 98 %      MAP       --         Physical Exam    Vitals reviewed.  Constitutional: He appears well-developed and well-nourished. He is not diaphoretic. No distress.   Patient is AAAxO4. Speaking in full sentences. Pt appears uncomfortable on stretcher.     HENT:   Head: Normocephalic and atraumatic.   Right Ear: External ear normal.   Left Ear: External ear normal.   Mouth/Throat: Oropharynx is clear and moist.   Eyes: EOM are normal. Pupils are equal, round, and reactive to light.   Neck: Neck supple.   Normal range of motion.  Cardiovascular:  Normal rate and normal heart sounds.           Pulmonary/Chest: Breath sounds normal. No respiratory distress. He has no wheezes.   Abdominal: Abdomen is soft. Bowel sounds are normal. He exhibits distension. There is abdominal tenderness.   There's moderate focal tenderness to palpation over the LLQ noted. Generalized abdominal discomfort.  There is rebound.   Musculoskeletal:         General: Normal range of motion.      Cervical back:  Normal range of motion and neck supple.     Neurological: He is alert and oriented to person, place, and time. GCS score is 15. GCS eye subscore is 4. GCS verbal subscore is 5. GCS motor subscore is 6.   Skin: Skin is warm. Capillary refill takes less than 2 seconds.   Psychiatric: He has a normal mood and affect. His behavior is normal. Judgment and thought content normal.         ED Course   Procedures  Labs Reviewed   CBC W/ AUTO DIFFERENTIAL - Abnormal; Notable for the following components:       Result Value    Immature Granulocytes 0.9 (*)     Immature Grans (Abs) 0.08 (*)     Mono # 2.2 (*)     Lymph % 13.2 (*)     Mono % 25.2 (*)     All other components within normal limits   LIPASE - Abnormal; Notable for the following components:    Lipase 149 (*)     All other components within normal limits   COMPREHENSIVE METABOLIC PANEL   URINALYSIS, REFLEX TO URINE CULTURE          Imaging Results              CT Abdomen Pelvis With IV Contrast NO Oral Contrast (Final result)  Result time 05/03/24 17:28:16      Final result by Adrien Rodríguez DO (05/03/24 17:28:16)                   Impression:      Mild acute uncomplicated diverticulitis of the descending colon.      Electronically signed by: Adrien Rodríguez  Date:    05/03/2024  Time:    17:28               Narrative:    EXAMINATION:  CT ABDOMEN PELVIS WITH IV CONTRAST    CLINICAL HISTORY:  LLQ abdominal pain;    TECHNIQUE:  Axial CT images with sagittal and coronal reformats were obtained of the abdomen and pelvis from the hemidiaphragms through the symphysis pubis after the administration of 75mL Omnipaque 350.    COMPARISON:  CT abdomen and pelvis from 05/01/2023.  CT chest from 03/03/2019.    FINDINGS:  Lung Bases: Stable 6 mm right lower lobe pulmonary nodule abutting the hemidiaphragm (series 2, image 13).  This is stable dating back to 03/03/2019 and is compatible with a benign etiology.  No further follow-up needed.  Lung bases are otherwise  clear.    Heart: Heart size is normal.  No pericardial effusion.    Liver: The liver is normal in size and demonstrates homogeneous enhancement without focal lesion.  The portal vasculature is patent.    Biliary tract: No intrahepatic or extrahepatic biliary ductal dilatation.    Gallbladder: No radiodense gallstone. No wall thickening or pericholecystic fluid.    Pancreas: Normal. No pancreatic ductal dilatation.    Spleen: Normal size without focal lesion.    Adrenals: Unremarkable.    Kidneys and urinary collecting systems: There is a too small to characterize hypodensity in the right kidney midpole.  There is mild cortical thinning of the bilateral kidneys which can be seen with chronic renal disease.  Previously described prominence of the right renal pelvis has resolved.  No hydronephrosis or urolithiasis.    Lymph nodes: None enlarged.    Stomach and bowel: The stomach is normal.  Loops of small and large bowel are normal in caliber without evidence for inflammation or obstruction.  Previously noted proximal small bowel wall thickening has resolved.  There is colonic diverticulosis.  There is trace stranding about the descending colon (series 2, image 79), compatible with mild acute, uncomplicated diverticulitis.  The appendix is normal.    Peritoneum and mesentery: No ascites or free intraperitoneal air.  No abdominal fluid collection.    Vasculature: There is moderate atherosclerosis.    Urinary bladder: No wall thickening.    Reproductive organs: The prostate is mildly prominent.    Body wall: No abnormality.    Musculoskeletal: No aggressive osseous lesion.  There are degenerative changes of the spine.  There is grade 1 anterolisthesis of L5 on S1.                                       Medications   sodium chloride 0.9% bolus 1,000 mL 1,000 mL (1,000 mLs Intravenous New Bag 5/3/24 1644)   iohexoL (OMNIPAQUE 350) injection 75 mL (75 mLs Intravenous Given 5/3/24 1636)   morphine injection 4 mg (4 mg  Intravenous Given 5/3/24 1715)     Medical Decision Making  Differential Diagnosis includes, but is not limited to:  AAA, aortic dissection, mesenteric ischemia, perforated viscous, MI/ACS, SBO/volvulus, incarcerated/strangulated hernia, intussusception, ileus, appendicitis, cholecystitis, cholangitis, diverticulitis, esophagitis, hepatitis, nephrolithiasis, pancreatitis, gastroenteritis, colitis, IBD/IBS, biliary colic, GERD, PUD, constipation, UTI/pyelonephritis,  disorder.    ED management     84-year-old male with past medical history of asthma, COPD, depression presents to the ED with abdominal pain that started this morning. Patient is not toxic appearing, hemodynamically stable and resting uncomfortably on bed. Patient is well-appearing.  Awake and alert.  Afebrile with vitals WNL. No distress on exam. Today's labs remarkable for elevated lipase at 149, otherwise no signs of leukocytosis or abnormal liver enzymes. Obtained CT abd/ pelvis, imaging results pending.  Pt's care and management will be f/u by my attending at the end of my shift.     I have discussed the specifics of the workup with the patient and the patient has verbalized understanding of the details of the workup, the diagnosis, the treatment plan, and the need for outpatient follow-up with PCP. ED precautions given. Discussed with pt about returning to the ED, if symptoms fail to improve or worsen.     RESULTS:  Documented in ED course.   Labs/ekg interpreted by myself and Dr. Robbins      Voice recognition software utilized in this note.                     Amount and/or Complexity of Data Reviewed  Labs: ordered. Decision-making details documented in ED Course.  Radiology: ordered.    Risk  Prescription drug management.               ED Course as of 05/03/24 1756   Fri May 03, 2024   1530 BP(!): 164/72 [NW]   1530 Pulse(!): 56 [NW]   1530 Resp: 16 [NW]   1530 SpO2: 98 % [NW]   1547 CBC W/ AUTO DIFFERENTIAL  H/H stable. Plt count within  normal limits.  [NW]   1617 Lipase(!): 149 [NW]   1617 Comp. Metabolic Panel  Grossly unremarkable.  [NW]   1719 Case discussed with my attending Dr. Robbins. Will sign off and hand off patient at this time.  [NW]      ED Course User Index  [NW] Usha Hudson PA-C                             Clinical Impression:  Final diagnoses:  [R10.32] Left lower quadrant abdominal pain (Primary)                 Usha Hudson PA-C  05/03/24 [id number]

## 2024-05-03 NOTE — ED NOTES
pt presents to ED c/o LLQ abdominal pain. Pt reports bloating with nausea. LBM was this morning prior to arriving to ED. abdomen soft, round and distended.    AAOx3, GCS15, answers questions appropriately and in complete sentences.PERRLA. currently denies CP and SOB. RR even and unlabored, NAD noted. pt o2 sats 97% on RA.   full ROM and sensation present to all extremities bilaterally. +2 bilateral radial and pedal pulses present. skin warm and dry, capillary refill less than 3 seconds.   pt currently has no other complaints, denies  n/v, fevers and chills.     pt placed on all monitors, bed locked and in lowest position and call light within reach. ED workup in process, will continue to closely monitor.

## 2024-05-03 NOTE — ED TRIAGE NOTES
Left sided abdominal pain since this morning. States pain is constant, worse with palpation and deep breath. No N/V, diarrhea with normal BM today. No fever reported. No h/o abdominal surgeries or GI problems. Denies urinary symptoms. Presents awake, alert. No distress noted.

## 2024-05-05 ENCOUNTER — PATIENT MESSAGE (OUTPATIENT)
Dept: FAMILY MEDICINE | Facility: CLINIC | Age: 84
End: 2024-05-05
Payer: MEDICARE

## 2024-05-05 DIAGNOSIS — K57.32 DIVERTICULITIS OF DESCENDING COLON: Primary | ICD-10-CM

## 2024-05-06 ENCOUNTER — PATIENT OUTREACH (OUTPATIENT)
Dept: EMERGENCY MEDICINE | Facility: HOSPITAL | Age: 84
End: 2024-05-06
Payer: MEDICARE

## 2024-05-06 ENCOUNTER — TELEPHONE (OUTPATIENT)
Dept: FAMILY MEDICINE | Facility: CLINIC | Age: 84
End: 2024-05-06
Payer: MEDICARE

## 2024-05-06 RX ORDER — METRONIDAZOLE 500 MG/1
500 TABLET ORAL EVERY 8 HOURS
Qty: 30 TABLET | Refills: 0 | Status: SHIPPED | OUTPATIENT
Start: 2024-05-06 | End: 2024-05-16

## 2024-05-06 RX ORDER — CIPROFLOXACIN 500 MG/1
500 TABLET ORAL 2 TIMES DAILY
Qty: 20 TABLET | Refills: 0 | Status: SHIPPED | OUTPATIENT
Start: 2024-05-06 | End: 2024-05-16

## 2024-05-06 NOTE — PROGRESS NOTES
Patient was contacted for post ED discharge navigation assessment. He has been in touch with his PCP since his ED visit and will schedule a F/U if needed. They declined assistance with making follow-up appointments and denied any other needs/concerns.

## 2024-05-06 NOTE — TELEPHONE ENCOUNTER
----- Message from Connie Parker RN sent at 5/6/2024  8:22 AM CDT -----  Regarding: Drug Reaction  Wife states seen in the ED this weekend and diagnosed with Diverticulitis. Patient has a documented Penicillin allergy. Wife states that the physician did a test does of Augmentin in the ED, held the patient for an hour, and since no reaction, sent him home on Augmentin. After the 4th dose, the patient woke up with severe hives to his torso, and wheezing. He took Benadryl with relief. Med discontinued.    Wife is asking for an alternative antibiotic.    Thanks

## 2024-05-16 DIAGNOSIS — R41.0 CONFUSION: ICD-10-CM

## 2024-05-16 DIAGNOSIS — G30.9 ALZHEIMER'S DISEASE, UNSPECIFIED (CODE): ICD-10-CM

## 2024-05-16 DIAGNOSIS — R41.3 IMPAIRED MEMORY: Primary | ICD-10-CM

## 2024-05-27 ENCOUNTER — HOSPITAL ENCOUNTER (OUTPATIENT)
Dept: RADIOLOGY | Facility: HOSPITAL | Age: 84
Discharge: HOME | End: 2024-05-27
Attending: PSYCHIATRY & NEUROLOGY
Payer: MEDICARE

## 2024-05-27 DIAGNOSIS — F02.80 ALZHEIMER DISEASE: ICD-10-CM

## 2024-05-27 DIAGNOSIS — G30.9 ALZHEIMER DISEASE: ICD-10-CM

## 2024-05-27 LAB — POCT GLUCOSE: 84 MG/DL (ref 70–110)

## 2024-05-27 PROCEDURE — 78608 BRAIN IMAGING (PET): CPT | Mod: TC,HCNC

## 2024-05-27 PROCEDURE — A9552 F18 FDG: HCPCS | Mod: HCNC | Performed by: PSYCHIATRY & NEUROLOGY

## 2024-05-27 RX ORDER — FLUDEOXYGLUCOSE F18 500 MCI/ML
10 INJECTION INTRAVENOUS
Status: COMPLETED | OUTPATIENT
Start: 2024-05-27 | End: 2024-05-27

## 2024-05-27 RX ADMIN — FLUDEOXYGLUCOSE F-18 11.3 MILLICURIE: 500 INJECTION INTRAVENOUS at 01:05

## 2024-07-15 DIAGNOSIS — F02.80 ALZHEIMER DISEASE: ICD-10-CM

## 2024-07-15 DIAGNOSIS — R41.0 CONFUSION: ICD-10-CM

## 2024-07-15 DIAGNOSIS — R41.3 MEMORY LOSS: Primary | ICD-10-CM

## 2024-07-15 DIAGNOSIS — G30.9 ALZHEIMER DISEASE: ICD-10-CM

## 2024-08-15 DIAGNOSIS — J44.89 ASTHMA-COPD OVERLAP SYNDROME: ICD-10-CM

## 2024-08-15 RX ORDER — BUDESONIDE AND FORMOTEROL FUMARATE DIHYDRATE 160; 4.5 UG/1; UG/1
2 AEROSOL RESPIRATORY (INHALATION) EVERY 12 HOURS
Qty: 30.6 G | Refills: 3 | Status: SHIPPED | OUTPATIENT
Start: 2024-08-15 | End: 2025-08-15

## 2024-08-15 NOTE — TELEPHONE ENCOUNTER
No care due was identified.  [facility] Embedded Care Due Messages. Reference number: [id number].   8/15/2024 2:50:18 PM CDT

## 2024-08-23 ENCOUNTER — OFFICE VISIT (OUTPATIENT)
Dept: FAMILY MEDICINE | Facility: CLINIC | Age: 84
End: 2024-08-23
Payer: MEDICARE

## 2024-08-23 VITALS
WEIGHT: 165.56 LBS | OXYGEN SATURATION: 94 % | HEART RATE: 68 BPM | DIASTOLIC BLOOD PRESSURE: 72 MMHG | SYSTOLIC BLOOD PRESSURE: 138 MMHG | HEIGHT: 66 IN | BODY MASS INDEX: 26.61 KG/M2

## 2024-08-23 DIAGNOSIS — R79.9 ABNORMAL FINDING OF BLOOD CHEMISTRY, UNSPECIFIED: ICD-10-CM

## 2024-08-23 DIAGNOSIS — I25.2 HISTORY OF MI (MYOCARDIAL INFARCTION): Chronic | ICD-10-CM

## 2024-08-23 DIAGNOSIS — E78.5 HYPERLIPIDEMIA LDL GOAL <70: ICD-10-CM

## 2024-08-23 DIAGNOSIS — K21.9 GASTROESOPHAGEAL REFLUX DISEASE WITHOUT ESOPHAGITIS: ICD-10-CM

## 2024-08-23 DIAGNOSIS — E66.3 OVERWEIGHT WITH BODY MASS INDEX (BMI) OF 26 TO 26.9 IN ADULT: ICD-10-CM

## 2024-08-23 DIAGNOSIS — N40.0 BENIGN PROSTATIC HYPERPLASIA WITHOUT LOWER URINARY TRACT SYMPTOMS: ICD-10-CM

## 2024-08-23 DIAGNOSIS — I25.118 ATHEROSCLEROTIC HEART DISEASE OF NATIVE CORONARY ARTERY WITH OTHER FORMS OF ANGINA PECTORIS: ICD-10-CM

## 2024-08-23 DIAGNOSIS — E78.2 MIXED HYPERLIPIDEMIA: ICD-10-CM

## 2024-08-23 DIAGNOSIS — I10 ESSENTIAL HYPERTENSION: Primary | ICD-10-CM

## 2024-08-23 PROCEDURE — 99999 PR PBB SHADOW E&M-EST. PATIENT-LVL IV: CPT | Mod: PBBFAC,HCNC,, | Performed by: FAMILY MEDICINE

## 2024-08-23 NOTE — PROGRESS NOTES
"Subjective:         Patient ID: Navin Henning Jr. is a 84 y.o. male.    Chief Complaint: Follow-up    Patient Active Problem List   Diagnosis    Mixed hyperlipidemia    Asthma-COPD overlap syndrome    Personal history of tobacco use, presenting hazards to health    Actinic keratosis    Seborrheic keratoses    Allergic rhinosinusitis    Anxiety disorder    Sensorineural hearing loss, bilateral    Testicular hypofunction    Essential hypertension    Family history of diabetes mellitus (DM)    Impaired fasting glucose    Insomnia    Memory loss    Nondependent alcohol abuse, in remission    Overweight with body mass index (BMI) of 26 to 26.9 in adult    MDD (recurrent major depressive disorder) in remission    Benign prostatic hyperplasia without lower urinary tract symptoms    Aortic atherosclerosis    Alzheimer's disease, unspecified (CODE)    Hypoglycemia    Flushing    Atherosclerotic heart disease of native coronary artery with other forms of angina pectoris    Calcified granuloma of lung    Left bundle branch block    History of MI (myocardial infarction)    Hyperlipidemia LDL goal <70      HPI    Navin is a 84 y.o. male who presents today for HTN follow up.   Patient is accompanied by his wife.   He sees Neurology outside of [facility], Dr. Alba regularly for his dementia. Meds managed by Dr. Alba.   Previous working dx of Alzheimer's. S/p brain imaging.     Overall doing well.   Day to day activities without any clinical angina.     Review of Systems   All other systems reviewed and are negative.       Objective:     Vitals:    08/23/24 1030   BP: 138/72   BP Location: Left arm   Patient Position: Sitting   BP Method: Medium (Manual)   Pulse: 68   SpO2: (!) 94%   Weight: 75.1 kg (165 lb 9.1 oz)   Height: 5' 6" (1.676 m)         Physical Exam  Vitals and nursing note reviewed.   Constitutional:       General: He is not in acute distress.     Appearance: Normal appearance. He is not ill-appearing, " toxic-appearing or diaphoretic.   HENT:      Head: Normocephalic and atraumatic.   Eyes:      General: No scleral icterus.     Conjunctiva/sclera: Conjunctivae normal.   Cardiovascular:      Rate and Rhythm: Normal rate.      Heart sounds: No murmur heard.  Pulmonary:      Effort: Pulmonary effort is normal. No respiratory distress.   Skin:     Coloration: Skin is not pale.   Neurological:      Mental Status: He is alert. Mental status is at baseline.   Psychiatric:         Attention and Perception: Attention and perception normal.         Mood and Affect: Mood and affect normal.         Speech: Speech normal.         Behavior: Behavior normal.         Cognition and Memory: Cognition and memory normal.         Judgment: Judgment normal.       Assessment:       1. Essential hypertension    2. Mixed hyperlipidemia    3. History of MI (myocardial infarction)    4. Atherosclerotic heart disease of native coronary artery with other forms of angina pectoris    5. Benign prostatic hyperplasia without lower urinary tract symptoms    6. Overweight with body mass index (BMI) of 26 to 26.9 in adult    7. Gastroesophageal reflux disease without esophagitis    8. Abnormal finding of blood chemistry, unspecified    9. Hyperlipidemia LDL goal <70          Plan:   Recent relevant labs results reviewed with patient.         1. Essential hypertension  -     Renal Function Panel; Future; Expected date: 08/23/2024  -     TSH; Future; Expected date: 08/23/2024  -     Hemoglobin A1C; Future; Expected date: 08/23/2024  BP appropriate for age.     2. Mixed hyperlipidemia  -     Renal Function Panel; Future; Expected date: 08/23/2024  -     Lipid Panel; Future; Expected date: 08/23/2024  Continue current statin - changed to rosuvastatin in 03/2024   Repeat Lipid panel - LDL goal < 70    3. History of MI (myocardial infarction)  Overview:  Seen in the nuclear stress test  Ongoing medical mgmt.   LDL goal < 70    4. Atherosclerotic heart  disease of native coronary artery with other forms of angina pectoris  Medical mgmt    5. Benign prostatic hyperplasia without lower urinary tract symptoms  Flomax    6. Overweight with body mass index (BMI) of 26 to 26.9 in adult  Stable weight    7. Gastroesophageal reflux disease without esophagitis  Prn famotidine    8. Abnormal finding of blood chemistry, unspecified  -     Hemoglobin A1C; Future; Expected date: 08/23/2024    Patient's questions answered. Plan reviewed with patient at the end of visit. Relevant precautions to chief complaint and reasons to seek further medical care or to contact the office sooner reviewed with patient.     Follow up in about 6 months (around 2/23/2025) for Hypertension Follow-up (prelabs).        Part of this note was dictated using voice recognition software. Please excuse any typographical errors.

## 2024-09-18 ENCOUNTER — OFFICE VISIT (OUTPATIENT)
Dept: FAMILY MEDICINE | Facility: CLINIC | Age: 84
End: 2024-09-18
Payer: MEDICARE

## 2024-09-18 VITALS
OXYGEN SATURATION: 96 % | SYSTOLIC BLOOD PRESSURE: 140 MMHG | HEART RATE: 62 BPM | HEIGHT: 66 IN | BODY MASS INDEX: 26.19 KG/M2 | WEIGHT: 162.94 LBS | DIASTOLIC BLOOD PRESSURE: 72 MMHG

## 2024-09-18 DIAGNOSIS — R42 VERTIGO: ICD-10-CM

## 2024-09-18 DIAGNOSIS — Z23 NEEDS FLU SHOT: ICD-10-CM

## 2024-09-18 DIAGNOSIS — K21.9 GASTROESOPHAGEAL REFLUX DISEASE WITHOUT ESOPHAGITIS: ICD-10-CM

## 2024-09-18 DIAGNOSIS — R13.10 DYSPHAGIA, UNSPECIFIED TYPE: Primary | ICD-10-CM

## 2024-09-18 PROBLEM — E29.1 TESTICULAR HYPOFUNCTION: Chronic | Status: ACTIVE | Noted: 2022-01-10

## 2024-09-18 PROCEDURE — 90653 IIV ADJUVANT VACCINE IM: CPT | Mod: HCNC,S$GLB,, | Performed by: FAMILY MEDICINE

## 2024-09-18 PROCEDURE — 1101F PT FALLS ASSESS-DOCD LE1/YR: CPT | Mod: HCNC,CPTII,S$GLB, | Performed by: FAMILY MEDICINE

## 2024-09-18 PROCEDURE — 3078F DIAST BP <80 MM HG: CPT | Mod: HCNC,CPTII,S$GLB, | Performed by: FAMILY MEDICINE

## 2024-09-18 PROCEDURE — 1157F ADVNC CARE PLAN IN RCRD: CPT | Mod: HCNC,CPTII,S$GLB, | Performed by: FAMILY MEDICINE

## 2024-09-18 PROCEDURE — 99999 PR PBB SHADOW E&M-EST. PATIENT-LVL V: CPT | Mod: PBBFAC,HCNC,, | Performed by: FAMILY MEDICINE

## 2024-09-18 PROCEDURE — G0008 ADMIN INFLUENZA VIRUS VAC: HCPCS | Mod: HCNC,S$GLB,, | Performed by: FAMILY MEDICINE

## 2024-09-18 PROCEDURE — 1159F MED LIST DOCD IN RCRD: CPT | Mod: HCNC,CPTII,S$GLB, | Performed by: FAMILY MEDICINE

## 2024-09-18 PROCEDURE — 1160F RVW MEDS BY RX/DR IN RCRD: CPT | Mod: HCNC,CPTII,S$GLB, | Performed by: FAMILY MEDICINE

## 2024-09-18 PROCEDURE — 3077F SYST BP >= 140 MM HG: CPT | Mod: HCNC,CPTII,S$GLB, | Performed by: FAMILY MEDICINE

## 2024-09-18 PROCEDURE — 99214 OFFICE O/P EST MOD 30 MIN: CPT | Mod: HCNC,S$GLB,, | Performed by: FAMILY MEDICINE

## 2024-09-18 PROCEDURE — 1126F AMNT PAIN NOTED NONE PRSNT: CPT | Mod: HCNC,CPTII,S$GLB, | Performed by: FAMILY MEDICINE

## 2024-09-18 PROCEDURE — 3288F FALL RISK ASSESSMENT DOCD: CPT | Mod: HCNC,CPTII,S$GLB, | Performed by: FAMILY MEDICINE

## 2024-09-18 RX ORDER — MECLIZINE HYDROCHLORIDE 25 MG/1
25 TABLET ORAL 3 TIMES DAILY PRN
Qty: 30 TABLET | Refills: 1 | Status: SHIPPED | OUTPATIENT
Start: 2024-09-18

## 2024-09-18 RX ORDER — FAMOTIDINE 20 MG/1
20 TABLET, FILM COATED ORAL NIGHTLY PRN
Qty: 90 TABLET | Refills: 3 | Status: SHIPPED | OUTPATIENT
Start: 2024-09-18 | End: 2025-09-18

## 2024-09-18 NOTE — PROGRESS NOTES
"Subjective     Patient ID: Navin Henning Jr. is a 84 y.o. male.    Chief Complaint: Dizziness and Weakness    Pt is here for Vertigo x4 days.  Pt has a hx of chronic, intermittent episodes of Vertigo for the last 30 years.  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  Has had Meclizine in the past but none available.  The Vertigo has been intermittent but daily before self resolving.  Pt has AR but has not been taking him meds daily as indicated.  He has been taking his Xyzal but not using his nasal spray.  His AR is not currently well controlled due to not using his meds.    He is also c/o difficulty swallowing x5-6 days.  Solid foods feel like they are getting "stuck" and liquids at times feel like difficulty going down.  No coughing, SOB, CP, N/V, abdominal pain, diarrhea or constipation. No previous episodes.    Having reflux with no current treatment.    Review of Systems   Constitutional:  Negative for appetite change, chills, fatigue, fever and unexpected weight change.   HENT:  Positive for nasal congestion, postnasal drip, rhinorrhea, sinus pressure/congestion and trouble swallowing. Negative for ear pain and voice change.    Respiratory:  Negative for cough, shortness of breath and wheezing.    Cardiovascular:  Negative for chest pain and palpitations.   Gastrointestinal:  Positive for reflux. Negative for abdominal pain, blood in stool, constipation, diarrhea, nausea and vomiting.   Endocrine: Negative for cold intolerance and heat intolerance.   Genitourinary:  Negative for dysuria and hematuria.   Musculoskeletal:  Negative for joint swelling, neck stiffness and joint deformity.   Integumentary:  Negative for color change and rash.   Neurological:  Positive for vertigo and memory loss. Negative for seizures, speech difficulty, weakness, headaches and coordination difficulties.   Psychiatric/Behavioral:  Negative for " confusion, depressed mood and sleep disturbance.    Objective     Physical Exam  Vitals and nursing note reviewed.   Constitutional:       General: He is not in acute distress.     Appearance: Normal appearance. He is not ill-appearing.   HENT:      Head: Normocephalic and atraumatic.      Right Ear: Tympanic membrane, ear canal and external ear normal.      Left Ear: Tympanic membrane, ear canal and external ear normal.      Nose: Congestion and rhinorrhea present.      Mouth/Throat:      Mouth: Mucous membranes are moist.      Pharynx: Oropharynx is clear.   Eyes:      General: No scleral icterus.     Extraocular Movements: Extraocular movements intact.      Conjunctiva/sclera: Conjunctivae normal.      Pupils: Pupils are equal, round, and reactive to light.   Cardiovascular:      Rate and Rhythm: Regular rhythm.      Pulses: Normal pulses.      Heart sounds: Normal heart sounds.      No gallop.   Pulmonary:      Effort: Pulmonary effort is normal. No respiratory distress.      Breath sounds: Normal breath sounds. No wheezing or rales.   Abdominal:      General: Bowel sounds are normal. There is no distension.      Palpations: Abdomen is soft. There is no mass.      Tenderness: There is no abdominal tenderness.   Musculoskeletal:         General: No swelling, tenderness or deformity. Normal range of motion.      Cervical back: Normal range of motion and neck supple. No rigidity or tenderness.   Skin:     General: Skin is warm and dry.      Coloration: Skin is not jaundiced.      Findings: No rash.   Neurological:      General: No focal deficit present.      Mental Status: He is alert and oriented to person, place, and time.      Cranial Nerves: No cranial nerve deficit.   Psychiatric:         Mood and Affect: Mood normal.         Behavior: Behavior normal.         Thought Content: Thought content normal.         Judgment: Judgment normal.      Assessment and Plan     Dysphagia, unspecified type  - Teaching  reviewed on dysphagia including possible causes, treatment options, safety precautions and warning symptoms.  Referral done to GI for EGD, evaluation and treatment.  -     Ambulatory referral/consult to Gastroenterology; Future; Expected date: 09/25/2024    Vertigo  - Teaching on Vertigo including possible causes and treatment options.  Reviewed [place] Hallpike Maneuver. Also needs to aggressively treat AR.  Meclizine also ordered for symptomatic treatment.  -     meclizine (ANTIVERT) 25 mg tablet; Take 1 tablet (25 mg total) by mouth 3 (three) times daily as needed for Dizziness.  Dispense: 30 tablet; Refill: 1    Gastroesophageal reflux disease without esophagitis  - Teaching on GERD including GERD precautions, diet changes, preventing flare ups and treatment options.  Start Prilosec daily.   -     famotidine (PEPCID) 20 MG tablet; Take 1 tablet (20 mg total) by mouth nightly as needed for Heartburn.  Dispense: 90 tablet; Refill: 3    Needs flu shot  -     influenza (adjuvanted) (Fluad) 45 mcg/0.5 mL IM vaccine (> or = 66 yo) 0.5 mL    Follow up as needed

## 2024-09-26 ENCOUNTER — PATIENT OUTREACH (OUTPATIENT)
Dept: ADMINISTRATIVE | Facility: HOSPITAL | Age: 84
End: 2024-09-26
Payer: MEDICARE

## 2024-09-26 NOTE — PROGRESS NOTES
Health Maintenance Topic(s) Outreach Outcomes & Actions Taken:    Blood Pressure - Outreach Outcomes & Actions Taken  : Patient Will Call Back or Send Portal Message with Reading

## 2024-10-01 ENCOUNTER — OFFICE VISIT (OUTPATIENT)
Dept: UROLOGY | Facility: CLINIC | Age: 84
End: 2024-10-01
Payer: MEDICARE

## 2024-10-01 VITALS
HEART RATE: 69 BPM | DIASTOLIC BLOOD PRESSURE: 68 MMHG | SYSTOLIC BLOOD PRESSURE: 144 MMHG | BODY MASS INDEX: 25.97 KG/M2 | WEIGHT: 161.63 LBS | HEIGHT: 66 IN

## 2024-10-01 DIAGNOSIS — N13.8 BPH WITH URINARY OBSTRUCTION: ICD-10-CM

## 2024-10-01 DIAGNOSIS — N40.1 BPH WITH URINARY OBSTRUCTION: ICD-10-CM

## 2024-10-01 DIAGNOSIS — R53.83 FATIGUE, UNSPECIFIED TYPE: ICD-10-CM

## 2024-10-01 DIAGNOSIS — E34.9 TESTOSTERONE DEFICIENCY: ICD-10-CM

## 2024-10-01 DIAGNOSIS — R79.89 LOW SERUM TESTOSTERONE LEVEL IN MALE: ICD-10-CM

## 2024-10-01 DIAGNOSIS — E29.1 HYPOGONADISM IN MALE: Primary | ICD-10-CM

## 2024-10-01 LAB
BILIRUB SERPL-MCNC: NORMAL MG/DL
BLOOD URINE, POC: NORMAL
CLARITY, POC UA: CLEAR
COLOR, POC UA: YELLOW
GLUCOSE UR QL STRIP: NORMAL
KETONES UR QL STRIP: NORMAL
LEUKOCYTE ESTERASE URINE, POC: NORMAL
NITRITE, POC UA: NORMAL
PH, POC UA: 6
PROTEIN, POC: NORMAL
SPECIFIC GRAVITY, POC UA: 1.01
UROBILINOGEN, POC UA: 0.2

## 2024-10-01 PROCEDURE — 1160F RVW MEDS BY RX/DR IN RCRD: CPT | Mod: HCNC,CPTII,S$GLB, | Performed by: NURSE PRACTITIONER

## 2024-10-01 PROCEDURE — 1126F AMNT PAIN NOTED NONE PRSNT: CPT | Mod: HCNC,CPTII,S$GLB, | Performed by: NURSE PRACTITIONER

## 2024-10-01 PROCEDURE — 3288F FALL RISK ASSESSMENT DOCD: CPT | Mod: HCNC,CPTII,S$GLB, | Performed by: NURSE PRACTITIONER

## 2024-10-01 PROCEDURE — 3078F DIAST BP <80 MM HG: CPT | Mod: HCNC,CPTII,S$GLB, | Performed by: NURSE PRACTITIONER

## 2024-10-01 PROCEDURE — 1101F PT FALLS ASSESS-DOCD LE1/YR: CPT | Mod: HCNC,CPTII,S$GLB, | Performed by: NURSE PRACTITIONER

## 2024-10-01 PROCEDURE — 99215 OFFICE O/P EST HI 40 MIN: CPT | Mod: HCNC,S$GLB,, | Performed by: NURSE PRACTITIONER

## 2024-10-01 PROCEDURE — 81002 URINALYSIS NONAUTO W/O SCOPE: CPT | Mod: HCNC,S$GLB,, | Performed by: NURSE PRACTITIONER

## 2024-10-01 PROCEDURE — 99999 PR PBB SHADOW E&M-EST. PATIENT-LVL IV: CPT | Mod: PBBFAC,HCNC,, | Performed by: NURSE PRACTITIONER

## 2024-10-01 PROCEDURE — 3077F SYST BP >= 140 MM HG: CPT | Mod: HCNC,CPTII,S$GLB, | Performed by: NURSE PRACTITIONER

## 2024-10-01 PROCEDURE — 1159F MED LIST DOCD IN RCRD: CPT | Mod: HCNC,CPTII,S$GLB, | Performed by: NURSE PRACTITIONER

## 2024-10-01 PROCEDURE — 1157F ADVNC CARE PLAN IN RCRD: CPT | Mod: HCNC,CPTII,S$GLB, | Performed by: NURSE PRACTITIONER

## 2024-10-01 NOTE — PROGRESS NOTES
CHIEF COMPLAINT:    Navin Henning Jr. is a 84 y.o. male who presents today for Low Testosterone.     HISTORY OF PRESENTING ILLINESS:    Navin Henning Jr. is a 84 y.o. male with a history of hypogonadism and BPH with LUTS. He was last seen in clinic 06/29/2023 for BPH. He had been on TRT before but with an outside Urologist.Ppreviously managed with Testopel, then switched to T. Cypionate 200mg every 2 weeks. He had stopped due to rising HCT and a workup for carcinoid syndrome. (-) Pet scan so was ready to get started.     Here today ready to restart his TRT. Reporting fatigue; not wanting to do anything.   Weight gain due to no energy. He is interested in AndroGel.     TRT LABS:   - Prolactin 3.6 on 06/29/2023  - PSA  1.3 on 06/29/2023  - Testosterone 171 on 06/21/2023 at 7:46 am.       REVIEW OF SYSTEMS:  Review of Systems   Constitutional: Negative.  Negative for chills and fever.   Eyes:  Negative for double vision.   Respiratory:  Negative for cough and shortness of breath.    Cardiovascular:  Negative for chest pain and palpitations.   Gastrointestinal:  Negative for abdominal pain, constipation, diarrhea, nausea and vomiting.   Genitourinary:  Negative for dysuria, hematuria and urgency.        Ok with urination with Flomax.      Musculoskeletal:  Negative for falls.   Neurological:  Negative for dizziness and seizures.   Endo/Heme/Allergies:  Negative for polydipsia.         PATIENT HISTORY:    Past Medical History:   Diagnosis Date    Asthma     COPD (chronic obstructive pulmonary disease)     Depression     High cholesterol        Past Surgical History:   Procedure Laterality Date    SKIN CANCER EXCISION  04/02/2024    [facility] right ear[facility]       Family History   Problem Relation Name Age of Onset    Heart disease Mother      Diabetes Mother      Emphysema Father      Cancer Brother x1     Bladder Cancer Brother x1     Depression Daughter Rica      Hashimoto's thyroiditis Daughter Rica     Fibromyalgia Daughter Rica     Hashimoto's thyroiditis Daughter Jaymie     Depression Daughter Eveline        Social History     Socioeconomic History    Marital status:    Tobacco Use    Smoking status: Former     Types: Cigarettes     Start date:      Quit date:      Years since quittin.7     Passive exposure: Past    Smokeless tobacco: Never    Tobacco comments:     Started smoking  to , about 2 ppd   Substance and Sexual Activity    Alcohol use: No     Comment: quit drinking 10/26/2002    Drug use: No    Sexual activity: Yes     Partners: Female     Social Drivers of Health     Financial Resource Strain: Low Risk  (2024)    Overall Financial Resource Strain (CARDIA)     Difficulty of Paying Living Expenses: Not hard at all   Food Insecurity: No Food Insecurity (2024)    Hunger Vital Sign     Worried About Running Out of Food in the Last Year: Never true     Ran Out of Food in the Last Year: Never true   Transportation Needs: No Transportation Needs (2024)    PRAPARE - Transportation     Lack of Transportation (Medical): No     Lack of Transportation (Non-Medical): No   Physical Activity: Insufficiently Active (2024)    Exercise Vital Sign     Days of Exercise per Week: 2 days     Minutes of Exercise per Session: 20 min   Stress: Stress Concern Present (2024)    [employer] - Occupational Stress Questionnaire     Feeling of Stress : To some extent   Housing Stability: Low Risk  (2024)    Housing Stability Vital Sign     Unable to Pay for Housing in the Last Year: No     Number of Places Lived in the Last Year: 1     Unstable Housing in the Last Year: No       Allergies:  Suvorexant, Trazodone, Doxycycline, Penicillins, and Sulfamethoxazole-trimethoprim    Medications:    Current Outpatient Medications:     albuterol (VENTOLIN HFA) 90 mcg/actuation inhaler, Inhale 2 puffs into the lungs  every 4 (four) hours as needed for Wheezing or Shortness of Breath. Rescue, Disp: 18 g, Rfl: 0    aspirin (ECOTRIN) 81 MG EC tablet, Take 1 tablet by mouth once daily., Disp: , Rfl:     azelastine (ASTELIN) 137 mcg (0.1 %) nasal spray, 2 sprays (274 mcg total) by Nasal route 2 (two) times daily., Disp: 60 mL, Rfl: 3    budesonide-formoterol 160-4.5 mcg (SYMBICORT) 160-4.5 mcg/actuation HFAA, Inhale 2 puffs into the lungs every 12 (twelve) hours. Controller, Disp: 30.6 g, Rfl: 3    cinnamon bark 500 mg capsule, Take 1,000 mg by mouth once daily., Disp: , Rfl:     famotidine (PEPCID) 20 MG tablet, Take 1 tablet (20 mg total) by mouth nightly as needed for Heartburn., Disp: 90 tablet, Rfl: 3    FLUoxetine 40 MG capsule, Take 40 mg by mouth., Disp: , Rfl:     fluticasone propionate (FLONASE) 50 mcg/actuation nasal spray, 1 spray (50 mcg total) by Each Nostril route 2 (two) times a day., Disp: 16 g, Rfl: 5    ketoconazole (NIZORAL) 2 % shampoo, SHAMPOO THREE TIMES WEEKLY TO AFFECTED AREAS. LET SIT FOR 5 MINUTES AND WASH OFF. FOR FACE, Disp: , Rfl:     levocetirizine (XYZAL) 5 MG tablet, Take 1 tablet (5 mg total) by mouth every evening., Disp: 90 tablet, Rfl: 3    meclizine (ANTIVERT) 25 mg tablet, Take 1 tablet (25 mg total) by mouth 3 (three) times daily as needed for Dizziness., Disp: 30 tablet, Rfl: 1    melatonin 10 mg TbDL, Take 1 tablet by mouth every evening., Disp: , Rfl:     memantine (NAMENDA XR) 28 mg CSpX, , Disp: , Rfl:     mirtazapine (REMERON) 15 MG tablet, mirtazapine 15 mg tablet  Take 1 tablet every day by oral route in the evening for 30 days., Disp: , Rfl:     montelukast (SINGULAIR) 10 mg tablet, Take 1 tablet (10 mg total) by mouth every evening., Disp: 90 tablet, Rfl: 3    multivitamin capsule, Take 1 capsule by mouth once daily., Disp: , Rfl:     rivastigmine tartrate 4.5 mg capsule, Take 4.5 mg by mouth 2 (two) times a day., Disp: , Rfl:     rosuvastatin (CRESTOR) 20 MG tablet, Take 1 tablet (20  "mg total) by mouth once daily., Disp: 90 tablet, Rfl: 3    tamsulosin (FLOMAX) 0.4 mg Cap, Take 1 capsule by mouth once daily., Disp: , Rfl:     sildenafiL (VIAGRA) 100 MG tablet, Take 1 tablet (100 mg total) by mouth daily as needed for Erectile Dysfunction (take on an empty stomach 30-60 minutes before)., Disp: 10 tablet, Rfl: 12    PHYSICAL EXAMINATION:  Physical Exam  Vitals and nursing note reviewed.   Constitutional:       General: He is awake.      Appearance: Normal appearance.   HENT:      Head: Normocephalic.      Right Ear: External ear normal.      Left Ear: External ear normal.      Nose: Nose normal.   Cardiovascular:      Rate and Rhythm: Normal rate.   Pulmonary:      Effort: Pulmonary effort is normal. No respiratory distress.   Abdominal:      Tenderness: There is no abdominal tenderness. There is no right CVA tenderness or left CVA tenderness.   Genitourinary:     Penis: Normal and circumcised. No hypospadias.       Testes: Normal.      Prostate: Enlarged (~35gms smooth). Not tender and no nodules present.      Rectum: Normal.   Musculoskeletal:         General: Normal range of motion.      Cervical back: Normal range of motion.   Skin:     General: Skin is warm and dry.   Neurological:      General: No focal deficit present.      Mental Status: He is alert and oriented to person, place, and time.   Psychiatric:         Mood and Affect: Mood normal.         Behavior: Behavior is cooperative.           LABS:      In office UA today was clear of active infection and GH.       No results found for: "PSA", "PSADIAG", "PSATOTAL", "PHIND"    Lab Results   Component Value Date    CREATININE 1.2 05/03/2024    EGFRNORACEVR >60 05/03/2024               IMPRESSION:    Encounter Diagnoses   Name Primary?    Hypogonadism in male Yes    BPH with urinary obstruction     Fatigue, unspecified type     Low serum testosterone level in male     Testosterone deficiency          Assessment:       1. Hypogonadism in " male    2. BPH with urinary obstruction    3. Fatigue, unspecified type    4. Low serum testosterone level in male    5. Testosterone deficiency        Plan:         I spent 40 minutes with the patient of which more than half was spent in direct consultation with the patient in regards to our treatment and plan.  We addressed the chief complaint as well as other office findings during the visit.   Reviewed recent and past pertinent labs and imaging. Recommendations for PCP follow up visit; any need to go to the ER.   We discussed his Testosterone levels and why it was checked.  To verify the low T:  -we need two levels that are below 300  -on different dates, with one drawn at least before 9am  -we also check a Prolactin level and why we check.  Discussed the benefits of TRT and the possible risks; TRT is not life saving but life improving. If not feeling better no need to continue; risks outweigh the benefits.   Discussed risks with worsen sleep apnea, cholesterol, hepatitis, lower sperm production and reduced testicular size; breast changes  To prevent risks the risks we will do labs every 6 months.   TRT does not cause Prostate Cancer but the risks for Prostate Cancer as well as polycythemia and other CAD are monitored.  Failure to comply will results in stopping TRT.  Reviewed the different TRT applications; pro and cons.  Diet and exercise; donating blood every 6 months strongly encouraged to prevent polycythemia.   Recommended lifestyle modifications with a proper, healthy diet, good hydration but during the day. Reducing bladder irritants.   Benefits of regular exercise   Will get his updated and complete labs this week (Thursday).   If all ok then start AndroGel 1.62% daily.   Check T levels in 2 weeks f/u based on results

## 2024-10-03 ENCOUNTER — LAB VISIT (OUTPATIENT)
Dept: LAB | Facility: HOSPITAL | Age: 84
End: 2024-10-03
Attending: NURSE PRACTITIONER
Payer: MEDICARE

## 2024-10-03 DIAGNOSIS — R53.83 FATIGUE, UNSPECIFIED TYPE: ICD-10-CM

## 2024-10-03 DIAGNOSIS — N40.1 BPH WITH URINARY OBSTRUCTION: ICD-10-CM

## 2024-10-03 DIAGNOSIS — R79.89 LOW SERUM TESTOSTERONE LEVEL IN MALE: ICD-10-CM

## 2024-10-03 DIAGNOSIS — E34.9 TESTOSTERONE DEFICIENCY: ICD-10-CM

## 2024-10-03 DIAGNOSIS — E29.1 HYPOGONADISM IN MALE: ICD-10-CM

## 2024-10-03 DIAGNOSIS — N13.8 BPH WITH URINARY OBSTRUCTION: ICD-10-CM

## 2024-10-03 LAB
BASOPHILS # BLD AUTO: 0.01 K/UL (ref 0–0.2)
BASOPHILS NFR BLD: 0.2 % (ref 0–1.9)
COMPLEXED PSA SERPL-MCNC: 0.74 NG/ML (ref 0–4)
DIFFERENTIAL METHOD BLD: ABNORMAL
EOSINOPHIL # BLD AUTO: 0 K/UL (ref 0–0.5)
EOSINOPHIL NFR BLD: 0.2 % (ref 0–8)
ERYTHROCYTE [DISTWIDTH] IN BLOOD BY AUTOMATED COUNT: 14.2 % (ref 11.5–14.5)
HCT VFR BLD AUTO: 47.5 % (ref 40–54)
HGB BLD-MCNC: 14.7 G/DL (ref 14–18)
IMM GRANULOCYTES # BLD AUTO: 0.02 K/UL (ref 0–0.04)
IMM GRANULOCYTES NFR BLD AUTO: 0.3 % (ref 0–0.5)
LH SERPL-ACNC: 6.9 MIU/ML (ref 0.6–12.1)
LYMPHOCYTES # BLD AUTO: 1.2 K/UL (ref 1–4.8)
LYMPHOCYTES NFR BLD: 20.7 % (ref 18–48)
MCH RBC QN AUTO: 28.4 PG (ref 27–31)
MCHC RBC AUTO-ENTMCNC: 30.9 G/DL (ref 32–36)
MCV RBC AUTO: 92 FL (ref 82–98)
MONOCYTES # BLD AUTO: 1.3 K/UL (ref 0.3–1)
MONOCYTES NFR BLD: 21.2 % (ref 4–15)
NEUTROPHILS # BLD AUTO: 3.4 K/UL (ref 1.8–7.7)
NEUTROPHILS NFR BLD: 57.4 % (ref 38–73)
NRBC BLD-RTO: 0 /100 WBC
PLATELET # BLD AUTO: 158 K/UL (ref 150–450)
PMV BLD AUTO: 11.2 FL (ref 9.2–12.9)
PROLACTIN SERPL IA-MCNC: 10.6 NG/ML (ref 3.5–19.4)
RBC # BLD AUTO: 5.17 M/UL (ref 4.6–6.2)
TESTOST SERPL-MCNC: 249 NG/DL (ref 304–1227)
WBC # BLD AUTO: 5.93 K/UL (ref 3.9–12.7)

## 2024-10-03 PROCEDURE — 84146 ASSAY OF PROLACTIN: CPT | Mod: HCNC | Performed by: NURSE PRACTITIONER

## 2024-10-03 PROCEDURE — 83002 ASSAY OF GONADOTROPIN (LH): CPT | Mod: HCNC | Performed by: NURSE PRACTITIONER

## 2024-10-03 PROCEDURE — 84403 ASSAY OF TOTAL TESTOSTERONE: CPT | Mod: HCNC | Performed by: NURSE PRACTITIONER

## 2024-10-03 PROCEDURE — 84153 ASSAY OF PSA TOTAL: CPT | Mod: HCNC | Performed by: NURSE PRACTITIONER

## 2024-10-03 PROCEDURE — 36415 COLL VENOUS BLD VENIPUNCTURE: CPT | Mod: HCNC,PO | Performed by: NURSE PRACTITIONER

## 2024-10-03 PROCEDURE — 85025 COMPLETE CBC W/AUTO DIFF WBC: CPT | Mod: HCNC | Performed by: NURSE PRACTITIONER

## 2024-10-04 ENCOUNTER — PATIENT MESSAGE (OUTPATIENT)
Dept: UROLOGY | Facility: CLINIC | Age: 84
End: 2024-10-04
Payer: MEDICARE

## 2024-10-04 ENCOUNTER — TELEPHONE (OUTPATIENT)
Dept: UROLOGY | Facility: CLINIC | Age: 84
End: 2024-10-04
Payer: MEDICARE

## 2024-10-04 DIAGNOSIS — E29.1 HYPOGONADISM IN MALE: ICD-10-CM

## 2024-10-04 DIAGNOSIS — R79.89 LOW SERUM TESTOSTERONE LEVEL IN MALE: Primary | ICD-10-CM

## 2024-10-04 NOTE — TELEPHONE ENCOUNTER
Patient notified that He needs to repeat his T level.    His lab appt was made at 9am but he checked in at 11am. Lab has to be drawn before 10am

## 2024-10-08 ENCOUNTER — LAB VISIT (OUTPATIENT)
Dept: LAB | Facility: HOSPITAL | Age: 84
End: 2024-10-08
Attending: NURSE PRACTITIONER
Payer: MEDICARE

## 2024-10-08 DIAGNOSIS — R79.89 LOW SERUM TESTOSTERONE LEVEL IN MALE: ICD-10-CM

## 2024-10-08 DIAGNOSIS — E29.1 HYPOGONADISM IN MALE: ICD-10-CM

## 2024-10-08 LAB — TESTOST SERPL-MCNC: 230 NG/DL (ref 304–1227)

## 2024-10-08 PROCEDURE — 84403 ASSAY OF TOTAL TESTOSTERONE: CPT | Mod: HCNC | Performed by: NURSE PRACTITIONER

## 2024-10-08 PROCEDURE — 36415 COLL VENOUS BLD VENIPUNCTURE: CPT | Mod: HCNC,PO | Performed by: NURSE PRACTITIONER

## 2024-10-09 ENCOUNTER — PATIENT MESSAGE (OUTPATIENT)
Dept: UROLOGY | Facility: CLINIC | Age: 84
End: 2024-10-09
Payer: MEDICARE

## 2024-10-10 ENCOUNTER — PATIENT MESSAGE (OUTPATIENT)
Dept: UROLOGY | Facility: CLINIC | Age: 84
End: 2024-10-10
Payer: MEDICARE

## 2024-10-10 ENCOUNTER — PATIENT MESSAGE (OUTPATIENT)
Dept: FAMILY MEDICINE | Facility: CLINIC | Age: 84
End: 2024-10-10
Payer: MEDICARE

## 2024-10-10 DIAGNOSIS — N13.8 BPH WITH URINARY OBSTRUCTION: ICD-10-CM

## 2024-10-10 DIAGNOSIS — N52.01 ERECTILE DYSFUNCTION DUE TO ARTERIAL INSUFFICIENCY: ICD-10-CM

## 2024-10-10 DIAGNOSIS — R79.89 LOW SERUM TESTOSTERONE LEVEL IN MALE: Primary | ICD-10-CM

## 2024-10-10 DIAGNOSIS — N40.1 BPH WITH URINARY OBSTRUCTION: ICD-10-CM

## 2024-10-10 DIAGNOSIS — E29.1 HYPOGONADISM IN MALE: ICD-10-CM

## 2024-10-10 DIAGNOSIS — R53.83 FATIGUE, UNSPECIFIED TYPE: ICD-10-CM

## 2024-10-10 RX ORDER — TESTOSTERONE 20.25 MG/1.25G
2.5 GEL TOPICAL DAILY
Qty: 75 G | Refills: 5 | Status: SHIPPED | OUTPATIENT
Start: 2024-10-10 | End: 2025-04-08

## 2024-10-14 DIAGNOSIS — N13.8 BPH WITH URINARY OBSTRUCTION: Primary | ICD-10-CM

## 2024-10-14 DIAGNOSIS — N40.1 BPH WITH URINARY OBSTRUCTION: Primary | ICD-10-CM

## 2024-10-16 ENCOUNTER — TELEPHONE (OUTPATIENT)
Dept: UROLOGY | Facility: CLINIC | Age: 84
End: 2024-10-16
Payer: MEDICARE

## 2024-10-16 RX ORDER — TAMSULOSIN HYDROCHLORIDE 0.4 MG/1
1 CAPSULE ORAL DAILY
Qty: 90 CAPSULE | Refills: 3 | Status: SHIPPED | OUTPATIENT
Start: 2024-10-16 | End: 2025-10-16

## 2024-11-12 ENCOUNTER — PATIENT MESSAGE (OUTPATIENT)
Dept: UROLOGY | Facility: CLINIC | Age: 84
End: 2024-11-12
Payer: MEDICARE

## 2024-11-12 ENCOUNTER — OFFICE VISIT (OUTPATIENT)
Dept: GASTROENTEROLOGY | Facility: CLINIC | Age: 84
End: 2024-11-12
Payer: MEDICARE

## 2024-11-12 VITALS
BODY MASS INDEX: 26.08 KG/M2 | HEIGHT: 66 IN | DIASTOLIC BLOOD PRESSURE: 71 MMHG | HEART RATE: 57 BPM | SYSTOLIC BLOOD PRESSURE: 144 MMHG | WEIGHT: 162.25 LBS

## 2024-11-12 DIAGNOSIS — R13.10 DYSPHAGIA, UNSPECIFIED TYPE: Primary | ICD-10-CM

## 2024-11-12 DIAGNOSIS — R09.A2 GLOBUS SENSATION: ICD-10-CM

## 2024-11-12 DIAGNOSIS — R05.9 COUGH, UNSPECIFIED TYPE: ICD-10-CM

## 2024-11-12 PROCEDURE — 1159F MED LIST DOCD IN RCRD: CPT | Mod: CPTII,S$GLB,,

## 2024-11-12 PROCEDURE — 1101F PT FALLS ASSESS-DOCD LE1/YR: CPT | Mod: CPTII,S$GLB,,

## 2024-11-12 PROCEDURE — 3288F FALL RISK ASSESSMENT DOCD: CPT | Mod: CPTII,S$GLB,,

## 2024-11-12 PROCEDURE — 1157F ADVNC CARE PLAN IN RCRD: CPT | Mod: CPTII,S$GLB,,

## 2024-11-12 PROCEDURE — 99999 PR PBB SHADOW E&M-EST. PATIENT-LVL IV: CPT | Mod: PBBFAC,,,

## 2024-11-12 PROCEDURE — 99214 OFFICE O/P EST MOD 30 MIN: CPT | Mod: S$GLB,,,

## 2024-11-12 PROCEDURE — 3078F DIAST BP <80 MM HG: CPT | Mod: CPTII,S$GLB,,

## 2024-11-12 PROCEDURE — 3077F SYST BP >= 140 MM HG: CPT | Mod: CPTII,S$GLB,,

## 2024-11-12 NOTE — PROGRESS NOTES
Gastroenterology Clinic Consultation Note    Reason for Visit:  The primary encounter diagnosis was Dysphagia, unspecified type. Diagnoses of Globus sensation and Cough, unspecified type were also pertinent to this visit.    PCP:   Chandrika Barrett   [address] [facility] / [place] [id number]    Initial HPI   This is a 84 y.o. male presenting for GERD with dysphagia. Patient new to me. Presents to his appointment with his wife. Patient and his wife concerned for new onset issues of dysphagia with globus sensation. This began a few months ago. Has never experienced this before. Noticed he has trouble swallowing most solid foods and often times liquids as well. No particular food item identified that causes him more trouble. Often feels like most foods are slow to go down. When he does experience this, he goes into coughing/choking spells. This happened recently a few nights ago while they were out at dinner. Was referred by his PCP for ongoing concern and trouble with this. Denies nausea/vomiting.    He does report longstanding history of occasional GERD related symptoms. Says he occasionally experiences reflux with heartburn but very rare. Does not associate these symptoms with any certain foods. Was recently placed on famotidine 20 mg nightly. Says he does not take this often as these symptoms do not appear as bothersome to him.     Denies changes in bowel patterns. Says he produces occasional looser stools. Denies blood in his stool or abdominal pains associated with this.     Denies any odynophagia, nausea, vomiting, or acid regurgitation. No abdominal pains, changes in bowel pattern, blood/ mucus in stool or unintentional weight loss. Nocturnal symptoms. No melena or maroon stools. No recent changes in diet or medications. No family history of IBD, Celiac disease or GI malignancy. No regular NSAIDs. No alcohol or tobacco use. No recent antibiotic use, travels or sick contacts. No prior history of C.diff.    Interval  "History   PCP Visit on 9/18/2024  He is also c/o difficulty swallowing x5-6 days.  Solid foods feel like they are getting "stuck" and liquids at times feel like difficulty going down.  No coughing, SOB, CP, N/V, abdominal pain, diarrhea or constipation. No previous episodes.    Having reflux with no current treatment.  Dysphagia, unspecified type  -     Teaching reviewed on dysphagia including possible causes, treatment options, safety precautions and warning symptoms.  Referral done to GI for EGD, evaluation and treatment.  -     Ambulatory referral/consult to Gastroenterology; Future; Expected date: 09/25/2024  Gastroesophageal reflux disease without esophagitis  -     Teaching on GERD including GERD precautions, diet changes, preventing flare ups and treatment options.  Start Prilosec daily.   -     famotidine (PEPCID) 20 MG tablet; Take 1 tablet (20 mg total) by mouth nightly as needed for Heartburn.  Dispense: 90 tablet; Refill: 3    Abdominal Surgeries: None    ROS:  Review of Systems   Constitutional:  Negative for chills, fever, malaise/fatigue and weight loss.   HENT:          Trouble swallowing, globus sensation   Respiratory:  Negative for cough, hemoptysis, sputum production, shortness of breath and wheezing.    Cardiovascular:  Negative for chest pain, palpitations, orthopnea, claudication, leg swelling and PND.   Gastrointestinal:  Positive for heartburn. Negative for abdominal pain, blood in stool, constipation, diarrhea, melena, nausea and vomiting.   Genitourinary:  Negative for dysuria, flank pain, frequency, hematuria and urgency.   Musculoskeletal:  Negative for back pain, falls, joint pain, myalgias and neck pain.   Skin:  Negative for itching and rash.   Neurological:  Negative for dizziness, seizures, loss of consciousness, weakness and headaches.   Psychiatric/Behavioral:  The patient is not nervous/anxious and does not have insomnia.       Medical History:  has a past medical history of " Asthma, COPD (chronic obstructive pulmonary disease), Depression, and High cholesterol.    Surgical History:  has a past surgical history that includes Skin cancer excision (04/02/2024).    Family History: family history includes Bladder Cancer in his brother; Cancer in his brother; Depression in his daughter and daughter; Diabetes in his mother; Emphysema in his father; Fibromyalgia in his daughter; Hashimoto's thyroiditis in his daughter and daughter; Heart disease in his mother..     Review of patient's allergies indicates:   Allergen Reactions    Suvorexant Anxiety and Other (See Comments)    Trazodone Other (See Comments)    Doxycycline Rash and Hives    Penicillins Rash     Tolerates cefdinir with no issues    Sulfamethoxazole-trimethoprim Rash     Current Outpatient Medications on File Prior to Visit   Medication Sig Dispense Refill    albuterol (VENTOLIN HFA) 90 mcg/actuation inhaler Inhale 2 puffs into the lungs every 4 (four) hours as needed for Wheezing or Shortness of Breath. Rescue 18 g 0    aspirin (ECOTRIN) 81 MG EC tablet Take 1 tablet by mouth once daily.      budesonide-formoterol 160-4.5 mcg (SYMBICORT) 160-4.5 mcg/actuation HFAA Inhale 2 puffs into the lungs every 12 (twelve) hours. Controller 30.6 g 3    cinnamon bark 500 mg capsule Take 1,000 mg by mouth once daily.      famotidine (PEPCID) 20 MG tablet Take 1 tablet (20 mg total) by mouth nightly as needed for Heartburn. 90 tablet 3    FLUoxetine 40 MG capsule Take 40 mg by mouth.      fluticasone propionate (FLONASE) 50 mcg/actuation nasal spray 1 spray (50 mcg total) by Each Nostril route 2 (two) times a day. 16 g 5    ketoconazole (NIZORAL) 2 % shampoo SHAMPOO THREE TIMES WEEKLY TO AFFECTED AREAS. LET SIT FOR 5 MINUTES AND WASH OFF. FOR FACE      meclizine (ANTIVERT) 25 mg tablet Take 1 tablet (25 mg total) by mouth 3 (three) times daily as needed for Dizziness. 30 tablet 1    melatonin 10 mg TbDL Take 1 tablet by mouth every evening.       "memantine (NAMENDA XR) 28 mg CSpX       mirtazapine (REMERON) 15 MG tablet mirtazapine 15 mg tablet   Take 1 tablet every day by oral route in the evening for 30 days.      montelukast (SINGULAIR) 10 mg tablet Take 1 tablet (10 mg total) by mouth every evening. 90 tablet 3    multivitamin capsule Take 1 capsule by mouth once daily.      rivastigmine tartrate 4.5 mg capsule Take 4.5 mg by mouth 2 (two) times a day.      rosuvastatin (CRESTOR) 20 MG tablet Take 1 tablet (20 mg total) by mouth once daily. 90 tablet 3    tamsulosin (FLOMAX) 0.4 mg Cap Take 1 capsule (0.4 mg total) by mouth once daily. 30 minutes after dinner 90 capsule 3    testosterone (ANDROGEL) 20.25 mg/1.25 gram (1.62 %) GlPm Place 2.5 g onto the skin Daily. 2 pumps daily 75 g 5    azelastine (ASTELIN) 137 mcg (0.1 %) nasal spray 2 sprays (274 mcg total) by Nasal route 2 (two) times daily. 60 mL 3    levocetirizine (XYZAL) 5 MG tablet Take 1 tablet (5 mg total) by mouth every evening. 90 tablet 3    sildenafiL (VIAGRA) 100 MG tablet Take 1 tablet (100 mg total) by mouth daily as needed for Erectile Dysfunction (take on an empty stomach 30-60 minutes before). 10 tablet 12     No current facility-administered medications on file prior to visit.     Objective Findings:    Vital Signs:  BP (!) 144/71 (BP Location: Right arm, Patient Position: Sitting)   Pulse (!) 57   Ht 5' 6" (1.676 m)   Wt 73.6 kg (162 lb 4.1 oz)   BMI 26.19 kg/m²   Body mass index is 26.19 kg/m².    Physical Exam  Constitutional:       Appearance: Normal appearance. He is normal weight.   HENT:      Head: Normocephalic and atraumatic.      Mouth/Throat:      Mouth: Mucous membranes are moist.      Pharynx: Oropharynx is clear.   Cardiovascular:      Rate and Rhythm: Normal rate and regular rhythm.      Pulses: Normal pulses.      Heart sounds: Normal heart sounds.   Pulmonary:      Effort: Pulmonary effort is normal.      Breath sounds: Normal breath sounds.   Abdominal:      " General: Abdomen is flat. Bowel sounds are normal.      Palpations: Abdomen is soft.   Musculoskeletal:         General: Normal range of motion.      Cervical back: Normal range of motion and neck supple.   Skin:     General: Skin is warm and dry.   Neurological:      General: No focal deficit present.      Mental Status: He is alert and oriented to person, place, and time. Mental status is at baseline.   Psychiatric:         Mood and Affect: Mood normal.         Behavior: Behavior normal.         Thought Content: Thought content normal.         Judgment: Judgment normal.       Labs:  Lab Results   Component Value Date    WBC 5.93 10/03/2024    HGB 14.7 10/03/2024    HCT 47.5 10/03/2024     10/03/2024    CRP 3.5 03/28/2023    CHOL 164 02/22/2024    TRIG 65 02/22/2024    HDL 50 02/22/2024    ALKPHOS 94 05/03/2024    LIPASE 149 (H) 05/03/2024    ALT 13 05/03/2024    AST 27 05/03/2024     05/03/2024    K 4.8 05/03/2024     05/03/2024    CREATININE 1.2 05/03/2024    BUN 17 05/03/2024    CO2 24 05/03/2024    TSH 2.452 05/17/2023    HGBA1C 5.4 02/22/2024     Imaging reviewed:   EXAMINATION:  CT ABDOMEN PELVIS WITH IV CONTRAST 5/3/2024  CLINICAL HISTORY:  LLQ abdominal pain  FINDINGS:  Lung Bases: Stable 6 mm right lower lobe pulmonary nodule abutting the hemidiaphragm (series 2, image 13).  This is stable dating back to 03/03/2019 and is compatible with a benign etiology.  No further follow-up needed.  Lung bases are otherwise clear.  Heart: Heart size is normal.  No pericardial effusion.  Liver: The liver is normal in size and demonstrates homogeneous enhancement without focal lesion.  The portal vasculature is patent.  Biliary tract: No intrahepatic or extrahepatic biliary ductal dilatation.  Gallbladder: No radiodense gallstone. No wall thickening or pericholecystic fluid.  Pancreas: Normal. No pancreatic ductal dilatation.  Spleen: Normal size without focal lesion.  Adrenals: Unremarkable.  Kidneys  and urinary collecting systems: There is a too small to characterize hypodensity in the right kidney midpole.  There is mild cortical thinning of the bilateral kidneys which can be seen with chronic renal disease.  Previously described prominence of the right renal pelvis has resolved.  No hydronephrosis or urolithiasis.  Lymph nodes: None enlarged.  Stomach and bowel: The stomach is normal.  Loops of small and large bowel are normal in caliber without evidence for inflammation or obstruction.  Previously noted proximal small bowel wall thickening has resolved.  There is colonic diverticulosis.  There is trace stranding about the descending colon (series 2, image 79), compatible with mild acute, uncomplicated diverticulitis.  The appendix is normal.  Peritoneum and mesentery: No ascites or free intraperitoneal air.  No abdominal fluid collection.  Vasculature: There is moderate atherosclerosis.  Urinary bladder: No wall thickening.  Reproductive organs: The prostate is mildly prominent.  Body wall: No abnormality.  Musculoskeletal: No aggressive osseous lesion.  There are degenerative changes of the spine.  There is grade 1 anterolisthesis of L5 on S1.  Impression:  Mild acute uncomplicated diverticulitis of the descending colon.    Assessment:  1. Dysphagia, unspecified type    2. Globus sensation    3. Cough, unspecified type         Plan:  Schedule EGD with biopsies (GERD with new onset dysphagia & globus sensation)  2. Continue current antireflux regimen.   3. F/u in 3 months with GI clinic       Thank you for allowing me to participate in this patient's care.    Sincerely,     DIRK CONNOLLY  Gastroenterology Department  [facility]

## 2024-11-12 NOTE — H&P (VIEW-ONLY)
Gastroenterology Clinic Consultation Note    Reason for Visit:  The primary encounter diagnosis was Dysphagia, unspecified type. Diagnoses of Globus sensation and Cough, unspecified type were also pertinent to this visit.    PCP:   Chandrika Barrett   [address] [facility] / [place] [id number]    Initial HPI   This is a 84 y.o. male presenting for GERD with dysphagia. Patient new to me. Presents to his appointment with his wife. Patient and his wife concerned for new onset issues of dysphagia with globus sensation. This began a few months ago. Has never experienced this before. Noticed he has trouble swallowing most solid foods and often times liquids as well. No particular food item identified that causes him more trouble. Often feels like most foods are slow to go down. When he does experience this, he goes into coughing/choking spells. This happened recently a few nights ago while they were out at dinner. Was referred by his PCP for ongoing concern and trouble with this. Denies nausea/vomiting.    He does report longstanding history of occasional GERD related symptoms. Says he occasionally experiences reflux with heartburn but very rare. Does not associate these symptoms with any certain foods. Was recently placed on famotidine 20 mg nightly. Says he does not take this often as these symptoms do not appear as bothersome to him.     Denies changes in bowel patterns. Says he produces occasional looser stools. Denies blood in his stool or abdominal pains associated with this.     Denies any odynophagia, nausea, vomiting, or acid regurgitation. No abdominal pains, changes in bowel pattern, blood/ mucus in stool or unintentional weight loss. Nocturnal symptoms. No melena or maroon stools. No recent changes in diet or medications. No family history of IBD, Celiac disease or GI malignancy. No regular NSAIDs. No alcohol or tobacco use. No recent antibiotic use, travels or sick contacts. No prior history of C.diff.    Interval  "History   PCP Visit on 9/18/2024  He is also c/o difficulty swallowing x5-6 days.  Solid foods feel like they are getting "stuck" and liquids at times feel like difficulty going down.  No coughing, SOB, CP, N/V, abdominal pain, diarrhea or constipation. No previous episodes.    Having reflux with no current treatment.  Dysphagia, unspecified type  -     Teaching reviewed on dysphagia including possible causes, treatment options, safety precautions and warning symptoms.  Referral done to GI for EGD, evaluation and treatment.  -     Ambulatory referral/consult to Gastroenterology; Future; Expected date: 09/25/2024  Gastroesophageal reflux disease without esophagitis  -     Teaching on GERD including GERD precautions, diet changes, preventing flare ups and treatment options.  Start Prilosec daily.   -     famotidine (PEPCID) 20 MG tablet; Take 1 tablet (20 mg total) by mouth nightly as needed for Heartburn.  Dispense: 90 tablet; Refill: 3    Abdominal Surgeries: None    ROS:  Review of Systems   Constitutional:  Negative for chills, fever, malaise/fatigue and weight loss.   HENT:          Trouble swallowing, globus sensation   Respiratory:  Negative for cough, hemoptysis, sputum production, shortness of breath and wheezing.    Cardiovascular:  Negative for chest pain, palpitations, orthopnea, claudication, leg swelling and PND.   Gastrointestinal:  Positive for heartburn. Negative for abdominal pain, blood in stool, constipation, diarrhea, melena, nausea and vomiting.   Genitourinary:  Negative for dysuria, flank pain, frequency, hematuria and urgency.   Musculoskeletal:  Negative for back pain, falls, joint pain, myalgias and neck pain.   Skin:  Negative for itching and rash.   Neurological:  Negative for dizziness, seizures, loss of consciousness, weakness and headaches.   Psychiatric/Behavioral:  The patient is not nervous/anxious and does not have insomnia.       Medical History:  has a past medical history of " Asthma, COPD (chronic obstructive pulmonary disease), Depression, and High cholesterol.    Surgical History:  has a past surgical history that includes Skin cancer excision (04/02/2024).    Family History: family history includes Bladder Cancer in his brother; Cancer in his brother; Depression in his daughter and daughter; Diabetes in his mother; Emphysema in his father; Fibromyalgia in his daughter; Hashimoto's thyroiditis in his daughter and daughter; Heart disease in his mother..     Review of patient's allergies indicates:   Allergen Reactions    Suvorexant Anxiety and Other (See Comments)    Trazodone Other (See Comments)    Doxycycline Rash and Hives    Penicillins Rash     Tolerates cefdinir with no issues    Sulfamethoxazole-trimethoprim Rash     Current Outpatient Medications on File Prior to Visit   Medication Sig Dispense Refill    albuterol (VENTOLIN HFA) 90 mcg/actuation inhaler Inhale 2 puffs into the lungs every 4 (four) hours as needed for Wheezing or Shortness of Breath. Rescue 18 g 0    aspirin (ECOTRIN) 81 MG EC tablet Take 1 tablet by mouth once daily.      budesonide-formoterol 160-4.5 mcg (SYMBICORT) 160-4.5 mcg/actuation HFAA Inhale 2 puffs into the lungs every 12 (twelve) hours. Controller 30.6 g 3    cinnamon bark 500 mg capsule Take 1,000 mg by mouth once daily.      famotidine (PEPCID) 20 MG tablet Take 1 tablet (20 mg total) by mouth nightly as needed for Heartburn. 90 tablet 3    FLUoxetine 40 MG capsule Take 40 mg by mouth.      fluticasone propionate (FLONASE) 50 mcg/actuation nasal spray 1 spray (50 mcg total) by Each Nostril route 2 (two) times a day. 16 g 5    ketoconazole (NIZORAL) 2 % shampoo SHAMPOO THREE TIMES WEEKLY TO AFFECTED AREAS. LET SIT FOR 5 MINUTES AND WASH OFF. FOR FACE      meclizine (ANTIVERT) 25 mg tablet Take 1 tablet (25 mg total) by mouth 3 (three) times daily as needed for Dizziness. 30 tablet 1    melatonin 10 mg TbDL Take 1 tablet by mouth every evening.       "memantine (NAMENDA XR) 28 mg CSpX       mirtazapine (REMERON) 15 MG tablet mirtazapine 15 mg tablet   Take 1 tablet every day by oral route in the evening for 30 days.      montelukast (SINGULAIR) 10 mg tablet Take 1 tablet (10 mg total) by mouth every evening. 90 tablet 3    multivitamin capsule Take 1 capsule by mouth once daily.      rivastigmine tartrate 4.5 mg capsule Take 4.5 mg by mouth 2 (two) times a day.      rosuvastatin (CRESTOR) 20 MG tablet Take 1 tablet (20 mg total) by mouth once daily. 90 tablet 3    tamsulosin (FLOMAX) 0.4 mg Cap Take 1 capsule (0.4 mg total) by mouth once daily. 30 minutes after dinner 90 capsule 3    testosterone (ANDROGEL) 20.25 mg/1.25 gram (1.62 %) GlPm Place 2.5 g onto the skin Daily. 2 pumps daily 75 g 5    azelastine (ASTELIN) 137 mcg (0.1 %) nasal spray 2 sprays (274 mcg total) by Nasal route 2 (two) times daily. 60 mL 3    levocetirizine (XYZAL) 5 MG tablet Take 1 tablet (5 mg total) by mouth every evening. 90 tablet 3    sildenafiL (VIAGRA) 100 MG tablet Take 1 tablet (100 mg total) by mouth daily as needed for Erectile Dysfunction (take on an empty stomach 30-60 minutes before). 10 tablet 12     No current facility-administered medications on file prior to visit.     Objective Findings:    Vital Signs:  BP (!) 144/71 (BP Location: Right arm, Patient Position: Sitting)   Pulse (!) 57   Ht 5' 6" (1.676 m)   Wt 73.6 kg (162 lb 4.1 oz)   BMI 26.19 kg/m²   Body mass index is 26.19 kg/m².    Physical Exam  Constitutional:       Appearance: Normal appearance. He is normal weight.   HENT:      Head: Normocephalic and atraumatic.      Mouth/Throat:      Mouth: Mucous membranes are moist.      Pharynx: Oropharynx is clear.   Cardiovascular:      Rate and Rhythm: Normal rate and regular rhythm.      Pulses: Normal pulses.      Heart sounds: Normal heart sounds.   Pulmonary:      Effort: Pulmonary effort is normal.      Breath sounds: Normal breath sounds.   Abdominal:      " General: Abdomen is flat. Bowel sounds are normal.      Palpations: Abdomen is soft.   Musculoskeletal:         General: Normal range of motion.      Cervical back: Normal range of motion and neck supple.   Skin:     General: Skin is warm and dry.   Neurological:      General: No focal deficit present.      Mental Status: He is alert and oriented to person, place, and time. Mental status is at baseline.   Psychiatric:         Mood and Affect: Mood normal.         Behavior: Behavior normal.         Thought Content: Thought content normal.         Judgment: Judgment normal.       Labs:  Lab Results   Component Value Date    WBC 5.93 10/03/2024    HGB 14.7 10/03/2024    HCT 47.5 10/03/2024     10/03/2024    CRP 3.5 03/28/2023    CHOL 164 02/22/2024    TRIG 65 02/22/2024    HDL 50 02/22/2024    ALKPHOS 94 05/03/2024    LIPASE 149 (H) 05/03/2024    ALT 13 05/03/2024    AST 27 05/03/2024     05/03/2024    K 4.8 05/03/2024     05/03/2024    CREATININE 1.2 05/03/2024    BUN 17 05/03/2024    CO2 24 05/03/2024    TSH 2.452 05/17/2023    HGBA1C 5.4 02/22/2024     Imaging reviewed:   EXAMINATION:  CT ABDOMEN PELVIS WITH IV CONTRAST 5/3/2024  CLINICAL HISTORY:  LLQ abdominal pain  FINDINGS:  Lung Bases: Stable 6 mm right lower lobe pulmonary nodule abutting the hemidiaphragm (series 2, image 13).  This is stable dating back to 03/03/2019 and is compatible with a benign etiology.  No further follow-up needed.  Lung bases are otherwise clear.  Heart: Heart size is normal.  No pericardial effusion.  Liver: The liver is normal in size and demonstrates homogeneous enhancement without focal lesion.  The portal vasculature is patent.  Biliary tract: No intrahepatic or extrahepatic biliary ductal dilatation.  Gallbladder: No radiodense gallstone. No wall thickening or pericholecystic fluid.  Pancreas: Normal. No pancreatic ductal dilatation.  Spleen: Normal size without focal lesion.  Adrenals: Unremarkable.  Kidneys  and urinary collecting systems: There is a too small to characterize hypodensity in the right kidney midpole.  There is mild cortical thinning of the bilateral kidneys which can be seen with chronic renal disease.  Previously described prominence of the right renal pelvis has resolved.  No hydronephrosis or urolithiasis.  Lymph nodes: None enlarged.  Stomach and bowel: The stomach is normal.  Loops of small and large bowel are normal in caliber without evidence for inflammation or obstruction.  Previously noted proximal small bowel wall thickening has resolved.  There is colonic diverticulosis.  There is trace stranding about the descending colon (series 2, image 79), compatible with mild acute, uncomplicated diverticulitis.  The appendix is normal.  Peritoneum and mesentery: No ascites or free intraperitoneal air.  No abdominal fluid collection.  Vasculature: There is moderate atherosclerosis.  Urinary bladder: No wall thickening.  Reproductive organs: The prostate is mildly prominent.  Body wall: No abnormality.  Musculoskeletal: No aggressive osseous lesion.  There are degenerative changes of the spine.  There is grade 1 anterolisthesis of L5 on S1.  Impression:  Mild acute uncomplicated diverticulitis of the descending colon.    Assessment:  1. Dysphagia, unspecified type    2. Globus sensation    3. Cough, unspecified type         Plan:  Schedule EGD with biopsies (GERD with new onset dysphagia & globus sensation)  2. Continue current antireflux regimen.   3. F/u in 3 months with GI clinic       Thank you for allowing me to participate in this patient's care.    Sincerely,     DIRK CONNOLLY  Gastroenterology Department  [facility]

## 2024-11-14 ENCOUNTER — LAB VISIT (OUTPATIENT)
Dept: LAB | Facility: HOSPITAL | Age: 84
End: 2024-11-14
Attending: NURSE PRACTITIONER
Payer: MEDICARE

## 2024-11-14 DIAGNOSIS — R79.89 LOW SERUM TESTOSTERONE LEVEL IN MALE: ICD-10-CM

## 2024-11-14 DIAGNOSIS — E29.1 HYPOGONADISM IN MALE: ICD-10-CM

## 2024-11-14 DIAGNOSIS — N40.1 BPH WITH URINARY OBSTRUCTION: ICD-10-CM

## 2024-11-14 DIAGNOSIS — R53.83 FATIGUE, UNSPECIFIED TYPE: ICD-10-CM

## 2024-11-14 DIAGNOSIS — N52.01 ERECTILE DYSFUNCTION DUE TO ARTERIAL INSUFFICIENCY: ICD-10-CM

## 2024-11-14 DIAGNOSIS — N13.8 BPH WITH URINARY OBSTRUCTION: ICD-10-CM

## 2024-11-14 PROCEDURE — 84403 ASSAY OF TOTAL TESTOSTERONE: CPT | Mod: HCNC | Performed by: NURSE PRACTITIONER

## 2024-11-14 PROCEDURE — 36415 COLL VENOUS BLD VENIPUNCTURE: CPT | Mod: HCNC,PO | Performed by: NURSE PRACTITIONER

## 2024-11-15 ENCOUNTER — TELEPHONE (OUTPATIENT)
Dept: ENDOSCOPY | Facility: HOSPITAL | Age: 84
End: 2024-11-15
Payer: MEDICARE

## 2024-11-15 LAB — TESTOST SERPL-MCNC: 218 NG/DL (ref 304–1227)

## 2024-11-15 NOTE — TELEPHONE ENCOUNTER
Contacted the patient to schedule an endoscopy procedure(s) 11/15/24. The patient did not answer the call and left a voice message requesting a call back.

## 2024-11-15 NOTE — TELEPHONE ENCOUNTER
"----- Message from Alessia sent at 11/14/2024 12:20 PM CST -----    ----- Message -----  From: Janelle Good RN  Sent: 11/14/2024  11:07 AM CST  To: [facility] Endoscopist [facility] Patients      ----- Message -----  From: Brianna Aggarwal NP  Sent: 11/12/2024   1:49 PM CST  To: Janelle Good RN    Procedure: EGD    Diagnosis: GERD, Dysphagia, and globus sensation    Procedure Timing: Within 4 weeks (Urgent)    #If within 4 weeks selected, please iva as high priority#    #If greater than 12 weeks, please select "5-12 weeks" and delay sending until 3 months prior to requested date#     Location: Hospital Based ([facility],  endo, [facility]) #Prefers [place] location if possible#    Additional Scheduling Information: Blood thinners and Advanced cardiac or pulmonary disease    Prep Specifications:Standard prep    Is the patient taking a GLP-1 Agonist:no    Have you attached a patient to this message: yes  "

## 2024-11-19 ENCOUNTER — TELEPHONE (OUTPATIENT)
Dept: UROLOGY | Facility: CLINIC | Age: 84
End: 2024-11-19
Payer: MEDICARE

## 2024-11-19 ENCOUNTER — TELEPHONE (OUTPATIENT)
Dept: ENDOSCOPY | Facility: HOSPITAL | Age: 84
End: 2024-11-19
Payer: MEDICARE

## 2024-11-19 ENCOUNTER — PATIENT MESSAGE (OUTPATIENT)
Dept: UROLOGY | Facility: CLINIC | Age: 84
End: 2024-11-19
Payer: MEDICARE

## 2024-11-19 DIAGNOSIS — K21.9 GASTROESOPHAGEAL REFLUX DISEASE, UNSPECIFIED WHETHER ESOPHAGITIS PRESENT: Primary | ICD-10-CM

## 2024-11-19 DIAGNOSIS — Z79.890 ENCOUNTER FOR MONITORING TESTOSTERONE REPLACEMENT THERAPY: Primary | ICD-10-CM

## 2024-11-19 DIAGNOSIS — N52.01 ERECTILE DYSFUNCTION DUE TO ARTERIAL INSUFFICIENCY: ICD-10-CM

## 2024-11-19 DIAGNOSIS — R13.10 DYSPHAGIA, UNSPECIFIED TYPE: ICD-10-CM

## 2024-11-19 DIAGNOSIS — R53.83 FATIGUE, UNSPECIFIED TYPE: ICD-10-CM

## 2024-11-19 DIAGNOSIS — R79.89 LOW SERUM TESTOSTERONE LEVEL IN MALE: ICD-10-CM

## 2024-11-19 DIAGNOSIS — N13.8 BPH WITH URINARY OBSTRUCTION: ICD-10-CM

## 2024-11-19 DIAGNOSIS — R09.A2 GLOBUS SENSATION: ICD-10-CM

## 2024-11-19 DIAGNOSIS — Z51.81 ENCOUNTER FOR MONITORING TESTOSTERONE REPLACEMENT THERAPY: Primary | ICD-10-CM

## 2024-11-19 DIAGNOSIS — E29.1 HYPOGONADISM IN MALE: ICD-10-CM

## 2024-11-19 DIAGNOSIS — N40.1 BPH WITH URINARY OBSTRUCTION: ICD-10-CM

## 2024-11-19 RX ORDER — TESTOSTERONE 20.25 MG/1.25G
3.75 GEL TOPICAL DAILY
Qty: 112.5 G | Refills: 5 | Status: SHIPPED | OUTPATIENT
Start: 2024-11-19 | End: 2025-05-18

## 2024-11-19 NOTE — TELEPHONE ENCOUNTER
Referral for procedure from [facility]      Spoke to patient to schedule procedure(s) Upper Endoscopy (EGD)       Physician to perform procedure(s) Dr. JOSEF Ruiz  Date of Procedure (s) 11/26/24  Arrival Time 9:00 AM  Time of Procedure(s) 10:00 AM   Location of Procedure(s) [facility]nd Floor  Type of Rx Prep sent to patient: N/A  Instructions provided to patient via [facility]    Patient was informed on the following information and verbalized understanding. Screening questionnaire reviewed with patient and complete. If procedure requires anesthesia, a responsible adult needs to be present to accompany the patient home, patient cannot drive after receiving anesthesia. Appointment details are tentative, especially check-in time. Patient will receive a prep-op call 7 days prior to confirm check-in time for procedure. If applicable the patient should contact their pharmacy to verify Rx for procedure prep is ready for pick-up. Patient was advised to call the scheduling department at [phone] if pharmacy states no Rx is available. Patient was advised to call the endoscopy scheduling department if any questions or concerns arise.       Endoscopy Scheduling Department

## 2024-11-19 NOTE — TELEPHONE ENCOUNTER
"----- Message from Alessia sent at 11/14/2024 12:20 PM CST -----     ----- Message -----  From: Janelle Good RN  Sent: 11/14/2024  11:07 AM CST  To: [facility] Endoscopist Clinic Patients        ----- Message -----  From: Brianna Aggarwal NP  Sent: 11/12/2024   1:49 PM CST  To: Janelle Good RN     Procedure: EGD     Diagnosis: GERD, Dysphagia, and globus sensation     Procedure Timing: Within 4 weeks (Urgent)     #If within 4 weeks selected, please iva as high priority#     #If greater than 12 weeks, please select "5-12 weeks" and delay sending until 3 months prior to requested date#      Location: Hospital Based ([facility],  [facility], [facility]) #Prefers [place] location if possible#     Additional Scheduling Information: Blood thinners and Advanced cardiac or pulmonary disease     Prep Specifications:Standard prep     Is the patient taking a GLP-1 Agonist:no     Have you attached a patient to this message: yes  "

## 2024-11-19 NOTE — TELEPHONE ENCOUNTER
Started AndroGel 1.62% 2 pumps daily  T level came back < 300 after 2 weeks.  Increase dosage to 3 pumps then recheck T level.  Rx was updated

## 2024-11-19 NOTE — TELEPHONE ENCOUNTER
Patient notified that  His recent T level was < 300;   We increased his AndroGel 1.62% to 3 pumps.   Recheck T in 4 weeks scheduled repeat lab.

## 2024-11-22 ENCOUNTER — TELEPHONE (OUTPATIENT)
Dept: ENDOSCOPY | Facility: HOSPITAL | Age: 84
End: 2024-11-22
Payer: MEDICARE

## 2024-11-22 ENCOUNTER — PATIENT MESSAGE (OUTPATIENT)
Dept: UROLOGY | Facility: CLINIC | Age: 84
End: 2024-11-22
Payer: MEDICARE

## 2024-11-22 NOTE — TELEPHONE ENCOUNTER
Spoke with patient about arrival time @ *. 900  Covid test =    NPO status reviewed: Patient must have nothing to eat after midnight.  Pt may have CLEAR liquids ONLY until completely NPO 3 hrs @ *    Medications: Do not take Insulin or oral diabetic medications the day of the procedure.  Take as prescribed: heart, seizure and blood pressure medication in the morning with a sip of water (less than an ounce).  Take any breathing medications and bring inhalers to hospital with you Leave all valuables and jewelry at home.     Wear comfortable clothes to procedure to change into hospital gown You cannot drive for 24 hours after your procedure because you will receive sedation for your procedure to make you comfortable.  A ride must be provided at discharge.

## 2024-11-26 ENCOUNTER — ANESTHESIA EVENT (OUTPATIENT)
Dept: ENDOSCOPY | Facility: HOSPITAL | Age: 84
End: 2024-11-26
Payer: MEDICARE

## 2024-11-26 ENCOUNTER — HOSPITAL ENCOUNTER (OUTPATIENT)
Facility: HOSPITAL | Age: 84
Discharge: HOME | End: 2024-11-26
Attending: INTERNAL MEDICINE | Admitting: INTERNAL MEDICINE
Payer: MEDICARE

## 2024-11-26 ENCOUNTER — ANESTHESIA (OUTPATIENT)
Dept: ENDOSCOPY | Facility: HOSPITAL | Age: 84
End: 2024-11-26
Payer: MEDICARE

## 2024-11-26 VITALS
TEMPERATURE: 98 F | WEIGHT: 160 LBS | SYSTOLIC BLOOD PRESSURE: 124 MMHG | OXYGEN SATURATION: 98 % | BODY MASS INDEX: 25.71 KG/M2 | RESPIRATION RATE: 10 BRPM | HEIGHT: 66 IN | HEART RATE: 54 BPM | DIASTOLIC BLOOD PRESSURE: 74 MMHG

## 2024-11-26 DIAGNOSIS — R13.19 ESOPHAGEAL DYSPHAGIA: ICD-10-CM

## 2024-11-26 PROBLEM — K21.9 HIATAL HERNIA WITH GERD: Status: ACTIVE | Noted: 2024-11-26

## 2024-11-26 PROBLEM — K22.2 SCHATZKI'S RING: Status: ACTIVE | Noted: 2024-11-26

## 2024-11-26 PROBLEM — K44.9 HIATAL HERNIA WITH GERD: Status: ACTIVE | Noted: 2024-11-26

## 2024-11-26 PROCEDURE — 63600175 PHARM REV CODE 636 W HCPCS: Mod: HCNC | Performed by: NURSE ANESTHETIST, CERTIFIED REGISTERED

## 2024-11-26 PROCEDURE — 37000009 HC ANESTHESIA EA ADD 15 MINS: Mod: HCNC | Performed by: INTERNAL MEDICINE

## 2024-11-26 PROCEDURE — 43239 EGD BIOPSY SINGLE/MULTIPLE: CPT | Mod: 59,HCNC | Performed by: INTERNAL MEDICINE

## 2024-11-26 PROCEDURE — 27201012 HC FORCEPS, HOT/COLD, DISP: Mod: HCNC | Performed by: INTERNAL MEDICINE

## 2024-11-26 PROCEDURE — 43248 EGD GUIDE WIRE INSERTION: CPT | Mod: HCNC | Performed by: INTERNAL MEDICINE

## 2024-11-26 PROCEDURE — C1726 CATH, BAL DIL, NON-VASCULAR: HCPCS | Mod: HCNC | Performed by: INTERNAL MEDICINE

## 2024-11-26 PROCEDURE — 43248 EGD GUIDE WIRE INSERTION: CPT | Mod: HCNC,,, | Performed by: INTERNAL MEDICINE

## 2024-11-26 PROCEDURE — 88305 TISSUE EXAM BY PATHOLOGIST: CPT | Mod: HCNC | Performed by: PATHOLOGY

## 2024-11-26 PROCEDURE — 43239 EGD BIOPSY SINGLE/MULTIPLE: CPT | Mod: 59,HCNC,, | Performed by: INTERNAL MEDICINE

## 2024-11-26 PROCEDURE — 37000008 HC ANESTHESIA 1ST 15 MINUTES: Mod: HCNC | Performed by: INTERNAL MEDICINE

## 2024-11-26 RX ORDER — PROPOFOL 10 MG/ML
VIAL (ML) INTRAVENOUS
Status: DISCONTINUED | OUTPATIENT
Start: 2024-11-26 | End: 2024-11-26

## 2024-11-26 RX ORDER — PANTOPRAZOLE SODIUM 40 MG/1
40 TABLET, DELAYED RELEASE ORAL DAILY
Qty: 90 TABLET | Refills: 3 | Status: SHIPPED | OUTPATIENT
Start: 2024-11-26 | End: 2025-11-26

## 2024-11-26 RX ORDER — LIDOCAINE HYDROCHLORIDE 20 MG/ML
INJECTION INTRAVENOUS
Status: DISCONTINUED | OUTPATIENT
Start: 2024-11-26 | End: 2024-11-26

## 2024-11-26 RX ORDER — SODIUM CHLORIDE 9 MG/ML
INJECTION, SOLUTION INTRAVENOUS CONTINUOUS
Status: DISCONTINUED | OUTPATIENT
Start: 2024-11-26 | End: 2024-11-26 | Stop reason: HOSPADM

## 2024-11-26 RX ORDER — PROPOFOL 10 MG/ML
VIAL (ML) INTRAVENOUS CONTINUOUS PRN
Status: DISCONTINUED | OUTPATIENT
Start: 2024-11-26 | End: 2024-11-26

## 2024-11-26 RX ORDER — SODIUM CHLORIDE 0.9 % (FLUSH) 0.9 %
10 SYRINGE (ML) INJECTION
Status: DISCONTINUED | OUTPATIENT
Start: 2024-11-26 | End: 2024-11-26 | Stop reason: HOSPADM

## 2024-11-26 RX ADMIN — PROPOFOL 70 MG: 10 INJECTION, EMULSION INTRAVENOUS at 11:11

## 2024-11-26 RX ADMIN — PROPOFOL 150 MCG/KG/MIN: 10 INJECTION, EMULSION INTRAVENOUS at 11:11

## 2024-11-26 RX ADMIN — LIDOCAINE HYDROCHLORIDE 100 MG: 20 INJECTION, SOLUTION INTRAVENOUS at 11:11

## 2024-11-26 NOTE — ANESTHESIA PREPROCEDURE EVALUATION
11/26/2024  Navin Henning Jr. is a 84 y.o., male.      Pre-op Assessment     I have reviewed the Nursing Notes.    I have reviewed the Medications.     Review of Systems  Anesthesia Hx:  No problems with previous Anesthesia             Denies Family Hx of Anesthesia complications.     Social:  Non-Smoker, No Alcohol Use       Hematology/Oncology:  Hematology Normal   Oncology Normal                                   EENT/Dental:  EENT/Dental Normal           Cardiovascular:  Exercise tolerance: good   Hypertension   CAD    Dysrhythmias  Angina             Cardiovascular Symptoms: Angina       Coronary Artery Disease:                            Hypertension     Disorder of Cardiac Rhythm     Pulmonary:   COPD Asthma       Asthma:   Chronic Obstructive Pulmonary Disease (COPD):                      Renal/:  Renal/ Normal                 Hepatic/GI:     GERD         Gerd          Musculoskeletal:  Musculoskeletal Normal                Neurological:  Neurology Normal                                      Endocrine:  Endocrine Normal            Psych:  Psychiatric History                  Physical Exam  General: Well nourished    Airway:  Mallampati: II / II  Mouth Opening: Normal  TM Distance: Normal  Tongue: Normal  Neck ROM: Normal ROM    Dental:  Intact        Anesthesia Plan  Type of Anesthesia, risks & benefits discussed:    Anesthesia Type: Gen Natural Airway  Intra-op Monitoring Plan: Standard ASA Monitors  Post Op Pain Control Plan: multimodal analgesia  Induction:  IV  Airway Plan: Direct, Post-Induction  Informed Consent: Informed consent signed with the Patient and all parties understand the risks and agree with anesthesia plan.  All questions answered.   ASA Score: 3    Ready For Surgery From Anesthesia Perspective.     .

## 2024-11-26 NOTE — PROVATION PATIENT INSTRUCTIONS
Discharge Summary/Instructions after an Endoscopic Procedure  Patient Name: Navin Henning  Patient MRN: [id number]  Patient YOB: 1940 Tuesday, November 26, 2024  Samuel Ruiz MD  Dear patient,  As a result of recent federal legislation (The Federal Cures Act), you may   receive lab or pathology results from your procedure in your [facility]   account before your physician is able to contact you. Your physician or   their representative will relay the results to you with their   recommendations at their soonest availability.  Thank you,  Your health is very important to us during the Covid Crisis. Following your   procedure today, you will receive a daily text for 2 weeks asking about   signs or symptoms of Covid 19.  Please respond to this text when you   receive it so we can follow up and keep you as safe as possible.   RESTRICTIONS:  During your procedure today, you received medications for sedation.  These   medications may affect your judgment, balance and coordination.  Therefore,   for 24 hours, you have the following restrictions:   - DO NOT drive a car, operate machinery, make legal/financial decisions,   sign important papers or drink alcohol.    ACTIVITY:  Today: no heavy lifting, straining or running due to procedural   sedation/anesthesia.  The following day: return to full activity including work.  DIET:  Eat and drink normally unless instructed otherwise.     TREATMENT FOR COMMON SIDE EFFECTS:  - Mild abdominal pain, nausea, belching, bloating or excessive gas:  rest,   eat lightly and use a heating pad.  - Sore Throat: treat with throat lozenges and/or gargle with warm salt   water.  - Because air was used during the procedure, expelling large amounts of air   from your rectum or belching is normal.  - If a bowel prep was taken, you may not have a bowel movement for 1-3 days.    This is normal.  SYMPTOMS TO WATCH FOR AND REPORT TO YOUR PHYSICIAN:  1. Abdominal pain or bloating, other than  gas cramps.  2. Chest pain.  3. Back pain.  4. Signs of infection such as: chills or fever occurring within 24 hours   after the procedure.  5. Rectal bleeding, which would show as bright red, maroon, or black stools.   (A tablespoon of blood from the rectum is not serious, especially if   hemorrhoids are present.)  6. Vomiting.  7. Weakness or dizziness.  GO DIRECTLY TO THE NEAREST EMERGENCY ROOM IF YOU HAVE ANY OF THE FOLLOWING:      Difficulty breathing              Chills and/or fever over 101 F   Persistent vomiting and/or vomiting blood   Severe abdominal pain   Severe chest pain   Black, tarry stools   Bleeding- more than one tablespoon   Any other symptom or condition that you feel may need urgent attention  Your doctor recommends these additional instructions:  If any biopsies were taken, your doctors clinic will contact you in 1 to 2   weeks with any results.  - Discharge patient to home.   - Patient has a contact number available for emergencies.  The signs and   symptoms of potential delayed complications were discussed with the   patient.  Return to normal activities tomorrow.  Written discharge   instructions were provided to the patient.   - Resume previous diet.   - Continue present medications.   - Await pathology results.   - Repeat upper endoscopy PRN for retreatment.   - Would monitor symptoms of globus , and if still persistent after todays   balloon dilation all the way to 20mm, would then consider esophagram with   tablet, as the medium sized sliding hiatal hernia may be causing his   symptoms.   - Start protonix 40mg daily  For questions, problems or results please call your physician - Samuel Ruiz MD.  EMERGENCY PHONE NUMBER: [phone],  LAB RESULTS: [phone]  IF A COMPLICATION OR EMERGENCY SITUATION ARISES AND YOU ARE UNABLE TO REACH   YOUR PHYSICIAN - GO DIRECTLY TO THE EMERGENCY ROOM.  Samuel Ruiz MD  11/26/2024 11:50:21 AM  This report has been verified and signed  electronically.  Dear patient,  As a result of recent federal legislation (The Federal Cures Act), you may   receive lab or pathology results from your procedure in your [facility]   account before your physician is able to contact you. Your physician or   their representative will relay the results to you with their   recommendations at their soonest availability.  Thank you,  PROVATION

## 2024-11-26 NOTE — TRANSFER OF CARE
"Anesthesia Transfer of Care Note    Patient: Navin Henning Jr.    Procedure(s) Performed: Procedure(s) (LRB):  EGD (ESOPHAGOGASTRODUODENOSCOPY) (N/A)    Patient location: GI    Anesthesia Type: general    Transport from OR: Transported from OR on room air with adequate spontaneous ventilation    Post pain: adequate analgesia    Post assessment: no apparent anesthetic complications    Post vital signs: stable    Level of consciousness: awake, alert and oriented    Nausea/Vomiting: no nausea/vomiting    Complications: none    Transfer of care protocol was followed      Last vitals: Visit Vitals  BP (!) 146/67   Pulse (!) 52   Temp 37.2 °C (99 °F)   Resp 18   Ht 5' 6" (1.676 m)   Wt 72.6 kg (160 lb)   SpO2 95%   BMI 25.82 kg/m²     "

## 2024-11-29 LAB
FINAL PATHOLOGIC DIAGNOSIS: NORMAL
GROSS: NORMAL
Lab: NORMAL

## 2024-12-10 ENCOUNTER — NURSE TRIAGE (OUTPATIENT)
Dept: ADMINISTRATIVE | Facility: CLINIC | Age: 84
End: 2024-12-10
Payer: MEDICARE

## 2024-12-10 ENCOUNTER — OFFICE VISIT (OUTPATIENT)
Dept: INTERNAL MEDICINE | Facility: CLINIC | Age: 84
End: 2024-12-10
Payer: MEDICARE

## 2024-12-10 VITALS
BODY MASS INDEX: 26.5 KG/M2 | HEIGHT: 66 IN | WEIGHT: 164.88 LBS | HEART RATE: 61 BPM | DIASTOLIC BLOOD PRESSURE: 70 MMHG | TEMPERATURE: 99 F | SYSTOLIC BLOOD PRESSURE: 120 MMHG

## 2024-12-10 DIAGNOSIS — J44.1 COPD EXACERBATION: ICD-10-CM

## 2024-12-10 DIAGNOSIS — R68.89 FLU-LIKE SYMPTOMS: Primary | ICD-10-CM

## 2024-12-10 PROCEDURE — 1159F MED LIST DOCD IN RCRD: CPT | Mod: HCNC,CPTII,S$GLB, | Performed by: INTERNAL MEDICINE

## 2024-12-10 PROCEDURE — 99999 PR PBB SHADOW E&M-EST. PATIENT-LVL IV: CPT | Mod: PBBFAC,HCNC,, | Performed by: INTERNAL MEDICINE

## 2024-12-10 PROCEDURE — 3078F DIAST BP <80 MM HG: CPT | Mod: HCNC,CPTII,S$GLB, | Performed by: INTERNAL MEDICINE

## 2024-12-10 PROCEDURE — 87502 INFLUENZA DNA AMP PROBE: CPT | Mod: QW,HCNC,S$GLB, | Performed by: INTERNAL MEDICINE

## 2024-12-10 PROCEDURE — 87635 SARS-COV-2 COVID-19 AMP PRB: CPT | Mod: QW,HCNC,S$GLB, | Performed by: INTERNAL MEDICINE

## 2024-12-10 PROCEDURE — 99213 OFFICE O/P EST LOW 20 MIN: CPT | Mod: HCNC,S$GLB,, | Performed by: INTERNAL MEDICINE

## 2024-12-10 PROCEDURE — 1160F RVW MEDS BY RX/DR IN RCRD: CPT | Mod: HCNC,CPTII,S$GLB, | Performed by: INTERNAL MEDICINE

## 2024-12-10 PROCEDURE — 3074F SYST BP LT 130 MM HG: CPT | Mod: HCNC,CPTII,S$GLB, | Performed by: INTERNAL MEDICINE

## 2024-12-10 PROCEDURE — 1157F ADVNC CARE PLAN IN RCRD: CPT | Mod: HCNC,CPTII,S$GLB, | Performed by: INTERNAL MEDICINE

## 2024-12-10 RX ORDER — AZITHROMYCIN 250 MG/1
TABLET, FILM COATED ORAL
Qty: 6 TABLET | Refills: 0 | Status: SHIPPED | OUTPATIENT
Start: 2024-12-10 | End: 2024-12-15

## 2024-12-10 RX ORDER — METHYLPREDNISOLONE 4 MG/1
TABLET ORAL
Qty: 21 EACH | Refills: 0 | Status: SHIPPED | OUTPATIENT
Start: 2024-12-10 | End: 2024-12-31

## 2024-12-10 NOTE — TELEPHONE ENCOUNTER
Pt's wife calling to make appt for  to be seen and said that they were all exposed to RSV and all have same symptoms cough but  has asthma and COPD and inhaler is keeping him ok and O2 sat is 91 but she said that's his baseline with his COPD. Pt triaged and care advice to be see in office today and was able to get him appt at 11:20 and wife will call back if any other questions concerns or needs.       Reason for Disposition   Patient wants to be seen    Additional Information   Negative: SEVERE asthma attack (e.g., struggling for each breath, speaks in single words, loud wheezes, pulse >120) (RED Zone)   Negative: Bluish (or gray) lips or face   Negative: Difficult to awaken or acting confused (e.g., disoriented, slurred speech)   Negative: Passed out (e.g., fainted, lost consciousness, blacked out and was not responding)   Negative: Wheezing started suddenly after medicine, an allergic food, or bee sting   Negative: Sounds like a life-threatening emergency to the triager   Negative: MODERATE asthma attack (e.g., SOB at rest, speaks in phrases, audible wheezes) AND doesn't have neb or inhaler available   Negative: Peak flow rate less than 50% of baseline level (RED Zone)   Negative: Oxygen level (e.g., pulse oximetry) 90% or lower   Negative: Hospitalized before with asthma; now feels same   Negative: MODERATE asthma attack (e.g., SOB at rest, speaks in phrases, audible wheezes) and not resolved after 2 or 3 inhaler or nebulizer treatments given 20 minutes apart   Negative: Peak flow rate 50-79% of baseline level (YELLOW zone) after using 2 or 3 inhaler nebulizer treatments given 20 minutes) apart   Negative: Chest pain  (Exception: Mild chest tightness that feels the same as prior asthma attacks.)   Negative: Patient sounds very sick or weak to the triager   Negative: Quick-relief asthma medicine (e.g., albuterol /salbutamol, levalbuterol by inhaler or nebulizer) is needed more frequently than every  4 hours to keep you comfortable   Negative: Oxygen level (e.g., pulse oximetry) 91 to 94%   Negative: COVID-19 diagnosed or suspected (e.g., COVID-19 present in community, known COVID-19 exposure, positive test) AND has COVID symptoms (e.g., cough, fever)   Negative: Influenza diagnosed or suspected AND has FLU symptoms (e.g., cough with fever)   Negative: Continuous (nonstop) coughing that keeps patient from working or sleeping, and not improved after 2 or 3 inhaler or nebulizer treatments given 20 minutes apart   Negative: Fever > 103 F (39.4 C)   Negative: Fever > 101 F (38.3 C) and over 60 years of age   Negative: Fever present > 3 days (72 hours)   Negative: Coughing up sheeba-colored or yellow-green sputum    Protocols used: Asthma Attack-A-[place]

## 2024-12-10 NOTE — PROGRESS NOTES
Subjective:       Patient ID: Navin Henning Jr. is a 84 y.o. male.    Chief Complaint:   Cough and Asthma    HPI - urgent care visit.  He was exposed to RSV through his grandchildren.  Three days ago, he began having flu-like symptoms, with a cough and shortness of breath.  He has had worsening wheezing, and he has had to use his inhaler more.  He has a sore throat and fatigue, but no fever.  No myalgias.  His wife is also ill; she saw another provider in clinic.  Has a history of COPD.    Pmh/meds:  Reviewed and reconciled in EPIC with patient during visit today.    Review of Systems   Constitutional:  Negative for fever.   HENT:  Positive for congestion and sore throat.    Respiratory:  Positive for cough, shortness of breath and wheezing.    Cardiovascular:  Negative for chest pain.   Gastrointestinal:  Negative for abdominal pain.   Genitourinary:  Negative for difficulty urinating.   Musculoskeletal:  Negative for arthralgias.   Skin:  Negative for rash.   Neurological:  Negative for headaches.   Psychiatric/Behavioral:  Negative for sleep disturbance.        Objective:      Physical Exam  Constitutional:       General: He is not in acute distress.     Appearance: He is well-developed. He is not diaphoretic.      Comments: Fatigued-appearing man, coughing through interview and exam   HENT:      Head: Normocephalic and atraumatic.   Cardiovascular:      Rate and Rhythm: Normal rate and regular rhythm.      Heart sounds: Normal heart sounds. No murmur heard.     No friction rub. No gallop.   Pulmonary:      Effort: No respiratory distress.      Breath sounds: Wheezing (Rare) present. No rales.   Chest:      Chest wall: No tenderness.   Skin:     General: Skin is warm.      Findings: No erythema.   Neurological:      Mental Status: He is alert and oriented to person, place, and time.   Psychiatric:         Thought Content: Thought content normal.         Assessment:       1. Flu-like symptoms    2. COPD  exacerbation        Plan:       Navin was seen today for cough and asthma.    Diagnoses and all orders for this visit:    Flu-like symptoms - Both flu and covid rapid tests were negative.   some cough syrup.  Stay warm, dry.  Fluids, tylenol for aches and pains.  Rest, stay home until recovered.  -     POCT COVID-19 Rapid Screening  -     POCT Influenza A/B Molecular    COPD exacerbation - given diagnosis of COPD, steroids and antibiotics are warranted.  Medrol dose kwan and a z-pack were sent to his pharmacy.  Go to ER for fever, worsening symptoms.    Rtc prn    ANITA Conley MD MPH  Staff Internist

## 2024-12-17 ENCOUNTER — LAB VISIT (OUTPATIENT)
Dept: LAB | Facility: HOSPITAL | Age: 84
End: 2024-12-17
Attending: NURSE PRACTITIONER
Payer: MEDICARE

## 2024-12-17 DIAGNOSIS — R53.83 FATIGUE, UNSPECIFIED TYPE: ICD-10-CM

## 2024-12-17 DIAGNOSIS — Z51.81 ENCOUNTER FOR MONITORING TESTOSTERONE REPLACEMENT THERAPY: ICD-10-CM

## 2024-12-17 DIAGNOSIS — Z79.890 ENCOUNTER FOR MONITORING TESTOSTERONE REPLACEMENT THERAPY: ICD-10-CM

## 2024-12-17 DIAGNOSIS — N52.01 ERECTILE DYSFUNCTION DUE TO ARTERIAL INSUFFICIENCY: ICD-10-CM

## 2024-12-17 DIAGNOSIS — N40.1 BPH WITH URINARY OBSTRUCTION: ICD-10-CM

## 2024-12-17 DIAGNOSIS — N13.8 BPH WITH URINARY OBSTRUCTION: ICD-10-CM

## 2024-12-17 DIAGNOSIS — R79.89 LOW SERUM TESTOSTERONE LEVEL IN MALE: ICD-10-CM

## 2024-12-17 DIAGNOSIS — E29.1 HYPOGONADISM IN MALE: ICD-10-CM

## 2024-12-17 LAB — COMPLEXED PSA SERPL-MCNC: 1.3 NG/ML (ref 0–4)

## 2024-12-17 PROCEDURE — 84153 ASSAY OF PSA TOTAL: CPT | Mod: HCNC | Performed by: NURSE PRACTITIONER

## 2024-12-17 PROCEDURE — 36415 COLL VENOUS BLD VENIPUNCTURE: CPT | Mod: HCNC,PO | Performed by: NURSE PRACTITIONER

## 2024-12-25 ENCOUNTER — PATIENT MESSAGE (OUTPATIENT)
Dept: FAMILY MEDICINE | Facility: CLINIC | Age: 84
End: 2024-12-25
Payer: MEDICARE

## 2024-12-26 ENCOUNTER — LAB VISIT (OUTPATIENT)
Dept: LAB | Facility: HOSPITAL | Age: 84
End: 2024-12-26
Attending: FAMILY MEDICINE
Payer: MEDICARE

## 2024-12-26 ENCOUNTER — PATIENT MESSAGE (OUTPATIENT)
Dept: FAMILY MEDICINE | Facility: CLINIC | Age: 84
End: 2024-12-26

## 2024-12-26 ENCOUNTER — TELEPHONE (OUTPATIENT)
Dept: FAMILY MEDICINE | Facility: CLINIC | Age: 84
End: 2024-12-26
Payer: MEDICARE

## 2024-12-26 ENCOUNTER — OFFICE VISIT (OUTPATIENT)
Dept: FAMILY MEDICINE | Facility: CLINIC | Age: 84
End: 2024-12-26
Payer: MEDICARE

## 2024-12-26 VITALS
OXYGEN SATURATION: 92 % | HEIGHT: 66 IN | DIASTOLIC BLOOD PRESSURE: 68 MMHG | BODY MASS INDEX: 25.61 KG/M2 | HEART RATE: 58 BPM | SYSTOLIC BLOOD PRESSURE: 122 MMHG | WEIGHT: 159.38 LBS

## 2024-12-26 DIAGNOSIS — D48.5 ATYPICAL FIBROXANTHOMA OF SKIN OF SCALP: ICD-10-CM

## 2024-12-26 DIAGNOSIS — D72.821 MONOCYTOSIS: Primary | ICD-10-CM

## 2024-12-26 DIAGNOSIS — R53.1 WEAKNESS: ICD-10-CM

## 2024-12-26 DIAGNOSIS — R53.1 WEAKNESS: Primary | ICD-10-CM

## 2024-12-26 DIAGNOSIS — J44.89 ASTHMA-COPD OVERLAP SYNDROME: ICD-10-CM

## 2024-12-26 DIAGNOSIS — J44.1 ASTHMA WITH COPD WITH EXACERBATION: ICD-10-CM

## 2024-12-26 DIAGNOSIS — J18.9 PNEUMONIA DUE TO INFECTIOUS ORGANISM, UNSPECIFIED LATERALITY, UNSPECIFIED PART OF LUNG: ICD-10-CM

## 2024-12-26 LAB
ALBUMIN SERPL BCP-MCNC: 3.2 G/DL (ref 3.5–5.2)
ALP SERPL-CCNC: 79 U/L (ref 40–150)
ALT SERPL W/O P-5'-P-CCNC: 17 U/L (ref 10–44)
ANION GAP SERPL CALC-SCNC: 9 MMOL/L (ref 8–16)
ANISOCYTOSIS BLD QL SMEAR: ABNORMAL
AST SERPL-CCNC: 19 U/L (ref 10–40)
BASOPHILS # BLD AUTO: 0.02 K/UL (ref 0–0.2)
BASOPHILS NFR BLD: 0.2 % (ref 0–1.9)
BILIRUB SERPL-MCNC: 0.7 MG/DL (ref 0.1–1)
BNP SERPL-MCNC: 55 PG/ML (ref 0–99)
BUN SERPL-MCNC: 11 MG/DL (ref 8–23)
CALCIUM SERPL-MCNC: 9.4 MG/DL (ref 8.7–10.5)
CHLORIDE SERPL-SCNC: 105 MMOL/L (ref 95–110)
CO2 SERPL-SCNC: 28 MMOL/L (ref 23–29)
CREAT SERPL-MCNC: 1.4 MG/DL (ref 0.5–1.4)
DIFFERENTIAL METHOD BLD: ABNORMAL
EOSINOPHIL # BLD AUTO: 0 K/UL (ref 0–0.5)
EOSINOPHIL NFR BLD: 0.2 % (ref 0–8)
ERYTHROCYTE [DISTWIDTH] IN BLOOD BY AUTOMATED COUNT: 16.1 % (ref 11.5–14.5)
EST. GFR  (NO RACE VARIABLE): 50.2 ML/MIN/1.73 M^2
GLUCOSE SERPL-MCNC: 92 MG/DL (ref 70–110)
HCT VFR BLD AUTO: 50.1 % (ref 40–54)
HGB BLD-MCNC: 15.8 G/DL (ref 14–18)
HYPOCHROMIA BLD QL SMEAR: ABNORMAL
IMM GRANULOCYTES # BLD AUTO: 0.21 K/UL (ref 0–0.04)
IMM GRANULOCYTES NFR BLD AUTO: 1.8 % (ref 0–0.5)
LYMPHOCYTES # BLD AUTO: 2.2 K/UL (ref 1–4.8)
LYMPHOCYTES NFR BLD: 18.4 % (ref 18–48)
MCH RBC QN AUTO: 28.8 PG (ref 27–31)
MCHC RBC AUTO-ENTMCNC: 31.5 G/DL (ref 32–36)
MCV RBC AUTO: 91 FL (ref 82–98)
MONOCYTES # BLD AUTO: 2.7 K/UL (ref 0.3–1)
MONOCYTES NFR BLD: 23.1 % (ref 4–15)
NEUTROPHILS # BLD AUTO: 6.7 K/UL (ref 1.8–7.7)
NEUTROPHILS NFR BLD: 56.3 % (ref 38–73)
NRBC BLD-RTO: 0 /100 WBC
OVALOCYTES BLD QL SMEAR: ABNORMAL
PLATELET # BLD AUTO: 153 K/UL (ref 150–450)
PMV BLD AUTO: 11.5 FL (ref 9.2–12.9)
POIKILOCYTOSIS BLD QL SMEAR: SLIGHT
POLYCHROMASIA BLD QL SMEAR: ABNORMAL
POTASSIUM SERPL-SCNC: 4.3 MMOL/L (ref 3.5–5.1)
PROT SERPL-MCNC: 6.7 G/DL (ref 6–8.4)
RBC # BLD AUTO: 5.49 M/UL (ref 4.6–6.2)
SODIUM SERPL-SCNC: 142 MMOL/L (ref 136–145)
SPHEROCYTES BLD QL SMEAR: ABNORMAL
TARGETS BLD QL SMEAR: ABNORMAL
WBC # BLD AUTO: 11.8 K/UL (ref 3.9–12.7)

## 2024-12-26 PROCEDURE — G2211 COMPLEX E/M VISIT ADD ON: HCPCS | Mod: HCNC,S$GLB,, | Performed by: FAMILY MEDICINE

## 2024-12-26 PROCEDURE — 1157F ADVNC CARE PLAN IN RCRD: CPT | Mod: HCNC,CPTII,S$GLB, | Performed by: FAMILY MEDICINE

## 2024-12-26 PROCEDURE — 3074F SYST BP LT 130 MM HG: CPT | Mod: HCNC,CPTII,S$GLB, | Performed by: FAMILY MEDICINE

## 2024-12-26 PROCEDURE — 36415 COLL VENOUS BLD VENIPUNCTURE: CPT | Mod: HCNC,PO | Performed by: FAMILY MEDICINE

## 2024-12-26 PROCEDURE — 83880 ASSAY OF NATRIURETIC PEPTIDE: CPT | Mod: HCNC | Performed by: FAMILY MEDICINE

## 2024-12-26 PROCEDURE — 1125F AMNT PAIN NOTED PAIN PRSNT: CPT | Mod: HCNC,CPTII,S$GLB, | Performed by: FAMILY MEDICINE

## 2024-12-26 PROCEDURE — 1159F MED LIST DOCD IN RCRD: CPT | Mod: HCNC,CPTII,S$GLB, | Performed by: FAMILY MEDICINE

## 2024-12-26 PROCEDURE — 99213 OFFICE O/P EST LOW 20 MIN: CPT | Mod: HCNC,S$GLB,, | Performed by: FAMILY MEDICINE

## 2024-12-26 PROCEDURE — 1160F RVW MEDS BY RX/DR IN RCRD: CPT | Mod: HCNC,CPTII,S$GLB, | Performed by: FAMILY MEDICINE

## 2024-12-26 PROCEDURE — 3078F DIAST BP <80 MM HG: CPT | Mod: HCNC,CPTII,S$GLB, | Performed by: FAMILY MEDICINE

## 2024-12-26 PROCEDURE — 85025 COMPLETE CBC W/AUTO DIFF WBC: CPT | Mod: HCNC | Performed by: FAMILY MEDICINE

## 2024-12-26 PROCEDURE — 80053 COMPREHEN METABOLIC PANEL: CPT | Mod: HCNC | Performed by: FAMILY MEDICINE

## 2024-12-26 PROCEDURE — 99999 PR PBB SHADOW E&M-EST. PATIENT-LVL IV: CPT | Mod: PBBFAC,HCNC,, | Performed by: FAMILY MEDICINE

## 2024-12-26 RX ORDER — IPRATROPIUM BROMIDE AND ALBUTEROL SULFATE 2.5; .5 MG/3ML; MG/3ML
3 SOLUTION RESPIRATORY (INHALATION) EVERY 6 HOURS PRN
COMMUNITY
Start: 2024-12-15 | End: 2025-12-10

## 2024-12-26 RX ORDER — LEVOFLOXACIN 750 MG/1
750 TABLET ORAL DAILY
Qty: 5 TABLET | Refills: 0 | Status: SHIPPED | OUTPATIENT
Start: 2024-12-26 | End: 2024-12-31

## 2024-12-26 RX ORDER — PREDNISONE 20 MG/1
TABLET ORAL
Qty: 19 TABLET | Refills: 0 | Status: SHIPPED | OUTPATIENT
Start: 2024-12-26 | End: 2025-01-09

## 2024-12-26 NOTE — PROGRESS NOTES
Subjective:         Patient ID: Navin Henning Jr. is a 84 y.o. male.    Chief Complaint: Wheezing and Nasal Congestion    Patient Active Problem List   Diagnosis    Mixed hyperlipidemia    Asthma-COPD overlap syndrome    Personal history of tobacco use, presenting hazards to health    Actinic keratosis    Seborrheic keratoses    Allergic rhinosinusitis    Anxiety disorder    Sensorineural hearing loss, bilateral    Testicular hypofunction    Essential hypertension    Family history of diabetes mellitus (DM)    Impaired fasting glucose    Insomnia    Memory loss    Nondependent alcohol abuse, in remission    Overweight with body mass index (BMI) of 26 to 26.9 in adult    MDD (recurrent major depressive disorder) in remission    Benign prostatic hyperplasia without lower urinary tract symptoms    Aortic atherosclerosis    Alzheimer's disease, unspecified (CODE)    Hypoglycemia    Flushing    Atherosclerotic heart disease of native coronary artery with other forms of angina pectoris    Calcified granuloma of lung    Left bundle branch block    History of MI (myocardial infarction)    Hyperlipidemia LDL goal <70    Gastroesophageal reflux disease without esophagitis    Schatzki's ring    Hiatal hernia with GERD    Atypical fibroxanthoma of skin of scalp      HPI    Navin is a 84 y.o. male    History of Present Illness    CHIEF COMPLAINT:  Navin presents today for follow-up after testing positive for RSV.    RSV COURSE AND TREATMENT:  He tested positive for RSV on January 15th and was treated with oral steroids, steroid injection, and Z-Richard. He completed the steroid course 4 days ago. He uses albuterol via nebulizer and rescue inhaler for symptom management, reporting improvement after nebulizer treatments. He experiences progressive weakness, particularly severe in the evenings, requiring assistance with mobility and transfers. Green phlegm has improved with treatment. He developed altered mental status during  "nighttime hours and experienced urinary difficulties after taking Mucinex.    Objective:     Vitals:    12/26/24 0914   BP: 122/68   BP Location: Right arm   Patient Position: Sitting   Pulse: (!) 58   SpO2: (!) 92%   Weight: 72.3 kg (159 lb 6.3 oz)   Height: 5' 6" (1.676 m)         Physical Exam  Vitals and nursing note reviewed.   Constitutional:       General: He is not in acute distress.     Appearance: Normal appearance. He is well-developed. He is not ill-appearing, toxic-appearing or diaphoretic.   HENT:      Head: Normocephalic and atraumatic.      Nose:      Comments: TM: appear normal BL  Nasal congestion noted   Cobblestoning and pharyngeal erythema without exudate noted  No adenopathy  Full ROM of neck    Eyes:      General: No scleral icterus.     Conjunctiva/sclera: Conjunctivae normal.      Pupils: Pupils are equal, round, and reactive to light.   Cardiovascular:      Rate and Rhythm: Normal rate and regular rhythm.   Pulmonary:      Effort: Pulmonary effort is normal. No respiratory distress.      Breath sounds: Normal breath sounds.   Abdominal:      Palpations: Abdomen is soft.      Tenderness: There is no abdominal tenderness.   Musculoskeletal:      Cervical back: Normal range of motion and neck supple.   Skin:     General: Skin is warm.      Coloration: Skin is not pale.      Findings: No rash.   Neurological:      Mental Status: He is alert and oriented to person, place, and time. Mental status is at baseline.   Psychiatric:         Attention and Perception: Attention and perception normal.         Mood and Affect: Mood and affect normal.         Speech: Speech normal.         Behavior: Behavior normal.         Thought Content: Thought content normal.         Cognition and Memory: Cognition and memory normal.         Judgment: Judgment normal.       Assessment:       1. Weakness    2. Pneumonia due to infectious organism, unspecified laterality, unspecified part of lung    3. Asthma-COPD " overlap syndrome    4. Asthma with COPD with exacerbation    5. Atypical fibroxanthoma of skin of scalp          Plan:   Recent relevant labs results reviewed with patient.     ER precautions for weakness.   Wheezing though the neb treatments are helping. I think likely reactive airway response from RSV trigger continuing. Longer steroid taper. Unable to take PCNs.   High resistance to azithromycin.   Levaquin should cover PNA, atypicals and potential UTI if contributing to weakness and very transient 1-2 hrs of confusion in early mornings this morning.   Appears stable in mentation and vitals, metabolically in clinic today.        Assessment & Plan    Assessed patient with history of RSV, experiencing persistent weakness despite prior treatment with steroids and antibiotics  Considered potential causes for ongoing symptoms, including prolonged viral effects, possible pneumonia, or electrolyte imbalance  Will initiate empiric antibiotic treatment with Levaquin due to concern for resistant infection, given patient's age and COPD history  Ordered labs to evaluate electrolytes, renal function, and BNP to rule out cardiac involvement  Implemented extended steroid taper to address potential steroid-induced effects and support recovery    RSV (RSV) INFECTION:  - Educated on the prolonged recovery course sometimes seen with RSVs.  - Discussed importance of monitoring for worsening symptoms, especially altered mental status.  - Navin to monitor for signs of worsening weakness or altered mental status.  - Recommend seeking immediate medical attention if confusion persists or worsens.    CHRONIC OBSTRUCTIVE PULMONARY DISEASE (COPD) EXACERBATION:  - Explained rationale for extended steroid taper and potential side effects.  - Navin to continue using nebulizer treatments as currently prescribed.  - Started Levaquin 750 mg daily for 5 days.  - Started prednisone taper: 40 mg daily for 5 days, then 30 mg for 3 days, 20 mg for 3  days, 15 mg for 2 days, 10 mg for 3 days, 5 mg for 3 days.  - Continued albuterol/ipratropium (DuoNeb) nebulizer treatments 3 times daily, with additional treatments as needed for wheezing.    GENERAL FOLLOW-UP AND MONITORING:  - Ordered CBC, CMP, urinalysis, and BNP (B-type Natriuretic Peptide).  - Follow up based on lab results, which will be available in the patient portal by tomorrow.  - Contact the office if weakness worsens or any altered mental status occurs.  - Follow up in March as scheduled, with potential for earlier evaluation based on lab results.         1. Weakness  -     CBC Auto Differential; Future; Expected date: 12/26/2024  -     Comprehensive Metabolic Panel; Future; Expected date: 12/26/2024  -     Urinalysis, Reflex to Urine Culture Urine, Clean Catch; Future; Expected date: 12/26/2024  -     BNP; Future; Expected date: 12/26/2024    2. Pneumonia due to infectious organism, unspecified laterality, unspecified part of lung  -     levoFLOXacin (LEVAQUIN) 750 MG tablet; Take 1 tablet (750 mg total) by mouth once daily. for 5 days  Dispense: 5 tablet; Refill: 0    3. Asthma-COPD overlap syndrome  Overview:  Substantial smoking history.  Emphysematous changes seen on recent CT chest.  PFTs with mild obstruction and significant bronchodilator response.  Currently on LABA/ICS which can be uses both a controller and rescue  Noted by ENMANUEL THURMAN DO  last documented on [id number]    Orders:  -     predniSONE (DELTASONE) 20 MG tablet; Take 2 tablets (40 mg total) by mouth once daily for 5 days, THEN 1.5 tablets (30 mg total) once daily for 3 days, THEN 1 tablet (20 mg total) once daily for 3 days, THEN 0.5 tablets (10 mg total) once daily for 3 days.  Dispense: 19 tablet; Refill: 0    4. Asthma with COPD with exacerbation  -     predniSONE (DELTASONE) 20 MG tablet; Take 2 tablets (40 mg total) by mouth once daily for 5 days, THEN 1.5 tablets (30 mg total) once daily for 3 days, THEN 1 tablet (20 mg  total) once daily for 3 days, THEN 0.5 tablets (10 mg total) once daily for 3 days.  Dispense: 19 tablet; Refill: 0    5. Atypical fibroxanthoma of skin of scalp  Overview:  Jan 9, 2025 - MOHS planned        Patient's questions answered. Plan reviewed with patient at the end of visit. Relevant precautions to chief complaint and reasons to seek further medical care or to contact the office sooner reviewed with patient.     Follow up in about 3 months (around 3/26/2025) for Hypertension Follow-up, (prelabs), - already scheduled.        Part of this note was dictated using voice recognition software. Please excuse any typographical errors.     This note was generated with the assistance of ambient listening technology. Verbal consent was obtained by the patient and accompanying visitor(s) for the recording of patient appointment to facilitate this note. I attest to having reviewed and edited the generated note for accuracy, though some syntax or spelling errors may persist. Please contact the author of this note for any clarification.

## 2024-12-30 ENCOUNTER — E-CONSULT (OUTPATIENT)
Dept: TRANSPLANT | Facility: HOSPITAL | Age: 84
End: 2024-12-30
Payer: MEDICARE

## 2024-12-30 DIAGNOSIS — D72.821 CHRONIC IDIOPATHIC MONOCYTOSIS: Primary | ICD-10-CM

## 2024-12-30 PROCEDURE — 99451 NTRPROF PH1/NTRNET/EHR 5/>: CPT | Mod: ,,, | Performed by: INTERNAL MEDICINE

## 2024-12-30 NOTE — CONSULTS
[facility] [facility]  Response for E-Consult     Patient Name: Navin Henning Jr.  MRN: [id number]  Primary Care Provider: Chandrika Barrett MD   Requesting Provider: Chandrika Barrett MD  Consults    Recommendation: Chronic monocytosis of unknown significance and no impact on CBC. Present since at least 2018 by chart review with otherwise normal CBC. Could be CMML, but with otherwise normal CBC would be considered low risk and we would just observe.    Contingency that warrants a repeat eConsult or referral: as needed    Total time of Consultation: 5 minute    I did not speak to the requesting provider verbally about this.     *This eConsult is based on the clinical data available to me and is furnished without benefit of a physical examination. The eConsult will need to be interpreted in light of any clinical issues or changes in patient status not available to me at the time of filing this eConsults. Significant changes in patient condition or level of acuity should result in immediate formal consultation and reevaluation. Please alert me if you have further questions.    Thank you for this eConsult referral.     Rubi Mason MD  [facility] [facility] [facility]

## 2025-01-02 ENCOUNTER — PATIENT MESSAGE (OUTPATIENT)
Dept: FAMILY MEDICINE | Facility: CLINIC | Age: 85
End: 2025-01-02
Payer: MEDICARE

## 2025-01-02 NOTE — TELEPHONE ENCOUNTER
Spoke with patients wife. She states that the patient has been having bilateral calf pain X 3 days. Describes as a tightness. Denies swelling, denies fever, no redness. Denies SOB. Scheduled to see JIGAR Rios, tomorrow morning. Verbalized understanding.

## 2025-01-03 ENCOUNTER — LAB VISIT (OUTPATIENT)
Dept: LAB | Facility: HOSPITAL | Age: 85
End: 2025-01-03
Payer: MEDICARE

## 2025-01-03 ENCOUNTER — OFFICE VISIT (OUTPATIENT)
Dept: FAMILY MEDICINE | Facility: CLINIC | Age: 85
End: 2025-01-03
Payer: MEDICARE

## 2025-01-03 VITALS
BODY MASS INDEX: 26.5 KG/M2 | HEART RATE: 95 BPM | SYSTOLIC BLOOD PRESSURE: 132 MMHG | WEIGHT: 164.88 LBS | OXYGEN SATURATION: 98 % | HEIGHT: 66 IN | DIASTOLIC BLOOD PRESSURE: 70 MMHG

## 2025-01-03 DIAGNOSIS — M79.662 BILATERAL CALF PAIN: Primary | ICD-10-CM

## 2025-01-03 DIAGNOSIS — E87.8 ELECTROLYTE ABNORMALITY: ICD-10-CM

## 2025-01-03 DIAGNOSIS — M79.661 BILATERAL CALF PAIN: Primary | ICD-10-CM

## 2025-01-03 LAB
ALBUMIN SERPL BCP-MCNC: 3.2 G/DL (ref 3.5–5.2)
ALP SERPL-CCNC: 100 U/L (ref 40–150)
ALT SERPL W/O P-5'-P-CCNC: 50 U/L (ref 10–44)
ANION GAP SERPL CALC-SCNC: 10 MMOL/L (ref 8–16)
AST SERPL-CCNC: 53 U/L (ref 10–40)
BILIRUB SERPL-MCNC: 0.5 MG/DL (ref 0.1–1)
BUN SERPL-MCNC: 19 MG/DL (ref 8–23)
CALCIUM SERPL-MCNC: 9.2 MG/DL (ref 8.7–10.5)
CHLORIDE SERPL-SCNC: 102 MMOL/L (ref 95–110)
CO2 SERPL-SCNC: 29 MMOL/L (ref 23–29)
CREAT SERPL-MCNC: 1.1 MG/DL (ref 0.5–1.4)
EST. GFR  (NO RACE VARIABLE): >60 ML/MIN/1.73 M^2
GLUCOSE SERPL-MCNC: 79 MG/DL (ref 70–110)
MAGNESIUM SERPL-MCNC: 2.2 MG/DL (ref 1.6–2.6)
POTASSIUM SERPL-SCNC: 4.1 MMOL/L (ref 3.5–5.1)
PROT SERPL-MCNC: 6.4 G/DL (ref 6–8.4)
SODIUM SERPL-SCNC: 141 MMOL/L (ref 136–145)

## 2025-01-03 PROCEDURE — 83735 ASSAY OF MAGNESIUM: CPT | Mod: HCNC

## 2025-01-03 PROCEDURE — 99999 PR PBB SHADOW E&M-EST. PATIENT-LVL V: CPT | Mod: PBBFAC,HCNC,,

## 2025-01-03 PROCEDURE — 80053 COMPREHEN METABOLIC PANEL: CPT | Mod: HCNC

## 2025-01-03 NOTE — PROGRESS NOTES
[facility]    1/3/2025      Subjective:       Patient ID:  Navin is a 84 y.o. male .  has a past medical history of Asthma, COPD (chronic obstructive pulmonary disease), Depression, and High cholesterol.    History of Present Illness    CHIEF COMPLAINT:  Mr. Henning presents today with severe leg cramps.    HISTORY OF PRESENT ILLNESS:  He reports bilateral leg cramps that initially started in the left leg 3-4 days ago, now affecting both calves. The pain is not associated with redness or warmth. He has been using a heating pad for relief. He has increased water intake and consumed a banana for potential low potassium. Overall pt feeling better today but was concerned when calf pain [place]     CURRENT MEDICATIONS:  He is currently on a tapering course of prednisone for pneumonia. He has completed three courses of steroids and received a steroid injection.      ROS:  General: -fever, -chills, -fatigue, -weight gain, -weight loss  Eyes: -vision changes, -redness, -discharge  ENT: -ear pain, -nasal congestion, -sore throat  Cardiovascular: -chest pain, -palpitations, -lower extremity edema  Respiratory: -cough, -shortness of breath  Gastrointestinal: -abdominal pain, -nausea, -vomiting, -diarrhea, -constipation, -blood in stool  Genitourinary: -dysuria, -hematuria, -frequency  Musculoskeletal: -joint pain, -muscle pain, + bilateral muscle cramps in calf.   Skin: -rash, -lesion  Neurological: -headache, -dizziness, -numbness, -tingling  Psychiatric: -anxiety, -depression, -sleep difficulty             Patient Active Problem List   Diagnosis    Mixed hyperlipidemia    Asthma-COPD overlap syndrome    Personal history of tobacco use, presenting hazards to health    Actinic keratosis    Seborrheic keratoses    Allergic rhinosinusitis    Anxiety disorder    Sensorineural hearing loss, bilateral    Testicular hypofunction    Essential hypertension    Family history  of diabetes mellitus (DM)    Impaired fasting glucose    Insomnia    Memory loss    Nondependent alcohol abuse, in remission    Overweight with body mass index (BMI) of 26 to 26.9 in adult    MDD (recurrent major depressive disorder) in remission    Benign prostatic hyperplasia without lower urinary tract symptoms    Aortic atherosclerosis    Alzheimer's disease, unspecified (CODE)    Hypoglycemia    Flushing    Atherosclerotic heart disease of native coronary artery with other forms of angina pectoris    Calcified granuloma of lung    Left bundle branch block    History of MI (myocardial infarction)    Hyperlipidemia LDL goal <70    Gastroesophageal reflux disease without esophagitis    Schatzki's ring    Hiatal hernia with GERD    Atypical fibroxanthoma of skin of scalp         Last HgbA1C:    Lab Results   Component Value Date    HGBA1C 5.4 02/22/2024    HGBA1C 5.5 01/25/2023    HGBA1C 5.4 07/12/2022         Last Lipid Panel:    Lab Results   Component Value Date    HDL 50 02/22/2024    HDL 40 07/12/2022    HDL 35 (L) 05/13/2021       Lab Results   Component Value Date    LDLCALC 101.0 02/22/2024    LDLCALC 128.2 07/12/2022    LDLCALC 115.0 05/13/2021       Lab Results   Component Value Date    TRIG 65 02/22/2024    TRIG 154 (H) 07/12/2022    TRIG 165 (H) 05/13/2021       Lab Results   Component Value Date    CHOLHDL 30.5 02/22/2024    CHOLHDL 20.1 07/12/2022    CHOLHDL 19.1 (L) 05/13/2021         Review of patient's allergies indicates:   Allergen Reactions    Suvorexant Anxiety and Other (See Comments)    Trazodone Other (See Comments)    Doxycycline Rash and Hives    Penicillins Rash     Tolerates cefdinir with no issues    Sulfamethoxazole-trimethoprim Rash        Medication List with Changes/Refills   Current Medications    ALBUTEROL (VENTOLIN HFA) 90 MCG/ACTUATION INHALER    Inhale 2 puffs into the lungs every 4 (four) hours as needed for Wheezing or Shortness of Breath. Rescue    ALBUTEROL-IPRATROPIUM  (DUO-NEB) 2.5 MG-0.5 MG/3 ML NEBULIZER SOLUTION    Inhale 3 mLs into the lungs every 6 (six) hours as needed.    ASPIRIN (ECOTRIN) 81 MG EC TABLET    Take 1 tablet by mouth once daily.    AZELASTINE (ASTELIN) 137 MCG (0.1 %) NASAL SPRAY    2 sprays (274 mcg total) by Nasal route 2 (two) times daily.    BUDESONIDE-FORMOTEROL 160-4.5 MCG (SYMBICORT) 160-4.5 MCG/ACTUATION HFAA    Inhale 2 puffs into the lungs every 12 (twelve) hours. Controller    CINNAMON BARK 500 MG CAPSULE    Take 1,000 mg by mouth once daily.    FLUOXETINE 40 MG CAPSULE    Take 40 mg by mouth.    FLUTICASONE PROPIONATE (FLONASE) 50 MCG/ACTUATION NASAL SPRAY    1 spray (50 mcg total) by Each Nostril route 2 (two) times a day.    KETOCONAZOLE (NIZORAL) 2 % SHAMPOO    SHAMPOO THREE TIMES WEEKLY TO AFFECTED AREAS. LET SIT FOR 5 MINUTES AND WASH OFF. FOR FACE    MECLIZINE (ANTIVERT) 25 MG TABLET    Take 1 tablet (25 mg total) by mouth 3 (three) times daily as needed for Dizziness.    MELATONIN 10 MG TBDL    Take 1 tablet by mouth every evening.    MEMANTINE (NAMENDA XR) 28 MG CSPX        MIRTAZAPINE (REMERON) 15 MG TABLET    mirtazapine 15 mg tablet   Take 1 tablet every day by oral route in the evening for 30 days.    MONTELUKAST (SINGULAIR) 10 MG TABLET    Take 1 tablet (10 mg total) by mouth every evening.    MULTIVITAMIN CAPSULE    Take 1 capsule by mouth once daily.    PANTOPRAZOLE (PROTONIX) 40 MG TABLET    Take 1 tablet (40 mg total) by mouth once daily.    PREDNISONE (DELTASONE) 20 MG TABLET    Take 2 tablets (40 mg total) by mouth once daily for 5 days, THEN 1.5 tablets (30 mg total) once daily for 3 days, THEN 1 tablet (20 mg total) once daily for 3 days, THEN 0.5 tablets (10 mg total) once daily for 3 days.    RIVASTIGMINE TARTRATE 4.5 MG CAPSULE    Take 4.5 mg by mouth 2 (two) times a day.    ROSUVASTATIN (CRESTOR) 20 MG TABLET    Take 1 tablet (20 mg total) by mouth once daily.    TAMSULOSIN (FLOMAX) 0.4 MG CAP    Take 1 capsule (0.4 mg  "total) by mouth once daily. 30 minutes after dinner    TESTOSTERONE (ANDROGEL) 20.25 MG/1.25 GRAM (1.62 %) GLPM    Place 3.75 g onto the skin Daily. 3 pumps daily               Objective:      /70 (BP Location: Left arm, Patient Position: Sitting)   Pulse 95   Ht 5' 6" (1.676 m)   Wt 74.8 kg (164 lb 14.5 oz)   SpO2 98%   BMI 26.62 kg/m²   Estimated body mass index is 26.62 kg/m² as calculated from the following:    Height as of this encounter: 5' 6" (1.676 m).    Weight as of this encounter: 74.8 kg (164 lb 14.5 oz).    Physical Exam  Vitals reviewed.   Constitutional:       Appearance: Normal appearance.   HENT:      Head: Normocephalic.      Mouth/Throat:      Mouth: Mucous membranes are moist.   Eyes:      Conjunctiva/sclera: Conjunctivae normal.      Pupils: Pupils are equal, round, and reactive to light.   Cardiovascular:      Rate and Rhythm: Normal rate and regular rhythm.      Heart sounds: Normal heart sounds.   Pulmonary:      Effort: No respiratory distress.      Breath sounds: Normal breath sounds.   Abdominal:      Tenderness: There is no abdominal tenderness.   Musculoskeletal:         General: Tenderness present.      Cervical back: Normal range of motion.      Comments: Tenderness on deep palpation of bilateral calves. No swelling or warmth  noted    Skin:     General: Skin is warm.   Neurological:      Mental Status: He is oriented to person, place, and time.   Psychiatric:         Mood and Affect: Mood normal.             Assessment and Plan:     Navin was seen today for leg pain.    Diagnoses and all orders for this visit:    Bilateral calf pain  -     Comprehensive metabolic panel; Future  -     MAGNESIUM; Future          Assessment & Plan    - Assessed bilateral leg cramps, likely related to recent prednisone use for pneumonia  - Considered electrolyte imbalance as potential cause of symptoms  - Planned lab work to evaluate electrolytes.    Mr. Henning had pneumonia approximately 10 " days ago and has been on multiple courses of steroids.   Continuing the tapering dose of prednisone,    Explained the potential connection between prednisone use and leg cramps to the patient.   Discussed the possibility of electrolyte imbalance contributing to symptoms.   Noted that the patient reports severe cramps in both legs, starting in the left leg and progressing to both legs over 4 days, causing difficulty walking [place]   Advised the patient to continue using a heating pad for symptom relief and recommended increasing intake of potassium-rich foods like bananas.   Ordered CMP and magnesium  to assess electrolyte levels.        The patient was informed of the following statements     Emergency Care:Seek immediate medical attention in the emergency room if you experience any new or worsening symptoms, or if your current condition significantly changes or becomes more severe.  Patient Acknowledgment: Patient verbalizes understanding of the plan and agrees to proceed with the recommended care.    Follow Up:       No follow-ups on file.    Other Orders Placed This Visit:  Orders Placed This Encounter   Procedures    Comprehensive metabolic panel    MAGNESIUM         This note was generated with the assistance of ambient listening technology. Verbal consent was obtained by the patient and accompanying visitor(s) for the recording of patient appointment to facilitate this note. I attest to having reviewed and edited the generated note for accuracy, though some syntax or spelling errors may persist. Please contact the author of this note for any clarification.        Luis Miguel North PA-C

## 2025-01-24 ENCOUNTER — PATIENT MESSAGE (OUTPATIENT)
Dept: FAMILY MEDICINE | Facility: CLINIC | Age: 85
End: 2025-01-24
Payer: MEDICARE

## 2025-01-24 ENCOUNTER — PATIENT MESSAGE (OUTPATIENT)
Dept: UROLOGY | Facility: CLINIC | Age: 85
End: 2025-01-24
Payer: MEDICARE

## 2025-01-24 DIAGNOSIS — E29.1 HYPOGONADISM IN MALE: ICD-10-CM

## 2025-01-24 DIAGNOSIS — R79.89 LOW SERUM TESTOSTERONE LEVEL IN MALE: ICD-10-CM

## 2025-01-24 DIAGNOSIS — R53.83 FATIGUE, UNSPECIFIED TYPE: ICD-10-CM

## 2025-01-24 DIAGNOSIS — N52.01 ERECTILE DYSFUNCTION DUE TO ARTERIAL INSUFFICIENCY: ICD-10-CM

## 2025-01-24 DIAGNOSIS — E78.2 MIXED HYPERLIPIDEMIA: ICD-10-CM

## 2025-01-24 RX ORDER — ROSUVASTATIN CALCIUM 20 MG/1
20 TABLET, COATED ORAL DAILY
Qty: 90 TABLET | Refills: 3 | Status: SHIPPED | OUTPATIENT
Start: 2025-01-24 | End: 2026-01-24

## 2025-01-24 RX ORDER — TESTOSTERONE 20.25 MG/1.25G
3.75 GEL TOPICAL DAILY
Qty: 150 G | Refills: 4 | Status: SHIPPED | OUTPATIENT
Start: 2025-01-24 | End: 2025-07-23

## 2025-01-24 NOTE — TELEPHONE ENCOUNTER
No care due was identified.  [facility] Embedded Care Due Messages. Reference number: [id number].   1/24/2025 10:59:47 AM CST

## 2025-01-27 DIAGNOSIS — E78.2 MIXED HYPERLIPIDEMIA: ICD-10-CM

## 2025-01-27 RX ORDER — ROSUVASTATIN CALCIUM 20 MG/1
20 TABLET, COATED ORAL
Qty: 90 TABLET | Refills: 3 | OUTPATIENT
Start: 2025-01-27

## 2025-01-27 NOTE — TELEPHONE ENCOUNTER
Care Due:                  Date            Visit Type   Department     Provider  --------------------------------------------------------------------------------                                SAME DAY -                              ESTABLISHED   [facility]  Last Visit: 12-      PATIENT      MEDICINE       Chandrika Barrett                              EP -                              PRIMARY      [facility]  Next Visit: 03-      CARE ([facility])   MEDICINE       Chandrika Barrett                                                            Last  Test          Frequency    Reason                     Performed    Due Date  --------------------------------------------------------------------------------    Lipid Panel.  12 months..  rosuvastatin.............  02- 02-    [facility] Embedded Care Due Messages. Reference number: [id number].   1/27/2025 3:15:59 PM CST

## 2025-01-28 RX ORDER — ROSUVASTATIN CALCIUM 20 MG/1
20 TABLET, COATED ORAL DAILY
Qty: 90 TABLET | Refills: 3 | OUTPATIENT
Start: 2025-01-28 | End: 2026-01-28

## 2025-01-28 NOTE — TELEPHONE ENCOUNTER
Refill Decision Note   Navin Mosleyilmarly  is requesting a refill authorization.  Brief Assessment and Rationale for Refill:  Quick Discontinue     Medication Therapy Plan:  FLOS 03/05/25      Comments:     Note composed:3:24 AM 01/28/2025

## 2025-03-05 ENCOUNTER — LAB VISIT (OUTPATIENT)
Dept: LAB | Facility: HOSPITAL | Age: 85
End: 2025-03-05
Attending: FAMILY MEDICINE
Payer: MEDICARE

## 2025-03-05 DIAGNOSIS — I25.118 ATHEROSCLEROTIC HEART DISEASE OF NATIVE CORONARY ARTERY WITH OTHER FORMS OF ANGINA PECTORIS: ICD-10-CM

## 2025-03-05 DIAGNOSIS — I10 ESSENTIAL HYPERTENSION: ICD-10-CM

## 2025-03-05 DIAGNOSIS — E78.2 MIXED HYPERLIPIDEMIA: ICD-10-CM

## 2025-03-05 DIAGNOSIS — I25.2 HISTORY OF MI (MYOCARDIAL INFARCTION): Chronic | ICD-10-CM

## 2025-03-05 DIAGNOSIS — R79.9 ABNORMAL FINDING OF BLOOD CHEMISTRY, UNSPECIFIED: ICD-10-CM

## 2025-03-05 LAB
ALBUMIN SERPL BCP-MCNC: 3.9 G/DL (ref 3.5–5.2)
ANION GAP SERPL CALC-SCNC: 7 MMOL/L (ref 8–16)
BUN SERPL-MCNC: 16 MG/DL (ref 8–23)
CALCIUM SERPL-MCNC: 9.7 MG/DL (ref 8.7–10.5)
CHLORIDE SERPL-SCNC: 109 MMOL/L (ref 95–110)
CHOLEST SERPL-MCNC: 116 MG/DL (ref 120–199)
CHOLEST/HDLC SERPL: 2 {RATIO} (ref 2–5)
CO2 SERPL-SCNC: 28 MMOL/L (ref 23–29)
CREAT SERPL-MCNC: 1.2 MG/DL (ref 0.5–1.4)
EST. GFR  (NO RACE VARIABLE): >60 ML/MIN/1.73 M^2
ESTIMATED AVG GLUCOSE: 111 MG/DL (ref 68–131)
GLUCOSE SERPL-MCNC: 86 MG/DL (ref 70–110)
HBA1C MFR BLD: 5.5 % (ref 4–5.6)
HDLC SERPL-MCNC: 57 MG/DL (ref 40–75)
HDLC SERPL: 49.1 % (ref 20–50)
LDLC SERPL CALC-MCNC: 47.6 MG/DL (ref 63–159)
NONHDLC SERPL-MCNC: 59 MG/DL
PHOSPHATE SERPL-MCNC: 3.2 MG/DL (ref 2.7–4.5)
POTASSIUM SERPL-SCNC: 5.9 MMOL/L (ref 3.5–5.1)
SODIUM SERPL-SCNC: 144 MMOL/L (ref 136–145)
TRIGL SERPL-MCNC: 57 MG/DL (ref 30–150)
TSH SERPL DL<=0.005 MIU/L-ACNC: 2.39 UIU/ML (ref 0.4–4)

## 2025-03-05 PROCEDURE — 80061 LIPID PANEL: CPT | Mod: HCNC | Performed by: FAMILY MEDICINE

## 2025-03-05 PROCEDURE — 83036 HEMOGLOBIN GLYCOSYLATED A1C: CPT | Mod: HCNC | Performed by: FAMILY MEDICINE

## 2025-03-05 PROCEDURE — 36415 COLL VENOUS BLD VENIPUNCTURE: CPT | Mod: HCNC,PO | Performed by: FAMILY MEDICINE

## 2025-03-05 PROCEDURE — 84443 ASSAY THYROID STIM HORMONE: CPT | Mod: HCNC | Performed by: FAMILY MEDICINE

## 2025-03-05 PROCEDURE — 80069 RENAL FUNCTION PANEL: CPT | Mod: HCNC | Performed by: FAMILY MEDICINE

## 2025-03-10 ENCOUNTER — OFFICE VISIT (OUTPATIENT)
Dept: FAMILY MEDICINE | Facility: CLINIC | Age: 85
End: 2025-03-10
Payer: MEDICARE

## 2025-03-10 ENCOUNTER — PATIENT MESSAGE (OUTPATIENT)
Dept: FAMILY MEDICINE | Facility: CLINIC | Age: 85
End: 2025-03-10

## 2025-03-10 ENCOUNTER — LAB VISIT (OUTPATIENT)
Dept: LAB | Facility: HOSPITAL | Age: 85
End: 2025-03-10
Attending: FAMILY MEDICINE
Payer: MEDICARE

## 2025-03-10 VITALS
SYSTOLIC BLOOD PRESSURE: 132 MMHG | HEIGHT: 66 IN | HEART RATE: 62 BPM | OXYGEN SATURATION: 93 % | BODY MASS INDEX: 25.37 KG/M2 | DIASTOLIC BLOOD PRESSURE: 72 MMHG | WEIGHT: 157.88 LBS

## 2025-03-10 DIAGNOSIS — R79.89 LFT ELEVATION: ICD-10-CM

## 2025-03-10 DIAGNOSIS — F10.11 NONDEPENDENT ALCOHOL ABUSE, IN REMISSION: ICD-10-CM

## 2025-03-10 DIAGNOSIS — F33.0 MILD EPISODE OF RECURRENT MAJOR DEPRESSIVE DISORDER: ICD-10-CM

## 2025-03-10 DIAGNOSIS — J44.89 ASTHMA-COPD OVERLAP SYNDROME: ICD-10-CM

## 2025-03-10 DIAGNOSIS — E87.5 HYPERKALEMIA: ICD-10-CM

## 2025-03-10 DIAGNOSIS — G30.9 ALZHEIMER'S DISEASE, UNSPECIFIED (CODE): ICD-10-CM

## 2025-03-10 DIAGNOSIS — E78.2 MIXED HYPERLIPIDEMIA: ICD-10-CM

## 2025-03-10 DIAGNOSIS — N40.1 BPH WITH LOWER URINARY TRACT SYMPTOMS WITHOUT URINARY OBSTRUCTION: ICD-10-CM

## 2025-03-10 DIAGNOSIS — K21.9 GASTROESOPHAGEAL REFLUX DISEASE WITHOUT ESOPHAGITIS: ICD-10-CM

## 2025-03-10 DIAGNOSIS — I10 ESSENTIAL HYPERTENSION: Primary | ICD-10-CM

## 2025-03-10 PROBLEM — E16.2 HYPOGLYCEMIA: Status: RESOLVED | Noted: 2023-02-03 | Resolved: 2025-03-10

## 2025-03-10 LAB
ALBUMIN SERPL BCP-MCNC: 4 G/DL (ref 3.5–5.2)
ALP SERPL-CCNC: 79 U/L (ref 40–150)
ALT SERPL W/O P-5'-P-CCNC: 16 U/L (ref 10–44)
ANION GAP SERPL CALC-SCNC: 7 MMOL/L (ref 8–16)
AST SERPL-CCNC: 31 U/L (ref 10–40)
BILIRUB SERPL-MCNC: 0.6 MG/DL (ref 0.1–1)
BUN SERPL-MCNC: 16 MG/DL (ref 8–23)
CALCIUM SERPL-MCNC: 9.3 MG/DL (ref 8.7–10.5)
CHLORIDE SERPL-SCNC: 106 MMOL/L (ref 95–110)
CO2 SERPL-SCNC: 27 MMOL/L (ref 23–29)
CREAT SERPL-MCNC: 1.1 MG/DL (ref 0.5–1.4)
EST. GFR  (NO RACE VARIABLE): >60 ML/MIN/1.73 M^2
GLUCOSE SERPL-MCNC: 81 MG/DL (ref 70–110)
POTASSIUM SERPL-SCNC: 4 MMOL/L (ref 3.5–5.1)
PROT SERPL-MCNC: 6.9 G/DL (ref 6–8.4)
SODIUM SERPL-SCNC: 140 MMOL/L (ref 136–145)

## 2025-03-10 PROCEDURE — 3075F SYST BP GE 130 - 139MM HG: CPT | Mod: HCNC,CPTII,S$GLB, | Performed by: FAMILY MEDICINE

## 2025-03-10 PROCEDURE — 36415 COLL VENOUS BLD VENIPUNCTURE: CPT | Mod: HCNC,PO | Performed by: FAMILY MEDICINE

## 2025-03-10 PROCEDURE — 3288F FALL RISK ASSESSMENT DOCD: CPT | Mod: HCNC,CPTII,S$GLB, | Performed by: FAMILY MEDICINE

## 2025-03-10 PROCEDURE — G2211 COMPLEX E/M VISIT ADD ON: HCPCS | Mod: HCNC,S$GLB,, | Performed by: FAMILY MEDICINE

## 2025-03-10 PROCEDURE — 80053 COMPREHEN METABOLIC PANEL: CPT | Mod: HCNC | Performed by: FAMILY MEDICINE

## 2025-03-10 PROCEDURE — 1160F RVW MEDS BY RX/DR IN RCRD: CPT | Mod: HCNC,CPTII,S$GLB, | Performed by: FAMILY MEDICINE

## 2025-03-10 PROCEDURE — 1157F ADVNC CARE PLAN IN RCRD: CPT | Mod: HCNC,CPTII,S$GLB, | Performed by: FAMILY MEDICINE

## 2025-03-10 PROCEDURE — 99214 OFFICE O/P EST MOD 30 MIN: CPT | Mod: HCNC,S$GLB,, | Performed by: FAMILY MEDICINE

## 2025-03-10 PROCEDURE — 1159F MED LIST DOCD IN RCRD: CPT | Mod: HCNC,CPTII,S$GLB, | Performed by: FAMILY MEDICINE

## 2025-03-10 PROCEDURE — 1101F PT FALLS ASSESS-DOCD LE1/YR: CPT | Mod: HCNC,CPTII,S$GLB, | Performed by: FAMILY MEDICINE

## 2025-03-10 PROCEDURE — 99999 PR PBB SHADOW E&M-EST. PATIENT-LVL IV: CPT | Mod: PBBFAC,HCNC,, | Performed by: FAMILY MEDICINE

## 2025-03-10 PROCEDURE — 1126F AMNT PAIN NOTED NONE PRSNT: CPT | Mod: HCNC,CPTII,S$GLB, | Performed by: FAMILY MEDICINE

## 2025-03-10 PROCEDURE — 3078F DIAST BP <80 MM HG: CPT | Mod: HCNC,CPTII,S$GLB, | Performed by: FAMILY MEDICINE

## 2025-03-10 RX ORDER — ROSUVASTATIN CALCIUM 20 MG/1
20 TABLET, COATED ORAL DAILY
Qty: 90 TABLET | Refills: 3 | Status: SHIPPED | OUTPATIENT
Start: 2025-03-10 | End: 2026-03-10

## 2025-03-10 RX ORDER — MIRTAZAPINE 15 MG/1
15 TABLET, FILM COATED ORAL NIGHTLY
Qty: 90 TABLET | Refills: 3 | Status: SHIPPED | OUTPATIENT
Start: 2025-03-10

## 2025-03-10 RX ORDER — FLUOXETINE HYDROCHLORIDE 40 MG/1
40 CAPSULE ORAL DAILY
Qty: 90 CAPSULE | Refills: 3 | Status: SHIPPED | OUTPATIENT
Start: 2025-03-10

## 2025-03-10 RX ORDER — TAMSULOSIN HYDROCHLORIDE 0.4 MG/1
1 CAPSULE ORAL DAILY
Qty: 90 CAPSULE | Refills: 3 | Status: SHIPPED | OUTPATIENT
Start: 2025-03-10 | End: 2026-03-10

## 2025-03-10 RX ORDER — PANTOPRAZOLE SODIUM 40 MG/1
40 TABLET, DELAYED RELEASE ORAL DAILY
Qty: 90 TABLET | Refills: 3 | Status: SHIPPED | OUTPATIENT
Start: 2025-03-10 | End: 2026-03-10

## 2025-03-10 NOTE — PROGRESS NOTES
Subjective:         Patient ID: Navin Henning Jr. is a 84 y.o. male.    Chief Complaint: Hypertension    Patient Active Problem List   Diagnosis    Mixed hyperlipidemia    Asthma-COPD overlap syndrome    Personal history of tobacco use, presenting hazards to health    Actinic keratosis    Seborrheic keratoses    Allergic rhinosinusitis    Anxiety disorder    Sensorineural hearing loss, bilateral    Testicular hypofunction    Essential hypertension    Family history of diabetes mellitus (DM)    Impaired fasting glucose    Insomnia    Memory loss    Nondependent alcohol abuse, in remission    Overweight with body mass index (BMI) of 25 to 25.9 in adult    MDD (recurrent major depressive disorder) in remission    BPH with lower urinary tract symptoms without urinary obstruction    Aortic atherosclerosis    Alzheimer's disease, unspecified (CODE)    Flushing    Atherosclerotic heart disease of native coronary artery with other forms of angina pectoris    Calcified granuloma of lung    Left bundle branch block    History of MI (myocardial infarction)    Hyperlipidemia LDL goal <70    Gastroesophageal reflux disease without esophagitis    Schatzki's ring    Hiatal hernia with GERD    Atypical fibroxanthoma of skin of scalp      HPI    Navin is a 84 y.o. male    History of Present Illness    CHIEF COMPLAINT:  Navin presents today for blood pressure follow up.    BLOOD PRESSURE:  He denies dizziness, sweating episodes, or elevated blood pressure readings at home. He reports occasional shortness of breath with physical activity, particularly during household tasks, but denies regular breathing difficulties.    MUSCULOSKELETAL:  He reports ongoing back pain and leg cramps without improvement.    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    SOCIAL HISTORY:  He socializes regularly with children and  "grandchildren and denies current alcohol use. He reports sleeping well.    CARE TEAM:  He follows with neurology every 3 months, cardiology every 6 months, and maintains regular dermatology visits.            Objective:     Vitals:    03/10/25 1023   BP: 132/72   BP Location: Left arm   Patient Position: Sitting   Pulse: 62   SpO2: (!) 93%   Weight: 71.6 kg (157 lb 13.6 oz)   Height: 5' 6" (1.676 m)         Physical Exam  Vitals and nursing note reviewed.   Constitutional:       General: He is not in acute distress.     Appearance: Normal appearance. He is not ill-appearing, toxic-appearing or diaphoretic.   HENT:      Head: Normocephalic and atraumatic.   Eyes:      General: No scleral icterus.     Conjunctiva/sclera: Conjunctivae normal.   Cardiovascular:      Rate and Rhythm: Normal rate.      Heart sounds: Normal heart sounds. No murmur heard.  Pulmonary:      Effort: Pulmonary effort is normal. No respiratory distress.   Skin:     Coloration: Skin is not pale.   Neurological:      Mental Status: He is alert. Mental status is at baseline.   Psychiatric:         Attention and Perception: Attention and perception normal.         Mood and Affect: Mood and affect normal.         Speech: Speech normal.         Behavior: Behavior normal.         Cognition and Memory: Cognition and memory normal.         Judgment: Judgment normal.       Assessment:       1. Essential hypertension    2. Mixed hyperlipidemia    3. Mild episode of recurrent major depressive disorder    4. BPH with lower urinary tract symptoms without urinary obstruction    5. Alzheimer's disease, unspecified (CODE)    6. Asthma-COPD overlap syndrome    7. Nondependent alcohol abuse, in remission    8. Hyperkalemia    9. LFT elevation    10. Gastroesophageal reflux disease without esophagitis          Plan:   Recent relevant labs results reviewed with patient.         Assessment & Plan    Assessed blood pressure (132/72), deemed satisfactory  Reviewed " kidney function, overall health status, and recent lab results:  - A1C stable and normal  - Thyroid function normal  - Cholesterol improved, current rosuvastatin dose appropriate  - Noted elevated potassium, likely due to blood sampling issue  - Liver enzymes slightly elevated in January, rechecking today  Considered fatty liver as a possible cause for elevated liver enzymes  Assessed energy levels, breathing, and potential shortness of breath  Reviewed weight loss and appetite  Evaluated sleep quality  Confirmed patient seeing cardiology, urology, and neurology specialists regularly  Considered RSV vaccination status, noting patient received it in September 2023  Discussed COVID booster, which patient declined    HYPERTENSION:  - Monitored the patient's blood pressure during the visit, with a reading of 132/72.  - Evaluated the current blood pressure reading as 'pretty good'.  - Inquired about any dizziness, sweating episodes, or high blood pressure readings at home, which the patient denied.  - Confirmed the patient's consistency with medications.  - Emphasized the importance of regular blood pressure checks.  - Advised the patient to continue current blood pressure medications.  - Instructed the patient to continue regular blood pressure monitoring at home.    DEPRESSION:  - Continued Prozac and Mirtazapine for depression management.  - Of[facility].  - Refilled Prozac prescription.    BACK PAIN:  - Noted the patient's report of ongoing back pain.    BENIGN PROSTATIC HYPERPLASIA:  - Continued Flomax for benign prostatic hyperplasia management.    HYPERLIPIDEMIA:  - Evaluated cholesterol levels, noting improvement compared to previous readings.  - Continued Rosuvastatin at current dose for cholesterol management.    GASTROESOPHAGEAL REFLUX DISEASE (GERD):  - Continued Pantoprazole for GERD management.    LEG CRAMPS:  - Noted the patient's report of ongoing leg  cramps.  - Inquired about improvement in calf pain and cramps.  - Acknowledged the patient's increased water intake to help with cramps.    DYSPNEA:  - Noted the patient's report of occasional dyspnea during physical activities.  - Inquired about the patient's breathing and energy levels.    SKIN CANCER:  - Noted that the patient regularly visits a dermatologist for skin cancer monitoring.  - Acknowledged recent removal of a rare skin cancer from the patient's scalp.    OTHER INSTRUCTIONS:  - Inquired about the patient's social activities.  - Navin to maintain current level of social engagement and family activities.    FOLLOW UP:  - Scheduled a follow-up visit in 6 months.         1. Essential hypertension  -     CBC Auto Differential; Future; Expected date: 09/10/2025  -     Comprehensive Metabolic Panel; Future; Expected date: 09/10/2025    2. Mixed hyperlipidemia  -     rosuvastatin (CRESTOR) 20 MG tablet; Take 1 tablet (20 mg total) by mouth once daily.  Dispense: 90 tablet; Refill: 3    3. Mild episode of recurrent major depressive disorder  -     mirtazapine (REMERON) 15 MG tablet; Take 1 tablet (15 mg total) by mouth every evening.  Dispense: 90 tablet; Refill: 3  -     FLUoxetine 40 MG capsule; Take 1 capsule (40 mg total) by mouth once daily.  Dispense: 90 capsule; Refill: 3    4. BPH with lower urinary tract symptoms without urinary obstruction  -     tamsulosin (FLOMAX) 0.4 mg Cap; Take 1 capsule (0.4 mg total) by mouth once daily. 30 minutes after dinner  Dispense: 90 capsule; Refill: 3    5. Alzheimer's disease, unspecified (CODE)    6. Asthma-COPD overlap syndrome  Overview:  Substantial smoking history.  Emphysematous changes seen on recent CT chest.  PFTs with mild obstruction and significant bronchodilator response.  Currently on LABA/ICS which can be uses both a controller and rescue  Noted by ENMANUEL THURMAN DO  last documented on [id number]      7. Nondependent alcohol abuse, in remission    8.  Hyperkalemia  -     Comprehensive Metabolic Panel; Future; Expected date: 03/10/2025    9. LFT elevation  -     Comprehensive Metabolic Panel; Future; Expected date: 03/10/2025    10. Gastroesophageal reflux disease without esophagitis  -     pantoprazole (PROTONIX) 40 MG tablet; Take 1 tablet (40 mg total) by mouth once daily.  Dispense: 90 tablet; Refill: 3        Patient's questions answered. Plan reviewed with patient at the end of visit. Relevant precautions to chief complaint and reasons to seek further medical care or to contact the office sooner reviewed with patient.     Follow up in about 6 months (around 9/10/2025) for Hypertension Follow-up, (prelabs - CBC and CMP).        Part of this note was dictated using voice recognition software. Please excuse any typographical errors.     This note was generated with the assistance of ambient listening technology. Verbal consent was obtained by the patient and accompanying visitor(s) for the recording of patient appointment to facilitate this note. I attest to having reviewed and edited the generated note for accuracy, though some syntax or spelling errors may persist. Please contact the author of this note for any clarification.

## 2025-03-27 ENCOUNTER — PATIENT MESSAGE (OUTPATIENT)
Dept: CARDIOLOGY | Facility: CLINIC | Age: 85
End: 2025-03-27
Payer: MEDICARE

## 2025-04-03 ENCOUNTER — RESULTS FOLLOW-UP (OUTPATIENT)
Dept: FAMILY MEDICINE | Facility: CLINIC | Age: 85
End: 2025-04-03

## 2025-04-03 PROBLEM — N40.1 BPH WITH LOWER URINARY TRACT SYMPTOMS WITHOUT URINARY OBSTRUCTION: Status: ACTIVE | Noted: 2022-07-11

## 2025-04-15 ENCOUNTER — LAB VISIT (OUTPATIENT)
Dept: LAB | Facility: HOSPITAL | Age: 85
End: 2025-04-15
Attending: PSYCHIATRY & NEUROLOGY
Payer: MEDICARE

## 2025-04-15 DIAGNOSIS — R25.2 CRAMP OF LIMB: ICD-10-CM

## 2025-04-15 DIAGNOSIS — R42 DIZZINESS AND GIDDINESS: ICD-10-CM

## 2025-04-15 DIAGNOSIS — E55.9 AVITAMINOSIS D: ICD-10-CM

## 2025-04-15 DIAGNOSIS — M54.50 LUMBAGO: ICD-10-CM

## 2025-04-15 DIAGNOSIS — R23.2 FLUSHING: ICD-10-CM

## 2025-04-15 DIAGNOSIS — R61 GENERALIZED HYPERHIDROSIS: ICD-10-CM

## 2025-04-15 DIAGNOSIS — G30.9: ICD-10-CM

## 2025-04-15 DIAGNOSIS — E53.8 BIOTIN-(PROPIONYL-COA-CARBOXYLASE) LIGASE DEFICIENCY: ICD-10-CM

## 2025-04-15 DIAGNOSIS — R76.0 RAISED ANTIBODY TITER: ICD-10-CM

## 2025-04-15 DIAGNOSIS — F10.21 ACUTE ALCOHOLIC INTOXICATION IN ALCOHOLISM, IN REMISSION: ICD-10-CM

## 2025-04-15 DIAGNOSIS — R41.3 MEMORY LOSS: Primary | ICD-10-CM

## 2025-04-15 LAB
CK SERPL-CCNC: 239 U/L (ref 20–200)
FERRITIN SERPL-MCNC: 85.7 NG/ML (ref 20–300)
IRON SATN MFR SERPL: 12 % (ref 20–50)
IRON SERPL-MCNC: 53 UG/DL (ref 45–160)
PTH-INTACT SERPL-MCNC: 84.9 PG/ML (ref 9–77)
TIBC SERPL-MCNC: 434 UG/DL (ref 250–450)
TRANSFERRIN SERPL-MCNC: 293 MG/DL (ref 200–375)
URATE SERPL-MCNC: 5 MG/DL (ref 3.4–7)

## 2025-04-15 PROCEDURE — 86235 NUCLEAR ANTIGEN ANTIBODY: CPT | Mod: 59,HCNC

## 2025-04-15 PROCEDURE — 84466 ASSAY OF TRANSFERRIN: CPT | Mod: GA,HCNC

## 2025-04-15 PROCEDURE — 86225 DNA ANTIBODY NATIVE: CPT | Mod: HCNC

## 2025-04-15 PROCEDURE — 86431 RHEUMATOID FACTOR QUANT: CPT | Mod: HCNC

## 2025-04-15 PROCEDURE — 82550 ASSAY OF CK (CPK): CPT | Mod: HCNC

## 2025-04-15 PROCEDURE — 82784 ASSAY IGA/IGD/IGG/IGM EACH: CPT | Mod: 59,HCNC

## 2025-04-15 PROCEDURE — 0412U BETA AMYLOID AB42/40 IMPRCIP: CPT

## 2025-04-15 PROCEDURE — 86235 NUCLEAR ANTIGEN ANTIBODY: CPT | Mod: HCNC

## 2025-04-15 PROCEDURE — 86200 CCP ANTIBODY: CPT | Mod: HCNC

## 2025-04-15 PROCEDURE — 30000890 MAYO GENERIC ORDERABLE

## 2025-04-15 PROCEDURE — 36415 COLL VENOUS BLD VENIPUNCTURE: CPT | Mod: HCNC

## 2025-04-15 PROCEDURE — 86334 IMMUNOFIX E-PHORESIS SERUM: CPT | Mod: 26,HCNC,, | Performed by: PATHOLOGY

## 2025-04-15 PROCEDURE — 83090 ASSAY OF HOMOCYSTEINE: CPT | Mod: HCNC

## 2025-04-15 PROCEDURE — 82306 VITAMIN D 25 HYDROXY: CPT | Mod: HCNC

## 2025-04-15 PROCEDURE — 82746 ASSAY OF FOLIC ACID SERUM: CPT | Mod: HCNC

## 2025-04-15 PROCEDURE — 82607 VITAMIN B-12: CPT | Mod: HCNC

## 2025-04-15 PROCEDURE — 82525 ASSAY OF COPPER: CPT | Mod: HCNC

## 2025-04-15 PROCEDURE — 84550 ASSAY OF BLOOD/URIC ACID: CPT | Mod: HCNC

## 2025-04-15 PROCEDURE — 86039 ANTINUCLEAR ANTIBODIES (ANA): CPT | Mod: HCNC

## 2025-04-15 PROCEDURE — 83970 ASSAY OF PARATHORMONE: CPT | Mod: HCNC

## 2025-04-15 PROCEDURE — 86334 IMMUNOFIX E-PHORESIS SERUM: CPT | Mod: HCNC

## 2025-04-15 PROCEDURE — 84393 TAU PHOSPHORYLATED EA: CPT | Mod: HCNC

## 2025-04-15 PROCEDURE — 84207 ASSAY OF VITAMIN B-6: CPT | Mod: GA,HCNC

## 2025-04-15 PROCEDURE — 84630 ASSAY OF ZINC: CPT | Mod: HCNC

## 2025-04-15 PROCEDURE — 84425 ASSAY OF VITAMIN B-1: CPT | Mod: HCNC

## 2025-04-15 PROCEDURE — 82728 ASSAY OF FERRITIN: CPT | Mod: GA,HCNC

## 2025-04-15 PROCEDURE — 83921 ORGANIC ACID SINGLE QUANT: CPT | Mod: HCNC

## 2025-04-15 PROCEDURE — 82784 ASSAY IGA/IGD/IGG/IGM EACH: CPT | Mod: HCNC

## 2025-04-15 PROCEDURE — 84165 PROTEIN E-PHORESIS SERUM: CPT | Mod: 26,HCNC,, | Performed by: PATHOLOGY

## 2025-04-15 PROCEDURE — 84165 PROTEIN E-PHORESIS SERUM: CPT | Mod: HCNC

## 2025-04-16 ENCOUNTER — OFFICE VISIT (OUTPATIENT)
Dept: FAMILY MEDICINE | Facility: CLINIC | Age: 85
End: 2025-04-16
Payer: MEDICARE

## 2025-04-16 VITALS
BODY MASS INDEX: 25.39 KG/M2 | OXYGEN SATURATION: 96 % | WEIGHT: 158 LBS | HEIGHT: 66 IN | HEART RATE: 81 BPM | DIASTOLIC BLOOD PRESSURE: 62 MMHG | SYSTOLIC BLOOD PRESSURE: 136 MMHG

## 2025-04-16 DIAGNOSIS — J44.89 ASTHMA-COPD OVERLAP SYNDROME: ICD-10-CM

## 2025-04-16 DIAGNOSIS — I70.0 AORTIC ATHEROSCLEROSIS: ICD-10-CM

## 2025-04-16 DIAGNOSIS — E66.3 OVERWEIGHT WITH BODY MASS INDEX (BMI) OF 25 TO 25.9 IN ADULT: ICD-10-CM

## 2025-04-16 DIAGNOSIS — D69.2 PURPURA: ICD-10-CM

## 2025-04-16 DIAGNOSIS — M25.50 ARTHRALGIA, UNSPECIFIED JOINT: ICD-10-CM

## 2025-04-16 DIAGNOSIS — E21.3 HYPERPARATHYROIDISM: ICD-10-CM

## 2025-04-16 DIAGNOSIS — G30.9 ALZHEIMER'S DISEASE, UNSPECIFIED (CODE): ICD-10-CM

## 2025-04-16 DIAGNOSIS — E78.2 MIXED HYPERLIPIDEMIA: ICD-10-CM

## 2025-04-16 DIAGNOSIS — F33.40 MDD (RECURRENT MAJOR DEPRESSIVE DISORDER) IN REMISSION: ICD-10-CM

## 2025-04-16 DIAGNOSIS — I10 ESSENTIAL HYPERTENSION: ICD-10-CM

## 2025-04-16 DIAGNOSIS — Z00.00 ENCOUNTER FOR MEDICARE ANNUAL WELLNESS EXAM: Primary | ICD-10-CM

## 2025-04-16 LAB
(HCYS)2 SERPL-MCNC: 11.6 UMOL/L (ref 4–16.5)
25(OH)D3+25(OH)D2 SERPL-MCNC: 29 NG/ML (ref 30–96)
CYCLIC CITRULLINATED PEPTIDE (CCP) (OHS): <0.5 U/ML
FOLATE SERPL-MCNC: 9.1 NG/ML (ref 4–24)
IGA SERPL-MCNC: 98 MG/DL (ref 40–350)
IGG SERPL-MCNC: 585 MG/DL (ref 650–1600)
IGM SERPL-MCNC: 103 MG/DL (ref 50–300)
RHEUMATOID FACT SERPL-ACNC: <13 IU/ML
VIT B12 SERPL-MCNC: 359 PG/ML (ref 210–950)

## 2025-04-16 PROCEDURE — 99999 PR PBB SHADOW E&M-EST. PATIENT-LVL IV: CPT | Mod: PBBFAC,HCNC,, | Performed by: NURSE PRACTITIONER

## 2025-04-16 RX ORDER — DONEPEZIL HYDROCHLORIDE 10 MG/1
10 TABLET, FILM COATED ORAL NIGHTLY
COMMUNITY
Start: 2025-03-26

## 2025-04-16 RX ORDER — TRAMADOL HYDROCHLORIDE 50 MG/1
1 TABLET ORAL 2 TIMES DAILY PRN
COMMUNITY

## 2025-04-16 RX ORDER — GABAPENTIN 100 MG/1
100 CAPSULE ORAL 3 TIMES DAILY
COMMUNITY
Start: 2025-04-15

## 2025-04-16 RX ORDER — FLUOROURACIL 0.04 G/G
1 CREAM TOPICAL NIGHTLY
COMMUNITY

## 2025-04-16 RX ORDER — MOMETASONE FUROATE 1 MG/G
1 CREAM TOPICAL DAILY
COMMUNITY
Start: 2025-04-16

## 2025-04-16 NOTE — PATIENT INSTRUCTIONS
Counseling and Referral of Other Preventative  (Italic type indicates deductible and co-insurance are waived)    Patient Name: Navin Henning  Today's Date: 4/16/2025    Health Maintenance       Date Due Completion Date    COVID-19 Vaccine (4 - 2024-25 season) 03/10/2026 (Originally 9/1/2024) 10/19/2021    Hemoglobin A1c (Prediabetes) 03/05/2026 3/5/2025    Aspirin/Antiplatelet Therapy 04/16/2026 4/16/2025    Lipid Panel 03/05/2030 3/5/2025    TETANUS VACCINE 08/23/2034 8/23/2024        No orders of the defined types were placed in this encounter.      The following information is provided to all patients.  This information is to help you find resources for any of the problems found today that may be affecting your health:                  Living healthy guide: www.[facility].[place].gov      Understanding Diabetes: www.diabetes.org      Eating healthy: www.cdc.gov/healthyweight      CDC home safety checklist: www.cdc.gov/steadi/patient.html      Agency on Aging: www.goea.[place].[facility]      Alcoholics anonymous (AA): www.aa.org      Physical Activity: www.tom.nih.gov/vl5zbas      Tobacco use: www.quitwithusla.org

## 2025-04-16 NOTE — PROGRESS NOTES
"  Navin Henning presented for a  Medicare AWV and comprehensive Health Risk Assessment today. The following components were reviewed and updated:    Medical history  Family History  Social history  Allergies and Current Medications  Health Risk Assessment  Health Maintenance  Care Team         ** See Completed Assessments for Annual Wellness Visit within the encounter summary.**         The following assessments were completed:  Living Situation  CAGE  Depression Screening  Timed Get Up and Go  Whisper Test  Cognitive Function Screening      Nutrition Screening  ADL Screening  PAQ Screening      Opioid documentation:      Patient does have a current opioid prescription.      See note below.         Vitals:    04/16/25 1510   BP: 136/62   BP Location: Left arm   Pulse: 81   SpO2: 96%   Weight: 71.7 kg (158 lb)   Height: 5' 6" (1.676 m)     Body mass index is 25.5 kg/m².    Physical Exam  Vitals reviewed.   Constitutional:       General: He is not in acute distress.     Appearance: Normal appearance. He is well-developed and well-groomed.   HENT:      Head: Normocephalic.   Cardiovascular:      Rate and Rhythm: Normal rate.   Pulmonary:      Effort: Pulmonary effort is normal. No respiratory distress.   Skin:     Coloration: Skin is not pale.      Comments: Purpura noted; see attached photo   Neurological:      Mental Status: He is alert and oriented to person, place, and time.   Psychiatric:         Attention and Perception: Attention normal.         Mood and Affect: Mood and affect normal.         Speech: Speech normal.         Behavior: Behavior normal. Behavior is cooperative.             Diagnoses and health risks identified today and associated recommendations/orders:    1. Encounter for Medicare annual wellness exam    2. Alzheimer's disease, unspecified (CODE)  Chronic; stable on rivastigmine, Namenda and Aricept medication. Follow up with PCP.    3. Asthma-COPD overlap syndrome  Chronic; stable on Symbicort " and PRN albuterol inhaler. Follow up with PCP.    4. MDD (recurrent major depressive disorder) in remission  Chronic; stable on Remeron and fluoxetine medication. Follow up with PCP.    5. Aortic atherosclerosis  Chronic; stable on rosuvastatin medication. Follow up with PCP.    6. Hyperparathyroidism  As noted on recent lab test; follow up with PCP.    7. Purpura  As seen on attached photo; follow up with PCP.    8. Essential hypertension  Chronic; stable; not taking medication. Follow up with PCP.    9. Mixed hyperlipidemia  Chronic; stable on rosuvastatin medication. Follow up with PCP.    10. Arthralgia, unspecified joint  Chronic; stable using PRN tramadol as needed for severe pain; mostly using acetaminophen for pain relief.  reviewed; has only been filled once in the past year. Patient is at low risk for opioid abuse. Follow up with PCP.    11. Overweight with body mass index (BMI) of 25 to 25.9 in adult  Eat a low salt/low fat diet and discussed importance of engaging in physical activity at least 5x/week for a minimum of 30 min/day.      Provided Navin with a 5-10 year written screening schedule and personal prevention plan. Recommendations were developed using the USPSTF age appropriate recommendations. Education, counseling, and referrals were provided as needed. After Visit Summary given to patient which includes a list of additional screenings/tests needed.    Follow up for your next annual wellness visit.    Karen Sullivan NP        I offered to discuss advanced care planning, including how to pick a person who would make decisions for you if you were unable to make them for yourself, called a health care power of , and what kind of decisions you might make such as use of life sustaining treatments such as ventilators and tube feeding when faced with a life limiting illness recorded on a living will that they will need to know. (How you want to be cared for as you near the end of your  natural life)     X  Patient has advanced directives on file, which we reviewed, and they do not wish to make changes.

## 2025-04-17 LAB
ALBUMIN, SPE (OHS): 3.98 G/DL (ref 3.35–5.55)
ALPHA 1 GLOB (OHS): 0.3 GM/DL (ref 0.17–0.41)
ALPHA 2 GLOB (OHS): 0.71 GM/DL (ref 0.43–0.99)
ANA (OHS): POSITIVE
ANA PATTERN 1 (OHS): ABNORMAL
ANA TITER 1 (OHS): ABNORMAL
BETA GLOB (OHS): 0.7 GM/DL (ref 0.5–1.1)
GAMMA GLOBULIN (OHS): 0.61 GM/DL (ref 0.67–1.58)
IMMUNOLOGIST REVIEW: NORMAL
M PHOSPHO-TAU 217: 0.15 PG/ML
PATHOLOGIST INTERPRETATION - IFE SERUM (OHS): NORMAL
PATHOLOGIST REVIEW - SPE (OHS): NORMAL
PROT SERPL-MCNC: 6.3 GM/DL (ref 6–8.4)

## 2025-04-18 PROBLEM — D69.2 PURPURA: Status: ACTIVE | Noted: 2025-04-18

## 2025-04-18 PROBLEM — E21.3 HYPERPARATHYROIDISM: Status: ACTIVE | Noted: 2025-04-18

## 2025-04-18 PROBLEM — M25.50 ARTHRALGIA: Status: ACTIVE | Noted: 2025-04-18

## 2025-04-18 LAB
W COPPER: 757 UG/L
W ZINC: 63 UG/DL

## 2025-04-19 LAB — W METHYLMALONIC ACID: 0.45 UMOL/L

## 2025-04-21 LAB
DSDNA ANTIBODY (OHS): NORMAL
DSDNA ANTIBODY TITER (OHS): NORMAL
W VITAMIN B6: 66 UG/L

## 2025-04-22 LAB
SM  ANTIBODY (OHS): 0.1 RATIO
SM INTERPRETATION (OHS): NEGATIVE
SM/RNP ANTIBODY (OHS): 0.07 RATIO
SM/RNP INTERPRETATION (OHS): NEGATIVE
SSA  ANTIBODY (OHS): 0.07 RATIO (ref 0–0.99)
SSA INTERPRETATION (OHS): NEGATIVE
SSB  ANTIBODY (OHS): 0.07 RATIO
SSB INTERPRETATION (OHS): NEGATIVE

## 2025-04-24 ENCOUNTER — HOSPITAL ENCOUNTER (OUTPATIENT)
Dept: RADIOLOGY | Facility: HOSPITAL | Age: 85
Discharge: HOME | End: 2025-04-24
Attending: PSYCHIATRY & NEUROLOGY
Payer: MEDICARE

## 2025-04-24 DIAGNOSIS — F10.21 ACUTE ALCOHOLIC INTOXICATION IN ALCOHOLISM, IN REMISSION: ICD-10-CM

## 2025-04-24 DIAGNOSIS — I10 ESSENTIAL HYPERTENSION, MALIGNANT: ICD-10-CM

## 2025-04-24 DIAGNOSIS — R41.3 MEMORY LOSS: ICD-10-CM

## 2025-04-24 DIAGNOSIS — F02.80 ALZHEIMER'S DISEASE: ICD-10-CM

## 2025-04-24 DIAGNOSIS — Z86.16 POST-COVID SYNDROME RESOLVED: ICD-10-CM

## 2025-04-24 DIAGNOSIS — R42 DIZZINESS AND GIDDINESS: ICD-10-CM

## 2025-04-24 DIAGNOSIS — R41.0 SUBACUTE DELIRIUM: ICD-10-CM

## 2025-04-24 DIAGNOSIS — G30.9 ALZHEIMER'S DISEASE: ICD-10-CM

## 2025-04-24 PROCEDURE — 70551 MRI BRAIN STEM W/O DYE: CPT | Mod: TC,HCNC

## 2025-04-24 PROCEDURE — 70551 MRI BRAIN STEM W/O DYE: CPT | Mod: 26,HCNC,, | Performed by: RADIOLOGY

## 2025-04-29 LAB
MAYO GENERIC ORDERABLE RESULT: NORMAL
W VITAMIN B1: 99 UG/L

## 2025-05-12 ENCOUNTER — OFFICE VISIT (OUTPATIENT)
Dept: FAMILY MEDICINE | Facility: CLINIC | Age: 85
End: 2025-05-12
Payer: MEDICARE

## 2025-05-12 ENCOUNTER — HOSPITAL ENCOUNTER (INPATIENT)
Facility: HOSPITAL | Age: 85
LOS: 1 days | Discharge: HOME HEALTH | DRG: 177 | End: 2025-05-13
Attending: EMERGENCY MEDICINE | Admitting: HOSPITALIST
Payer: MEDICARE

## 2025-05-12 ENCOUNTER — PATIENT OUTREACH (OUTPATIENT)
Facility: OTHER | Age: 85
End: 2025-05-12
Payer: MEDICARE

## 2025-05-12 ENCOUNTER — OCHSNER VIRTUAL EMERGENCY DEPARTMENT (OUTPATIENT)
Facility: CLINIC | Age: 85
End: 2025-05-12
Payer: MEDICARE

## 2025-05-12 VITALS
HEIGHT: 66 IN | TEMPERATURE: 99 F | BODY MASS INDEX: 25.54 KG/M2 | WEIGHT: 158.94 LBS | DIASTOLIC BLOOD PRESSURE: 68 MMHG | HEART RATE: 65 BPM | OXYGEN SATURATION: 93 % | SYSTOLIC BLOOD PRESSURE: 134 MMHG

## 2025-05-12 DIAGNOSIS — R42 DIZZINESS: ICD-10-CM

## 2025-05-12 DIAGNOSIS — R07.9 CHEST PAIN: ICD-10-CM

## 2025-05-12 DIAGNOSIS — U07.1 LAB TEST POSITIVE FOR DETECTION OF COVID-19 VIRUS: ICD-10-CM

## 2025-05-12 DIAGNOSIS — R09.02 HYPOXIA: ICD-10-CM

## 2025-05-12 DIAGNOSIS — R05.8 POST-VIRAL COUGH SYNDROME: ICD-10-CM

## 2025-05-12 DIAGNOSIS — U07.1 COVID-19: Primary | ICD-10-CM

## 2025-05-12 DIAGNOSIS — R06.02 SOB (SHORTNESS OF BREATH): ICD-10-CM

## 2025-05-12 DIAGNOSIS — U07.1 COVID-19: ICD-10-CM

## 2025-05-12 PROBLEM — J96.01 ACUTE RESPIRATORY FAILURE WITH HYPOXIA AND HYPERCARBIA: Status: ACTIVE | Noted: 2025-05-12

## 2025-05-12 PROBLEM — J96.02 ACUTE RESPIRATORY FAILURE WITH HYPOXIA AND HYPERCARBIA: Status: ACTIVE | Noted: 2025-05-12

## 2025-05-12 LAB
ABSOLUTE NEUTROPHIL MANUAL (OHS): 8.3 K/UL
ALBUMIN SERPL BCP-MCNC: 3.4 G/DL (ref 3.5–5.2)
ALP SERPL-CCNC: 73 UNIT/L (ref 40–150)
ALT SERPL W/O P-5'-P-CCNC: 22 UNIT/L (ref 10–44)
ANION GAP (OHS): 8 MMOL/L (ref 8–16)
AST SERPL-CCNC: 24 UNIT/L (ref 11–45)
BILIRUB SERPL-MCNC: 0.7 MG/DL (ref 0.1–1)
BNP SERPL-MCNC: 48 PG/ML (ref 0–99)
BUN SERPL-MCNC: 18 MG/DL (ref 8–23)
CALCIUM SERPL-MCNC: 8.9 MG/DL (ref 8.7–10.5)
CHLORIDE SERPL-SCNC: 105 MMOL/L (ref 95–110)
CK SERPL-CCNC: 54 U/L (ref 20–200)
CO2 SERPL-SCNC: 25 MMOL/L (ref 23–29)
CREAT SERPL-MCNC: 1.2 MG/DL (ref 0.5–1.4)
CRP SERPL-MCNC: 10.1 MG/L
CTP QC/QA: YES
CTP QC/QA: YES
D DIMER PPP IA.FEU-MCNC: 0.61 MG/L FEU
ERYTHROCYTE [DISTWIDTH] IN BLOOD BY AUTOMATED COUNT: 14.9 % (ref 11.5–14.5)
FERRITIN SERPL-MCNC: 72.1 NG/ML (ref 20–300)
FIO2: 21 %
GFR SERPLBLD CREATININE-BSD FMLA CKD-EPI: 60 ML/MIN/1.73/M2
GLUCOSE SERPL-MCNC: 96 MG/DL (ref 70–110)
HCT VFR BLD AUTO: 42 % (ref 40–54)
HGB BLD-MCNC: 13.8 GM/DL (ref 14–18)
HOLD SPECIMEN: NORMAL
LACTATE SERPL-SCNC: 1.4 MMOL/L (ref 0.5–2.2)
LDH SERPL L TO P-CCNC: 1.2 MMOL/L (ref 0.5–2.2)
LDH SERPL-CCNC: 200 U/L (ref 110–260)
LYMPHOCYTES NFR BLD MANUAL: 5 % (ref 18–48)
MCH RBC QN AUTO: 29.5 PG (ref 27–31)
MCHC RBC AUTO-ENTMCNC: 32.9 G/DL (ref 32–36)
MCV RBC AUTO: 90 FL (ref 82–98)
MONOCYTES NFR BLD MANUAL: 15 % (ref 4–15)
NEUTROPHILS NFR BLD MANUAL: 77 % (ref 38–73)
NEUTS BAND NFR BLD MANUAL: 3 %
NUCLEATED RBC (/100WBC) (OHS): 0 /100 WBC
PLATELET # BLD AUTO: 134 K/UL (ref 150–450)
PLATELET BLD QL SMEAR: ABNORMAL
PMV BLD AUTO: 10.6 FL (ref 9.2–12.9)
POC MOLECULAR INFLUENZA A AGN: NEGATIVE
POC MOLECULAR INFLUENZA B AGN: NEGATIVE
POC PERFORMED BY: NORMAL
POTASSIUM SERPL-SCNC: 4.2 MMOL/L (ref 3.5–5.1)
PROCALCITONIN SERPL-MCNC: 0.06 NG/ML
PROT SERPL-MCNC: 6.4 GM/DL (ref 6–8.4)
RBC # BLD AUTO: 4.68 M/UL (ref 4.6–6.2)
SARS-COV-2 RDRP RESP QL NAA+PROBE: POSITIVE
SODIUM SERPL-SCNC: 138 MMOL/L (ref 136–145)
SPECIMEN SOURCE: NORMAL
TROPONIN I SERPL DL<=0.01 NG/ML-MCNC: <0.006 NG/ML
WBC # BLD AUTO: 10.37 K/UL (ref 3.9–12.7)

## 2025-05-12 PROCEDURE — 94644 CONT INHLJ TX 1ST HOUR: CPT | Mod: HCNC

## 2025-05-12 PROCEDURE — 83605 ASSAY OF LACTIC ACID: CPT | Mod: HCNC | Performed by: EMERGENCY MEDICINE

## 2025-05-12 PROCEDURE — 96375 TX/PRO/DX INJ NEW DRUG ADDON: CPT | Mod: HCNC

## 2025-05-12 PROCEDURE — 99999 PR PBB SHADOW E&M-EST. PATIENT-LVL V: CPT | Mod: PBBFAC,HCNC,,

## 2025-05-12 PROCEDURE — 99285 EMERGENCY DEPT VISIT HI MDM: CPT | Mod: 25,HCNC

## 2025-05-12 PROCEDURE — 85379 FIBRIN DEGRADATION QUANT: CPT | Mod: HCNC | Performed by: EMERGENCY MEDICINE

## 2025-05-12 PROCEDURE — 85027 COMPLETE CBC AUTOMATED: CPT | Mod: HCNC | Performed by: EMERGENCY MEDICINE

## 2025-05-12 PROCEDURE — 83880 ASSAY OF NATRIURETIC PEPTIDE: CPT | Mod: HCNC | Performed by: EMERGENCY MEDICINE

## 2025-05-12 PROCEDURE — 93005 ELECTROCARDIOGRAM TRACING: CPT | Mod: HCNC

## 2025-05-12 PROCEDURE — 25000242 PHARM REV CODE 250 ALT 637 W/ HCPCS: Mod: HCNC | Performed by: INTERNAL MEDICINE

## 2025-05-12 PROCEDURE — 93010 ELECTROCARDIOGRAM REPORT: CPT | Mod: HCNC,,, | Performed by: INTERNAL MEDICINE

## 2025-05-12 PROCEDURE — 63600175 PHARM REV CODE 636 W HCPCS: Mod: HCNC | Performed by: HOSPITALIST

## 2025-05-12 PROCEDURE — 84145 PROCALCITONIN (PCT): CPT | Mod: HCNC | Performed by: EMERGENCY MEDICINE

## 2025-05-12 PROCEDURE — 94761 N-INVAS EAR/PLS OXIMETRY MLT: CPT | Mod: HCNC

## 2025-05-12 PROCEDURE — 25000003 PHARM REV CODE 250: Mod: HCNC | Performed by: INTERNAL MEDICINE

## 2025-05-12 PROCEDURE — 83605 ASSAY OF LACTIC ACID: CPT | Mod: HCNC

## 2025-05-12 PROCEDURE — 99900035 HC TECH TIME PER 15 MIN (STAT): Mod: HCNC

## 2025-05-12 PROCEDURE — 96374 THER/PROPH/DIAG INJ IV PUSH: CPT | Mod: HCNC

## 2025-05-12 PROCEDURE — 25000242 PHARM REV CODE 250 ALT 637 W/ HCPCS: Mod: HCNC | Performed by: EMERGENCY MEDICINE

## 2025-05-12 PROCEDURE — 94640 AIRWAY INHALATION TREATMENT: CPT | Mod: HCNC

## 2025-05-12 PROCEDURE — 63600175 PHARM REV CODE 636 W HCPCS: Mod: HCNC | Performed by: EMERGENCY MEDICINE

## 2025-05-12 PROCEDURE — 86140 C-REACTIVE PROTEIN: CPT | Mod: HCNC | Performed by: EMERGENCY MEDICINE

## 2025-05-12 PROCEDURE — 27000207 HC ISOLATION: Mod: HCNC

## 2025-05-12 PROCEDURE — 80053 COMPREHEN METABOLIC PANEL: CPT | Mod: HCNC | Performed by: EMERGENCY MEDICINE

## 2025-05-12 PROCEDURE — 82728 ASSAY OF FERRITIN: CPT | Mod: HCNC | Performed by: EMERGENCY MEDICINE

## 2025-05-12 PROCEDURE — 82550 ASSAY OF CK (CPK): CPT | Mod: HCNC | Performed by: EMERGENCY MEDICINE

## 2025-05-12 PROCEDURE — 11000001 HC ACUTE MED/SURG PRIVATE ROOM: Mod: HCNC

## 2025-05-12 PROCEDURE — 87040 BLOOD CULTURE FOR BACTERIA: CPT | Mod: HCNC | Performed by: INTERNAL MEDICINE

## 2025-05-12 PROCEDURE — 83615 LACTATE (LD) (LDH) ENZYME: CPT | Mod: HCNC | Performed by: EMERGENCY MEDICINE

## 2025-05-12 PROCEDURE — 84484 ASSAY OF TROPONIN QUANT: CPT | Mod: HCNC | Performed by: EMERGENCY MEDICINE

## 2025-05-12 PROCEDURE — 63600175 PHARM REV CODE 636 W HCPCS: Mod: HCNC | Performed by: INTERNAL MEDICINE

## 2025-05-12 RX ORDER — ONDANSETRON HYDROCHLORIDE 2 MG/ML
4 INJECTION, SOLUTION INTRAVENOUS EVERY 8 HOURS PRN
Status: DISCONTINUED | OUTPATIENT
Start: 2025-05-12 | End: 2025-05-13 | Stop reason: HOSPADM

## 2025-05-12 RX ORDER — LEVOFLOXACIN 5 MG/ML
500 INJECTION, SOLUTION INTRAVENOUS
Status: DISCONTINUED | OUTPATIENT
Start: 2025-05-12 | End: 2025-05-12

## 2025-05-12 RX ORDER — IBUPROFEN 200 MG
16 TABLET ORAL
Status: DISCONTINUED | OUTPATIENT
Start: 2025-05-12 | End: 2025-05-13 | Stop reason: HOSPADM

## 2025-05-12 RX ORDER — ATORVASTATIN CALCIUM 40 MG/1
40 TABLET, FILM COATED ORAL NIGHTLY
Status: DISCONTINUED | OUTPATIENT
Start: 2025-05-12 | End: 2025-05-13 | Stop reason: HOSPADM

## 2025-05-12 RX ORDER — DONEPEZIL HYDROCHLORIDE 5 MG/1
10 TABLET, FILM COATED ORAL NIGHTLY
Status: DISCONTINUED | OUTPATIENT
Start: 2025-05-12 | End: 2025-05-12

## 2025-05-12 RX ORDER — FUROSEMIDE 10 MG/ML
40 INJECTION INTRAMUSCULAR; INTRAVENOUS ONCE
Status: COMPLETED | OUTPATIENT
Start: 2025-05-12 | End: 2025-05-12

## 2025-05-12 RX ORDER — ENOXAPARIN SODIUM 100 MG/ML
40 INJECTION SUBCUTANEOUS EVERY 24 HOURS
Status: DISCONTINUED | OUTPATIENT
Start: 2025-05-12 | End: 2025-05-13 | Stop reason: HOSPADM

## 2025-05-12 RX ORDER — SODIUM CHLORIDE 0.9 % (FLUSH) 0.9 %
10 SYRINGE (ML) INJECTION EVERY 12 HOURS PRN
Status: DISCONTINUED | OUTPATIENT
Start: 2025-05-12 | End: 2025-05-13 | Stop reason: HOSPADM

## 2025-05-12 RX ORDER — ASPIRIN 81 MG/1
81 TABLET ORAL DAILY
Status: DISCONTINUED | OUTPATIENT
Start: 2025-05-13 | End: 2025-05-13 | Stop reason: HOSPADM

## 2025-05-12 RX ORDER — AMOXICILLIN 250 MG
1 CAPSULE ORAL 2 TIMES DAILY
Status: DISCONTINUED | OUTPATIENT
Start: 2025-05-12 | End: 2025-05-13 | Stop reason: HOSPADM

## 2025-05-12 RX ORDER — NALOXONE HCL 0.4 MG/ML
0.02 VIAL (ML) INJECTION
Status: DISCONTINUED | OUTPATIENT
Start: 2025-05-12 | End: 2025-05-13 | Stop reason: HOSPADM

## 2025-05-12 RX ORDER — IPRATROPIUM BROMIDE AND ALBUTEROL SULFATE 2.5; .5 MG/3ML; MG/3ML
3 SOLUTION RESPIRATORY (INHALATION) EVERY 6 HOURS
Status: DISCONTINUED | OUTPATIENT
Start: 2025-05-12 | End: 2025-05-13 | Stop reason: HOSPADM

## 2025-05-12 RX ORDER — FLUTICASONE FUROATE AND VILANTEROL 100; 25 UG/1; UG/1
1 POWDER RESPIRATORY (INHALATION) DAILY
Status: DISCONTINUED | OUTPATIENT
Start: 2025-05-12 | End: 2025-05-13 | Stop reason: HOSPADM

## 2025-05-12 RX ORDER — LEVOFLOXACIN 5 MG/ML
750 INJECTION, SOLUTION INTRAVENOUS
Status: DISCONTINUED | OUTPATIENT
Start: 2025-05-12 | End: 2025-05-13

## 2025-05-12 RX ORDER — DEXAMETHASONE SODIUM PHOSPHATE 4 MG/ML
6 INJECTION, SOLUTION INTRA-ARTICULAR; INTRALESIONAL; INTRAMUSCULAR; INTRAVENOUS; SOFT TISSUE
Status: COMPLETED | OUTPATIENT
Start: 2025-05-12 | End: 2025-05-12

## 2025-05-12 RX ORDER — PANTOPRAZOLE SODIUM 40 MG/1
40 TABLET, DELAYED RELEASE ORAL DAILY
Status: DISCONTINUED | OUTPATIENT
Start: 2025-05-13 | End: 2025-05-13 | Stop reason: HOSPADM

## 2025-05-12 RX ORDER — FLUOXETINE 20 MG/1
40 CAPSULE ORAL DAILY
Status: DISCONTINUED | OUTPATIENT
Start: 2025-05-13 | End: 2025-05-13 | Stop reason: HOSPADM

## 2025-05-12 RX ORDER — GABAPENTIN 100 MG/1
100 CAPSULE ORAL 3 TIMES DAILY
Status: DISCONTINUED | OUTPATIENT
Start: 2025-05-12 | End: 2025-05-13 | Stop reason: HOSPADM

## 2025-05-12 RX ORDER — MIRTAZAPINE 7.5 MG/1
15 TABLET, FILM COATED ORAL NIGHTLY
Status: DISCONTINUED | OUTPATIENT
Start: 2025-05-12 | End: 2025-05-13 | Stop reason: HOSPADM

## 2025-05-12 RX ORDER — TAMSULOSIN HYDROCHLORIDE 0.4 MG/1
1 CAPSULE ORAL DAILY
Status: DISCONTINUED | OUTPATIENT
Start: 2025-05-13 | End: 2025-05-13 | Stop reason: HOSPADM

## 2025-05-12 RX ORDER — GUAIFENESIN AND DEXTROMETHORPHAN HYDROBROMIDE 10; 100 MG/5ML; MG/5ML
10 SYRUP ORAL EVERY 4 HOURS PRN
Status: DISCONTINUED | OUTPATIENT
Start: 2025-05-12 | End: 2025-05-13 | Stop reason: HOSPADM

## 2025-05-12 RX ORDER — ALBUTEROL SULFATE 2.5 MG/.5ML
10 SOLUTION RESPIRATORY (INHALATION)
Status: COMPLETED | OUTPATIENT
Start: 2025-05-12 | End: 2025-05-12

## 2025-05-12 RX ORDER — GLUCAGON 1 MG
1 KIT INJECTION
Status: DISCONTINUED | OUTPATIENT
Start: 2025-05-12 | End: 2025-05-13 | Stop reason: HOSPADM

## 2025-05-12 RX ORDER — IPRATROPIUM BROMIDE 0.5 MG/2.5ML
0.5 SOLUTION RESPIRATORY (INHALATION)
Status: COMPLETED | OUTPATIENT
Start: 2025-05-12 | End: 2025-05-12

## 2025-05-12 RX ADMIN — MIRTAZAPINE 15 MG: 7.5 TABLET, FILM COATED ORAL at 08:05

## 2025-05-12 RX ADMIN — ALBUTEROL SULFATE 10 MG: 2.5 SOLUTION RESPIRATORY (INHALATION) at 02:05

## 2025-05-12 RX ADMIN — DONEPEZIL HYDROCHLORIDE 10 MG: 5 TABLET ORAL at 08:05

## 2025-05-12 RX ADMIN — DEXAMETHASONE SODIUM PHOSPHATE 6 MG: 4 INJECTION, SOLUTION INTRA-ARTICULAR; INTRALESIONAL; INTRAMUSCULAR; INTRAVENOUS; SOFT TISSUE at 02:05

## 2025-05-12 RX ADMIN — IPRATROPIUM BROMIDE 0.5 MG: 0.5 SOLUTION RESPIRATORY (INHALATION) at 02:05

## 2025-05-12 RX ADMIN — FLUTICASONE FUROATE AND VILANTEROL TRIFENATATE 1 PUFF: 100; 25 POWDER RESPIRATORY (INHALATION) at 05:05

## 2025-05-12 RX ADMIN — GABAPENTIN 100 MG: 100 CAPSULE ORAL at 08:05

## 2025-05-12 RX ADMIN — FUROSEMIDE 40 MG: 10 INJECTION, SOLUTION INTRAVENOUS at 04:05

## 2025-05-12 RX ADMIN — ATORVASTATIN CALCIUM 40 MG: 40 TABLET, FILM COATED ORAL at 08:05

## 2025-05-12 RX ADMIN — IPRATROPIUM BROMIDE AND ALBUTEROL SULFATE 3 ML: 2.5; .5 SOLUTION RESPIRATORY (INHALATION) at 07:05

## 2025-05-12 RX ADMIN — SENNOSIDES AND DOCUSATE SODIUM 1 TABLET: 50; 8.6 TABLET ORAL at 08:05

## 2025-05-12 RX ADMIN — LEVOFLOXACIN 750 MG: 5 INJECTION, SOLUTION INTRAVENOUS at 09:05

## 2025-05-12 RX ADMIN — ENOXAPARIN SODIUM 40 MG: 40 INJECTION SUBCUTANEOUS at 04:05

## 2025-05-12 NOTE — ED NOTES
Transport here to get pt. No signs of respiratory distress or bleeding noted. Family at bedside and will be going up with the pt. Pt presently has on a n-95.

## 2025-05-12 NOTE — HPI
Navin Prasad is an 86 yo male with medical history of Alzheimer's dementia, asthma- COPD overlap syndrome, HLD, BPH presented to the ER with shortness of breath x2 days duration.  Patient reported generalized weakness for the past week.  Denies fever, chills, nausea or vomiting.  States to have checked home oxygen saturation at home and was found to be 91-93% as against baseline of 94-95%.  He denies increased cough, sputum production, or wheezing.    Prior to presenting in the ER, he was evaluated by his PCP who sent Paxlovid to his outpatient pharmacy.    In the ER, noted to be hemodynamically stable.  O2 sat of 92%.  CBC revealed WBC of 10.37, hemoglobin of 13.8, platelet of 134.  Ferritin of 72, D-dimer mildly elevated at 0.61, CMP unrevealing.COVID-19 positive.  CRP elevated at 10.1.  Normal troponin, LDH, BMP and CPK.    CXR with no acute cardiopulmonary abnormality.  He was treated with Duo-Neb breathing treatment and IV dexamethasone 6 mg.  Case discussed with the ER staff. Agreed to admit for observation given medical co-morbidities and risk for decompensation.

## 2025-05-12 NOTE — H&P
[facility] Medicine  History & Physical    Patient Name: Navin Henning Jr.  MRN: [id number]  Patient Class: IP- Inpatient  Admission Date: 5/12/2025  Attending Physician: Christophe Garza MD   Primary Care Provider: Chandrika Barrett MD         Patient information was obtained from patient, ER records, and son-in-law.     Subjective:     Principal Problem:<principal problem not specified>    Chief Complaint:   Chief Complaint   Patient presents with    Shortness of Breath     C/o SOB, productive cough with green mucus, sore scratchy throat, HA, and general aches starting Friday. Tested Positive for Covid today and was referred by PCP for further evaluation. Hx of COPD    Referral        HPI: Navin Prasad is an 86 yo male with medical history of Alzheimer's dementia, asthma- COPD overlap syndrome, HLD, BPH presented to the ER with shortness of breath x2 days duration.  Patient reported generalized weakness for the past week.  Denies fever, chills, nausea or vomiting.  States to have checked home oxygen saturation at home and was found to be 91-93% as against baseline of 94-95%.  He denies increased cough, sputum production, or wheezing.    Prior to presenting in the ER, he was evaluated by his PCP who sent Paxlovid to his outpatient pharmacy.    In the ER, noted to be hemodynamically stable.  O2 sat of 92%.  CBC revealed WBC of 10.37, hemoglobin of 13.8, platelet of 134.  Ferritin of 72, D-dimer mildly elevated at 0.61, CMP unrevealing.COVID-19 positive.  CRP elevated at 10.1.  Normal troponin, LDH, BMP and CPK.    CXR with no acute cardiopulmonary abnormality.  He was treated with Duo-Neb breathing treatment and IV dexamethasone 6 mg.  Case discussed with the ER staff. Agreed to admit for observation given medical co-morbidities and risk for decompensation.    Past Medical History:   Diagnosis Date    Asthma     COPD (chronic obstructive pulmonary disease)     Depression     High cholesterol         Past Surgical History:   Procedure Laterality Date    ESOPHAGOGASTRODUODENOSCOPY N/A 11/26/2024    Procedure: EGD (ESOPHAGOGASTRODUODENOSCOPY);  Surgeon: Samuel Ruiz MD;  Location: [facility];  Service: Endoscopy;  Laterality: N/A;  11/19 ref by Brianna Aggarwal NP, [facility]    SKIN CANCER EXCISION  04/02/2024    BCC right ear, by Dr. Armando Martinez [facility]    SKIN CANCER EXCISION Right 01/2025    BCC excision removed by Dr. Armando Martinez       Review of patient's allergies indicates:   Allergen Reactions    Suvorexant Anxiety and Other (See Comments)    Trazodone Other (See Comments)    Augmentin [amoxicillin-pot clavulanate]     Doxycycline Rash and Hives    Penicillins Rash     Tolerates cefdinir with no issues    Sulfamethoxazole-trimethoprim Rash       No current facility-administered medications on file prior to encounter.     Current Outpatient Medications on File Prior to Encounter   Medication Sig    albuterol (VENTOLIN HFA) 90 mcg/actuation inhaler Inhale 2 puffs into the lungs every 4 (four) hours as needed for Wheezing or Shortness of Breath. Rescue    albuterol-ipratropium (DUO-NEB) 2.5 mg-0.5 mg/3 mL nebulizer solution Inhale 3 mLs into the lungs every 6 (six) hours as needed.    aspirin (ECOTRIN) 81 MG EC tablet Take 1 tablet by mouth once daily.    azelastine (ASTELIN) 137 mcg (0.1 %) nasal spray 2 sprays (274 mcg total) by Nasal route 2 (two) times daily.    budesonide-formoterol 160-4.5 mcg (SYMBICORT) 160-4.5 mcg/actuation HFAA Inhale 2 puffs into the lungs every 12 (twelve) hours. Controller    donepeziL (ARICEPT) 10 MG tablet Take 10 mg by mouth every evening.    FLUoxetine 40 MG capsule Take 1 capsule (40 mg total) by mouth once daily.    gabapentin (NEURONTIN) 100 MG capsule Take 100 mg by mouth 3 (three) times daily.    ketoconazole (NIZORAL) 2 % shampoo SHAMPOO THREE TIMES WEEKLY TO AFFECTED AREAS. LET SIT FOR 5 MINUTES AND WASH OFF. FOR FACE    meclizine  (ANTIVERT) 25 mg tablet Take 1 tablet (25 mg total) by mouth 3 (three) times daily as needed for Dizziness.    melatonin 10 mg TbDL Take 1 tablet by mouth every evening.    memantine (NAMENDA XR) 28 mg CSpX     mirtazapine (REMERON) 15 MG tablet Take 1 tablet (15 mg total) by mouth every evening.    mometasone 0.1% (ELOCON) 0.1 % cream Apply 1 Application topically once daily.    multivitamin capsule Take 1 capsule by mouth once daily.    nirmatrelvir-ritonavir 300 mg (150 mg x 2)-100 mg copackaged tablets (EUA) Take 3 tablets by mouth 2 (two) times daily for 5 days. Each dose contains 2 nirmatrelvir (pink tablets) and 1 ritonavir (white tablet). Take all 3 tablets together    pantoprazole (PROTONIX) 40 MG tablet Take 1 tablet (40 mg total) by mouth once daily.    rivastigmine tartrate 4.5 mg capsule Take 4.5 mg by mouth 2 (two) times a day.    rosuvastatin (CRESTOR) 20 MG tablet Take 1 tablet (20 mg total) by mouth once daily.    tamsulosin (FLOMAX) 0.4 mg Cap Take 1 capsule (0.4 mg total) by mouth once daily. 30 minutes after dinner    testosterone (ANDROGEL) 20.25 mg/1.25 gram (1.62 %) GlPm Place 3.75 g onto the skin Daily. 3 pumps daily    TOLAK 4 % Crea Apply 1 Application topically every evening.    traMADoL (ULTRAM) 50 mg tablet Take 1 tablet by mouth 2 (two) times daily as needed for Pain.     Family History       Problem Relation (Age of Onset)    Bladder Cancer Brother    Cancer Brother    Depression Daughter, Daughter    Diabetes Mother    Emphysema Father    Fibromyalgia Daughter    Hashimoto's thyroiditis Daughter, Daughter    Heart disease Mother          Tobacco Use    Smoking status: Former     Current packs/day: 0.00     Types: Cigarettes     Quit date:      Years since quittin.3     Passive exposure: Past    Smokeless tobacco: Never    Tobacco comments:     Started smoking  to , about 2 ppd   Substance and Sexual Activity    Alcohol use: No     Comment: quit drinking 10/26/2002     Drug use: No    Sexual activity: Yes     Partners: Female     Review of Systems   Constitutional:  Positive for fatigue.   Respiratory:  Positive for shortness of breath.    Cardiovascular: Negative.    Gastrointestinal: Negative.    Genitourinary: Negative.      Objective:     Vital Signs (Most Recent):  Temp: 98.5 °F (36.9 °C) (05/12/25 1430)  Pulse: 65 (05/12/25 1430)  Resp: 20 (05/12/25 1430)  BP: 136/64 (05/12/25 1330)  SpO2: (!) 93 % (05/12/25 1430) Vital Signs (24h Range):  Temp:  [98.4 °F (36.9 °C)-98.8 °F (37.1 °C)] 98.5 °F (36.9 °C)  Pulse:  [65-75] 65  Resp:  [18-22] 20  SpO2:  [93 %-96 %] 93 %  BP: (134-140)/(64-87) 136/64     Weight: 72.6 kg (160 lb)  Body mass index is 25.82 kg/m².     Physical Exam  Vitals reviewed.   Constitutional:       General: He is not in acute distress.     Appearance: He is not toxic-appearing.   HENT:      Head: Normocephalic and atraumatic.   Cardiovascular:      Rate and Rhythm: Normal rate and regular rhythm.      Pulses: Normal pulses.      Heart sounds: Normal heart sounds.   Pulmonary:      Effort: Pulmonary effort is normal. No respiratory distress.      Breath sounds: Rales (bibasilar) present. No wheezing.   Abdominal:      General: Bowel sounds are normal. There is no distension.      Palpations: Abdomen is soft.      Tenderness: There is no abdominal tenderness.   Musculoskeletal:      Right lower leg: No edema.      Left lower leg: No edema.   Skin:     General: Skin is warm and dry.      Coloration: Skin is not jaundiced.   Neurological:      Mental Status: He is alert and oriented to person, place, and time. Mental status is at baseline.   Psychiatric:         Mood and Affect: Mood normal.         Behavior: Behavior normal.                Significant Labs: All pertinent labs within the past 24 hours have been reviewed.  CBC:   Recent Labs   Lab 05/12/25  1402   WBC 10.37   HGB 13.8*   HCT 42.0   *     CMP:   Recent Labs   Lab 05/12/25  1402      K  4.2      CO2 25   GLU 96   BUN 18   CREATININE 1.2   CALCIUM 8.9   PROT 6.4   ALBUMIN 3.4*   BILITOT 0.7   ALKPHOS 73   AST 24   ALT 22   ANIONGAP 8     Recent Lab Results  (Last 5 results in the past 24 hours)        05/12/25  1411   05/12/25  1407   05/12/25  1402   05/12/25  1047   05/12/25  1046        Performed By:   [id number]             POC Molecular Influenza A Ag       Negative         POC Molecular Influenza B Ag       Negative         Procalcitonin     0.06  Comment: A concentration < 0.25 ng/mL represents a low risk of bacterial infection.  Procalcitonin may not be accurate among patients with localized   infection, recent trauma or major surgery, immunosuppressed state,   invasive fungal infection, renal dysfunction. Decisions regarding   initiation or continuation of antibiotic therapy should not be based   solely on procalcitonin levels.           Specimen source   Venous             Albumin     3.4           ALP     73           ALT     22           Anion Gap     8           AST     24           Bands     3.0           BILIRUBIN TOTAL     0.7  Comment: For infants and newborns, interpretation of results should be based   on gestational age, weight and in agreement with clinical   observations.    Premature Infant recommended reference ranges:   0-24 hours:  <8.0 mg/dL   24-48 hours: <12.0 mg/dL   3-5 days:    <15.0 mg/dL   6-29 days:   <15.0 mg/dL           BNP     48  Comment: Values of less than 100 pg/ml are consistent with non-CHF populations.            BUN     18           Calcium     8.9           Chloride     105           CO2     25           CPK     54           Creatinine     1.2           CRP     10.1           D-Dimer     0.61  Comment: The quantitative D-dimer assay should be used as an aid in the diagnosis of deep vein thrombosis and pulmonary embolism in patients with the appropriate presentation and clinical history. The upper limit of the reference interval and the clinical cut  off point are identical. Causes of a positive (>0.50 mg/L FEU) D-Dimer test include, but are not limited to: DVT, PE, DIC, thrombolytic therapy, anticoagulant therapy, recent surgery, trauma, or pregnancy, disseminated malignancy, aortic aneurysm, cirrhosis, and severe infection. False negative results may occur in patients with distal DVT.           eGFR     60  Comment: Estimated GFR calculated using the CKD-EPI creatinine (2021) equation.           Ferritin     72.1           FiO2   21.0             Glucose     96           Gran # (ANC)     8.3           Hematocrit     42.0           Hemoglobin     13.8           Extra Tube Hold for add-ons.  Comment: Auto resulted.                  Lactic Acid Level     1.4           Lactate Dehydrogenase     200           Lymph %     5.0           MCH     29.5           MCHC     32.9           MCV     90           Mono %     15.0           MPV     10.6  Comment: This result was previously suppressed from the chart.           nRBC     0           Platelet Estimate     Decreased           Platelet Count     134  Comment: This result was previously suppressed from the chart.           POC Lactate   1.2             Potassium     4.2           PROTEIN TOTAL     6.4            Acceptable       Yes   Yes       RBC     4.68           RDW     14.9           SARS-CoV-2 RNA, Amplification, Qual         Positive       Segmented Neutrophil %     77.0           Sodium     138           Troponin I     <0.006  Comment: The reference interval for Troponin I represents the 99th percentile cutoff for our facility and is consistent with 3rd generation assay performance.           WBC     10.37                                  Significant Imaging: I have reviewed all pertinent imaging results/findings within the past 24 hours.  Assessment/Plan:     Assessment & Plan  COVID-19 virus infection  Patient is identified as Severe COVID-19 based on hypoxemia with O2 saturations <94% on room  air or on ambulation , however oxygen baseline is 95 - 96% per family at bedside.  Initiate standard COVID protocols; COVID-19 testing ,Infection Control notification  and isolation- respiratory, contact and droplet per protocol    Diagnostics: CBC, CMP, Ferritin, CRP, LDH, BNP, Troponin, ECG, Rapid Flu, Blood Culture x2, and Portable CXR    Management: Maintain oxygen saturations 92-96% via Nasal Cannula  LPM and monitor with continuous/intermittent pulse oximetry.  and Inhaled bronchodilators as needed for shortness of breath.    Advance Care Planning  Current advance care plan has not been discussed with patient/family/POA and patient currently wishes Full Code.   Acute respiratory failure with hypoxia  Patient with Hypoxic Respiratory failure which is Acute.  he is not on home oxygen. Supplemental oxygen was provided and noted-      .   Signs/symptoms of respiratory failure include- Hypoxia. Contributing diagnoses includes - COPD Labs and images were reviewed. Patient Has not had a recent ABG. Will treat underlying causes and adjust management of respiratory failure as follows- Oxygenation support, steroid and lasix.  Wean oxygen as tolerated.  If hypoxia progresses, consider obtaining a CTA PE study to rule out a PE. No unilateral leg swelling to suggest a DVT at this time although mild D-dimer elevation.  Mixed hyperlipidemia    Continue atorvastatin  Asthma-COPD overlap syndrome  Does not appear to be in acute exacerbation  Continue Breo-Ellipta and breathing treatment.  Increases risk for COVID decompensation.    MDD (recurrent major depressive disorder) in remission  Patient has persistent depression which is moderate and is currently controlled. Will Continue anti-depressant medications. We will not consult psychiatry at this time. Patient does not display psychosis at this time. Continue to monitor closely and adjust plan of care as needed.      BPH with lower urinary tract symptoms without urinary  obstruction    Continue Flomax  Alzheimer's disease, unspecified (CODE)  Patient with dementia with likely etiology of alzheimer's dementia. Dementia is moderate. The patient does not have signs of behavioral disturbance. Home dementia medications are Held or Continued: continued.. Continue non-pharmacologic interventions to prevent delirium (No VS between 11PM-5AM, activity during day, opening blinds, providing glasses/hearing aids, and up in chair during daytime). Will avoid narcotics and benzos unless absolutely necessary. PRN anti-psychotics are not prescribed to avoid self harm behaviors.  Hiatal hernia with GERD    Continue Protonix    VTE Risk Mitigation (From admission, onward)           Ordered     enoxaparin injection 40 mg  Daily         05/12/25 1559     IP VTE HIGH RISK PATIENT  Once         05/12/25 2659     Place sequential compression device  Until discontinued         05/12/25 3479                                    Rogerio Farrell MD  Department of Hospital Medicine  [place] - Emergency Dept

## 2025-05-12 NOTE — ASSESSMENT & PLAN NOTE
Patient with Hypoxic Respiratory failure which is Acute.  he is not on home oxygen. Supplemental oxygen was provided and noted-      .   Signs/symptoms of respiratory failure include- Hypoxia. Contributing diagnoses includes - COPD Labs and images were reviewed. Patient Has not had a recent ABG. Will treat underlying causes and adjust management of respiratory failure as follows- Oxygenation support, steroid and lasix.  Wean oxygen as tolerated.  If hypoxia progresses, consider obtaining a CTA PE study to rule out a PE. No unilateral leg swelling to suggest a DVT at this time although mild D-dimer elevation.

## 2025-05-12 NOTE — SUBJECTIVE & OBJECTIVE
Past Medical History:   Diagnosis Date    Asthma     COPD (chronic obstructive pulmonary disease)     Depression     High cholesterol        Past Surgical History:   Procedure Laterality Date    ESOPHAGOGASTRODUODENOSCOPY N/A 11/26/2024    Procedure: EGD (ESOPHAGOGASTRODUODENOSCOPY);  Surgeon: Samuel Ruiz MD;  Location: [facility];  Service: Endoscopy;  Laterality: N/A;  11/19 ref by Brianna Aggarwal NP, [facility]    SKIN CANCER EXCISION  04/02/2024    BCC right ear, by Dr. Armando [facility]    SKIN CANCER EXCISION Right 01/2025    BCC excision removed by Dr. Armando Martinez       Review of patient's allergies indicates:   Allergen Reactions    Suvorexant Anxiety and Other (See Comments)    Trazodone Other (See Comments)    Augmentin [amoxicillin-pot clavulanate]     Doxycycline Rash and Hives    Penicillins Rash     Tolerates cefdinir with no issues    Sulfamethoxazole-trimethoprim Rash       No current facility-administered medications on file prior to encounter.     Current Outpatient Medications on File Prior to Encounter   Medication Sig    albuterol (VENTOLIN HFA) 90 mcg/actuation inhaler Inhale 2 puffs into the lungs every 4 (four) hours as needed for Wheezing or Shortness of Breath. Rescue    albuterol-ipratropium (DUO-NEB) 2.5 mg-0.5 mg/3 mL nebulizer solution Inhale 3 mLs into the lungs every 6 (six) hours as needed.    aspirin (ECOTRIN) 81 MG EC tablet Take 1 tablet by mouth once daily.    azelastine (ASTELIN) 137 mcg (0.1 %) nasal spray 2 sprays (274 mcg total) by Nasal route 2 (two) times daily.    budesonide-formoterol 160-4.5 mcg (SYMBICORT) 160-4.5 mcg/actuation HFAA Inhale 2 puffs into the lungs every 12 (twelve) hours. Controller    donepeziL (ARICEPT) 10 MG tablet Take 10 mg by mouth every evening.    FLUoxetine 40 MG capsule Take 1 capsule (40 mg total) by mouth once daily.    gabapentin (NEURONTIN) 100 MG capsule Take 100 mg by mouth 3 (three) times daily.    ketoconazole  (NIZORAL) 2 % shampoo SHAMPOO THREE TIMES WEEKLY TO AFFECTED AREAS. LET SIT FOR 5 MINUTES AND WASH OFF. FOR FACE    meclizine (ANTIVERT) 25 mg tablet Take 1 tablet (25 mg total) by mouth 3 (three) times daily as needed for Dizziness.    melatonin 10 mg TbDL Take 1 tablet by mouth every evening.    memantine (NAMENDA XR) 28 mg CSpX     mirtazapine (REMERON) 15 MG tablet Take 1 tablet (15 mg total) by mouth every evening.    mometasone 0.1% (ELOCON) 0.1 % cream Apply 1 Application topically once daily.    multivitamin capsule Take 1 capsule by mouth once daily.    nirmatrelvir-ritonavir 300 mg (150 mg x 2)-100 mg copackaged tablets (EUA) Take 3 tablets by mouth 2 (two) times daily for 5 days. Each dose contains 2 nirmatrelvir (pink tablets) and 1 ritonavir (white tablet). Take all 3 tablets together    pantoprazole (PROTONIX) 40 MG tablet Take 1 tablet (40 mg total) by mouth once daily.    rivastigmine tartrate 4.5 mg capsule Take 4.5 mg by mouth 2 (two) times a day.    rosuvastatin (CRESTOR) 20 MG tablet Take 1 tablet (20 mg total) by mouth once daily.    tamsulosin (FLOMAX) 0.4 mg Cap Take 1 capsule (0.4 mg total) by mouth once daily. 30 minutes after dinner    testosterone (ANDROGEL) 20.25 mg/1.25 gram (1.62 %) GlPm Place 3.75 g onto the skin Daily. 3 pumps daily    TOLAK 4 % Crea Apply 1 Application topically every evening.    traMADoL (ULTRAM) 50 mg tablet Take 1 tablet by mouth 2 (two) times daily as needed for Pain.     Family History       Problem Relation (Age of Onset)    Bladder Cancer Brother    Cancer Brother    Depression Daughter, Daughter    Diabetes Mother    Emphysema Father    Fibromyalgia Daughter    Hashimoto's thyroiditis Daughter, Daughter    Heart disease Mother          Tobacco Use    Smoking status: Former     Current packs/day: 0.00     Types: Cigarettes     Quit date:      Years since quittin.3     Passive exposure: Past    Smokeless tobacco: Never    Tobacco comments:     Started  smoking 1969 to 1976, about 2 ppd   Substance and Sexual Activity    Alcohol use: No     Comment: quit drinking 10/26/2002    Drug use: No    Sexual activity: Yes     Partners: Female     Review of Systems   Constitutional:  Positive for fatigue.   Respiratory:  Positive for shortness of breath.    Cardiovascular: Negative.    Gastrointestinal: Negative.    Genitourinary: Negative.      Objective:     Vital Signs (Most Recent):  Temp: 98.5 °F (36.9 °C) (05/12/25 1430)  Pulse: 65 (05/12/25 1430)  Resp: 20 (05/12/25 1430)  BP: 136/64 (05/12/25 1330)  SpO2: (!) 93 % (05/12/25 1430) Vital Signs (24h Range):  Temp:  [98.4 °F (36.9 °C)-98.8 °F (37.1 °C)] 98.5 °F (36.9 °C)  Pulse:  [65-75] 65  Resp:  [18-22] 20  SpO2:  [93 %-96 %] 93 %  BP: (134-140)/(64-87) 136/64     Weight: 72.6 kg (160 lb)  Body mass index is 25.82 kg/m².     Physical Exam  Vitals reviewed.   Constitutional:       General: He is not in acute distress.     Appearance: He is not toxic-appearing.   HENT:      Head: Normocephalic and atraumatic.   Cardiovascular:      Rate and Rhythm: Normal rate and regular rhythm.      Pulses: Normal pulses.      Heart sounds: Normal heart sounds.   Pulmonary:      Effort: Pulmonary effort is normal. No respiratory distress.      Breath sounds: Rales (bibasilar) present. No wheezing.   Abdominal:      General: Bowel sounds are normal. There is no distension.      Palpations: Abdomen is soft.      Tenderness: There is no abdominal tenderness.   Musculoskeletal:      Right lower leg: No edema.      Left lower leg: No edema.   Skin:     General: Skin is warm and dry.      Coloration: Skin is not jaundiced.   Neurological:      Mental Status: He is alert and oriented to person, place, and time. Mental status is at baseline.   Psychiatric:         Mood and Affect: Mood normal.         Behavior: Behavior normal.                Significant Labs: All pertinent labs within the past 24 hours have been reviewed.  CBC:   Recent Labs    Lab 05/12/25  1402   WBC 10.37   HGB 13.8*   HCT 42.0   *     CMP:   Recent Labs   Lab 05/12/25  1402      K 4.2      CO2 25   GLU 96   BUN 18   CREATININE 1.2   CALCIUM 8.9   PROT 6.4   ALBUMIN 3.4*   BILITOT 0.7   ALKPHOS 73   AST 24   ALT 22   ANIONGAP 8     Recent Lab Results  (Last 5 results in the past 24 hours)        05/12/25  1411   05/12/25  1407   05/12/25  1402   05/12/25  1047   05/12/25  1046        Performed By:   [id number]             POC Molecular Influenza A Ag       Negative         POC Molecular Influenza B Ag       Negative         Procalcitonin     0.06  Comment: A concentration < 0.25 ng/mL represents a low risk of bacterial infection.  Procalcitonin may not be accurate among patients with localized   infection, recent trauma or major surgery, immunosuppressed state,   invasive fungal infection, renal dysfunction. Decisions regarding   initiation or continuation of antibiotic therapy should not be based   solely on procalcitonin levels.           Specimen source   Venous             Albumin     3.4           ALP     73           ALT     22           Anion Gap     8           AST     24           Bands     3.0           BILIRUBIN TOTAL     0.7  Comment: For infants and newborns, interpretation of results should be based   on gestational age, weight and in agreement with clinical   observations.    Premature Infant recommended reference ranges:   0-24 hours:  <8.0 mg/dL   24-48 hours: <12.0 mg/dL   3-5 days:    <15.0 mg/dL   6-29 days:   <15.0 mg/dL           BNP     48  Comment: Values of less than 100 pg/ml are consistent with non-CHF populations.            BUN     18           Calcium     8.9           Chloride     105           CO2     25           CPK     54           Creatinine     1.2           CRP     10.1           D-Dimer     0.61  Comment: The quantitative D-dimer assay should be used as an aid in the diagnosis of deep vein thrombosis and pulmonary embolism in  patients with the appropriate presentation and clinical history. The upper limit of the reference interval and the clinical cut off point are identical. Causes of a positive (>0.50 mg/L FEU) D-Dimer test include, but are not limited to: DVT, PE, DIC, thrombolytic therapy, anticoagulant therapy, recent surgery, trauma, or pregnancy, disseminated malignancy, aortic aneurysm, cirrhosis, and severe infection. False negative results may occur in patients with distal DVT.           eGFR     60  Comment: Estimated GFR calculated using the CKD-EPI creatinine (2021) equation.           Ferritin     72.1           FiO2   21.0             Glucose     96           Gran # (ANC)     8.3           Hematocrit     42.0           Hemoglobin     13.8           Extra Tube Hold for add-ons.  Comment: Auto resulted.                  Lactic Acid Level     1.4           Lactate Dehydrogenase     200           Lymph %     5.0           MCH     29.5           MCHC     32.9           MCV     90           Mono %     15.0           MPV     10.6  Comment: This result was previously suppressed from the chart.           nRBC     0           Platelet Estimate     Decreased           Platelet Count     134  Comment: This result was previously suppressed from the chart.           POC Lactate   1.2             Potassium     4.2           PROTEIN TOTAL     6.4            Acceptable       Yes   Yes       RBC     4.68           RDW     14.9           SARS-CoV-2 RNA, Amplification, Qual         Positive       Segmented Neutrophil %     77.0           Sodium     138           Troponin I     <0.006  Comment: The reference interval for Troponin I represents the 99th percentile cutoff for our facility and is consistent with 3rd generation assay performance.           WBC     10.37                                  Significant Imaging: I have reviewed all pertinent imaging results/findings within the past 24 hours.

## 2025-05-12 NOTE — CARE UPDATE
Multiple attempts to reach out to spouse - Janett [phone] regarding patient's prescription for Paxlovid were unsuccessful.      Advice follow up on the matter.      Update - 8:36 PM    Spoke to patient's spouse who reports that he has been having increased green sputum production and cough. No fever or chills. Will provide CAP coverage with levaquin 500 mg IV daily noting documented history of penicillin allergy.

## 2025-05-12 NOTE — PROGRESS NOTES
Patient seen by JIGAR Marie at primary care on 5/12/25 for c/o SOB; Venu consulted.     Venu Provider Dr Hanna Keen disposition recommendation patient to go to ED.    Follow up scheduled for 5/13/25 to assess for additional needs.     
No

## 2025-05-12 NOTE — ASSESSMENT & PLAN NOTE
Patient's blood pressure range in the last 24 hours was: BP  Min: 134/68  Max: 140/87.The patient's inpatient anti-hypertensive regimen is listed below:  Current Antihypertensives       Plan  -   - ***

## 2025-05-12 NOTE — ASSESSMENT & PLAN NOTE
Does not appear to be in acute exacerbation  Continue Breo-Ellipta and breathing treatment.  Increases risk for COVID decompensation.

## 2025-05-12 NOTE — PLAN OF CARE-OVED
[facility] Plan of Care Note  Referral Source: Primary Care Provider                          Referral Comment: JIGAR Marie    Chief Complaint   Patient presents with    Hypoxia     pt today in clinic,Tested positive for Covid, respiratory symptoms include Cough, SOB,chest pain, dizziness coughing up green mucus O2 stat of 95% and normal temp, no confusion. Has a history of pneumonia this past December. Wheezing heard in left lobe and pt is complaining of chest pain with cough. Symptoms have worsened since this past Friday.  Reports 90% O2 sat with short ambulation in clinic, patient appears ill       Recommendation: Emergency Department                            Encounter Diagnosis   Name Primary?    COVID-19 Yes

## 2025-05-12 NOTE — Clinical Note
Diagnosis: COVID-19 [[id number]]   Future Attending Provider: ARMAAN ANTONIO [[id number]]   Is the patient being sent to ED Observation?: No   Special Needs:: No Special Needs [1]

## 2025-05-12 NOTE — PROGRESS NOTES
[facility]    5/12/2025      Subjective:       Patient ID:  Navin is a 85 y.o. male .  has a past medical history of Asthma, COPD (chronic obstructive pulmonary disease), Depression, and High cholesterol.    History of Present Illness    CHIEF COMPLAINT:  Mr. Henning presents today for COVID symptoms and dizziness    COVID-19 SYMPTOMS:  He began experiencing symptoms on Friday including headache, sore throat, productive cough with green sputum, chest pain,  body aches, and shortness of breath. He has been taking nighttime cold medicine containing dextromethorphan and antihistamine for symptom relief with no relief.  Patient in clinic complains of cough  and chest pain, deep breaths induces cough      He reports experiencing dizziness for a few weeks, characterized by lightheadedness upon standing, episodes of spinning sensation, and unsteadiness. He has been using a cane for support. Previously prescribed meclizine did not provide significant relief.    CURRENT MEDICATIONS:  He takes Gabapentin 100 mg 3 times daily.    Message sent to ov ED  Seeing pt today in clinic,Tested positive for Covid, respiratory symptoms include Cough, SOB,chest pain, dizziness coughing up green mucus O2 stat of 95% initially  and normal temp, no confusion. Has a history of pneumonia this past December. Wheezing heard in left lobe and pt is complaining of chest pain with cough.Symptoms have worsened since this past Friday   Patient's O2 stat worsened while in clinic as well as with O2 sat initially dropped to 93% while waiting in office.While ambulatory spatient O2 sat consistently was 90%.  Patient does look ill is and has no shortness of breath while talking.    ROS:  General: -fever, -chills, -fatigue, -weight gain, -weight loss, +weakness, +muscle weakness  Eyes: -vision changes, -redness, -discharge  ENT: -ear pain, -nasal congestion, +sore throat  Cardiovascular: -chest pain, -palpitations, -lower  extremity edema  Respiratory: -cough, +shortness of breath, +productive cough  Gastrointestinal: -abdominal pain, -nausea, -vomiting, -diarrhea, -constipation, -blood in stool  Genitourinary: -dysuria, -hematuria, -frequency  Musculoskeletal: -joint pain, -muscle pain, +body aches  Skin: -rash, -lesion  Neurological: +headache, +dizziness, -numbness, -tingling, +lightheadedness, +vertigo, +difficulty walking, +abnormal gait  Psychiatric: -anxiety, -depression, -sleep difficulty           Problem List[1]      Last HgbA1C:    Lab Results   Component Value Date    HGBA1C 5.5 03/05/2025    HGBA1C 5.4 02/22/2024    HGBA1C 5.5 01/25/2023         Last Lipid Panel:    Lab Results   Component Value Date    HDL 57 03/05/2025    HDL 50 02/22/2024    HDL 40 07/12/2022       Lab Results   Component Value Date    LDLCALC 47.6 (L) 03/05/2025    LDLCALC 101.0 02/22/2024    LDLCALC 128.2 07/12/2022       Lab Results   Component Value Date    TRIG 57 03/05/2025    TRIG 65 02/22/2024    TRIG 154 (H) 07/12/2022       Lab Results   Component Value Date    CHOLHDL 49.1 03/05/2025    CHOLHDL 30.5 02/22/2024    CHOLHDL 20.1 07/12/2022         Review of patient's allergies indicates:   Allergen Reactions    Suvorexant Anxiety and Other (See Comments)    Trazodone Other (See Comments)    Augmentin [amoxicillin-pot clavulanate]     Doxycycline Rash and Hives    Penicillins Rash     Tolerates cefdinir with no issues    Sulfamethoxazole-trimethoprim Rash        Medication List with Changes/Refills   New Medications    NIRMATRELVIR-RITONAVIR 300 MG (150 MG X 2)-100 MG COPACKAGED TABLETS (EUA)    Take 3 tablets by mouth 2 (two) times daily for 5 days. Each dose contains 2 nirmatrelvir (pink tablets) and 1 ritonavir (white tablet). Take all 3 tablets together   Current Medications    ALBUTEROL (VENTOLIN HFA) 90 MCG/ACTUATION INHALER    Inhale 2 puffs into the lungs every 4 (four) hours as needed for Wheezing or Shortness of Breath. Rescue     ALBUTEROL-IPRATROPIUM (DUO-NEB) 2.5 MG-0.5 MG/3 ML NEBULIZER SOLUTION    Inhale 3 mLs into the lungs every 6 (six) hours as needed.    ASPIRIN (ECOTRIN) 81 MG EC TABLET    Take 1 tablet by mouth once daily.    AZELASTINE (ASTELIN) 137 MCG (0.1 %) NASAL SPRAY    2 sprays (274 mcg total) by Nasal route 2 (two) times daily.    BUDESONIDE-FORMOTEROL 160-4.5 MCG (SYMBICORT) 160-4.5 MCG/ACTUATION HFAA    Inhale 2 puffs into the lungs every 12 (twelve) hours. Controller    DONEPEZIL (ARICEPT) 10 MG TABLET    Take 10 mg by mouth every evening.    FLUOXETINE 40 MG CAPSULE    Take 1 capsule (40 mg total) by mouth once daily.    GABAPENTIN (NEURONTIN) 100 MG CAPSULE    Take 100 mg by mouth 3 (three) times daily.    KETOCONAZOLE (NIZORAL) 2 % SHAMPOO    SHAMPOO THREE TIMES WEEKLY TO AFFECTED AREAS. LET SIT FOR 5 MINUTES AND WASH OFF. FOR FACE    MECLIZINE (ANTIVERT) 25 MG TABLET    Take 1 tablet (25 mg total) by mouth 3 (three) times daily as needed for Dizziness.    MELATONIN 10 MG TBDL    Take 1 tablet by mouth every evening.    MEMANTINE (NAMENDA XR) 28 MG CSPX        MIRTAZAPINE (REMERON) 15 MG TABLET    Take 1 tablet (15 mg total) by mouth every evening.    MOMETASONE 0.1% (ELOCON) 0.1 % CREAM    Apply 1 Application topically once daily.    MULTIVITAMIN CAPSULE    Take 1 capsule by mouth once daily.    PANTOPRAZOLE (PROTONIX) 40 MG TABLET    Take 1 tablet (40 mg total) by mouth once daily.    RIVASTIGMINE TARTRATE 4.5 MG CAPSULE    Take 4.5 mg by mouth 2 (two) times a day.    ROSUVASTATIN (CRESTOR) 20 MG TABLET    Take 1 tablet (20 mg total) by mouth once daily.    TAMSULOSIN (FLOMAX) 0.4 MG CAP    Take 1 capsule (0.4 mg total) by mouth once daily. 30 minutes after dinner    TESTOSTERONE (ANDROGEL) 20.25 MG/1.25 GRAM (1.62 %) GLPM    Place 3.75 g onto the skin Daily. 3 pumps daily    TOLAK 4 % CREA    Apply 1 Application topically every evening.    TRAMADOL (ULTRAM) 50 MG TABLET    Take 1 tablet by mouth 2 (two) times  "daily as needed for Pain.               Objective:      /68 (Patient Position: Sitting)   Pulse 65   Temp 98.8 °F (37.1 °C) (Axillary)   Ht 5' 6" (1.676 m)   Wt 72.1 kg (158 lb 15.2 oz)   SpO2 (!) 93%   BMI 25.66 kg/m²   Estimated body mass index is 25.66 kg/m² as calculated from the following:    Height as of this encounter: 5' 6" (1.676 m).    Weight as of this encounter: 72.1 kg (158 lb 15.2 oz).    Physical Exam  Vitals reviewed.   Constitutional:       General: He is not in acute distress.     Appearance: Normal appearance. He is ill-appearing.   HENT:      Head: Normocephalic.   Eyes:      General: No scleral icterus.     Conjunctiva/sclera: Conjunctivae normal.   Cardiovascular:      Rate and Rhythm: Normal rate.   Pulmonary:      Effort: Pulmonary effort is normal. No respiratory distress.      Breath sounds: Wheezing present.   Skin:     Coloration: Skin is not pale.   Neurological:      Mental Status: He is oriented to person, place, and time. Mental status is at baseline.   Psychiatric:         Mood and Affect: Mood normal.         Behavior: Behavior normal.             Assessment and Plan:   1. COVID-19  - X-Ray Chest PA And Lateral; Future  - Refer to Emergency Dept.    2. Hypoxia    3. SOB (shortness of breath)  - Refer to Emergency Dept.    4. Lab test positive for detection of COVID-19 virus  - POCT Influenza A/B Molecular  - POCT COVID-19 Rapid Screening    5. Dizziness     Assessment & Plan    - Assessed symptoms of COVID-19, including cough, sore throat, headache, body aches, and shortness of breath.  - Considered pre-existing conditions, including dizziness and unsteady gait.  - Evaluated potential side effects of Gabapentin, but determined dizziness likely predated medication start.  Message over ED for protocol for admission of worsening COVID symptoms.  Patient O2 stat initially was 95% in clinic sitting.  Recheck was done after initial conversation and O2 sat was 93%.  Ambulatory " O2 sat was done and was 90%.  Patient was seated and allow time to rest and O2 sat remained 90%  - initially  prescribed Paxlovid (antiviral) for treatment of COVID-19 due to symptom duration and complex medical history.  - Considered further workup due to concerns about weakness and unsteadiness shortness of breath chest pain along with wheezing.  Due to worsening O2 sat current symptoms as well as hypoxia pt was referred to emergency department   After conversation with amado decision was made for patient to be admitted to emergency department for further care for possible pneumonia and worsening hypoxia    COVID-19:   Confirmed positive  test with symptoms of headache, sore throat, cough, body aches, and shortness of breath.   Discussed contagiousness, potential exposure timeframes, and importance of informing contacts.   Will monitor for improvement or worsening of respiratory symptoms and response to treatment.   Mr. Henning advised to stay hydrated by drinking plenty of water.    DIZZINESS    Mr. Henning experiencing persistent vertigo and lightheadedness for approximately 2 weeks, particularly with postural changes.  Pt taking meclizine as prescribed    Associated symptoms include weakness and unsteady gait requiring ambulatory cane.  Possibly could be related to worsening respiratory symptoms which will be evaluated today neurology follow up pending   Neurology follow-up already scheduled this week for further evaluation.          The patient was informed of the following statements     Emergency Care:Seek immediate medical attention in the emergency room if you experience any new or worsening symptoms, or if your current condition significantly changes or becomes more severe.  Patient Acknowledgment: Patient verbalizes understanding of the plan and agrees to proceed with the recommended care.        Follow Up:  9/15/2025 Chandrika Barrett MD   Future Appointments   Date Time Provider Department Center   6/16/2025  10:00 AM Garland Barraza MD [facility] CARDIO [facility]   9/12/2025  9:00 AM LAB, [facility] [place]   9/15/2025 10:20 AM Chandrika Barrett MD [facility] MED [place]   4/20/2026  3:00 PM Karen Sullivan NP [facility] MED [place]                     No follow-ups on file.    Other Orders Placed This Visit:  Orders Placed This Encounter   Procedures    X-Ray Chest PA And Lateral    Refer to Emergency Dept.    POCT Influenza A/B Molecular    POCT COVID-19 Rapid Screening         This note was generated with the assistance of ambient listening technology. Verbal consent was obtained by the patient and accompanying visitor(s) for the recording of patient appointment to facilitate this note. I attest to having reviewed and edited the generated note for accuracy, though some syntax or spelling errors may persist. Please contact the author of this note for any clarification.        Luis Miguel North PA-C             [1]   Patient Active Problem List  Diagnosis    Mixed hyperlipidemia    Asthma-COPD overlap syndrome    Personal history of tobacco use, presenting hazards to health    Actinic keratosis    Seborrheic keratoses    Allergic rhinosinusitis    Anxiety disorder    Sensorineural hearing loss, bilateral    Testicular hypofunction    Essential hypertension    Family history of diabetes mellitus (DM)    Impaired fasting glucose    Insomnia    Memory loss    Nondependent alcohol abuse, in remission    Overweight with body mass index (BMI) of 25 to 25.9 in adult    MDD (recurrent major depressive disorder) in remission    BPH with lower urinary tract symptoms without urinary obstruction    Aortic atherosclerosis    Alzheimer's disease, unspecified (CODE)    Flushing    Atherosclerotic heart disease of native coronary artery with other forms of angina pectoris    Calcified granuloma of lung    Left bundle branch block    History of MI (myocardial infarction)    Hyperlipidemia LDL goal <70    Gastroesophageal  reflux disease without esophagitis    Schatzki's ring    Hiatal hernia with GERD    Atypical fibroxanthoma of skin of scalp    Hyperparathyroidism    Purpura    Arthralgia

## 2025-05-12 NOTE — ED NOTES
Handoff from Usha PADGETT. Pt presented to the ED with c/o SOB. Pt has tested for COVID. Pt was sent here from PCP because his O2 would drop every time he would walk. O2 currently at 94%. Respiratory currently giving breathing treatment. Family at the bedside.

## 2025-05-12 NOTE — ASSESSMENT & PLAN NOTE
Patient is identified as Severe COVID-19 based on hypoxemia with O2 saturations <94% on room air or on ambulation , however oxygen baseline is 95 - 96% per family at bedside.  Initiate standard COVID protocols; COVID-19 testing ,Infection Control notification  and isolation- respiratory, contact and droplet per protocol    Diagnostics: CBC, CMP, Ferritin, CRP, LDH, BNP, Troponin, ECG, Rapid Flu, Blood Culture x2, and Portable CXR    Management: Maintain oxygen saturations 92-96% via Nasal Cannula  LPM and monitor with continuous/intermittent pulse oximetry.  and Inhaled bronchodilators as needed for shortness of breath.    Advance Care Planning  Current advance care plan has not been discussed with patient/family/POA and patient currently wishes Full Code.

## 2025-05-13 ENCOUNTER — PATIENT OUTREACH (OUTPATIENT)
Facility: OTHER | Age: 85
End: 2025-05-13
Payer: MEDICARE

## 2025-05-13 VITALS
SYSTOLIC BLOOD PRESSURE: 124 MMHG | RESPIRATION RATE: 20 BRPM | WEIGHT: 158.5 LBS | BODY MASS INDEX: 24.88 KG/M2 | HEART RATE: 62 BPM | DIASTOLIC BLOOD PRESSURE: 58 MMHG | HEIGHT: 67 IN | TEMPERATURE: 98 F | OXYGEN SATURATION: 97 %

## 2025-05-13 DIAGNOSIS — U07.1 COVID-19 VIRUS DETECTED: ICD-10-CM

## 2025-05-13 LAB
ABSOLUTE EOSINOPHIL (OHS): 0 K/UL
ABSOLUTE MONOCYTE (OHS): 0.84 K/UL (ref 0.3–1)
ABSOLUTE NEUTROPHIL COUNT (OHS): 5.65 K/UL (ref 1.8–7.7)
ANION GAP (OHS): 10 MMOL/L (ref 8–16)
BASOPHILS # BLD AUTO: 0.01 K/UL
BASOPHILS NFR BLD AUTO: 0.1 %
BUN SERPL-MCNC: 22 MG/DL (ref 8–23)
CALCIUM SERPL-MCNC: 8.8 MG/DL (ref 8.7–10.5)
CHLORIDE SERPL-SCNC: 105 MMOL/L (ref 95–110)
CO2 SERPL-SCNC: 21 MMOL/L (ref 23–29)
CREAT SERPL-MCNC: 1.3 MG/DL (ref 0.5–1.4)
ERYTHROCYTE [DISTWIDTH] IN BLOOD BY AUTOMATED COUNT: 15 % (ref 11.5–14.5)
GFR SERPLBLD CREATININE-BSD FMLA CKD-EPI: 55 ML/MIN/1.73/M2
GLUCOSE SERPL-MCNC: 119 MG/DL (ref 70–110)
HCT VFR BLD AUTO: 40.6 % (ref 40–54)
HGB BLD-MCNC: 13.4 GM/DL (ref 14–18)
IMM GRANULOCYTES # BLD AUTO: 0.06 K/UL (ref 0–0.04)
IMM GRANULOCYTES NFR BLD AUTO: 0.9 % (ref 0–0.5)
LYMPHOCYTES # BLD AUTO: 0.18 K/UL (ref 1–4.8)
MCH RBC QN AUTO: 29.6 PG (ref 27–31)
MCHC RBC AUTO-ENTMCNC: 33 G/DL (ref 32–36)
MCV RBC AUTO: 90 FL (ref 82–98)
NUCLEATED RBC (/100WBC) (OHS): 0 /100 WBC
PLATELET # BLD AUTO: 128 K/UL (ref 150–450)
PMV BLD AUTO: 11.8 FL (ref 9.2–12.9)
POTASSIUM SERPL-SCNC: 3.9 MMOL/L (ref 3.5–5.1)
RBC # BLD AUTO: 4.53 M/UL (ref 4.6–6.2)
RELATIVE EOSINOPHIL (OHS): 0 %
RELATIVE LYMPHOCYTE (OHS): 2.7 % (ref 18–48)
RELATIVE MONOCYTE (OHS): 12.5 % (ref 4–15)
RELATIVE NEUTROPHIL (OHS): 83.8 % (ref 38–73)
SODIUM SERPL-SCNC: 136 MMOL/L (ref 136–145)
WBC # BLD AUTO: 6.74 K/UL (ref 3.9–12.7)

## 2025-05-13 PROCEDURE — 94640 AIRWAY INHALATION TREATMENT: CPT | Mod: HCNC

## 2025-05-13 PROCEDURE — 63600175 PHARM REV CODE 636 W HCPCS: Mod: HCNC | Performed by: STUDENT IN AN ORGANIZED HEALTH CARE EDUCATION/TRAINING PROGRAM

## 2025-05-13 PROCEDURE — 25000003 PHARM REV CODE 250: Mod: HCNC | Performed by: INTERNAL MEDICINE

## 2025-05-13 PROCEDURE — 94761 N-INVAS EAR/PLS OXIMETRY MLT: CPT | Mod: HCNC

## 2025-05-13 PROCEDURE — 80048 BASIC METABOLIC PNL TOTAL CA: CPT | Mod: HCNC | Performed by: INTERNAL MEDICINE

## 2025-05-13 PROCEDURE — 36415 COLL VENOUS BLD VENIPUNCTURE: CPT | Mod: HCNC | Performed by: INTERNAL MEDICINE

## 2025-05-13 PROCEDURE — 85025 COMPLETE CBC W/AUTO DIFF WBC: CPT | Mod: HCNC | Performed by: INTERNAL MEDICINE

## 2025-05-13 PROCEDURE — 25500020 PHARM REV CODE 255: Mod: HCNC | Performed by: STUDENT IN AN ORGANIZED HEALTH CARE EDUCATION/TRAINING PROGRAM

## 2025-05-13 PROCEDURE — 99900035 HC TECH TIME PER 15 MIN (STAT): Mod: HCNC

## 2025-05-13 PROCEDURE — 25000242 PHARM REV CODE 250 ALT 637 W/ HCPCS: Mod: HCNC | Performed by: INTERNAL MEDICINE

## 2025-05-13 PROCEDURE — 25000003 PHARM REV CODE 250: Mod: HCNC | Performed by: STUDENT IN AN ORGANIZED HEALTH CARE EDUCATION/TRAINING PROGRAM

## 2025-05-13 RX ORDER — ACETAMINOPHEN 325 MG/1
650 TABLET ORAL EVERY 6 HOURS PRN
Status: DISCONTINUED | OUTPATIENT
Start: 2025-05-13 | End: 2025-05-13 | Stop reason: HOSPADM

## 2025-05-13 RX ORDER — LEVOFLOXACIN 750 MG/1
750 TABLET, FILM COATED ORAL EVERY OTHER DAY
Status: DISCONTINUED | OUTPATIENT
Start: 2025-05-14 | End: 2025-05-13

## 2025-05-13 RX ORDER — RIVASTIGMINE TARTRATE 1.5 MG/1
4.5 CAPSULE ORAL 2 TIMES DAILY
Status: DISCONTINUED | OUTPATIENT
Start: 2025-05-13 | End: 2025-05-13 | Stop reason: HOSPADM

## 2025-05-13 RX ORDER — MONTELUKAST SODIUM 10 MG/1
10 TABLET ORAL NIGHTLY
Status: DISCONTINUED | OUTPATIENT
Start: 2025-05-13 | End: 2025-05-13 | Stop reason: HOSPADM

## 2025-05-13 RX ORDER — MONTELUKAST SODIUM 10 MG/1
10 TABLET ORAL NIGHTLY
COMMUNITY

## 2025-05-13 RX ORDER — DONEPEZIL HYDROCHLORIDE 5 MG/1
10 TABLET, FILM COATED ORAL NIGHTLY
Status: DISCONTINUED | OUTPATIENT
Start: 2025-05-13 | End: 2025-05-13 | Stop reason: HOSPADM

## 2025-05-13 RX ORDER — SODIUM CHLORIDE, SODIUM LACTATE, POTASSIUM CHLORIDE, CALCIUM CHLORIDE 600; 310; 30; 20 MG/100ML; MG/100ML; MG/100ML; MG/100ML
INJECTION, SOLUTION INTRAVENOUS CONTINUOUS
Status: DISCONTINUED | OUTPATIENT
Start: 2025-05-13 | End: 2025-05-13 | Stop reason: HOSPADM

## 2025-05-13 RX ORDER — MEMANTINE HYDROCHLORIDE 5 MG/1
10 TABLET ORAL 2 TIMES DAILY
Status: DISCONTINUED | OUTPATIENT
Start: 2025-05-13 | End: 2025-05-13 | Stop reason: HOSPADM

## 2025-05-13 RX ORDER — ALBUTEROL SULFATE 90 UG/1
2 INHALANT RESPIRATORY (INHALATION) EVERY 4 HOURS PRN
Qty: 18 G | Refills: 2 | Status: SHIPPED | OUTPATIENT
Start: 2025-05-13 | End: 2025-05-14 | Stop reason: SDUPTHER

## 2025-05-13 RX ORDER — MUPIROCIN 20 MG/G
OINTMENT TOPICAL 2 TIMES DAILY
Status: DISCONTINUED | OUTPATIENT
Start: 2025-05-13 | End: 2025-05-13 | Stop reason: HOSPADM

## 2025-05-13 RX ADMIN — ACETAMINOPHEN 650 MG: 325 TABLET ORAL at 09:05

## 2025-05-13 RX ADMIN — ASPIRIN 81 MG: 81 TABLET, COATED ORAL at 09:05

## 2025-05-13 RX ADMIN — GUAIFENESIN AND DEXTROMETHORPHAN 10 ML: 100; 10 SYRUP ORAL at 09:05

## 2025-05-13 RX ADMIN — IPRATROPIUM BROMIDE AND ALBUTEROL SULFATE 3 ML: 2.5; .5 SOLUTION RESPIRATORY (INHALATION) at 01:05

## 2025-05-13 RX ADMIN — GABAPENTIN 100 MG: 100 CAPSULE ORAL at 09:05

## 2025-05-13 RX ADMIN — MEMANTINE HYDROCHLORIDE 10 MG: 5 TABLET ORAL at 12:05

## 2025-05-13 RX ADMIN — FLUTICASONE FUROATE AND VILANTEROL TRIFENATATE 1 PUFF: 100; 25 POWDER RESPIRATORY (INHALATION) at 07:05

## 2025-05-13 RX ADMIN — THERA TABS 1 TABLET: TAB at 12:05

## 2025-05-13 RX ADMIN — RIVASTIGMINE TARTRATE 4.5 MG: 1.5 CAPSULE ORAL at 12:05

## 2025-05-13 RX ADMIN — PANTOPRAZOLE SODIUM 40 MG: 40 TABLET, DELAYED RELEASE ORAL at 09:05

## 2025-05-13 RX ADMIN — MUPIROCIN: 20 OINTMENT TOPICAL at 09:05

## 2025-05-13 RX ADMIN — IPRATROPIUM BROMIDE AND ALBUTEROL SULFATE 3 ML: 2.5; .5 SOLUTION RESPIRATORY (INHALATION) at 12:05

## 2025-05-13 RX ADMIN — IOHEXOL 100 ML: 350 INJECTION, SOLUTION INTRAVENOUS at 02:05

## 2025-05-13 RX ADMIN — IPRATROPIUM BROMIDE AND ALBUTEROL SULFATE 3 ML: 2.5; .5 SOLUTION RESPIRATORY (INHALATION) at 07:05

## 2025-05-13 RX ADMIN — TAMSULOSIN HYDROCHLORIDE 0.4 MG: 0.4 CAPSULE ORAL at 09:05

## 2025-05-13 RX ADMIN — FLUOXETINE HYDROCHLORIDE 40 MG: 20 CAPSULE ORAL at 09:05

## 2025-05-13 RX ADMIN — SODIUM CHLORIDE, POTASSIUM CHLORIDE, SODIUM LACTATE AND CALCIUM CHLORIDE: 600; 310; 30; 20 INJECTION, SOLUTION INTRAVENOUS at 12:05

## 2025-05-13 NOTE — NURSING
Patient discharging home with wife. All discharge instructions reviewed with VN. IV removed, patient tolerated well.

## 2025-05-13 NOTE — PROGRESS NOTES
Pharmacist Intervention IV to PO Note    Navin Henning Jr. is a 85 y.o. male being treated with IV medication levofloxacin    Patient Data:    Vital Signs (Most Recent):  Temp: 97.8 °F (36.6 °C) (05/13/25 0819)  Pulse: 68 (05/13/25 0819)  Resp: 18 (05/13/25 0819)  BP: (!) 122/57 (05/13/25 0819)  SpO2: (!) 92 % (05/13/25 0819) Vital Signs (72h Range):  Temp:  [97.2 °F (36.2 °C)-98.8 °F (37.1 °C)]   Pulse:  [62-97]   Resp:  [16-22]   BP: (105-140)/(50-87)   SpO2:  [67 %-98 %]      CBC:  Recent Labs   Lab 05/12/25  1402 05/13/25  0432   WBC 10.37 6.74   RBC 4.68 4.53*   HGB 13.8* 13.4*   HCT 42.0 40.6   * 128*   MCV 90 90   MCH 29.5 29.6   MCHC 32.9 33.0     CMP:     Recent Labs   Lab 05/12/25  1402 05/13/25  0432   GLU 96 119*   CALCIUM 8.9 8.8   ALBUMIN 3.4*  --    PROT 6.4  --     136   K 4.2 3.9   CO2 25 21*    105   BUN 18 22   CREATININE 1.2 1.3   ALKPHOS 73  --    ALT 22  --    AST 24  --    BILITOT 0.7  --        Dietary Orders:  Diet Orders            Diet Adult Regular Standard Tray: Regular starting at 05/12 1557            Based on the following criteria, this patient qualifies for intravenous to oral conversion:  [x] The patients gastrointestinal tract is functioning (tolerating medications via oral or enteral route for 24 hours and tolerating food or enteral feeds for 24 hours).  [x] The patient is hemodynamically stable for 24 hours (heart rate <100 beats per minute, systolic blood pressure >99 mm Hg, and respiratory rate <20 breaths per minute).  [x] The patient shows clinical improvement (afebrile for at least 24 hours and white blood cell count downtrending or normalized). Additionally, the patient must be non-neutropenic (absolute neutrophil count >500 cells/mm3).  [x] For antimicrobials, the patient has received IV therapy for at least 24 hours.    IV medication levofloxacin will be changed to oral medication     Pharmacist's Name: Eric Venegas  Pharmacist's Extension:  [phone]

## 2025-05-13 NOTE — PLAN OF CARE
Problem: Adult Inpatient Plan of Care  Goal: Plan of Care Review  Outcome: Progressing      VIRTUAL NURSE: Pt arrived to unit. Permission received per wife  to turn camera to view patient. VIP model explained; patient informed this VN will be working with bedside nurse and the rest of the care team. Plan of care reviewed.  Educated patient on VTE, fall risk and fall risk precautions in place. Call light within reach, side rails up x2. Admission questions completed. Instructed to ask staff for assistance. Verbalized complete understanding.  Initiated plan of care.

## 2025-05-13 NOTE — PLAN OF CARE
Pt will dc with  services. Pt will be contacted by  agency to schedule first appointment time/date. Pts wife is at bedside and will provide transport home. SW will continue to follow pt throughout his transitions of care and assist with any dc needs.     Cleared from CM . Bedside Nurse and VN notified.    [facility]     Future Appointments   Date Time Provider Department Center   5/23/2025  9:30 AM Baylee Fermin MD [facility] IMPRI [place] [facility]   6/16/2025 10:00 AM Garland Barraza MD [facility] CARDIO [place] [facility]   9/12/2025  9:00 AM LAB, [place] [facility] [place]   9/15/2025 10:20 AM Chandrika Barrett MD [facility] MED [place]   4/20/2026  3:00 PM Karen Sullivan, JAYDA [facility] MED [place]        05/13/25 1533   Final Note   Assessment Type Final Discharge Note   Anticipated Discharge Disposition [facility]   Hospital Resources/Appts/Education Provided Appointments scheduled and added to AVS   Post-Acute Status   Discharge Delays None known at this time

## 2025-05-13 NOTE — PLAN OF CARE
Problem: Adult Inpatient Plan of Care  Goal: Plan of Care Review  Outcome: Progressing  Goal: Optimal Comfort and Wellbeing  Outcome: Progressing     Problem: Fall Injury Risk  Goal: Absence of Fall and Fall-Related Injury  Outcome: Progressing     Problem: Pulmonary Impairment  Goal: Effective Airway Clearance  Outcome: Progressing  Goal: Optimal Gas Exchange  Outcome: Progressing     Problem: Comorbidity Management  Goal: Maintenance of COPD Symptom Control  Outcome: Progressing   Plan of care reviewed with patient. Questions answered. All safety measure implemented during shift.

## 2025-05-13 NOTE — PLAN OF CARE
Pt on RA, no respiratory distress noted. Will continue to monitor. Patient given aerosol treatment with no adverse reactions noted. Patient instructed on proper use.

## 2025-05-13 NOTE — PLAN OF CARE
[place] - Telemetry      HOME HEALTH ORDERS  FACE TO FACE ENCOUNTER    Patient Name: Navin Henning Jr.  YOB: 1940    PCP: Chandrika Barrett MD   PCP Address: [address] / [place] [address]  PCP Phone Number: [phone]  PCP Fax: [phone]    Encounter Date: 5/12/25    Admit to Home Health    Diagnoses:  Active Hospital Problems    Diagnosis  POA    *Acute respiratory failure with hypoxia [J96.01, J96.02]  Yes    COVID-19 virus infection [U07.1]  Yes    Hiatal hernia with GERD [K44.9, K21.9]  Yes    Alzheimer's disease, unspecified (CODE) [G30.9]  Yes    MDD (recurrent major depressive disorder) in remission [F33.40]  Yes    BPH with lower urinary tract symptoms without urinary obstruction [N40.1]  Yes    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  Emphysematous changes seen on recent CT chest.  PFTs with mild obstruction and significant bronchodilator response.  Currently on LABA/ICS which can be uses both a controller and rescue  Noted by ENMANUEL THURMAN DO  last documented on [id number]      Mixed hyperlipidemia [E78.2]  Yes      Resolved Hospital Problems   No resolved problems to display.       Follow Up Appointments:  Future Appointments   Date Time Provider Department Center   5/23/2025  9:30 AM Baylee Fermin MD [facility] [place] [facility]   6/16/2025 10:00 AM Garland Barraza MD [facility] CARDIO [place] [facility]   9/12/2025  9:00 AM LAB, [place] [facility] [place]   9/15/2025 10:20 AM Chandrika Barrett MD [facility] MED [place]   4/20/2026  3:00 PM Karen Sullivan NP [facility] [place]       Allergies:  Review of patient's allergies indicates:   Allergen Reactions    Suvorexant Anxiety and Other (See Comments)    Trazodone Other (See Comments)    Augmentin [amoxicillin-pot clavulanate]     Doxycycline Rash and Hives    Penicillins Rash     Tolerates cefdinir with no issues    Sulfamethoxazole-trimethoprim Rash       Medications: Review discharge  medications with patient and family and provide education.    Current Medications[1]     Medication List        START taking these medications      miscellaneous medical supply Kit  Uses as needed to measure oxygen saturation            CONTINUE taking these medications      albuterol 90 mcg/actuation inhaler  Commonly known as: VENTOLIN HFA  Inhale 2 puffs into the lungs every 4 (four) hours as needed for Wheezing or Shortness of Breath. Rescue     albuterol-ipratropium 2.5 mg-0.5 mg/3 mL nebulizer solution  Commonly known as: DUO-NEB  Inhale 3 mLs into the lungs every 6 (six) hours as needed.     aspirin 81 MG EC tablet  Commonly known as: ECOTRIN  Take 1 tablet by mouth once daily.     azelastine 137 mcg (0.1 %) nasal spray  Commonly known as: ASTELIN  2 sprays (274 mcg total) by Nasal route 2 (two) times daily.     budesonide-formoterol 160-4.5 mcg 160-4.5 mcg/actuation Hfaa  Commonly known as: SYMBICORT  Inhale 2 puffs into the lungs every 12 (twelve) hours. Controller     donepeziL 10 MG tablet  Commonly known as: ARICEPT  Take 10 mg by mouth every evening.     FLUoxetine 40 MG capsule  Take 1 capsule (40 mg total) by mouth once daily.     gabapentin 100 MG capsule  Commonly known as: NEURONTIN  Take 100 mg by mouth 3 (three) times daily.     ketoconazole 2 % shampoo  Commonly known as: NIZORAL  SHAMPOO THREE TIMES WEEKLY TO AFFECTED AREAS. LET SIT FOR 5 MINUTES AND WASH OFF. FOR FACE     meclizine 25 mg tablet  Commonly known as: ANTIVERT  Take 1 tablet (25 mg total) by mouth 3 (three) times daily as needed for Dizziness.     melatonin 10 mg Tbdl  Take 1 tablet by mouth every evening.     memantine 28 mg Cspx  Commonly known as: NAMENDA XR     mirtazapine 15 MG tablet  Commonly known as: REMERON  Take 1 tablet (15 mg total) by mouth every evening.     mometasone 0.1% 0.1 % cream  Commonly known as: ELOCON  Apply 1 Application topically once daily.     montelukast 10 mg tablet  Commonly known as:  SINGULAIR  Take 10 mg by mouth every evening.     multivitamin capsule  Take 1 capsule by mouth once daily.     nirmatrelvir-ritonavir 300 mg (150 mg x 2)-100 mg copackaged tablets (EUA)  Take 3 tablets by mouth 2 (two) times daily for 5 days. Each dose contains 2 nirmatrelvir (pink tablets) and 1 ritonavir (white tablet). Take all 3 tablets together     pantoprazole 40 MG tablet  Commonly known as: PROTONIX  Take 1 tablet (40 mg total) by mouth once daily.     rivastigmine tartrate 4.5 mg capsule  Take 4.5 mg by mouth 2 (two) times a day.     rosuvastatin 20 MG tablet  Commonly known as: CRESTOR  Take 1 tablet (20 mg total) by mouth once daily.     tamsulosin 0.4 mg Cap  Commonly known as: FLOMAX  Take 1 capsule (0.4 mg total) by mouth once daily. 30 minutes after dinner     testosterone 20.25 mg/1.25 gram (1.62 %) Glpm  Commonly known as: AndroGeL  Place 3.75 g onto the skin Daily. 3 pumps daily     TOLAK 4 % Crea  Generic drug: fluorouraciL  Apply 1 Application topically every evening.     traMADoL 50 mg tablet  Commonly known as: ULTRAM  Take 1 tablet by mouth 2 (two) times daily as needed for Pain.                I have seen and examined this patient within the last 30 days. My clinical findings that support the need for the home health skilled services and home bound status are the following:no   Weakness/numbness causing balance and gait disturbance due to Infection making it taxing to leave home.  Requiring assistive device to leave home due to unsteady gait caused by  Infection.     Diet:   cardiac diet    Referrals/ Consults  Physical Therapy to evaluate and treat. Evaluate for home safety and equipment needs; Establish/upgrade home exercise program. Perform / instruct on therapeutic exercises, gait training, transfer training, and Range of Motion.  Occupational Therapy to evaluate and treat. Evaluate home environment for safety and equipment needs. Perform/Instruct on transfers, ADL training, ROM, and  therapeutic exercises.    Activities:   activity as tolerated    Nursing:   Agency to admit patient within 24 hours of hospital discharge unless specified on physician order or at patient request    SN to complete comprehensive assessment including routine vital signs. Instruct on disease process and s/s of complications to report to MD. Review/verify medication list sent home with the patient at time of discharge  and instruct patient/caregiver as needed. Frequency may be adjusted depending on start of care date.     Skilled nurse to perform up to 3 visits PRN for symptoms related to diagnosis    Notify MD if SBP > 160 or < 90; DBP > 90 or < 50; HR > 120 or < 50; Temp > 101; O2 < 88%    Ok to schedule additional visits based on staff availability and patient request on consecutive days within the home health episode.    When multiple disciplines ordered:    Start of Care occurs on Sunday - Wednesday schedule remaining discipline evaluations as ordered on separate consecutive days following the start of care.    Thursday SOC -schedule subsequent evaluations Friday and Monday the following week.     Friday - Saturday SOC - schedule subsequent discipline evaluations on consecutive days starting Monday of the following week.    For all post-discharge communication and subsequent orders please contact patient's primary care physician.       Home Health Aide:  Nursing Twice weekly, Physical Therapy Twice weekly, and Occupational Therapy Twice weekly    Wound Care Orders  no    I certify that this patient is confined to his home and needs intermittent skilled nursing care, physical therapy, and occupational therapy.              [1]   Current Facility-Administered Medications   Medication Dose Route Frequency Provider Last Rate Last Admin    acetaminophen tablet 650 mg  650 mg Oral Q6H PRN Alexandra Munoz MD   650 mg at 05/13/25 0952    albuterol-ipratropium 2.5 mg-0.5 mg/3 mL nebulizer solution 3 mL  3 mL Nebulization  Q6H Rogerio Farrell MD   3 mL at 05/13/25 1321    aspirin EC tablet 81 mg  81 mg Oral Daily Rogerio Farrell MD   81 mg at 05/13/25 0928    atorvastatin tablet 40 mg  40 mg Oral QHS Rogerio Farrell MD   40 mg at 05/12/25 2024    dextromethorphan-guaiFENesin  mg/5 ml liquid 10 mL  10 mL Oral Q4H PRN Rogerio Farrell MD   10 mL at 05/13/25 0925    dextrose 50% injection 12.5 g  12.5 g Intravenous PRN Rogerio Farrell MD        donepeziL tablet 10 mg  10 mg Oral QHS Alexandra Munoz MD        enoxaparin injection 40 mg  40 mg Subcutaneous Daily Rogerio Farrell MD   40 mg at 05/12/25 1634    FLUoxetine capsule 40 mg  40 mg Oral Daily Rogerio Farrell MD   40 mg at 05/13/25 0927    fluticasone furoate-vilanteroL 100-25 mcg/dose diskus inhaler 1 puff  1 puff Inhalation Daily Rogerio Farrell MD   1 puff at 05/13/25 0737    gabapentin capsule 100 mg  100 mg Oral TID Rogerio Farrell MD   100 mg at 05/13/25 0927    glucagon (human recombinant) injection 1 mg  1 mg Intramuscular PRN Rogerio Farrell MD        glucose chewable tablet 16 g  16 g Oral PRN Rogerio Farrell MD        lactated ringers infusion   Intravenous Continuous Alexandra Munoz MD 50 mL/hr at 05/13/25 1220 New Bag at 05/13/25 1220    memantine tablet 10 mg  10 mg Oral BID Alexandra Munoz MD   10 mg at 05/13/25 1212    mirtazapine tablet 15 mg  15 mg Oral QHS Rogerio Farrell MD   15 mg at 05/12/25 2024    montelukast tablet 10 mg  10 mg Oral QHS Alexandra Munoz MD        multivitamin tablet  1 tablet Oral Daily Alexandra Munoz MD   1 tablet at 05/13/25 1212    mupirocin 2 % ointment   Nasal BID Alexandra Munoz MD   Given at 05/13/25 0928    naloxone 0.4 mg/mL injection 0.02 mg  0.02 mg Intravenous PRN Rogerio Farrell MD        ondansetron injection 4 mg  4 mg Intravenous Q8H PRN Rogerio Farrell MD        pantoprazole EC tablet 40 mg  40 mg Oral Daily Rogerio Farrell  MD   40 mg at 05/13/25 0928    rivastigmine tartrate capsule 4.5 mg  4.5 mg Oral BID Alexandra Munoz MD   4.5 mg at 05/13/25 1211    senna-docusate 8.6-50 mg per tablet 1 tablet  1 tablet Oral BID Rogerio Farrell MD   1 tablet at 05/12/25 2024    sodium chloride 0.9% flush 10 mL  10 mL Intravenous Q12H PRN Rogerio Farrell MD        tamsulosin 24 hr capsule 0.4 mg  1 capsule Oral Daily Rogerio Farrell MD   0.4 mg at 05/13/25 0928

## 2025-05-13 NOTE — PLAN OF CARE
SW communicated with pt and pts wife via [employer]. Pt is hard of hearing so pts wife answered questions. All information on chart confirmed. Pt resides in home with his wife. Pt is independent in ADLs. Pt has no HH services at home. Pts wife interested in HH at time of dc. HH referral sent via [place]. Pt has dme listed below. Pt normally uses cane to get around. Per wife as of lately he has been using rw due to unsteady gait. At time of dc pts wife will provide transport home. SW will continue to follow pt throughout his transitions of care and assist with any dc needs.     Future Appointments   Date Time Provider Department Center   5/23/2025  9:30 AM Baylee Fermin MD [facility] IMPRI [place] [facility]   6/16/2025 10:00 AM Garland Barraza MD [facility] CARDIO [place] [facility]   9/12/2025  9:00 AM [facility] [place] [facility] [place]   9/15/2025 10:20 AM Chandrika Barrett MD [facility] MED [place]   4/20/2026  3:00 PM Karen Sullivan NP [facility] MED [place]        05/13/25 1205   Discharge Assessment   Assessment Type Discharge Planning Assessment   Confirmed/corrected address, phone number and insurance Yes   Confirmed Demographics Correct on Facesheet   Source of Information family   Does patient/caregiver understand observation status Yes   Communicated SILVER with patient/caregiver Yes   People in Home spouse   Do you expect to return to your current living situation? Yes   Do you have help at home or someone to help you manage your care at home? Yes   Who are your caregiver(s) and their phone number(s)? yusef marks (Spouse)  [phone]   Walking or Climbing Stairs Difficulty no   Dressing/Bathing Difficulty no   Home Layout Able to live on 1st floor   Equipment Currently Used at Home cane, straight;walker, rolling;wheelchair;bedside commode;shower chair;walker, standard   Readmission within 30 days? No   Patient currently being followed by outpatient case management? No   Do you currently have service(s) that help you manage  your care at home? No   Do you take prescription medications? Yes   Do you have prescription coverage? Yes   Do you have any problems affording any of your prescribed medications? No   Is the patient taking medications as prescribed? yes   Who is going to help you get home at discharge? yusef marks (Spouse)  [phone]   How do you get to doctors appointments? family or friend will provide   Are you on dialysis? No   Do you take coumadin? No   Discharge Plan A Home with family   DME Needed Upon Discharge  none   Discharge Plan discussed with: Patient;Spouse/sig other   Name(s) and Number(s) yusef marks (Spouse)  [phone]   Transition of Care Barriers None   Financial Resource Strain   How hard is it for you to pay for the very basics like food, housing, medical care, and heating? Not hard   Housing Stability   In the last 12 months, was there a time when you were not able to pay the mortgage or rent on time? N   At any time in the past 12 months, were you homeless or living in a shelter (including now)? N   Transportation Needs   In the past 12 months, has lack of transportation kept you from medical appointments or from getting medications? no   In the past 12 months, has lack of transportation kept you from meetings, work, or from getting things needed for daily living? No   Food Insecurity   Within the past 12 months, you worried that your food would run out before you got the money to buy more. Never true   Within the past 12 months, the food you bought just didn't last and you didn't have money to get more. Never true   Alcohol Use   Q1: How often do you have a drink containing alcohol? Never   Q2: How many drinks containing alcohol do you have on a typical day when you are drinking? None   Q3: How often do you have six or more drinks on one occasion? Never   Utilities   In the past 12 months has the electric, gas, oil, or water company threatened to shut off services in your home? No   Health  Literacy   How often do you need to have someone help you when you read instructions, pamphlets, or other written material from your doctor or pharmacy? Always   OTHER   Name(s) of People in Home yusef marks (Spouse)  [phone]

## 2025-05-14 ENCOUNTER — PATIENT MESSAGE (OUTPATIENT)
Dept: FAMILY MEDICINE | Facility: CLINIC | Age: 85
End: 2025-05-14
Payer: MEDICARE

## 2025-05-14 DIAGNOSIS — R05.9 COUGH, UNSPECIFIED TYPE: Primary | ICD-10-CM

## 2025-05-14 DIAGNOSIS — R05.8 POST-VIRAL COUGH SYNDROME: ICD-10-CM

## 2025-05-14 LAB
OHS QRS DURATION: 130 MS
OHS QTC CALCULATION: 459 MS

## 2025-05-14 RX ORDER — IPRATROPIUM BROMIDE AND ALBUTEROL SULFATE 2.5; .5 MG/3ML; MG/3ML
3 SOLUTION RESPIRATORY (INHALATION) EVERY 6 HOURS PRN
Qty: 75 ML | Refills: 0 | Status: SHIPPED | OUTPATIENT
Start: 2025-05-14 | End: 2026-05-09

## 2025-05-14 RX ORDER — PROMETHAZINE HYDROCHLORIDE AND DEXTROMETHORPHAN HYDROBROMIDE 6.25; 15 MG/5ML; MG/5ML
5 SYRUP ORAL EVERY 4 HOURS PRN
Qty: 118 ML | Refills: 0 | Status: SHIPPED | OUTPATIENT
Start: 2025-05-14 | End: 2025-05-24

## 2025-05-14 RX ORDER — ALBUTEROL SULFATE 90 UG/1
2 INHALANT RESPIRATORY (INHALATION) EVERY 4 HOURS PRN
Qty: 18 G | Refills: 2 | Status: SHIPPED | OUTPATIENT
Start: 2025-05-14

## 2025-05-14 NOTE — TELEPHONE ENCOUNTER
No care due was identified.  [facility] Embedded Care Due Messages. Reference number: [id number].   5/14/2025 10:41:09 AM CDT

## 2025-05-15 ENCOUNTER — PATIENT OUTREACH (OUTPATIENT)
Dept: ADMINISTRATIVE | Facility: CLINIC | Age: 85
End: 2025-05-15
Payer: MEDICARE

## 2025-05-15 NOTE — PLAN OF CARE
Through communication with [facility], the Inpatient order is being changed to observation as the payer will not authorize Inpatient and the facility agrees not to appeal or challenge the change in status. The account will be changed to Observation for billing purposes.

## 2025-05-15 NOTE — PROGRESS NOTES
C3 nurse spoke with Navin Henning Jr., patient's spouse, Janett, for a TCC post hospital discharge follow up call. The patient has a scheduled HOSFU appointment with Baylee Fermin MD on 5/23/2025 @ 7662.

## 2025-05-15 NOTE — PROGRESS NOTES
C3 nurse spoke with patient's spouse, Janett, who is unable to complete the call at this time. The patient's spouse, Janett, requested a Callback. Patient has a HOSFU with  Baylee Fermin MD on 5/23/2025 @ 2948.

## 2025-05-17 ENCOUNTER — PATIENT OUTREACH (OUTPATIENT)
Facility: OTHER | Age: 85
End: 2025-05-17
Payer: MEDICARE

## 2025-05-17 NOTE — PROGRESS NOTES
Patient was discharged from [facility] on 5/13/25. The discharge summary instructed the patient to follow up with primary care. An appointment is scheduled for 5/23/25. A follow-up call was made to assess for additional needs. There was no answer, and a message was left asking for a return call. An appointment reminder is scheduled for 5/21/25.

## 2025-05-18 LAB
BACTERIA BLD CULT: NORMAL
BACTERIA BLD CULT: NORMAL

## 2025-05-19 ENCOUNTER — PATIENT MESSAGE (OUTPATIENT)
Dept: FAMILY MEDICINE | Facility: CLINIC | Age: 85
End: 2025-05-19
Payer: MEDICARE

## 2025-05-19 DIAGNOSIS — R26.89 ABNORMALITY OF GAIT DUE TO IMPAIRMENT OF BALANCE: Primary | ICD-10-CM

## 2025-05-19 NOTE — PROGRESS NOTES
Patient has history of several falls as of recent. Patient would benefit from balance physical therapy to help with overall gait and stability.  Patient would benefit from overall stability in balanced physical therapy.

## 2025-05-20 NOTE — HOSPITAL COURSE
"85-year-old male with PMH of Alzheimer's dementia, asthma COPD overlap syndrome, hyperlipidemia, BPH presented to the ER with shortness of breath found to be COVID positive.  Admitted due to age, comorbidities, mild hypoxia and risk for decompensation.  CTA chest ruled out PE.  Patient's symptoms improved with supportive care and he requested to be discharged.  Vital signs stable.  Patient remained on room air and did not meet criteria for remdesivir.  Stable for discharge with outpatient follow-up.  Wife at bedside in agreement with plan.    BP (!) 124/58 (BP Location: Left arm, Patient Position: Lying)   Pulse 62   Temp 97.9 °F (36.6 °C) (Oral)   Resp 20   Ht 5' 7" (1.702 m)   Wt 71.9 kg (158 lb 8.2 oz)   SpO2 97%   BMI 24.83 kg/m²     Patient and/or family was seen on day of discharge by myself and was examined. They were stable for discharge. Discharge plan, follow up instructions, and contacts in case of further needs were discussed in detail.     "

## 2025-05-21 ENCOUNTER — PATIENT OUTREACH (OUTPATIENT)
Facility: OTHER | Age: 85
End: 2025-05-21
Payer: MEDICARE

## 2025-05-21 NOTE — PROGRESS NOTES
Successfully contacted patient to confirm appointment for 5/23/2025 9:30 AM Baylee Fermin MD [facility].  Follow up scheduled for 5/24/25 to assess for additional needs.

## 2025-05-23 ENCOUNTER — OFFICE VISIT (OUTPATIENT)
Dept: PRIMARY CARE CLINIC | Facility: CLINIC | Age: 85
End: 2025-05-23
Payer: MEDICARE

## 2025-05-23 VITALS
WEIGHT: 162.5 LBS | OXYGEN SATURATION: 94 % | SYSTOLIC BLOOD PRESSURE: 125 MMHG | TEMPERATURE: 98 F | HEART RATE: 64 BPM | DIASTOLIC BLOOD PRESSURE: 60 MMHG | HEIGHT: 67 IN | BODY MASS INDEX: 25.51 KG/M2

## 2025-05-23 DIAGNOSIS — U07.1 COVID-19: Primary | ICD-10-CM

## 2025-05-23 PROCEDURE — 99999 PR PBB SHADOW E&M-EST. PATIENT-LVL III: CPT | Mod: PBBFAC,HCNC,, | Performed by: INTERNAL MEDICINE

## 2025-05-23 RX ORDER — PREDNISONE 20 MG/1
20 TABLET ORAL DAILY
Qty: 5 TABLET | Refills: 0 | Status: SHIPPED | OUTPATIENT
Start: 2025-05-23 | End: 2025-05-28

## 2025-05-23 NOTE — PROGRESS NOTES
[facility]   New Visit Progress Note   Recent Hospital Discharge     PRESENTING HISTORY     Chief Complaint/Reason for Admission:  Follow up Hospital Discharge   PCP: Chandrika Barrett MD    History of Present Illness:  Mr. Navin Henning Jr. is a 85 y.o. male who was recently admitted to the hospital.    [facility] Medicine  Discharge Summary        Patient Name: Navin Henning Jr.  MRN: [id number]  JORDAN: [id number]  Patient Class: IP- Inpatient  Admission Date: 5/12/2025  Hospital Length of Stay: 1 days  Discharge Date and Time: 5/13/2025  4:13 PM  Attending Physician: Cat att. providers found   Discharging Provider: Alexandra Munoz MD  Primary Care Provider: Chandrika Barrett MD  ___________________________________________________________________    Today:  [facility] to [facility] for initial hospital follow up.  Recently hospitalized for management of COVID infection.  Complicated by COPD at baseline.   Admitted to [facility] service.  Treated with Duo Neb and IV dexamethasone.  CTA chest- no PE or pneumonia.  Respiratory status stable on room air- no indication for Remdesivir.   Patient responded well to above interventions and supportive care.  Discharged to home with [facility].     Patient accompanied today by wife.  Ambulatory and independent with ADL's.  Brought medication bottles for review.  Using Symbicort once daily but prescribed twice daily.  Cough, dyspnea, and fatigue have increased since hospital discharge.  No fever, chills.   Monitoring O2 with home pulse ox- maintaining sats in mid 90's on room air.   Discharged from home health services (goals met) and now attending outpatient physical therapy at non [facility] location in [place].     Review of Systems  General ROS: negative for chills, fever or weight loss  + fatigue   Psychological ROS: negative for hallucination, depression or suicidal ideation  Ophthalmic ROS: negative for blurry vision,  photophobia or eye pain  ENT ROS: negative for epistaxis, sore throat or rhinorrhea  Respiratory ROS: + cough, + shortness of breath, no wheezing  Cardiovascular ROS: no chest pain + dyspnea on exertion  Gastrointestinal ROS: no abdominal pain, change in bowel habits, or black/ bloody stools  Genito-Urinary ROS: no dysuria, trouble voiding, or hematuria  Musculoskeletal ROS: negative for gait disturbance or muscular weakness  Neurological ROS: no syncope or seizures; no ataxia  Dermatological ROS: negative for pruritis, rash and jaundice    PAST HISTORY:     Past Medical History:   Diagnosis Date    Asthma     COPD (chronic obstructive pulmonary disease)     Depression     High cholesterol        Past Surgical History:   Procedure Laterality Date    ESOPHAGOGASTRODUODENOSCOPY N/A 11/26/2024    Procedure: EGD (ESOPHAGOGASTRODUODENOSCOPY);  Surgeon: Samuel Ruiz MD;  Location: [facility];  Service: Endoscopy;  Laterality: N/A;  11/19 ref by Brianna Aggarwal NP, [facility]    SKIN CANCER EXCISION  04/02/2024    BCC right ear, [facility] Dr[facility] Armando [facility]    SKIN CANCER EXCISION Right 01/2025    BCC excision removed by Dr. Armando Martinez       Family History   Problem Relation Name Age of Onset    Heart disease Mother      Diabetes Mother      Emphysema Father      Cancer Brother x1     Bladder Cancer Brother x1     Depression Daughter Rica     Hashimoto's thyroiditis Daughter Rica     Fibromyalgia Daughter Rica     Hashimoto's thyroiditis Daughter Jaymie     Depression Daughter Eveline          MEDICATIONS & ALLERGIES:     Medications Ordered Prior to Encounter[1]     Review of patient's allergies indicates:   Allergen Reactions    Suvorexant Anxiety and Other (See Comments)    Trazodone Other (See Comments)    Augmentin [amoxicillin-pot clavulanate]     Doxycycline Rash and Hives    Penicillins Rash     Tolerates cefdinir with no issues    Sulfamethoxazole-trimethoprim Rash       OBJECTIVE:  "    Vital Signs:  /60 (BP Location: Left arm, Patient Position: Sitting)   Pulse 64   Temp 97.7 °F (36.5 °C) (Oral)   Ht 5' 7" (1.702 m)   Wt 73.7 kg (162 lb 7.7 oz)   SpO2 (!) 94%   BMI 25.45 kg/m²   Wt Readings from Last 3 Encounters:   05/23/25 0937 73.7 kg (162 lb 7.7 oz)   05/12/25 1735 71.9 kg (158 lb 8.2 oz)   05/12/25 1249 72.6 kg (160 lb)   05/12/25 1035 72.1 kg (158 lb 15.2 oz)     Body mass index is 25.45 kg/m².        Physical Exam:  /60 (BP Location: Left arm, Patient Position: Sitting)   Pulse 64   Temp 97.7 °F (36.5 °C) (Oral)   Ht 5' 7" (1.702 m)   Wt 73.7 kg (162 lb 7.7 oz)   SpO2 (!) 94%   BMI 25.45 kg/m²   General appearance: alert, cooperative, no distress  Constitutional:Oriented to person, place, and time  + appears well-developed and well-nourished.   HEENT: Normocephalic, atraumatic, neck symmetrical, no nasal discharge   Eyes: conjunctivae/corneas clear, PERRL, EOM's intact  Lungs: clear to auscultation bilaterally, no dullness to percussion bilaterally  + cough noted during exam   Heart: regular rate and rhythm without rub; no displacement of the PMI   Abdomen: soft, non-tender; bowel sounds normoactive; no organomegaly  Extremities: extremities symmetric; no clubbing, cyanosis, or edema  Integument: Skin color, texture, turgor normal; no rashes; hair distrubution normal  Neurologic: Alert and oriented X 3, normal strength, normal coordination and gait  Psychiatric: no pressured speech; normal affect; no evidence of impaired cognition     Laboratory  Lab Results   Component Value Date    WBC 6.74 05/13/2025    HGB 13.4 (L) 05/13/2025    HCT 40.6 05/13/2025    MCV 90 05/13/2025     (L) 05/13/2025     BMP  Lab Results   Component Value Date     05/13/2025    K 3.9 05/13/2025     05/13/2025    CO2 21 (L) 05/13/2025    BUN 22 05/13/2025    CREATININE 1.3 05/13/2025    CALCIUM 8.8 05/13/2025    ANIONGAP 10 05/13/2025    EGFRNORACEVR 55 (L) 05/13/2025 " "    Lab Results   Component Value Date    ALT 22 05/12/2025    AST 24 05/12/2025    ALKPHOS 73 05/12/2025    BILITOT 0.7 05/12/2025     No results found for: "INR", "PROTIME"  Lab Results   Component Value Date    HGBA1C 5.5 03/05/2025       Diagnostic Results:    CTA Chest 5/13/25:  No pulmonary thromboembolism or other acute abnormality.     Chest X Ray 5/12/25:  No significant change from prior. No new lung opacity. No lung consolidation. No large pleural effusion or pneumothorax. And cardiomediastinal silhouette limited by technique but similar to prior. Advanced degenerative change right shoulder joint partially visualized. Further evaluation as warranted clinically.     TRANSITION OF CARE:     [facility] Contact Note: 5/15/25     Family and/or Caretaker present at visit?  Yes.  Diagnostic tests reviewed/disposition: I have reviewed all completed as well as pending diagnostic tests at the time of discharge.  Disease/illness education: Yes  Home health/community services discussion/referrals: Patient has home health established at [facility]- discharged 2/2 goals met.   Establishment or re-establishment of referral orders for community resources: No other necessary community resources.   Discussion with other health care providers: No discussion with other health care providers necessary.     ASSESSMENT & PLAN:         COVID-19  - recent hospitalization as above  - will resume steroids due to ongoing respiratory symptoms  - increase Symbicort to BID as prescribed  - use home Nebulizer as needed  - notify me if he develops fever/chills or hypoxia < 88%   -     predniSONE (DELTASONE) 20 MG tablet; Take 1 tablet (20 mg total) by mouth once daily. for 5 days  Dispense: 5 tablet; Refill: 0    I will see patient again in hospital follow up clinic 5/28/25.    Instructions for the patient:      Scheduled Follow-up :  Future Appointments   Date Time Provider Department Center   5/28/2025  1:00 PM " Baylee Fermin MD [facility] [place] [facility]   5/30/2025 11:00 AM Brianna Aggarwal, NP [facility] GASTRO [place]   6/16/2025 10:00 AM Garland Barraza MD [facility] CARDIO [place] [facility]   9/12/2025  9:00 AM [facility] [place]   9/15/2025 10:20 AM Chandrika Barrett MD [facility] MED [place]   4/20/2026  3:00 PM Karen Sullivan NP [facility] [place]       Post Visit Medication List:     Medication List            Accurate as of May 23, 2025 10:38 AM. If you have any questions, ask your nurse or doctor.                START taking these medications      predniSONE 20 MG tablet  Commonly known as: DELTASONE  Take 1 tablet (20 mg total) by mouth once daily. for 5 days  Started by: Baylee Fermin MD            CONTINUE taking these medications      albuterol 90 mcg/actuation inhaler  Commonly known as: VENTOLIN HFA  Inhale 2 puffs into the lungs every 4 (four) hours as needed for Wheezing or Shortness of Breath. Rescue     albuterol-ipratropium 2.5 mg-0.5 mg/3 mL nebulizer solution  Commonly known as: DUO-NEB  Take 3 mLs by nebulization every 6 (six) hours as needed for Wheezing.     aspirin 81 MG EC tablet  Commonly known as: ECOTRIN     azelastine 137 mcg (0.1 %) nasal spray  Commonly known as: ASTELIN  2 sprays (274 mcg total) by Nasal route 2 (two) times daily.     budesonide-formoterol 160-4.5 mcg 160-4.5 mcg/actuation Hfaa  Commonly known as: SYMBICORT  Inhale 2 puffs into the lungs every 12 (twelve) hours. Controller     donepeziL 10 MG tablet  Commonly known as: ARICEPT     FLUoxetine 40 MG capsule  Take 1 capsule (40 mg total) by mouth once daily.     gabapentin 100 MG capsule  Commonly known as: NEURONTIN     ketoconazole 2 % shampoo  Commonly known as: NIZORAL     meclizine 25 mg tablet  Commonly known as: ANTIVERT  Take 1 tablet (25 mg total) by mouth 3 (three) times daily as needed for Dizziness.     melatonin 10 mg Tbdl     memantine 28 mg Cspx  Commonly known as: NAMENDA XR     mirtazapine 15  MG tablet  Commonly known as: REMERON  Take 1 tablet (15 mg total) by mouth every evening.     miscellaneous medical supply Kit  Uses as needed to measure oxygen saturation     mometasone 0.1% 0.1 % cream  Commonly known as: ELOCON     montelukast 10 mg tablet  Commonly known as: SINGULAIR     multivitamin capsule     pantoprazole 40 MG tablet  Commonly known as: PROTONIX  Take 1 tablet (40 mg total) by mouth once daily.     promethazine-dextromethorphan 6.25-15 mg/5 mL Syrp  Commonly known as: PROMETHAZINE-DM  Take 5 mLs by mouth every 4 (four) hours as needed.     rivastigmine tartrate 4.5 mg capsule     rosuvastatin 20 MG tablet  Commonly known as: CRESTOR  Take 1 tablet (20 mg total) by mouth once daily.     tamsulosin 0.4 mg Cap  Commonly known as: FLOMAX  Take 1 capsule (0.4 mg total) by mouth once daily. 30 minutes after dinner     testosterone 20.25 mg/1.25 gram (1.62 %) Glpm  Commonly known as: AndroGeL  Place 3.75 g onto the skin Daily. 3 pumps daily     traMADoL 50 mg tablet  Commonly known as: ULTRAM            STOP taking these medications      TOLAK 4 % Crea  Generic drug: fluorouraciL  Stopped by: Baylee Fermin MD               Where to Get Your Medications        These medications were sent to [facility]  [address], [place] [address]      Hours: Mon-Fri, 8a-5:30p Phone: [phone]   predniSONE 20 MG tablet         Signing Physician:  Baylee Fermin MD         [1]   Current Outpatient Medications on File Prior to Visit   Medication Sig Dispense Refill    albuterol (VENTOLIN HFA) 90 mcg/actuation inhaler Inhale 2 puffs into the lungs every 4 (four) hours as needed for Wheezing or Shortness of Breath. Rescue 18 g 2    albuterol-ipratropium (DUO-NEB) 2.5 mg-0.5 mg/3 mL nebulizer solution Take 3 mLs by nebulization every 6 (six) hours as needed for Wheezing. 75 mL 0    aspirin (ECOTRIN) 81 MG EC tablet Take 1 tablet by mouth once daily.      azelastine (ASTELIN) 137  mcg (0.1 %) nasal spray 2 sprays (274 mcg total) by Nasal route 2 (two) times daily. 60 mL 3    budesonide-formoterol 160-4.5 mcg (SYMBICORT) 160-4.5 mcg/actuation HFAA Inhale 2 puffs into the lungs every 12 (twelve) hours. Controller 30.6 g 3    donepeziL (ARICEPT) 10 MG tablet Take 10 mg by mouth every evening.      FLUoxetine 40 MG capsule Take 1 capsule (40 mg total) by mouth once daily. 90 capsule 3    gabapentin (NEURONTIN) 100 MG capsule Take 100 mg by mouth 3 (three) times daily. (Patient taking differently: Take 100 mg by mouth 3 (three) times daily.)      ketoconazole (NIZORAL) 2 % shampoo SHAMPOO THREE TIMES WEEKLY TO AFFECTED AREAS. LET SIT FOR 5 MINUTES AND WASH OFF. FOR FACE      meclizine (ANTIVERT) 25 mg tablet Take 1 tablet (25 mg total) by mouth 3 (three) times daily as needed for Dizziness. 30 tablet 1    melatonin 10 mg TbDL Take 1 tablet by mouth every evening.      memantine (NAMENDA XR) 28 mg CSpX       mirtazapine (REMERON) 15 MG tablet Take 1 tablet (15 mg total) by mouth every evening. 90 tablet 3    miscellaneous medical supply (PULSE OXIMETER) Kit Uses as needed to measure oxygen saturation 1 kit 0    mometasone 0.1% (ELOCON) 0.1 % cream Apply 1 Application topically once daily.      montelukast (SINGULAIR) 10 mg tablet Take 10 mg by mouth every evening.      multivitamin capsule Take 1 capsule by mouth once daily.      pantoprazole (PROTONIX) 40 MG tablet Take 1 tablet (40 mg total) by mouth once daily. 90 tablet 3    promethazine-dextromethorphan (PROMETHAZINE-DM) 6.25-15 mg/5 mL Syrp Take 5 mLs by mouth every 4 (four) hours as needed. 118 mL 0    rivastigmine tartrate 4.5 mg capsule Take 4.5 mg by mouth 2 (two) times a day.      rosuvastatin (CRESTOR) 20 MG tablet Take 1 tablet (20 mg total) by mouth once daily. 90 tablet 3    tamsulosin (FLOMAX) 0.4 mg Cap Take 1 capsule (0.4 mg total) by mouth once daily. 30 minutes after dinner 90 capsule 3    testosterone (ANDROGEL) 20.25 mg/1.25  gram (1.62 %) GlPm Place 3.75 g onto the skin Daily. 3 pumps daily 150 g 4    TOLAK 4 % Crea Apply 1 Application topically every evening. (Patient not taking: Reported on 5/15/2025)      traMADoL (ULTRAM) 50 mg tablet Take 1 tablet by mouth 2 (two) times daily as needed for Pain.       No current facility-administered medications on file prior to visit.

## 2025-05-23 NOTE — PHYSICIAN QUERY
Due to the conflicting clinical picture, please clinically validate the diagnosis of Acute respiratory failure with hypoxia.    If validated, please provide additional clinical support for the diagnosis.    The respiratory condition is confirmed. Additional clinical support/decision making indicators for the diagnosis include (please specify):     Patient initially presented to Urgent care/PCP with SOB. Found to be saturating about 90-93% and found to be covid positive, Necessitating his presentation to the ER. Although he was subjectively SOB, he did not have increased work of breathing.  I was concerned he may have a PE and will need additional studies if hypoxia persists. Optimized with breathing treatments.

## 2025-05-25 ENCOUNTER — PATIENT OUTREACH (OUTPATIENT)
Facility: OTHER | Age: 85
End: 2025-05-25
Payer: MEDICARE

## 2025-05-25 NOTE — PROGRESS NOTES
Patient completed a hospital follow-up appointment on 5/23/25 with Dr. Baylee Fermin. A follow-up call was made to assess for additional needs. Spoke with the patient's wife, who handles his calls as he is hard of hearing. Patient's wife stated that Dr. Fermin was fantastic and answered all their questions. They have no needs or concerns at this time.

## 2025-05-28 ENCOUNTER — OFFICE VISIT (OUTPATIENT)
Dept: PRIMARY CARE CLINIC | Facility: CLINIC | Age: 85
End: 2025-05-28
Payer: MEDICARE

## 2025-05-28 VITALS
BODY MASS INDEX: 26.23 KG/M2 | DIASTOLIC BLOOD PRESSURE: 67 MMHG | HEIGHT: 67 IN | OXYGEN SATURATION: 95 % | SYSTOLIC BLOOD PRESSURE: 138 MMHG | HEART RATE: 69 BPM | WEIGHT: 167.13 LBS

## 2025-05-28 DIAGNOSIS — U07.1 COVID-19 VIRUS INFECTION: Primary | ICD-10-CM

## 2025-05-28 DIAGNOSIS — J44.89 ASTHMA-COPD OVERLAP SYNDROME: ICD-10-CM

## 2025-05-28 PROCEDURE — 3078F DIAST BP <80 MM HG: CPT | Mod: CPTII,HCNC,S$GLB, | Performed by: INTERNAL MEDICINE

## 2025-05-28 PROCEDURE — 99213 OFFICE O/P EST LOW 20 MIN: CPT | Mod: HCNC,S$GLB,, | Performed by: INTERNAL MEDICINE

## 2025-05-28 PROCEDURE — 1126F AMNT PAIN NOTED NONE PRSNT: CPT | Mod: CPTII,HCNC,S$GLB, | Performed by: INTERNAL MEDICINE

## 2025-05-28 PROCEDURE — 3288F FALL RISK ASSESSMENT DOCD: CPT | Mod: CPTII,HCNC,S$GLB, | Performed by: INTERNAL MEDICINE

## 2025-05-28 PROCEDURE — 1111F DSCHRG MED/CURRENT MED MERGE: CPT | Mod: CPTII,HCNC,S$GLB, | Performed by: INTERNAL MEDICINE

## 2025-05-28 PROCEDURE — 3075F SYST BP GE 130 - 139MM HG: CPT | Mod: CPTII,HCNC,S$GLB, | Performed by: INTERNAL MEDICINE

## 2025-05-28 PROCEDURE — 1157F ADVNC CARE PLAN IN RCRD: CPT | Mod: CPTII,HCNC,S$GLB, | Performed by: INTERNAL MEDICINE

## 2025-05-28 PROCEDURE — 99999 PR PBB SHADOW E&M-EST. PATIENT-LVL IV: CPT | Mod: PBBFAC,HCNC,, | Performed by: INTERNAL MEDICINE

## 2025-05-28 PROCEDURE — 1159F MED LIST DOCD IN RCRD: CPT | Mod: CPTII,HCNC,S$GLB, | Performed by: INTERNAL MEDICINE

## 2025-05-28 PROCEDURE — 1101F PT FALLS ASSESS-DOCD LE1/YR: CPT | Mod: CPTII,HCNC,S$GLB, | Performed by: INTERNAL MEDICINE

## 2025-05-28 NOTE — PROGRESS NOTES
Subjective:       Patient ID: Navin Henning Jr. is a 85 y.o. male.    Chief Complaint: Hospital Follow Up    HPI:  Hospitalized 5/12/25 - 5/13/25 for management of COVID infection.  Complicated by COPD at baseline.   Admitted to [facility] service.  Treated with Duo Neb and IV dexamethasone.  CTA chest- no PE or pneumonia.  Respiratory status stable on room air- no indication for Remdesivir.   Patient responded well to above interventions and supportive care.  Discharged to home with [facility].     [facility] 5/23/25->  Using Symbicort once daily but prescribed twice daily.  Cough, dyspnea, and fatigue have increased since hospital discharge.  Monitoring O2 with home pulse ox- maintaining sats in mid 90's on room air.   Discharged from home health services (goals met) and now attending outpatient physical therapy at non [facility] location in [place].   Prednisone 5 day course prescribed; patient advised to increase Symbicort to BID, and use Nebulizer prn.     [facility] 5/28/25->  Feeling much better.  Completed Steroids.  Taking Symbicort as prescribed.  Started outpatient PT today.  Respiratory status at baseline.  Cough, dyspnea have resolved.     Review of Systems   Constitutional: Negative.            Objective:      Vital Signs:  Vitals:    05/28/25 1304   BP: 138/67   Pulse: 69     Wt Readings from Last 3 Encounters:   05/28/25 1304 75.8 kg (167 lb 1.7 oz)   05/23/25 0937 73.7 kg (162 lb 7.7 oz)   05/12/25 1735 71.9 kg (158 lb 8.2 oz)   05/12/25 1249 72.6 kg (160 lb)     Body mass index is 26.17 kg/m².   SpO2 Readings from Last 1 Encounters:   05/23/25 (!) 94%       Physical Exam  Constitutional:       General: He is not in acute distress.     Appearance: He is not ill-appearing.   Cardiovascular:      Rate and Rhythm: Normal rate.   Pulmonary:      Effort: Pulmonary effort is normal. No respiratory distress.      Breath sounds: No wheezing.   Neurological:       General: No focal deficit present.      Mental Status: He is oriented to person, place, and time.   Psychiatric:         Mood and Affect: Mood normal.           Assessment:       1. COVID-19 virus infection    2. Asthma-COPD overlap syndrome        Plan:       Diagnoses and all orders for this visit:    COVID-19 virus infection  - recent hospitalization as above  - symptoms have resolved    Asthma-COPD overlap syndrome  - responded favorably to steroid course   - continue Symbicort    Patient will be released from hospital follow up clinic.  Follow up as detailed below.           Scheduled Follow-up :  Future Appointments   Date Time Provider Department Center   5/30/2025 11:00 AM Brianna Aggarwal NP [facility] GASTRO [place]   6/16/2025 10:00 AM Garland Barraza MD [facility] [place] [facility]   9/12/2025  9:00 AM [facility] [place]   9/15/2025 10:20 AM Chandrika Barrett MD [facility] MED [place]   4/20/2026  3:00 PM Karen Sullivan NP [facility] MED [place]       Post Visit Medication List:     Medication List            Accurate as of May 28, 2025  1:36 PM. If you have any questions, ask your nurse or doctor.                CONTINUE taking these medications      albuterol 90 mcg/actuation inhaler  Commonly known as: VENTOLIN HFA  Inhale 2 puffs into the lungs every 4 (four) hours as needed for Wheezing or Shortness of Breath. Rescue     albuterol-ipratropium 2.5 mg-0.5 mg/3 mL nebulizer solution  Commonly known as: DUO-NEB  Take 3 mLs by nebulization every 6 (six) hours as needed for Wheezing.     aspirin 81 MG EC tablet  Commonly known as: ECOTRIN     azelastine 137 mcg (0.1 %) nasal spray  Commonly known as: ASTELIN  2 sprays (274 mcg total) by Nasal route 2 (two) times daily.     budesonide-formoterol 160-4.5 mcg 160-4.5 mcg/actuation Hfaa  Commonly known as: SYMBICORT  Inhale 2 puffs into the lungs every 12 (twelve) hours. Controller     donepeziL 10 MG tablet  Commonly known as: ARICEPT      FLUoxetine 40 MG capsule  Take 1 capsule (40 mg total) by mouth once daily.     gabapentin 100 MG capsule  Commonly known as: NEURONTIN     ketoconazole 2 % shampoo  Commonly known as: NIZORAL     meclizine 25 mg tablet  Commonly known as: ANTIVERT  Take 1 tablet (25 mg total) by mouth 3 (three) times daily as needed for Dizziness.     melatonin 10 mg Tbdl     memantine 28 mg Cspx  Commonly known as: NAMENDA XR     mirtazapine 15 MG tablet  Commonly known as: REMERON  Take 1 tablet (15 mg total) by mouth every evening.     miscellaneous medical supply Kit  Uses as needed to measure oxygen saturation     mometasone 0.1% 0.1 % cream  Commonly known as: ELOCON     montelukast 10 mg tablet  Commonly known as: SINGULAIR     multivitamin capsule     pantoprazole 40 MG tablet  Commonly known as: PROTONIX  Take 1 tablet (40 mg total) by mouth once daily.     predniSONE 20 MG tablet  Commonly known as: DELTASONE  Take 1 tablet (20 mg total) by mouth once daily. for 5 days     rivastigmine tartrate 4.5 mg capsule     rosuvastatin 20 MG tablet  Commonly known as: CRESTOR  Take 1 tablet (20 mg total) by mouth once daily.     tamsulosin 0.4 mg Cap  Commonly known as: FLOMAX  Take 1 capsule (0.4 mg total) by mouth once daily. 30 minutes after dinner     testosterone 20.25 mg/1.25 gram (1.62 %) Glpm  Commonly known as: AndroGeL  Place 3.75 g onto the skin Daily. 3 pumps daily     traMADoL 50 mg tablet  Commonly known as: ULTRAM

## 2025-05-30 ENCOUNTER — OFFICE VISIT (OUTPATIENT)
Dept: GASTROENTEROLOGY | Facility: CLINIC | Age: 85
End: 2025-05-30
Payer: MEDICARE

## 2025-05-30 VITALS
HEIGHT: 67 IN | HEART RATE: 65 BPM | WEIGHT: 165.38 LBS | SYSTOLIC BLOOD PRESSURE: 148 MMHG | BODY MASS INDEX: 25.96 KG/M2 | DIASTOLIC BLOOD PRESSURE: 76 MMHG

## 2025-05-30 DIAGNOSIS — K44.9 HIATAL HERNIA WITH GERD: ICD-10-CM

## 2025-05-30 DIAGNOSIS — K22.2 SCHATZKI'S RING: ICD-10-CM

## 2025-05-30 DIAGNOSIS — R13.10 DYSPHAGIA, UNSPECIFIED TYPE: Primary | ICD-10-CM

## 2025-05-30 DIAGNOSIS — K21.9 HIATAL HERNIA WITH GERD: ICD-10-CM

## 2025-05-30 PROCEDURE — 99999 PR PBB SHADOW E&M-EST. PATIENT-LVL III: CPT | Mod: PBBFAC,,,

## 2025-05-30 NOTE — PATIENT INSTRUCTIONS
Start daily fiber (Metamucil). Take 1 tsp of fiber powder (psyllium or other sugar-free powder).  Mix in 8 oz of water. Take x 3-5 days.  Then, increase fiber by 1 tsp every 3-5 days until stool is easy to pass.  Stop and continue at that dose.   Do not exceed 6 tsps/day. GOAL:  More well-formed stool (one continuous well-formed piece vs. Pellets) and minimize straining with initiation. Can cause increased gas. *DRINK PLENTY OF WATER WITH THIS FOR MAX EFFECTIVENESS**    For GERD/Reflux:     Take your PPI 30-45 minutes before your first protein containing meal (breakfast) every day. Take once daily for 8 weeks. If symptoms improve ok discontinue an use PPI as needed for symptoms.      Take Pepcid 20mg in the evening before bedtime to help with nocturnal symptoms, as needed.     Remain upright for at least 3 hours after eating.      Elevate the head of the bed for nighttime.      Avoid foods that you have noticed make your symptoms worse (possible triggers include: peppermint, alcohol, chocolate, caffeine, spicy foods, greasy/fried foods, acidic foods-citrus).     GERD Treatment: Lifestyle and Dietary Changes    Dietary and lifestyle changes are the first step in treating GERD. Certain foods make the reflux worse. Suggestions to help alleviate symptoms include:  Lose weight if you are overweight -- of all of the lifestyle changes you can make, this one is the most effective.  Avoid foods that increase the level of acid in your stomach, including caffeinated beverages.  Avoid foods that decrease the pressure in the lower esophagus, such as fatty foods, alcohol and peppermint.  Avoid foods that affect peristalsis (the muscle movements in your digestive tract), such as coffee, alcohol and acidic liquids.  Avoid foods that slow gastric emptying, including fatty foods.  Avoid large meals.  Quit smoking.  Do not lie down immediately after a meal.  Elevate the level of your head when you lie down.    Foods That May Cause  Heartburn  Foods commonly known to be heartburn triggers cause the esophageal sphincter to relax and delay the digestive process, letting food sit in the stomach longer, says Ajay. The worst culprits? Foods that are high in fat, salt or spice such as:  Fried food  Fast food  Pizza  Potato chips and other processed snacks  Chili powder and pepper (white, black, cayenne)  Fatty meats such as simon and sausage  Cheese  Other foods that can cause the same problem include:  Tomato-based sauces  Citrus fruits  Chocolate  Peppermint  Carbonated beverages    Moderation is key since many people may not be able to or want to completely eliminate these foods. But try to avoid eating problem foods late in the evening closer to bedtime, so they're not sitting in your stomach and then coming up your esophagus when you lay down at night. It's also a good idea to eat small frequent meals instead of bigger, heavier meals and avoid late-night dinners and bedtime snacks.    Foods That Help Prevent Acid Reflux  Good news: There are plenty of things you can eat to help prevent acid reflux. Stock your kitchen with foods from these three categories:    High-fiber foods  Fibrous foods make you feel full so you're less likely to overeat, which may contribute to heartburn. So, load up on healthy fiber from these foods:  Whole grains such as oatmeal, couscous and brown rice.  Root vegetables such as sweet potatoes, carrots and beets.  Green vegetables such as asparagus, broccoli and green beans.    Alkaline foods  Foods fall somewhere along the pH scale (an indicator of acid levels). Those that have a low pH are acidic and more likely to cause reflux. Those with higher pH are alkaline and can help offset strong stomach acid. Alkaline foods include:  Bananas  Melons  Cauliflower  Fennel  Nuts    Watery foods  Eating foods that contain a lot of water can dilute and weaken stomach acid. Choose foods such  as:  Celery  Cucumber  Lettuce  Watermelon  Broth-based soups  Herbal tea

## 2025-05-30 NOTE — PROGRESS NOTES
Patient ID: Navin Henning Jr. is a 85 y.o. male.    PCP:   Chandrika Barrett       Gastroenterology Clinic Consultation Note    Reason for Visit:  The primary encounter diagnosis was Dysphagia, unspecified type. Diagnoses of Hiatal hernia with GERD and Schatzki's ring were also pertinent to this visit.  Chief Complaint: Dysphagia and Encopresis    History of Present Illness    CHIEF COMPLAINT:  Patient presents today for follow up of swallowing difficulties.     He was initially seen by me on 11/12/2024 with c/o of GERD with dysphagia and globus sensation. This was a new problem that began a few months prior to our visit. Is having difficulty with mostly solid foods and often times liquids as well. No identifiable food that causes him more trouble. When he does experience this, he goes into coughing/choking spells.      He did also report longstanding history of occasional GERD related symptoms. Says he occasionally experiences reflux with heartburn but very rare. Does not associate these symptoms with any certain foods. Was recently placed on famotidine 20 mg nightly. Says he does not take this often as these symptoms do not appear as bothersome to him.     DYSPHAGIA:  Symptoms are ongoing despite esophageal dilation in November. Noted mild improvement following this with more recent return of symptoms. He reports a recent choking episode with water last night where he began to experience facial color change. Endoscopy revealed a 4 cm hiatal hernia, Schatzki's ring which was dilated, and erosive gastropathy. Biopsy was negative for H. pylori. He continues Protonix daily as recommended.    Recent EGD recommended repeat EGD for dilation as needed and and if still persistent following balloon dilation, consider esophagram with tablet, as the medium sized HH is what could be causing his symptoms.     GASTROINTESTINAL:  He reports liquid stool leakage requiring two pads daily, worsening over the past month. He has  daily bowel movements but needs to strain during defecation. His stools are hard and small when passing, with sensation of incomplete evacuation. He continues Colace for bowel management. Denies blood in his stool.     Denies any odynophagia, nausea, vomiting, heartburn or acid regurgitation. No abdominal pains, blood/ mucus in stool or unintentional weight loss. Nocturnal symptoms. No melena or maroon stools. No recent changes in diet or medications. No family history of IBD, Celiac disease or GI malignancy. No regular NSAIDs. No alcohol or tobacco use. No recent antibiotic use, travels or sick contacts. No prior history of C.diff.    Abdominal Surgeries: None    ROS:  Review of Systems   Constitutional:  Negative for chills, fever, malaise/fatigue and weight loss.   HENT:          Throat clearing   Respiratory:  Positive for cough. Negative for hemoptysis, sputum production, shortness of breath and wheezing.    Cardiovascular:  Negative for chest pain, palpitations, orthopnea, claudication, leg swelling and PND.   Gastrointestinal:  Negative for abdominal pain, blood in stool, constipation, diarrhea, heartburn, melena, nausea and vomiting.        Dysphagia   Genitourinary:  Negative for dysuria, flank pain, frequency, hematuria and urgency.   Musculoskeletal:  Negative for back pain, falls, joint pain, myalgias and neck pain.   Skin:  Negative for itching and rash.   Neurological:  Negative for dizziness, seizures, loss of consciousness, weakness and headaches.   Psychiatric/Behavioral:  The patient is not nervous/anxious and does not have insomnia.       Medical History:  has a past medical history of Asthma, COPD (chronic obstructive pulmonary disease), Depression, and High cholesterol.    Surgical History:  has a past surgical history that includes Skin cancer excision (04/02/2024); Esophagogastroduodenoscopy (N/A, 11/26/2024); and Skin cancer excision (Right, 01/2025).    Family History: family history  "includes Bladder Cancer in his brother; Cancer in his brother; Depression in his daughter and daughter; Diabetes in his mother; Emphysema in his father; Fibromyalgia in his daughter; Hashimoto's thyroiditis in his daughter and daughter; Heart disease in his mother..     Vital Signs:  BP (!) 148/76 (BP Location: Left arm, Patient Position: Sitting)   Pulse 65   Ht 5' 7" (1.702 m)   Wt 75 kg (165 lb 5.5 oz)   BMI 25.90 kg/m²   Body mass index is 25.9 kg/m².    Physical Exam    General: No acute distress. Well-developed. Well-nourished.  Eyes: EOMI. Sclerae anicteric.  HENT: Normocephalic. Atraumatic. Nares patent. Moist oral mucosa.  Ears: Bilateral TMs clear. Bilateral EACs clear.  Cardiovascular: Regular rate. Regular rhythm. No murmurs. No rubs. No gallops. Normal S1, S2.  Respiratory: Normal respiratory effort. Clear to auscultation bilaterally. No rales. No rhonchi. No wheezing.  Abdomen: Soft. Non-tender. Non-distended. Normoactive bowel sounds.  Musculoskeletal: No  obvious deformity.  Extremities: No lower extremity edema.  Neurological: Alert & oriented x3. No slurred speech. Normal gait.  Psychiatric: Normal mood. Normal affect. Good insight. Good judgment.  Skin: Warm. Dry. No rash.         Review of patient's allergies indicates:   Allergen Reactions    Suvorexant Anxiety and Other (See Comments)    Trazodone Other (See Comments)    Augmentin [amoxicillin-pot clavulanate]     Doxycycline Rash and Hives    Penicillins Rash     Tolerates cefdinir with no issues    Sulfamethoxazole-trimethoprim Rash     Labs:  Lab Results   Component Value Date    WBC 6.74 05/13/2025    HGB 13.4 (L) 05/13/2025    HCT 40.6 05/13/2025     (L) 05/13/2025    CRP 10.1 (H) 05/12/2025    CHOL 116 (L) 03/05/2025    TRIG 57 03/05/2025    HDL 57 03/05/2025    ALKPHOS 73 05/12/2025    LIPASE 149 (H) 05/03/2024    ALT 22 05/12/2025    AST 24 05/12/2025     05/13/2025    K 3.9 05/13/2025     05/13/2025    CREATININE " 1.3 05/13/2025    BUN 22 05/13/2025    CO2 21 (L) 05/13/2025    TSH 2.394 03/05/2025    HGBA1C 5.5 03/05/2025     Imaging reviewed:   EXAMINATION:  CT ABDOMEN PELVIS WITH IV CONTRAST 5/3/2024  CLINICAL HISTORY:  LLQ abdominal pain  FINDINGS:  Lung Bases: Stable 6 mm right lower lobe pulmonary nodule abutting the hemidiaphragm (series 2, image 13).  This is stable dating back to 03/03/2019 and is compatible with a benign etiology.  No further follow-up needed.  Lung bases are otherwise clear.  Heart: Heart size is normal.  No pericardial effusion.  Liver: The liver is normal in size and demonstrates homogeneous enhancement without focal lesion.  The portal vasculature is patent.  Biliary tract: No intrahepatic or extrahepatic biliary ductal dilatation.  Gallbladder: No radiodense gallstone. No wall thickening or pericholecystic fluid.  Pancreas: Normal. No pancreatic ductal dilatation.  Spleen: Normal size without focal lesion.  Adrenals: Unremarkable.  Kidneys and urinary collecting systems: There is a too small to characterize hypodensity in the right kidney midpole.  There is mild cortical thinning of the bilateral kidneys which can be seen with chronic renal disease.  Previously described prominence of the right renal pelvis has resolved.  No hydronephrosis or urolithiasis.  Lymph nodes: None enlarged.  Stomach and bowel: The stomach is normal.  Loops of small and large bowel are normal in caliber without evidence for inflammation or obstruction.  Previously noted proximal small bowel wall thickening has resolved.  There is colonic diverticulosis.  There is trace stranding about the descending colon (series 2, image 79), compatible with mild acute, uncomplicated diverticulitis.  The appendix is normal.  Peritoneum and mesentery: No ascites or free intraperitoneal air.  No abdominal fluid collection.  Vasculature: There is moderate atherosclerosis.  Urinary bladder: No wall thickening.  Reproductive organs: The  prostate is mildly prominent.  Body wall: No abnormality.  Musculoskeletal: No aggressive osseous lesion.  There are degenerative changes of the spine.  There is grade 1 anterolisthesis of L5 on S1.  Impression:  Mild acute uncomplicated diverticulitis of the descending colon.    Endoscopy reviewed:   Procedure:             Upper GI endoscopy 11/26/2024  Indications:           Dysphagia, Suspected esophageal reflux, Globus                          sensation   Impression:            - 4 cm hiatal hernia.                          - Widely patent Schatzki ring. Dilated. Initially                          I dilated with 48fr savary given his symptoms                          reported high in neck, but then transition to                          large balloon at lower esophagus to break ring at                          20mm.                          - Erosive gastropathy with no bleeding and no                          stigmata of recent bleeding. Biopsied.                          - Erosive gastropathy with no bleeding and no                          stigmata of recent bleeding.                          - Erythematous duodenopathy.   Final Pathologic Diagnosis STOMACH, BIOPSY:  - Benign oxyntic mucosa with focal features suggestive of fundic gland polyp and chemical/reactive gastropathy.    - No Helicobacter pylori microorganisms identified on H&E stain   Recommendation:        - Discharge patient to home.                          - Patient has a contact number available for                          emergencies. The signs and symptoms of potential                          delayed complications were discussed with the                          patient. Return to normal activities tomorrow.                          Written discharge instructions were provided to                          the patient.                          - Resume previous diet.                          - Continue present medications.                           - Await pathology results.                          - Repeat upper endoscopy PRN for retreatment.                          - Would monitor symptoms of globus , and if still                          persistent after todays balloon dilation all the                          way to 20mm, would then consider esophagram with                          tablet, as the medium sized sliding hiatal hernia                          may be causing his symptoms.                          - Start protonix 40mg daily     Assessment & Plan    R13.12 Dysphagia, oropharyngeal phase  K44.9 Diaphragmatic hernia without obstruction or gangrene  K22.2 Schatzki's ring   K29.60 Other gastritis without bleeding  K59.02 Outlet dysfunction constipation    DYSPHAGIA AND SCHATZKI'S RING  - Reviewed recent upper endoscopy findings: 4 cm hiatal hernia, Schatzki's ring, and erosive gastropathy.  - Suspect Schatzki's ring and hiatal hernia may be contributing to swallowing difficulties and choking episodes.  - Ordered esophagram to assess swallowing function.    DIAPHRAGMATIC HERNIA:  - Reviewed recent upper endoscopy findings: 4 cm hiatal hernia, Schatzki's ring, and erosive gastropathy.  - Suspect Schatzki's ring and hiatal hernia may be contributing to swallowing difficulties and choking episodes.    GASTRITIS:  - Reviewed recent upper endoscopy findings: 4 cm hiatal hernia, Schatzki's ring, and erosive gastropathy.    CONSTIPATION AND DIARRHEA:  - Assessed for possible overflow diarrhea based on report of hard stools with straining, followed by liquid stool leakage.  - Determined fiber supplement may help bulk form stools and improve bowel regularity.  - Explained potential initial side effects of fiber supplement, including bloating and cramping, which typically resolve after about a week of use.  - Discussed importance of adequate water intake with fiber supplement for effectiveness.  - Educated on overflow diarrhea concept, where liquid  stool leaks around impacted harder stool.  - Started Metamucil (fiber supplement) to be taken with plenty of water, patient to increase water intake. This information is included in AVS.     Assessment:  1. Dysphagia, unspecified type    2. Hiatal hernia with GERD    3. Schatzki's ring      Orders Placed This Encounter    FL Esophagram Complete     Follow up in about 3 months (around 8/30/2025).    Thank you for allowing me to participate in this patient's care.     Sincerely,      DIRK CONNOLLY  Gastroenterology Department  [facility]     This note was generated with the assistance of ambient listening technology. Verbal consent was obtained by the patient and accompanying visitor(s) for the recording of patient appointment to facilitate this note. I attest to having reviewed and edited the generated note for accuracy, though some syntax or spelling errors may persist. Please contact the author of this note for any clarification.

## 2025-06-11 NOTE — ED PROVIDER NOTES
Encounter Date: 5/12/2025       History     Chief Complaint   Patient presents with    Shortness of Breath     C/o SOB, productive cough with green mucus, sore scratchy throat, HA, and general aches starting Friday. Tested Positive for Covid today and was referred by PCP for further evaluation. Hx of COPD    Referral     85-year-old male with history of COPD presents complaining of cough and URI symptoms.  Productive green sputum.  Generalized weakness.  Tested positive for COVID today and referred by PCP for further evaluation in the emergency room.  States he has oxygen saturations today were 91%.  States that has baseline is 95.      Review of patient's allergies indicates:   Allergen Reactions    Suvorexant Anxiety and Other (See Comments)    Trazodone Other (See Comments)    Augmentin [amoxicillin-pot clavulanate]     Doxycycline Rash and Hives    Penicillins Rash     Tolerates cefdinir with no issues    Sulfamethoxazole-trimethoprim Rash     Past Medical History:   Diagnosis Date    Asthma     COPD (chronic obstructive pulmonary disease)     Depression     High cholesterol      Past Surgical History:   Procedure Laterality Date    ESOPHAGOGASTRODUODENOSCOPY N/A 11/26/2024    Procedure: EGD (ESOPHAGOGASTRODUODENOSCOPY);  Surgeon: Samuel Ruiz MD;  Location: [facility];  Service: Endoscopy;  Laterality: N/A;  11/19 ref by Brianna Aggarwal NP, [facility]    SKIN CANCER EXCISION  04/02/2024    BCC right ear, by Dr. Armando [facility]    SKIN CANCER EXCISION Right 01/2025    BCC excision removed by Dr. Armando Martinez     Family History   Problem Relation Name Age of Onset    Heart disease Mother      Diabetes Mother      Emphysema Father      Cancer Brother x1     Bladder Cancer Brother x1     Depression Daughter Riac     Hashimoto's thyroiditis Daughter Rica     Fibromyalgia Daughter Rica     Hashimoto's thyroiditis Daughter Jaymie     Depression Daughter Eveline      Social  History[1]  Review of Systems    Physical Exam     Initial Vitals [05/12/25 1249]   BP Pulse Resp Temp SpO2   (!) 140/87 75 (!) 22 98.4 °F (36.9 °C) (!) 94 %      MAP       --         Physical Exam    Nursing note and vitals reviewed.  Constitutional: He appears well-developed and well-nourished.   HENT:   Head: Atraumatic.   Eyes: EOM are normal. Pupils are equal, round, and reactive to light.   Neck: Neck supple. No JVD present.   Normal range of motion.  Cardiovascular:  Normal rate, regular rhythm, normal heart sounds and intact distal pulses.     Exam reveals no gallop and no friction rub.       No murmur heard.  Pulmonary/Chest: Breath sounds normal.   Abdominal: Abdomen is soft. Bowel sounds are normal.   Musculoskeletal:         General: Normal range of motion.      Cervical back: Normal range of motion and neck supple.     Lymphadenopathy:     He has no cervical adenopathy.   Neurological: He is alert and oriented to person, place, and time. He has normal strength.   Skin: Skin is warm and dry.   Psychiatric: He has a normal mood and affect. Thought content normal.         ED Course   Procedures  Labs Reviewed   COMPREHENSIVE METABOLIC PANEL - Abnormal       Result Value    Sodium 138      Potassium 4.2      Chloride 105      CO2 25      Glucose 96      BUN 18      Creatinine 1.2      Calcium 8.9      Protein Total 6.4      Albumin 3.4 (*)     Bilirubin Total 0.7      ALP 73      AST 24      ALT 22      Anion Gap 8      eGFR 60 (*)    C-REACTIVE PROTEIN - Abnormal    CRP 10.1 (*)    D DIMER, QUANTITATIVE - Abnormal    D-Dimer 0.61 (*)    CBC WITH DIFFERENTIAL - Abnormal    WBC 10.37      RBC 4.68      HGB 13.8 (*)     HCT 42.0      MCV 90      MCH 29.5      MCHC 32.9      RDW 14.9 (*)     Platelet Count 134 (*)     MPV 10.6      Nucleated RBC 0     MANUAL DIFFERENTIAL - Abnormal    Gran # (ANC) 8.3      Segmented Neutrophil % 77.0 (*)     Bands % 3.0      Lymphocyte % 5.0 (*)     Monocyte % 15.0       Platelet Estimate Decreased (*)    FERRITIN - Normal    Ferritin 72.1     LACTATE DEHYDROGENASE - Normal    Lactate Dehydrogenase 200      Narrative:     Results are increased in hemolyzed samples.    CK - Normal    CPK 54     LACTIC ACID, PLASMA - Normal    Lactic Acid Level 1.4      Narrative:     Falsely low lactic acid results can be found in samples containing >=13.0 mg/dL total bilirubin and/or >=3.5 mg/dL direct bilirubin.    TROPONIN I - Normal    Troponin-I <0.006     PROCALCITONIN - Normal    Procalcitonin 0.06     B-TYPE NATRIURETIC PEPTIDE - Normal    BNP 48     CBC W/ AUTO DIFFERENTIAL    Narrative:     The following orders were created for panel order CBC auto differential.  Procedure                               Abnormality         Status                     ---------                               -----------         ------                     CBC with Differential[[id number]]       Abnormal            Final result               Manual Differential[[id number]]         Abnormal            Final result                 Please view results for these tests on the individual orders.   EXTRA TUBES    Narrative:     The following orders were created for panel order EXTRA TUBES.  Procedure                               Abnormality         Status                     ---------                               -----------         ------                     Light Green Top Hold[[id number]]                            Final result                 Please view results for these tests on the individual orders.   LIGHT GREEN TOP HOLD    Extra Tube Hold for add-ons.       EKG Readings: (Independently Interpreted)   Normal sinus rhythm rate of 61.  Left bundle-branch block.     ECG Results              EKG 12-lead (Final result)        Collection Time Result Time QRS Duration OHS QTC Calculation    05/12/25 14:22:51 05/14/25 07:13:02 130 459                     Final result by Interface, Lab In [facility] (05/14/25 07:13:06)                    Narrative:    Test Reason : U07.1,    Vent. Rate :  61 BPM     Atrial Rate :  61 BPM     P-R Int : 152 ms          QRS Dur : 130 ms      QT Int : 456 ms       P-R-T Axes :  71   0  86 degrees    QTcB Int : 459 ms    Normal sinus rhythm  Left bundle branch block  Abnormal ECG  When compared with ECG of 28-Mar-2024 15:08,  T wave inversion less evident in Lateral leads  Confirmed by Garland Barraza ([id number]) on 5/14/2025 7:12:57 AM    Referred By: ARASELIREFERRAL SELF           Confirmed By: Garland Barraza                                  Imaging Results              X-Ray Chest AP Portable (Final result)  Result time 05/12/25 14:59:42      Final result by Al Singh DO (05/12/25 14:59:42)                   Impression:      Please see above      Electronically signed by: Al Singh DO  Date:    05/12/2025  Time:    14:59               Narrative:    EXAMINATION:  XR CHEST AP PORTABLE    CLINICAL HISTORY:  COVID-19;    TECHNIQUE:  Single frontal view of the chest was performed.    COMPARISON:  12/20/2023    FINDINGS:  No significant change from prior.  No new lung opacity.  No lung consolidation.  No large pleural effusion or pneumothorax.  And cardiomediastinal silhouette limited by technique but similar to prior.  Advanced degenerative change right shoulder joint partially visualized.  Further evaluation as warranted clinically.                                       Medications   dexAMETHasone injection 6 mg (6 mg Intravenous Given 5/12/25 1435)   albuterol sulfate nebulizer solution 10 mg (10 mg Nebulization Given 5/12/25 1418)   ipratropium 0.02 % nebulizer solution 0.5 mg (0.5 mg Nebulization Given 5/12/25 1418)   furosemide injection 40 mg (40 mg Intravenous Given 5/12/25 1634)   iohexoL (OMNIPAQUE 350) injection 100 mL (100 mLs Intravenous Given 5/13/25 1406)     Medical Decision Making  Amount and/or Complexity of Data Reviewed  Labs: ordered.  Radiology: ordered.    Risk  Prescription drug  management.  Decision regarding hospitalization.    Due to age, comorbidities, sats less than 93% and COVID-19 concern for decompensation.  Will bring into the hospital to initiate treatment.  Discuss with Hospital Medicine who accepted the patient.                                  Clinical Impression:  Final diagnoses:  [U07.1] COVID-19          ED Disposition Condition    Admit                       [1]   Social History  Tobacco Use    Smoking status: Former     Current packs/day: 0.00     Types: Cigarettes     Quit date:      Years since quittin.4     Passive exposure: Past    Smokeless tobacco: Never    Tobacco comments:     Started smoking  to , about 2 ppd   Substance Use Topics    Alcohol use: No     Comment: quit drinking 10/26/2002    Drug use: No        Patel Rodriguez MD  25 [phone]

## 2025-06-13 ENCOUNTER — HOSPITAL ENCOUNTER (OUTPATIENT)
Dept: RADIOLOGY | Facility: HOSPITAL | Age: 85
Discharge: HOME | End: 2025-06-13
Payer: MEDICARE

## 2025-06-13 DIAGNOSIS — R13.19 ESOPHAGEAL DYSPHAGIA: Primary | ICD-10-CM

## 2025-06-13 DIAGNOSIS — K22.4 ESOPHAGEAL DYSMOTILITY: ICD-10-CM

## 2025-06-13 DIAGNOSIS — R13.10 DYSPHAGIA, UNSPECIFIED TYPE: ICD-10-CM

## 2025-06-13 DIAGNOSIS — K44.9 HIATAL HERNIA WITH GERD: ICD-10-CM

## 2025-06-13 DIAGNOSIS — K21.9 HIATAL HERNIA WITH GERD: ICD-10-CM

## 2025-06-13 PROCEDURE — 74220 X-RAY XM ESOPHAGUS 1CNTRST: CPT | Mod: TC,HCNC

## 2025-06-13 PROCEDURE — 74220 X-RAY XM ESOPHAGUS 1CNTRST: CPT | Mod: 26,HCNC,, | Performed by: INTERNAL MEDICINE

## 2025-06-13 PROCEDURE — A9698 NON-RAD CONTRAST MATERIALNOC: HCPCS | Mod: HCNC

## 2025-06-13 PROCEDURE — 25500020 PHARM REV CODE 255: Mod: HCNC

## 2025-06-13 RX ADMIN — BARIUM SULFATE 280 ML: 0.6 SUSPENSION ORAL at 10:06

## 2025-06-17 ENCOUNTER — RESULTS FOLLOW-UP (OUTPATIENT)
Dept: GASTROENTEROLOGY | Facility: CLINIC | Age: 85
End: 2025-06-17

## 2025-06-17 DIAGNOSIS — R13.19 ESOPHAGEAL DYSPHAGIA: Primary | ICD-10-CM

## 2025-06-17 DIAGNOSIS — K22.4 ESOPHAGEAL DYSMOTILITY: ICD-10-CM

## 2025-06-24 ENCOUNTER — TELEPHONE (OUTPATIENT)
Dept: ENDOSCOPY | Facility: HOSPITAL | Age: 85
End: 2025-06-24
Payer: MEDICARE

## 2025-06-24 ENCOUNTER — PATIENT MESSAGE (OUTPATIENT)
Dept: ENDOSCOPY | Facility: HOSPITAL | Age: 85
End: 2025-06-24
Payer: MEDICARE

## 2025-06-24 DIAGNOSIS — R13.10 DYSPHAGIA, UNSPECIFIED TYPE: Primary | ICD-10-CM

## 2025-06-24 DIAGNOSIS — K22.4 ESOPHAGEAL DYSMOTILITY: ICD-10-CM

## 2025-06-24 NOTE — TELEPHONE ENCOUNTER
"Patient is scheduled for a 24 hr pH Impedance on   8/25/25 with Dr. JOSEF Nix  Referral for procedure from [facility]      ----- Message -----   From: Brianna Aggarwal NP   Sent: 6/17/2025  12:21 PM CDT   To: [facility] Endoscopy Schedulers     **Patient is aware he needs to sign consent. This test was decided following our office visit after prior testing so I was not able to have him sign the consent in clinic.     Procedure: Esophageal Manometry WITH pH Impedance OFF PPI.     Diagnosis: Dysphagia and Esophageal Dysmotility     Procedure Timing: Within 12 weeks     *If within 4 weeks selected, please iva as high priority*     *If greater than 12 weeks, please select "5-12 weeks" and delay sending until 3 months prior to requested date*     Location: Hospital Based ([facility] [place] [facility]  [facility], [facility])     Additional Scheduling Information: Blood thinners and Advanced cardiac or pulmonary disease     Prep Specifications:Standard prep     Is the patient taking a GLP-1 Agonist:no     Have you attached a patient to this message: yes   "

## 2025-07-10 ENCOUNTER — OFFICE VISIT (OUTPATIENT)
Dept: CARDIOLOGY | Facility: CLINIC | Age: 85
End: 2025-07-10
Payer: MEDICARE

## 2025-07-10 VITALS
DIASTOLIC BLOOD PRESSURE: 68 MMHG | OXYGEN SATURATION: 91 % | BODY MASS INDEX: 25.97 KG/M2 | HEART RATE: 62 BPM | SYSTOLIC BLOOD PRESSURE: 134 MMHG | WEIGHT: 165.44 LBS | HEIGHT: 67 IN

## 2025-07-10 DIAGNOSIS — I10 ESSENTIAL HYPERTENSION: ICD-10-CM

## 2025-07-10 DIAGNOSIS — E78.5 HYPERLIPIDEMIA LDL GOAL <70: ICD-10-CM

## 2025-07-10 DIAGNOSIS — Z78.9 STATIN INTOLERANCE: ICD-10-CM

## 2025-07-10 DIAGNOSIS — E78.2 MIXED HYPERLIPIDEMIA: ICD-10-CM

## 2025-07-10 DIAGNOSIS — I25.2 HISTORY OF MI (MYOCARDIAL INFARCTION): Primary | Chronic | ICD-10-CM

## 2025-07-10 PROCEDURE — 3288F FALL RISK ASSESSMENT DOCD: CPT | Mod: CPTII,HCNC,S$GLB, | Performed by: INTERNAL MEDICINE

## 2025-07-10 PROCEDURE — 99214 OFFICE O/P EST MOD 30 MIN: CPT | Mod: HCNC,S$GLB,, | Performed by: INTERNAL MEDICINE

## 2025-07-10 PROCEDURE — 1159F MED LIST DOCD IN RCRD: CPT | Mod: CPTII,HCNC,S$GLB, | Performed by: INTERNAL MEDICINE

## 2025-07-10 PROCEDURE — 1125F AMNT PAIN NOTED PAIN PRSNT: CPT | Mod: CPTII,HCNC,S$GLB, | Performed by: INTERNAL MEDICINE

## 2025-07-10 PROCEDURE — 1100F PTFALLS ASSESS-DOCD GE2>/YR: CPT | Mod: CPTII,HCNC,S$GLB, | Performed by: INTERNAL MEDICINE

## 2025-07-10 PROCEDURE — 1157F ADVNC CARE PLAN IN RCRD: CPT | Mod: CPTII,HCNC,S$GLB, | Performed by: INTERNAL MEDICINE

## 2025-07-10 PROCEDURE — 3078F DIAST BP <80 MM HG: CPT | Mod: CPTII,HCNC,S$GLB, | Performed by: INTERNAL MEDICINE

## 2025-07-10 PROCEDURE — 3075F SYST BP GE 130 - 139MM HG: CPT | Mod: CPTII,HCNC,S$GLB, | Performed by: INTERNAL MEDICINE

## 2025-07-10 PROCEDURE — 99999 PR PBB SHADOW E&M-EST. PATIENT-LVL IV: CPT | Mod: PBBFAC,HCNC,, | Performed by: INTERNAL MEDICINE

## 2025-07-10 RX ORDER — EZETIMIBE 10 MG/1
10 TABLET ORAL DAILY
Qty: 90 TABLET | Refills: 3 | Status: SHIPPED | OUTPATIENT
Start: 2025-07-10 | End: 2025-07-10

## 2025-07-10 NOTE — PROGRESS NOTES
Subjective:   @Patient ID:  Navin Henning Jr. is a 85 y.o. male who presents for evaluation of LBBB      HPI:   07/10/2025:  F/U.  Admitted in 05/2025 with hypoxia secondary to COVID improved with supportive care.  CT chest negative for PE.  He has been having significant leg cramps over the last year that is bothering him a lot.  He is taking magnesium supplement and Co Q10 without significant improvement.    3/28/2024:  Follow up.  Nuclear stress test with fixed defects without ischemia.  He is doing well.  Denies any chest pain or significant dyspnea on exertion.  Blood pressure well-controlled at home.  He is compliant with pravastatin and aspirin.  EKG today sinus bradycardia with left bundle-branch block    4/2023: Here for initial evaluation. Referred by Dr. Barrett for LBBB  He is accompanied by his wife. He is a pleasant gentleman.  Does have chronic left bundle-branch block for many years.  For the last 2 -3 years he has been having significant night sweats. No chest pain.  Stable dyspnea on exertion. Recent CT of the chest showed severe coronary atherosclerosis in the left main as well as LAD circumflex and RCA  He had a stress test 15 years ago that was okay  He does have vertigo the last few days.  He had history of vertigo pain many years ago and he was seen by ENT    Stopped smoking 1976. He smoked for 20 yrs    Prior cardiovascular  Hx  --------------------------------    - Stress MPI 5/2023 Fixed defect, no ischemia     1. Findings concerning for a moderately sized infarct involving the inferoseptal wall.  2. No convincing evidence of reversible ischemia.  3. The inferoseptal wall appears hypokinetic, which would correlate with the infarct, however, hypokinesis in this location may be seen in the absence of significant perfusion defects.  4. Left ventricular ejection fraction 49% during stress and 50% during rest.           - ECHO   3/14/2023   The left ventricle is normal in size with concentric  remodeling and normal systolic function.  The estimated ejection fraction is 55%.  There is abnormal septal wall motion consistent with left bundle branch block.  Normal left ventricular diastolic function.  The estimated PA systolic pressure is 9 mmHg.  Normal right ventricular size with normal right ventricular systolic function.  Normal central venous pressure (3 mmHg).        - EKG 3/2019   SR with LBBB        Patient Active Problem List    Diagnosis Date Noted    Acute respiratory failure with hypoxia 05/12/2025    COVID-19 virus infection 05/12/2025    Hyperparathyroidism 04/18/2025    Purpura 04/18/2025    Arthralgia 04/18/2025    Atypical fibroxanthoma of skin of scalp 12/26/2024 Jan 9, 2025 - MOHS planned      Schatzki's ring 11/26/2024    Hiatal hernia with GERD 11/26/2024    Gastroesophageal reflux disease without esophagitis 09/18/2024    Hyperlipidemia LDL goal <70 08/23/2024    Left bundle branch block 03/28/2024    History of MI (myocardial infarction) 03/28/2024     Seen in the nuclear stress test      Atherosclerotic heart disease of native coronary artery with other forms of angina pectoris 08/18/2023     Noted by CALRITZA COFFEY MD  last documented on [id number]      Calcified granuloma of lung 08/18/2023    Flushing 05/11/2023     Associated with diarrhea, hypoglycemia and other endocrine this regulations.  Suspected neuroendocrine tumor driving all this, however PET DOTATATE negative as well as laboratories.      Aortic atherosclerosis 01/25/2023     Noted on recent 2023 CT chest.  Not currently on ASA or statin      Alzheimer's disease, unspecified (CODE) 01/25/2023    MDD (recurrent major depressive disorder) in remission 07/11/2022    BPH with lower urinary tract symptoms without urinary obstruction 07/11/2022    Personal history of tobacco use, presenting hazards to health 01/10/2022     Quit 1976.  Twenty pack year history before that.  Worked in a chemical plant.  Congratulated on  quitting encouraged ongoing cessation.      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  Emphysematous changes seen on recent CT chest.  PFTs with mild obstruction and significant bronchodilator response.  Currently on LABA/ICS which can be uses both a controller and rescue  Noted by ENMANUEL THURMAN DO  last documented on 20230125      Allergic rhinosinusitis 11/02/2018     Significant sinus symptoms with almost constant postnasal gtt.   This appears to have been the primary  behind his chronic cough.  Sinus regimen seems to have improved symptoms.      Family history of diabetes mellitus (DM) 11/02/2018                    LAST HbA1c  Lab Results   Component Value Date    HGBA1C 5.5 03/05/2025       Lipid panel  Lab Results   Component Value Date    CHOL 116 (L) 03/05/2025    CHOL 164 02/22/2024    CHOL 199 07/12/2022     Lab Results   Component Value Date    HDL 57 03/05/2025    HDL 50 02/22/2024    HDL 40 07/12/2022     Lab Results   Component Value Date    LDLCALC 47.6 (L) 03/05/2025    LDLCALC 101.0 02/22/2024    LDLCALC 128.2 07/12/2022     Lab Results   Component Value Date    TRIG 57 03/05/2025    TRIG 65 02/22/2024    TRIG 154 (H) 07/12/2022     Lab Results   Component Value Date    CHOLHDL 49.1 03/05/2025    CHOLHDL 30.5 02/22/2024    CHOLHDL 20.1 07/12/2022            Review of Systems   Constitutional: Negative for chills and fever.   HENT:  Positive for  hearing loss.         As in HPI   Eyes:  Negative for blurred vision.   Cardiovascular:         As in HPI   Respiratory:          As in HPI   Hematologic/Lymphatic: Negative for bleeding problem.   Skin:  Negative for itching.   Musculoskeletal:  Negative for falls.   Gastrointestinal:  Negative for abdominal pain and hematochezia.   Genitourinary:  Negative for hematuria.   Neurological:  Negative for dizziness and loss of balance.   Psychiatric/Behavioral:  Negative for altered mental status and depression.        Objective:   Physical Exam  Constitutional:       Appearance: He is well-developed.   HENT:      Head: Normocephalic and atraumatic.   Eyes:      Conjunctiva/sclera: Conjunctivae normal.   Neck:      Vascular: No carotid bruit or JVD.   Cardiovascular:      Rate and Rhythm: Normal rate and regular rhythm.      Pulses:           Carotid pulses are 2+ on the right side and 2+ on the left side.       Radial pulses are 2+ on the right side and 2+ on the left side.      Heart sounds: Normal heart sounds. No murmur heard.     No friction rub. No gallop.   Pulmonary:      Effort: Pulmonary effort is normal. No respiratory distress.      Breath sounds: No stridor. No wheezing.      Comments: Prolonged expiratory phase  Abdominal:      General: Abdomen is flat.      Palpations: Abdomen is soft.   Musculoskeletal:      Cervical back: Neck supple.      Right lower leg: No edema.      Left lower leg: No edema.   Skin:     General: Skin is warm and dry.   Neurological:      Mental Status: He is alert and oriented to person, place, and time.   Psychiatric:         Behavior: Behavior normal.         Assessment:     1. History of MI (myocardial infarction)    2. Mixed hyperlipidemia    3. Essential hypertension    4. Hyperlipidemia LDL goal <70    5. Statin intolerance          Plan:   1. Coronary artery disease  Nuclear stress test with fixed defects and no ischemia  We will continue medical therapy  His LDL at goal  however he has significant muscle cramps which actually started 1 we switch it pravastatin to rosuvastatin.  He needs better LDL controlled.  We will switch rosuvastatin to Repatha.  Prescription sent  Continue aspirin 81 mg daily    2. Hypertension  BP well-controlled  Continue current regimen        3. Hyperlipidemia  Switch statin to Repatha given memory issues, statin induced neuropathy      Six-months follow up    Pertinent cardiac images and EKG reviewed independently.    Continue with current medical plan and lifestyle changes.  Return sooner for concerns or questions. If symptoms persist go to the ED  I have reviewed all pertinent data including patient's medical history in detail and updated the computerized patient record.     No orders of the defined types were placed in this encounter.      Follow up as scheduled.     He expressed verbal understanding and agreed with the plan    Patient's Medications   New Prescriptions    EVOLOCUMAB (REPATHA SURECLICK) 140 MG/ML PNIJ    Inject 1 mL (140 mg total) into the skin every 14 (fourteen) days.   Previous Medications    ALBUTEROL (VENTOLIN HFA) 90 MCG/ACTUATION INHALER    Inhale 2 puffs into the lungs every 4 (four) hours as needed for Wheezing or Shortness of Breath. Rescue    ALBUTEROL-IPRATROPIUM (DUO-NEB) 2.5 MG-0.5 MG/3 ML NEBULIZER SOLUTION    Take 3 mLs by nebulization every 6 (six) hours as needed for Wheezing.    ASPIRIN (ECOTRIN) 81 MG EC TABLET    Take 1 tablet by mouth once daily.    AZELASTINE (ASTELIN) 137 MCG (0.1 %) NASAL SPRAY    2 sprays (274 mcg total) by Nasal route 2 (two) times daily.    BUDESONIDE-FORMOTEROL 160-4.5 MCG (SYMBICORT) 160-4.5 MCG/ACTUATION HFAA    Inhale 2 puffs into the lungs every 12 (twelve) hours. Controller    DONEPEZIL (ARICEPT) 10 MG TABLET    Take 10 mg by mouth every evening.    FLUOXETINE 40 MG CAPSULE    Take 1 capsule (40 mg total) by mouth once daily.    GABAPENTIN (NEURONTIN) 100 MG CAPSULE    Take 100 mg by  mouth 3 (three) times daily.    KETOCONAZOLE (NIZORAL) 2 % SHAMPOO    SHAMPOO THREE TIMES WEEKLY TO AFFECTED AREAS. LET SIT FOR 5 MINUTES AND WASH OFF. FOR FACE    MECLIZINE (ANTIVERT) 25 MG TABLET    Take 1 tablet (25 mg total) by mouth 3 (three) times daily as needed for Dizziness.    MELATONIN 10 MG TBDL    Take 1 tablet by mouth every evening.    MEMANTINE (NAMENDA XR) 28 MG CSPX        MIRTAZAPINE (REMERON) 15 MG TABLET    Take 1 tablet (15 mg total) by mouth every evening.    MISCELLArtify It MEDICAL SUPPLY (PULSE OXIMETER) KIT    Uses as needed to measure oxygen saturation    MOMETASONE 0.1% (ELOCON) 0.1 % CREAM    Apply 1 Application topically once daily.    MONTELUKAST (SINGULAIR) 10 MG TABLET    Take 10 mg by mouth every evening.    MULTIVITAMIN CAPSULE    Take 1 capsule by mouth once daily.    PANTOPRAZOLE (PROTONIX) 40 MG TABLET    Take 1 tablet (40 mg total) by mouth once daily.    RIVASTIGMINE TARTRATE 4.5 MG CAPSULE    Take 4.5 mg by mouth 2 (two) times a day.    TAMSULOSIN (FLOMAX) 0.4 MG CAP    Take 1 capsule (0.4 mg total) by mouth once daily. 30 minutes after dinner    TESTOSTERONE (ANDROGEL) 20.25 MG/1.25 GRAM (1.62 %) GLPM    Place 3.75 g onto the skin Daily. 3 pumps daily    TRAMADOL (ULTRAM) 50 MG TABLET    Take 1 tablet by mouth 2 (two) times daily as needed for Pain.   Modified Medications    No medications on file   Discontinued Medications    ROSUVASTATIN (CRESTOR) 20 MG TABLET    Take 1 tablet (20 mg total) by mouth once daily.

## 2025-07-25 ENCOUNTER — EXTERNAL HOME HEALTH (OUTPATIENT)
Dept: HOME HEALTH SERVICES | Facility: HOSPITAL | Age: 85
End: 2025-07-25
Payer: MEDICARE

## 2025-07-27 ENCOUNTER — HOSPITAL ENCOUNTER (EMERGENCY)
Facility: HOSPITAL | Age: 85
Discharge: HOME | End: 2025-07-27
Attending: EMERGENCY MEDICINE
Payer: MEDICARE

## 2025-07-27 VITALS
DIASTOLIC BLOOD PRESSURE: 62 MMHG | SYSTOLIC BLOOD PRESSURE: 128 MMHG | TEMPERATURE: 98 F | RESPIRATION RATE: 20 BRPM | HEART RATE: 60 BPM | WEIGHT: 160 LBS | BODY MASS INDEX: 25.71 KG/M2 | HEIGHT: 66 IN | OXYGEN SATURATION: 95 %

## 2025-07-27 DIAGNOSIS — M25.512 ACUTE PAIN OF LEFT SHOULDER: Primary | ICD-10-CM

## 2025-07-27 DIAGNOSIS — R06.02 SOB (SHORTNESS OF BREATH): ICD-10-CM

## 2025-07-27 DIAGNOSIS — R06.02 SHORTNESS OF BREATH: ICD-10-CM

## 2025-07-27 LAB
ABSOLUTE EOSINOPHIL (OHS): 0 K/UL
ABSOLUTE MONOCYTE (OHS): 1.42 K/UL (ref 0.3–1)
ABSOLUTE NEUTROPHIL COUNT (OHS): 4.08 K/UL (ref 1.8–7.7)
ALBUMIN SERPL BCP-MCNC: 3.8 G/DL (ref 3.5–5.2)
ALP SERPL-CCNC: 80 UNIT/L (ref 40–150)
ALT SERPL W/O P-5'-P-CCNC: 8 UNIT/L (ref 10–44)
ANION GAP (OHS): 8 MMOL/L (ref 8–16)
AST SERPL-CCNC: 23 UNIT/L (ref 11–45)
BASOPHILS # BLD AUTO: 0.01 K/UL
BASOPHILS NFR BLD AUTO: 0.2 %
BILIRUB SERPL-MCNC: 0.5 MG/DL (ref 0.1–1)
BUN SERPL-MCNC: 16 MG/DL (ref 8–23)
CALCIUM SERPL-MCNC: 9.1 MG/DL (ref 8.7–10.5)
CHLORIDE SERPL-SCNC: 107 MMOL/L (ref 95–110)
CO2 SERPL-SCNC: 28 MMOL/L (ref 23–29)
CREAT SERPL-MCNC: 1.3 MG/DL (ref 0.5–1.4)
ERYTHROCYTE [DISTWIDTH] IN BLOOD BY AUTOMATED COUNT: 15.1 % (ref 11.5–14.5)
GFR SERPLBLD CREATININE-BSD FMLA CKD-EPI: 55 ML/MIN/1.73/M2
GLUCOSE SERPL-MCNC: 110 MG/DL (ref 70–110)
HCT VFR BLD AUTO: 47.2 % (ref 40–54)
HGB BLD-MCNC: 14.7 GM/DL (ref 14–18)
IMM GRANULOCYTES # BLD AUTO: 0.03 K/UL (ref 0–0.04)
IMM GRANULOCYTES NFR BLD AUTO: 0.5 % (ref 0–0.5)
LIPASE SERPL-CCNC: 47 U/L (ref 4–60)
LYMPHOCYTES # BLD AUTO: 1.08 K/UL (ref 1–4.8)
MCH RBC QN AUTO: 28.4 PG (ref 27–31)
MCHC RBC AUTO-ENTMCNC: 31.1 G/DL (ref 32–36)
MCV RBC AUTO: 91 FL (ref 82–98)
NT-PROBNP SERPL-MCNC: 165 PG/ML
NUCLEATED RBC (/100WBC) (OHS): 0 /100 WBC
PLATELET # BLD AUTO: 175 K/UL (ref 150–450)
PMV BLD AUTO: 10.6 FL (ref 9.2–12.9)
POTASSIUM SERPL-SCNC: 4.6 MMOL/L (ref 3.5–5.1)
PROT SERPL-MCNC: 6.8 GM/DL (ref 6–8.4)
RBC # BLD AUTO: 5.18 M/UL (ref 4.6–6.2)
RELATIVE EOSINOPHIL (OHS): 0 %
RELATIVE LYMPHOCYTE (OHS): 16.3 % (ref 18–48)
RELATIVE MONOCYTE (OHS): 21.5 % (ref 4–15)
RELATIVE NEUTROPHIL (OHS): 61.5 % (ref 38–73)
SODIUM SERPL-SCNC: 143 MMOL/L (ref 136–145)
TROPONIN I SERPL HS-MCNC: <3 NG/L
WBC # BLD AUTO: 6.62 K/UL (ref 3.9–12.7)

## 2025-07-27 PROCEDURE — 85025 COMPLETE CBC W/AUTO DIFF WBC: CPT | Mod: HCNC | Performed by: NURSE PRACTITIONER

## 2025-07-27 PROCEDURE — 93005 ELECTROCARDIOGRAM TRACING: CPT | Mod: HCNC

## 2025-07-27 PROCEDURE — 25000242 PHARM REV CODE 250 ALT 637 W/ HCPCS: Mod: HCNC | Performed by: NURSE PRACTITIONER

## 2025-07-27 PROCEDURE — 83880 ASSAY OF NATRIURETIC PEPTIDE: CPT | Mod: HCNC | Performed by: NURSE PRACTITIONER

## 2025-07-27 PROCEDURE — 93010 ELECTROCARDIOGRAM REPORT: CPT | Mod: ,,, | Performed by: STUDENT IN AN ORGANIZED HEALTH CARE EDUCATION/TRAINING PROGRAM

## 2025-07-27 PROCEDURE — 99285 EMERGENCY DEPT VISIT HI MDM: CPT | Mod: 25

## 2025-07-27 PROCEDURE — 84484 ASSAY OF TROPONIN QUANT: CPT | Mod: HCNC | Performed by: NURSE PRACTITIONER

## 2025-07-27 PROCEDURE — 94640 AIRWAY INHALATION TREATMENT: CPT | Mod: HCNC

## 2025-07-27 PROCEDURE — 25000003 PHARM REV CODE 250: Mod: HCNC | Performed by: EMERGENCY MEDICINE

## 2025-07-27 PROCEDURE — 83690 ASSAY OF LIPASE: CPT | Mod: HCNC | Performed by: EMERGENCY MEDICINE

## 2025-07-27 PROCEDURE — 80053 COMPREHEN METABOLIC PANEL: CPT | Mod: HCNC | Performed by: NURSE PRACTITIONER

## 2025-07-27 RX ORDER — TRAMADOL HYDROCHLORIDE 50 MG/1
50 TABLET, FILM COATED ORAL EVERY 6 HOURS PRN
Qty: 12 TABLET | Refills: 0 | Status: SHIPPED | OUTPATIENT
Start: 2025-07-27

## 2025-07-27 RX ORDER — TRAMADOL HYDROCHLORIDE 50 MG/1
50 TABLET, FILM COATED ORAL
Status: COMPLETED | OUTPATIENT
Start: 2025-07-27 | End: 2025-07-27

## 2025-07-27 RX ORDER — TIZANIDINE 4 MG/1
4 TABLET ORAL EVERY 8 HOURS PRN
Qty: 25 TABLET | Refills: 0 | Status: SHIPPED | OUTPATIENT
Start: 2025-07-27 | End: 2025-08-06

## 2025-07-27 RX ORDER — IPRATROPIUM BROMIDE AND ALBUTEROL SULFATE 2.5; .5 MG/3ML; MG/3ML
3 SOLUTION RESPIRATORY (INHALATION)
Status: COMPLETED | OUTPATIENT
Start: 2025-07-27 | End: 2025-07-27

## 2025-07-27 RX ORDER — TIZANIDINE 2 MG/1
4 TABLET ORAL ONCE
Status: COMPLETED | OUTPATIENT
Start: 2025-07-27 | End: 2025-07-27

## 2025-07-27 RX ADMIN — IPRATROPIUM BROMIDE AND ALBUTEROL SULFATE 3 ML: .5; 3 SOLUTION RESPIRATORY (INHALATION) at 02:07

## 2025-07-27 RX ADMIN — TRAMADOL HYDROCHLORIDE 50 MG: 50 TABLET, COATED ORAL at 02:07

## 2025-07-27 RX ADMIN — TIZANIDINE 4 MG: 2 TABLET ORAL at 02:07

## 2025-07-27 NOTE — FIRST PROVIDER EVALUATION
Emergency Department TeleTriage Encounter Note      CHIEF COMPLAINT    Chief Complaint   Patient presents with    Shortness of Breath     C/o SOB w/ L shoulder pain and neck pain starting 30mins PTA. SPO2 99%RA, lungs clear and equal bilaterally, RR 32. Denies taking anything for sx.        VITAL SIGNS   Initial Vitals [07/27/25 1312]   BP Pulse Resp Temp SpO2   (!) 147/70 76 (!) 32 98 °F (36.7 °C) 99 %      MAP       --            ALLERGIES    Review of patient's allergies indicates:   Allergen Reactions    Suvorexant Anxiety and Other (See Comments)    Trazodone Other (See Comments)    Augmentin [amoxicillin-pot clavulanate]     Doxycycline Rash and Hives    Penicillins Rash     Tolerates cefdinir with no issues    Sulfamethoxazole-trimethoprim Rash       PROVIDER TRIAGE NOTE  This is a teletriage evaluation of a 85 y.o. male presenting to the ED complaining of SOB with associated left shoulder and neck pain starting just prior to arrival today. Pmhx of COPD.     Pt's family is speaking for patient. Pt is alert, sitting upright.     Initial orders will be placed and care will be transferred to an alternate provider when patient is roomed for a full evaluation. Any additional orders and the final disposition will be determined by that provider.         ORDERS  Labs Reviewed   CBC W/ AUTO DIFFERENTIAL    Narrative:     The following orders were created for panel order CBC Auto Differential.  Procedure                               Abnormality         Status                     ---------                               -----------         ------                     CBC with Differential[[id number]]                                                        Please view results for these tests on the individual orders.   COMPREHENSIVE METABOLIC PANEL   TROPONIN I HIGH SENSITIVITY   NT-PRO NATRIURETIC PEPTIDE   CBC WITH DIFFERENTIAL       ED Orders (720h ago, onward)      Start Ordered     Status Ordering Provider     07/27/25 1330 07/27/25 1317  albuterol-ipratropium 2.5 mg-0.5 mg/3 mL nebulizer solution 3 mL  ED 1 Time         Ordered SUMANTH MENSAH N.    07/27/25 1318 07/27/25 1317  Troponin I High Sensitivity  STAT         Ordered SUMANTH MENSAH N.    07/27/25 1318 07/27/25 1317  NT-Pro Natriuretic Peptide  STAT         Ordered MAKENNA SUMANTH N.    07/27/25 1317 07/27/25 1317  CBC Auto Differential  STAT         Ordered MAKENNA SUMANTH N.    07/27/25 1317 07/27/25 1317  Comprehensive Metabolic Panel  STAT         Ordered MAKENNA SUMANTH N.    07/27/25 1317 07/27/25 1317  Pulse Oximetry Continuous  Continuous         Ordered MAKENNA SUMANTH N.    07/27/25 1317 07/27/25 1317  Cardiac Monitoring - Adult  Continuous         Ordered MAKENNA SUMANTH N.    07/27/25 1317 07/27/25 1317  EKG 12-lead  Once         Ordered MAKENNA SUMANTH N.    07/27/25 1317 07/27/25 1317  X-Ray Chest 1 View  1 time imaging         Ordered MAKENNA SUMANTH N.    07/27/25 1317 07/27/25 1317  CBC with Differential  PROCEDURE ONCE         Ordered MAKENNA SUMANTH N.    07/27/25 1314 07/27/25 1313  EKG 12-lead (Shortness of Breath) Age > 50  Once        Comments: If patient is > 50 yrs.    Completed by HOLLY DELONG on 7/27/2025 at  1:13 PM ARMAAN RITTER              Virtual Visit Note: The provider triage portion of this emergency department evaluation and documentation was performed via [employer], a HIPAA-compliant telemedicine application, in concert with a tele-presenter in the room. A face to face patient evaluation with one of my colleagues will occur once the patient is placed in an emergency department room.      DISCLAIMER: This note was prepared with [employer] voice recognition transcription software. Garbled syntax, mangled pronouns, and other bizarre constructions may be attributed to that software system.

## 2025-07-27 NOTE — ED NOTES
Patient denies SOB, headache, dizziness, lightheadedness, nausea or vomiting, generalized weakness or fatigue, numbness and tingling to bilateral upper and lower extremities.Patient stated he fell about 3 weeks ago has been evaluated by ortho and had x rays imaging was negative, no recent fall or trauma to left shoulder

## 2025-07-27 NOTE — ED PROVIDER NOTES
Encounter Date: 7/27/2025       History     Chief Complaint   Patient presents with    Shortness of Breath     C/o SOB w/ L shoulder pain and neck pain starting 30mins PTA. SPO2 99%RA, lungs clear and equal bilaterally, RR 32. Denies taking anything for sx.        85-year-old male with history of asthma COPD depression and high cholesterol states he was eating at all of garden.  He did eat a little too much spaghetti.  Started having relatively sudden onset left shoulder pain.  Describes it more as the left superior trapezius muscle.  Worse with pushing in this area.  Worse with raising his arm.  States the pain cause him to feel little short of breath.  No pain with breathing.  No shortness a breath now.  Never had chest pain.  No abdominal pain.  No dyspnea on exertion.      Review of patient's allergies indicates:   Allergen Reactions    Suvorexant Anxiety and Other (See Comments)    Trazodone Other (See Comments)    Augmentin [amoxicillin-pot clavulanate]     Doxycycline Rash and Hives    Penicillins Rash     Tolerates cefdinir with no issues    Sulfamethoxazole-trimethoprim Rash     Past Medical History:   Diagnosis Date    Asthma     COPD (chronic obstructive pulmonary disease)     Depression     High cholesterol      Past Surgical History:   Procedure Laterality Date    ESOPHAGOGASTRODUODENOSCOPY N/A 11/26/2024    Procedure: EGD (ESOPHAGOGASTRODUODENOSCOPY);  Surgeon: Samuel Ruiz MD;  Location: [facility];  Service: Endoscopy;  Laterality: N/A;  11/19 ref by Brianna Aggarwal NP, [facility]    SKIN CANCER EXCISION  04/02/2024    BCC right ear, [facility] Dr[facility] Armando [facility]    SKIN CANCER EXCISION Right 01/2025    BCC excision removed by Dr. Armando Martinez     Family History   Problem Relation Name Age of Onset    Heart disease Mother      Diabetes Mother      Emphysema Father      Cancer Brother x1     Bladder Cancer Brother x1     Depression Daughter Rica     Hashimoto's thyroiditis  Daughter Rica     Fibromyalgia Daughter Rica     Hashimoto's thyroiditis Daughter Jaymie     Depression Daughter Eveline      Social History[1]  Review of Systems    Physical Exam     Initial Vitals [07/27/25 1312]   BP Pulse Resp Temp SpO2   (!) 147/70 76 (!) 32 98 °F (36.7 °C) 99 %      MAP       --         Physical Exam    Nursing note and vitals reviewed.  Constitutional: He appears well-developed and well-nourished.   HENT:   Head: Atraumatic.   Eyes: EOM are normal. Pupils are equal, round, and reactive to light.   Neck: Neck supple. No JVD present.   Normal range of motion.  Cardiovascular:  Normal rate, regular rhythm, normal heart sounds and intact distal pulses.     Exam reveals no gallop and no friction rub.       No murmur heard.  Pulmonary/Chest: Breath sounds normal. No respiratory distress. He has no wheezes. He has no rhonchi. He has no rales. He exhibits no tenderness.   Abdominal: Abdomen is soft. Bowel sounds are normal. He exhibits no distension. There is no abdominal tenderness. There is no rebound.   Musculoskeletal:         General: Tenderness present. Normal range of motion.      Cervical back: Normal range of motion and neck supple.      Comments:   Highly reproducible left trapezius muscle tenderness.  No rash noted along the area.     Lymphadenopathy:     He has no cervical adenopathy.   Neurological: He is alert and oriented to person, place, and time. He has normal strength.   Skin: Skin is warm and dry. Capillary refill takes less than 2 seconds.   Psychiatric: He has a normal mood and affect. Thought content normal.         ED Course   Procedures  Labs Reviewed   COMPREHENSIVE METABOLIC PANEL - Abnormal       Result Value    Sodium 143      Potassium 4.6      Chloride 107      CO2 28      Glucose 110      BUN 16      Creatinine 1.3      Calcium 9.1      Protein Total 6.8      Albumin 3.8      Bilirubin Total 0.5      ALP 80      AST 23      ALT 8 (*)     Anion Gap 8      eGFR 55 (*)     CBC WITH DIFFERENTIAL - Abnormal    WBC 6.62      RBC 5.18      HGB 14.7      HCT 47.2      MCV 91      MCH 28.4      MCHC 31.1 (*)     RDW 15.1 (*)     Platelet Count 175      MPV 10.6      Nucleated RBC 0      Neut % 61.5      Lymph % 16.3 (*)     Mono % 21.5 (*)     Eos % 0.0      Basophil % 0.2      Imm Grans % 0.5      Neut # 4.08      Lymph # 1.08      Mono # 1.42 (*)     Eos # 0.00      Baso # 0.01      Imm Grans # 0.03     TROPONIN I HIGH SENSITIVITY - Normal    Troponin High Sensitive <3     NT-PRO NATRIURETIC PEPTIDE - Normal    NT-proBNP 165      Narrative:     NOTE:  Access complete set of age - and/or gender-specific reference intervals for this test in the [facility] Collection Manual.   LIPASE - Normal    Lipase Level 47     CBC W/ AUTO DIFFERENTIAL    Narrative:     The following orders were created for panel order CBC Auto Differential.  Procedure                               Abnormality         Status                     ---------                               -----------         ------                     CBC with Differential[[id number]]       Abnormal            Final result                 Please view results for these tests on the individual orders.     EKG Readings: (Independently Interpreted)     Normal sinus rhythm rate of 76, left bundle-branch block.  No ischemic changes.  Left bundle-branch block is old.  EKG consistent with prior EKGs.       Imaging Results              X-Ray Chest 1 View (Final result)  Result time 07/27/25 14:32:29      Final result by Amado Leyva MD (07/27/25 14:32:29)                   Impression:      As above.      Electronically signed by: Amado Leyva  Date:    07/27/2025  Time:    14:32               Narrative:    EXAMINATION:  XR CHEST 1 VIEW    CLINICAL HISTORY:  shortness of breath;    TECHNIQUE:  XR CHEST 1 VIEW    COMPARISON:  05/12/2025    FINDINGS:  Multiple wires coiled over the cardiac silhouette.    Cardiac silhouette is unchanged.   Aortic calcifications.    The lungs are clear and free of infiltrate.  No pleural effusion or pneumothorax.    No evidence of acute osseous abnormality.  Findings suggestive calcific tendinitis on the right.                                       Medications   albuterol-ipratropium 2.5 mg-0.5 mg/3 mL nebulizer solution 3 mL (3 mLs Nebulization Given 7/27/25 1422)   traMADoL tablet 50 mg (50 mg Oral Given 7/27/25 1454)   tiZANidine tablet 4 mg (4 mg Oral Given 7/27/25 1454)     Medical Decision Making  Risk  Prescription drug management.     Highly consistent with left trapezius muscle strain.  Improved with tramadol and tizanidine.  Patient now states no pain at rest.  Still pain with palpation and raising the arm.  Chest pain workup is negative.                                      Clinical Impression:  Final diagnoses:  [R06.02] SOB (shortness of breath)  [R06.02] Shortness of breath  [M25.512] Acute pain of left shoulder (Primary)          ED Disposition Condition    Discharge Stable          ED Prescriptions       Medication Sig Dispense Start Date End Date Auth. Provider    traMADoL (ULTRAM) 50 mg tablet Take 1 tablet (50 mg total) by mouth every 6 (six) hours as needed for Pain. 12 tablet 7/27/2025 -- Patel Rodriguez MD    tiZANidine (ZANAFLEX) 4 MG tablet Take 1 tablet (4 mg total) by mouth every 8 (eight) hours as needed. 25 tablet 7/27/2025 8/6/2025 Patel Rodriguez MD          Follow-up Information       Follow up With Specialties Details Why Contact Info    Chandrika Barrett MD Family Medicine Schedule an appointment as soon as possible for a visit   [address]  [place] [id number] [phone]      Benito Cullen IV, MD Orthopedic Surgery Schedule an appointment as soon as possible for a visit  or orthopedic of your choice. [address]  [address]  [place] [address]  [phone]      [facility] - Emergency Dept Emergency Medicine  As needed, If symptoms worsen or new symptoms. [address]  [place] [id number] [phone]                   [1]   Social History  Tobacco Use    Smoking status: Former     Current packs/day: 0.00     Types: Cigarettes     Quit date:      Years since quittin.6     Passive exposure: Past    Smokeless tobacco: Never    Tobacco comments:     Started smoking  to , about 2 ppd   Substance Use Topics    Alcohol use: No     Comment: quit drinking 10/26/2002    Drug use: No        Patel Rodriguez MD  25 1102

## 2025-07-27 NOTE — ED NOTES
Patient states pain is present when he touches the area over the clavicle of the left shoulder but when lying still he feels no pain

## 2025-07-29 ENCOUNTER — PATIENT OUTREACH (OUTPATIENT)
Facility: OTHER | Age: 85
End: 2025-07-29
Payer: MEDICARE

## 2025-07-29 LAB
OHS QRS DURATION: 134 MS
OHS QRS DURATION: 144 MS
OHS QTC CALCULATION: 478 MS
OHS QTC CALCULATION: 488 MS

## 2025-07-30 NOTE — PROGRESS NOTES
Patient was seen in the ED on 7/27/25. Phoned patient to assist with Post ED Discharge Navigation. Patient was unavailable. Encounter closed.  Rose Mary Minor

## 2025-08-25 ENCOUNTER — HOSPITAL ENCOUNTER (OUTPATIENT)
Facility: HOSPITAL | Age: 85
Discharge: HOME | End: 2025-08-25
Attending: STUDENT IN AN ORGANIZED HEALTH CARE EDUCATION/TRAINING PROGRAM
Payer: MEDICARE

## 2025-08-25 VITALS
BODY MASS INDEX: 25.71 KG/M2 | OXYGEN SATURATION: 93 % | HEIGHT: 66 IN | DIASTOLIC BLOOD PRESSURE: 78 MMHG | WEIGHT: 160 LBS | TEMPERATURE: 98 F | RESPIRATION RATE: 15 BRPM | HEART RATE: 78 BPM | SYSTOLIC BLOOD PRESSURE: 143 MMHG

## 2025-08-25 DIAGNOSIS — R13.10 DYSPHAGIA: ICD-10-CM

## 2025-08-25 PROCEDURE — 25000003 PHARM REV CODE 250: Performed by: STUDENT IN AN ORGANIZED HEALTH CARE EDUCATION/TRAINING PROGRAM

## 2025-08-25 PROCEDURE — 91010 ESOPHAGUS MOTILITY STUDY: CPT | Mod: 26,,, | Performed by: STUDENT IN AN ORGANIZED HEALTH CARE EDUCATION/TRAINING PROGRAM

## 2025-08-25 PROCEDURE — 91037 ESOPH IMPED FUNCTION TEST: CPT | Mod: TC,HCNC | Performed by: STUDENT IN AN ORGANIZED HEALTH CARE EDUCATION/TRAINING PROGRAM

## 2025-08-25 PROCEDURE — 91010 ESOPHAGUS MOTILITY STUDY: CPT | Mod: TC | Performed by: STUDENT IN AN ORGANIZED HEALTH CARE EDUCATION/TRAINING PROGRAM

## 2025-08-25 RX ORDER — LIDOCAINE HYDROCHLORIDE 20 MG/ML
JELLY TOPICAL ONCE
Status: COMPLETED | OUTPATIENT
Start: 2025-08-25 | End: 2025-08-25

## 2025-08-25 RX ADMIN — LIDOCAINE HYDROCHLORIDE 10 ML: 20 JELLY TOPICAL at 07:08

## 2025-08-26 PROCEDURE — 91037 ESOPH IMPED FUNCTION TEST: CPT | Mod: 26,HCNC,, | Performed by: STUDENT IN AN ORGANIZED HEALTH CARE EDUCATION/TRAINING PROGRAM
